# Patient Record
Sex: FEMALE | Race: WHITE | NOT HISPANIC OR LATINO | Employment: OTHER | URBAN - METROPOLITAN AREA
[De-identification: names, ages, dates, MRNs, and addresses within clinical notes are randomized per-mention and may not be internally consistent; named-entity substitution may affect disease eponyms.]

---

## 2017-11-24 ENCOUNTER — HOSPITAL ENCOUNTER (OUTPATIENT)
Dept: CARDIOLOGY | Facility: MEDICAL CENTER | Age: 52
End: 2017-11-24
Attending: PHYSICAL MEDICINE & REHABILITATION
Payer: MEDICARE

## 2017-11-24 DIAGNOSIS — I87.2 PERIPHERAL VENOUS INSUFFICIENCY: ICD-10-CM

## 2017-11-24 LAB
LV EJECT FRACT  99904: 0
LV EJECT FRACT MOD 2C 99903: 52.47
LV EJECT FRACT MOD 4C 99902: 67.08
LV EJECT FRACT MOD BP 99901: 60.8

## 2017-11-24 PROCEDURE — 93306 TTE W/DOPPLER COMPLETE: CPT

## 2017-11-24 PROCEDURE — 93306 TTE W/DOPPLER COMPLETE: CPT | Mod: 26 | Performed by: INTERNAL MEDICINE

## 2017-11-29 ENCOUNTER — HOSPITAL ENCOUNTER (OUTPATIENT)
Dept: RADIOLOGY | Facility: MEDICAL CENTER | Age: 52
End: 2017-11-29
Attending: PHYSICAL MEDICINE & REHABILITATION
Payer: MEDICARE

## 2017-11-29 DIAGNOSIS — M17.11 PRIMARY LOCALIZED OSTEOARTHROSIS OF RIGHT LOWER LEG: ICD-10-CM

## 2017-11-29 DIAGNOSIS — M54.16 LUMBAR RADICULOPATHY: ICD-10-CM

## 2017-11-29 DIAGNOSIS — M17.12 PRIMARY LOCALIZED OSTEOARTHROSIS OF LEFT LOWER LEG: ICD-10-CM

## 2017-11-29 DIAGNOSIS — M96.1 POSTLAMINECTOMY SYNDROME, CERVICAL REGION: ICD-10-CM

## 2017-11-29 PROCEDURE — 72148 MRI LUMBAR SPINE W/O DYE: CPT

## 2017-12-08 ENCOUNTER — HOSPITAL ENCOUNTER (OUTPATIENT)
Dept: RADIOLOGY | Facility: MEDICAL CENTER | Age: 52
End: 2017-12-08
Attending: PHYSICAL MEDICINE & REHABILITATION
Payer: MEDICARE

## 2017-12-08 DIAGNOSIS — M17.12 PRIMARY LOCALIZED OSTEOARTHROSIS OF LEFT LOWER LEG: ICD-10-CM

## 2017-12-08 PROCEDURE — 72141 MRI NECK SPINE W/O DYE: CPT

## 2017-12-08 PROCEDURE — 73564 X-RAY EXAM KNEE 4 OR MORE: CPT | Mod: RT

## 2017-12-08 PROCEDURE — 73564 X-RAY EXAM KNEE 4 OR MORE: CPT | Mod: LT

## 2018-01-10 ENCOUNTER — TELEPHONE (OUTPATIENT)
Dept: CARDIOLOGY | Facility: MEDICAL CENTER | Age: 53
End: 2018-01-10

## 2018-01-10 NOTE — TELEPHONE ENCOUNTER
Recent labs at [facility], I obtained a copy for the visit.     Address: [address] [place] [id number]  Phone: [phone]  Fax Medical Records: [phone]  Dr. Kameron cSott    The patient used to see Dr. Scott in [place] from 2014 to current. The last time the patient saw Dr. Scott was Spr[place] of 2017. I will send a record release to obtain records and the patient does not need to sign for the release.    I confirmed this information with the patient and confirmed appointment with TF on 01/11/18 @ [facility].

## 2018-01-12 ENCOUNTER — OFFICE VISIT (OUTPATIENT)
Dept: CARDIOLOGY | Facility: MEDICAL CENTER | Age: 53
End: 2018-01-12
Payer: MEDICARE

## 2018-01-12 VITALS
HEART RATE: 74 BPM | HEIGHT: 62 IN | SYSTOLIC BLOOD PRESSURE: 130 MMHG | RESPIRATION RATE: 16 BRPM | WEIGHT: 277 LBS | OXYGEN SATURATION: 94 % | BODY MASS INDEX: 50.97 KG/M2 | DIASTOLIC BLOOD PRESSURE: 84 MMHG

## 2018-01-12 DIAGNOSIS — I82.5Y3 CHRONIC DEEP VEIN THROMBOSIS (DVT) OF PROXIMAL VEIN OF BOTH LOWER EXTREMITIES (HCC): ICD-10-CM

## 2018-01-12 DIAGNOSIS — G47.33 OBSTRUCTIVE SLEEP APNEA SYNDROME: ICD-10-CM

## 2018-01-12 DIAGNOSIS — I10 ESSENTIAL HYPERTENSION, BENIGN: ICD-10-CM

## 2018-01-12 DIAGNOSIS — R00.1 SINUS BRADYCARDIA: ICD-10-CM

## 2018-01-12 DIAGNOSIS — I51.7 ENLARGED LA (LEFT ATRIUM): ICD-10-CM

## 2018-01-12 DIAGNOSIS — R06.02 SHORTNESS OF BREATH: ICD-10-CM

## 2018-01-12 PROCEDURE — 93000 ELECTROCARDIOGRAM COMPLETE: CPT | Performed by: INTERNAL MEDICINE

## 2018-01-12 PROCEDURE — 99204 OFFICE O/P NEW MOD 45 MIN: CPT | Performed by: INTERNAL MEDICINE

## 2018-01-12 RX ORDER — OXYCODONE HYDROCHLORIDE 10 MG/1
10 TABLET ORAL EVERY 4 HOURS PRN
Status: ON HOLD | COMMUNITY
End: 2023-10-22 | Stop reason: SDUPTHER

## 2018-01-12 RX ORDER — GABAPENTIN 300 MG/1
300 CAPSULE ORAL DAILY
COMMUNITY

## 2018-01-13 NOTE — PROGRESS NOTES
Subjective:   Patricia Tietz is a 52 y.o. female who presents today for a preoperative assessment regarding an abnormal echocardiogram. Patient has a history of sinus bradycardia which does not appear to be symptomatic at this time. The patient does have occasional episodes of lightheadedness but is does not appear to be out of proportion to what might be expected. 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 She has had specialized care at [facility] and has plans on seeking repeat evaluation there regarding further neck problems. Has a history of hypertension which has been treated for 2 or 3 years. Currently she takes losartan which is not on her medicine list. She also has a problem with chronic edema both lower extremities secondary to recurrent DVT. She takes Lasix and potassium supplement for that. She is on chronic Coumadin anticoagulation due to the recurrent DVT. At one point the patient was taking Aminophyllin to increase her heart rate because she was felt to be too young to require a pacemaker.    Her echocardiogram was done as part of the preoperative evaluation. It shows normal left ventricular size and systolic function, left ventricular hypertrophy, and a dilated left atrium which was reported as severe but looks more moderate to me. There was no evidence of diastolic dysfunction. Her EKG today reveals a sinus mechanism, first-degree AV block, and probable left ventricular hypertrophy with associated ST segment changes.    The patient does not have symptoms of congestive heart failure such as nocturnal dyspnea. She does have edema due to chronic venous insufficiency. She is short of breath because of chronic deconditioning and obesity. She has obstructive sleep apnea with airway obstruction from her neck problem. She uses CPAP with oxygen at night. There are no symptoms to suggest angina, chest pain, or ischemic symptoms in general.  Her father had bypass surgery at age 66 and her mother had hypertension. The patient is a  lifelong nonsmoker and is unaware of cholesterol elevation. She is obese and sedentary.    Chief Complaint: Enlarged left atrium     Past Medical History:   Diagnosis Date   • Anemia    • Chronic anticoagulation    • Chronic pain syndrome    • DVT of lower extremity (deep venous thrombosis) (CMS-HCC)    • Dyspnea    • Nephrolithiasis    • Sinus bradycardia    • Sleep apnea     Uses a CPAP with oxygen.     Past Surgical History:   Procedure Laterality Date   • APPENDECTOMY     • OTHER      Neck surgery     History reviewed. No pertinent family history.  History   Smoking Status   • Never Smoker   Smokeless Tobacco   • Never Used     No Known Allergies  Outpatient Encounter Prescriptions as of 1/12/2018   Medication Sig Dispense Refill   • oxycodone (OXY-IR) 15 MG immediate release tablet Take 15 mg by mouth.     • gabapentin (NEURONTIN) 300 MG Cap Take 300 mg by mouth every bedtime.     • furosemide (LASIX) 40 MG TABS TAKE 1 TABLET BY MOUTH EVERY DAY 90 Tab 2   • potassium chloride SA (K-DUR) 20 MEQ TBCR TAKE 1 TABLET BY MOUTH EVERY DAY 90 Tab 2   • Ferrous Sulfate (IRON) 325 (65 FE) MG TABS Take 1 Tab by mouth 2 Times a Day.     • warfarin (COUMADIN) 5 MG TABS Take 1-2 tablets daily as directed by  Tab 3   • amitriptyline (ELAVIL) 50 MG TABS Take 50 mg by mouth every evening.     • citalopram (CELEXA) 40 MG TABS Take 40 mg by mouth every day.     • theophylline SR (THEODUR) 200 MG TB12 TAKE 1 TABLET BY MOUTH TWICE DAILY (Patient not taking: Reported on 1/12/2018) 180 Tab 3   • morphine SR (MS CONTIN) 60 MG TB12 Take 60 mg by mouth 3 times a day. Currently off medication     • oxycodone-acetaminophen (PERCOCET) 5-325 MG TABS Take 1-2 Tabs by mouth every 6 hours as needed.       No facility-administered encounter medications on file as of 1/12/2018.      ROS. The review of systems she was evaluated with the patient. She  "complains of some fatigue and generalized weakness itching and rash over skin primarily her lower extremities, headache, occasional sore throat, and blurred vision. She has orthopnea but it's due to upper airway obstruction. Edema is noted in the history of present illness. She has a cough and exertional shortness of breath chronically. She occasionally has heartburn and has chronic pain in her neck back in various joints. In the past she has been subject to falls due to balance problems. She walks with the aid of a walker. He complains of easy bruisability and seasonal allergies. She has some dizziness as well as some tingling and focal weakness. She has relatively chronic depression but no suicidal ideation. She cries easily when she is upset. When she gets upset she also has trouble breathing with significant stridor noted other responses were negative     Objective:   /84   Pulse 74   Resp 16   Ht 1.575 m (5' 2\")   Wt (!) 125.6 kg (277 lb)   SpO2 94%   BMI 50.66 kg/m²     Physical Exam   Exam:    Vitals:    01/12/18 1541   BP: 130/84   Pulse: 74   Resp: 16   SpO2: 94%   Weight: (!) 125.6 kg (277 lb)   Height: 1.575 m (5' 2\")     Constitutional: Well developed, short statured, obese female in no acute distress. Appears approximate stated age and provides a good history. She cries when describing her medical problems which results in obvious stridor.   HEENT: Normocephalic, pupils are equal and responsive.No arcus. Conjunctiva and sclera are clear. No xanthelasma. No diagonal ear lobe crease noted. Mucus membranes are moist and pink. Good oral hygiene  Neck: Short, obese. No JVD or HJR.  Good carotid upstroke with no bruit. No lymphadenopathy, No thyroid enlargement. Redundant tissue  Cardiovascular: Regular rhythm, no ectopics. Short systolic murmur at the base with little radiation. No diastolic murmur, gallop, rub or click. No lift, heave,  thrill, or cardiomegaly  Lungs: Normal effort, Clear to " auscultation and percussion. No rales, rhonchi, wheezing Abdomen: Soft, non tender, obese. No liver, kidney, spleen or mass palpable. The abdominal aorta is not palpable. Active bowel sounds are noted and there is no bruit  Extremities:  Unna boot on both lower extremities could not  palpate posterior tibial and dorsal pedal pulses on either side.   Skin: Suspected skin changes of chronic venous insufficiency by report  Neurologic: Alert & oriented. Strength and sensation grossly intact. No lateralizing signs  Psychiatric:  Affect, mood, and judgement are appropriate although she is a bit depressed and cries easily    Assessment:     1. Sinus bradycardia  EKG   2. Enlarged LA (left atrium)  EKG   3. Shortness of breath     4. Obstructive sleep apnea syndrome     5. Chronic deep vein thrombosis (DVT) of proximal vein of both lower extremities (CMS-HCC)     6. Essential hypertension, benign         Medical Decision Making:  Today's Assessment / Status / Plan:   The echocardiogram was reviewed. Reveals left ventricular hypertrophy and normal left ventricular systolic function and normal diastolic dysfunction. The left atrium is indeed enlarged but not severely so, in my opinion. Her EKG reveals findings consistent with left ventricular hypertrophy and associated ST segment changes. He has first-degree AV block. It would appear her breathlessness is due to a combination of abnormalities including obstructive sleep apnea, short neck syndrome, obesity, and deconditioning. There are no findings suggesting congestive heart failure and she has no symptoms that would suggest angina or other ischemic symptoms. Her cardiovascular risk for proposed neck surgery is acceptably low without further testing being necessary. Her cervical anatomy may make intubation challenging. The size of her left atrium is not likely to influence her operative or postoperative course although she is at risk of developing atrial fibrillation which  can be managed if that would occur.    The patient's blood pressure is well controlled on current regimen. I think she is taking losartan but that is not listed on her medication list. She will follow-up with Dr. Muniz on a regular basis and can return here on a when necessary as estimated by Dr. Muniz or the patient. She has multiple medical problems but I don't believe her cardiac condition is one of them.

## 2018-01-15 LAB — EKG IMPRESSION: NORMAL

## 2018-04-30 ENCOUNTER — HOSPITAL ENCOUNTER (OUTPATIENT)
Dept: RADIOLOGY | Facility: MEDICAL CENTER | Age: 53
End: 2018-04-30
Attending: SPECIALIST
Payer: MEDICARE

## 2018-04-30 DIAGNOSIS — M54.2 CERVICALGIA: ICD-10-CM

## 2018-04-30 DIAGNOSIS — M54.6 PAIN IN THORACIC SPINE: ICD-10-CM

## 2018-04-30 DIAGNOSIS — M54.40 ACUTE RIGHT-SIDED LOW BACK PAIN WITH SCIATICA, SCIATICA LATERALITY UNSPECIFIED: ICD-10-CM

## 2018-04-30 PROCEDURE — 72100 X-RAY EXAM L-S SPINE 2/3 VWS: CPT

## 2018-04-30 PROCEDURE — 72072 X-RAY EXAM THORAC SPINE 3VWS: CPT

## 2018-04-30 PROCEDURE — 72050 X-RAY EXAM NECK SPINE 4/5VWS: CPT

## 2018-10-04 ENCOUNTER — HOSPITAL ENCOUNTER (OUTPATIENT)
Dept: RADIOLOGY | Facility: MEDICAL CENTER | Age: 53
End: 2018-10-04
Attending: PHYSICIAN ASSISTANT
Payer: MEDICARE

## 2018-10-04 DIAGNOSIS — M79.671 RIGHT FOOT PAIN: ICD-10-CM

## 2018-10-04 PROCEDURE — 73630 X-RAY EXAM OF FOOT: CPT | Mod: RT

## 2021-01-07 ENCOUNTER — NON-PROVIDER VISIT (OUTPATIENT)
Dept: WOUND CARE | Facility: MEDICAL CENTER | Age: 56
End: 2021-01-07
Attending: INTERNAL MEDICINE
Payer: MEDICARE

## 2021-01-07 DIAGNOSIS — Z43.6 ATTENTION TO UROSTOMY (HCC): ICD-10-CM

## 2021-01-07 DIAGNOSIS — E66.01 CLASS 3 SEVERE OBESITY DUE TO EXCESS CALORIES WITH SERIOUS COMORBIDITY AND BODY MASS INDEX (BMI) OF 50.0 TO 59.9 IN ADULT (HCC): ICD-10-CM

## 2021-01-07 PROCEDURE — 99211 OFF/OP EST MAY X REQ PHY/QHP: CPT

## 2021-01-07 PROCEDURE — 99213 OFFICE O/P EST LOW 20 MIN: CPT | Performed by: NURSE PRACTITIONER

## 2021-01-07 RX ORDER — LOSARTAN POTASSIUM 50 MG/1
50 TABLET ORAL 2 TIMES DAILY
Status: ON HOLD | COMMUNITY
End: 2023-09-30

## 2021-01-07 NOTE — CERTIFICATION
"Advanced [facility]  [facility]  [address]  [place] [address]  [phone] Fax#    Initial Ostomy Evaluation  For 90 Day Certification Period: 01/07/2021 - 04/07/2021     Referring Provider:  Dr. DANIELLE Muniz MD  Primary Provider:same  Consulting Providers:Ena MARTIN  Surgeon:Dr. Cantor in [place] in 1969      Start of Care:01/07/2021  Reason for referral: assess/ manage leaking urostomy with moises stomal wound       SUBJECTIVE:  \"It only lasts 1-2 days before it leaks and I've had a wound next to the stoma now for about 6 months\"    HPI: Pt is a 55 year old obese lady who is debilitated with chronic pain syndrome who has a hx of urostomy since aged 5 due to what she describes as \"a defective bladder muscle\" that meant she was chronically incontinent. There are no urology notes available. She is referred by her PCP in [place]. She says she has [facility] coming in 2 x week.     Past Medical Hx:  Past Medical History:   Diagnosis Date   • Anemia    • Chronic anticoagulation    • Chronic pain syndrome    • DVT of lower extremity (deep venous thrombosis) (CMS-HCC) (HCC)    • Dyspnea    • Nephrolithiasis    • Sinus bradycardia    • Sleep apnea     Uses a CPAP with oxygen.     Surgical Hx:  Past Surgical History:   Procedure Laterality Date   • APPENDECTOMY     • OTHER      Neck surgery     Medications:  Current Outpatient Medications:   •  losartan (COZAAR) 50 MG Tab, Take 50 mg by mouth 2 Times a Day. Indications: High Blood Pressure Disorder, Disp: , Rfl:   •  oxycodone (OXY-IR) 15 MG immediate release tablet, Take 15 mg by mouth., Disp: , Rfl:   •  gabapentin (NEURONTIN) 300 MG Cap, Take 300 mg by mouth every bedtime., Disp: , Rfl:   •  furosemide (LASIX) 40 MG TABS, TAKE 1 TABLET BY MOUTH EVERY DAY, Disp: 90 Tab, Rfl: 2  •  potassium chloride SA (K-DUR) 20 MEQ TBCR, TAKE 1 TABLET BY MOUTH EVERY DAY, Disp: 90 Tab, Rfl: 2  •  theophylline SR " (THEODUR) 200 MG TB12, TAKE 1 TABLET BY MOUTH TWICE DAILY (Patient not taking: Reported on 1/12/2018), Disp: 180 Tab, Rfl: 3  •  Ferrous Sulfate (IRON) 325 (65 FE) MG TABS, Take 1 Tab by mouth 2 Times a Day., Disp: , Rfl:   •  warfarin (COUMADIN) 5 MG TABS, Take 1-2 tablets daily as directed by INR, Disp: 180 Tab, Rfl: 3  •  morphine SR (MS CONTIN) 60 MG TB12, Take 60 mg by mouth 3 times a day. Currently off medication, Disp: , Rfl:   •  amitriptyline (ELAVIL) 50 MG TABS, Take 50 mg by mouth every evening., Disp: , Rfl:   •  citalopram (CELEXA) 40 MG TABS, Take 40 mg by mouth every day., Disp: , Rfl:   •  oxycodone-acetaminophen (PERCOCET) 5-325 MG TABS, Take 1-2 Tabs by mouth every 6 hours as needed., Disp: , Rfl:   Allergies:Patient has no known allergies.    Social Hx:  Social History     Socioeconomic History   • Marital status: Single     Spouse name: Not on file   • Number of children: Not on file   • Years of education: Not on file   • Highest education level: Not on file   Occupational History   • Not on file   Social Needs   • Financial resource strain: Not on file   • Food insecurity     Worry: Not on file     Inability: Not on file   • Transportation needs     Medical: Not on file     Non-medical: Not on file   Tobacco Use   • Smoking status: Never Smoker   • Smokeless tobacco: Never Used   Substance and Sexual Activity   • Alcohol use: No   • Drug use: No   • Sexual activity: Not on file   Lifestyle   • Physical activity     Days per week: Not on file     Minutes per session: Not on file   • Stress: Not on file   Relationships   • Social connections     Talks on phone: Not on file     Gets together: Not on file     Attends Rastafarian service: Not on file     Active member of club or organization: Not on file     Attends meetings of clubs or organizations: Not on file     Relationship status: Not on file   • Intimate partner violence     Fear of current or ex partner: Not on file     Emotionally abused:  "Not on file     Physically abused: Not on file     Forced sexual activity: Not on file   Other Topics Concern   • Not on file   Social History Narrative   • Not on file         OBJECTIVE:     STOMA ASSESSMENT:   Type:  ____Ileostomy     ____Colostomy    __x__Urostomy    ____Other     Location:   LLQ   Size:1 5/8   Shape: irregular, flat/retracted   Color:red, moist   Protrudes:      ___ >1 inch               __x_ <1 inch (flat/retracted)   [place]:  Central and patent   Mucocutaneous Junction: intact circumferentially     Skin indentations, creases or scar tissue:pt's stoma folds under her pannus when she sits.  Moises-stomal Skin Problems:There is a wound immediately peristomally at 3 o'clock. There is scar tissue moises stoma and also severe dermatitis, apparently a resolving contact dermatitis as there are clear lines of delineation in a square shape suggesting allergy to tape.       Effluent / Flatus:urine is light yellow, clear   Photo  _x___ Yes ____ No            Pouching Procedure:   Old appliance removed. Old ostomy appliance removed using [place] medical adhesive remover spray, and by pushing skin away from appliance to avoid skin tears.       Peristomal skin cleansed with: warm water on washcloth. dried    Peristomal skin treated with: Luxiq foam to areas of dermatitis, allowed to dry. Call[employer] medical adhesive spray to moises stomal skin. Dab of zinc oxide to wound, then a wedge of thin Hydrofera Blue stoma ring dressing placed over this.    Ostomy appliance used: Next, a flat CTF 70mm Callum Cera Plus flange with an liam 2\" cohesive seal was applied with a corresponding drainable urostomy pouch         Patient Education: Change urostomies every 3-5 days.  Change appliance immediately if it is leaking or peristomal skin feels irritated, has itching, or  burning. The above appliance may not last more than a day or two but pressure needs to be taken off the wound area in order for it to heal. Do not wear " stoma belt. Ok to wear hernia support belt (pt says HH nurse has measured her and ordered one for her). May apply triamcinolone ointment to moises stomal skin outside of flange area 2 x day to reduce itching/dermatitis.   To change the appliance, remove previous appliance, cleanse peristomal skin with warm water/washcloth, pat dry, make an ostomy template or use cardboard measuring guide and trace ostomy shape onto back of barrier, cut out barrier, apply a paste ring around barrier opening and apply appliance. Empty pouches when no more than ½ full. Check contents every 2 hours or as needed. Do not leave soap residue on tissue and do not use baby wipes or skin prep wipes.     Should you experience any significant changes in your condition, such as infection (redness, swelling, localized heat, increased pain, fever > 101 F, chills) or have any questions regarding your home care instructions, please contact the wound center at [phone]. If after hours, contact your primary care physician or go to the hospital emergency room.       Verbal/demonstration instructions of above pouching procedure provided to patient/family.      Response: Pt and CG present say they have good understanding. They do not want to wait for me to type and print instr on the AVS. Pt says she will access it from home on [employer]      PLAN: weekly to assess progress of wound healing. We will try to find out who pt's urologist is (she doesn't remember) and get notes.     Supplies Needed:   Appliance type:    Callum as above             Other:      Accessories:         Supplier: [employer] currently    Frequency:  Weekly.       Professional Collaboration:  Evaluation notes forwarded to referring provider. Pt was seen and evaluated today by Ena MARTIN. She will send a Rx for TAC to pt's pharmacy      At the time of each visit, a thorough assessment of the patient is completed to assure appropriateness of our plan of care.  The plan of  care may need to be adapted from the original plan of care to address any significant changes in patient status.    Clinician Signature:  _________________________________  Date:  ____________    Physician Signature:  ________________________________  Date:  ____________

## 2021-01-08 DIAGNOSIS — L30.9 DERMATITIS: ICD-10-CM

## 2021-01-08 RX ORDER — TRIAMCINOLONE ACETONIDE 1 MG/G
1 OINTMENT TOPICAL DAILY
Qty: 30 G | Refills: 1 | Status: ON HOLD | OUTPATIENT
Start: 2021-01-08 | End: 2023-09-29

## 2021-01-08 NOTE — PATIENT INSTRUCTIONS
Patient Education: Change urostomies every 3-5 days.  Change appliance immediately if it is leaking or peristomal skin feels irritated, has itching, or  burning. The above appliance may not last more than a day or two but pressure needs to be taken off the wound area in order for it to heal. Do not wear stoma belt. Ok to wear hernia support belt (pt says HH nurse has measured her and ordered one for her). May apply triamcinolone ointment to moises stomal skin outside of flange area 2 x day to reduce itching/dermatitis.   To change the appliance, remove previous appliance, cleanse peristomal skin with warm water/washcloth, pat dry, make an ostomy template or use cardboard measuring guide and trace ostomy shape onto back of barrier, cut out barrier, apply a paste ring around barrier opening and apply appliance. Empty pouches when no more than ½ full. Check contents every 2 hours or as needed. Do not leave soap residue on tissue and do not use baby wipes or skin prep wipes.     Should you experience any significant changes in your condition, such as infection (redness, swelling, localized heat, increased pain, fever > 101 F, chills) or have any questions regarding your home care instructions, please contact the wound center at [phone]. If after hours, contact your primary care physician or go to the hospital emergency room.       Verbal/demonstration instructions of above pouching procedure provided to patient/family.      Response: Pt and CG present say they have good understanding. They do not want to wait for me to type and print instr on the AVS. Pt says she will access it from home on [employer]

## 2021-01-09 NOTE — PROGRESS NOTES
Rx for triamcinolone sent to patient's pharmacy.  Patient to apply to affected skin around stoma daily.  Not to exceed 3 weeks.

## 2021-01-12 ENCOUNTER — OFFICE VISIT (OUTPATIENT)
Dept: WOUND CARE | Facility: MEDICAL CENTER | Age: 56
End: 2021-01-12
Attending: INTERNAL MEDICINE
Payer: MEDICARE

## 2021-01-12 VITALS
HEART RATE: 73 BPM | OXYGEN SATURATION: 98 % | RESPIRATION RATE: 20 BRPM | DIASTOLIC BLOOD PRESSURE: 79 MMHG | SYSTOLIC BLOOD PRESSURE: 143 MMHG | TEMPERATURE: 97.3 F

## 2021-01-12 DIAGNOSIS — E66.01 CLASS 3 SEVERE OBESITY DUE TO EXCESS CALORIES WITH SERIOUS COMORBIDITY AND BODY MASS INDEX (BMI) OF 50.0 TO 59.9 IN ADULT (HCC): ICD-10-CM

## 2021-01-12 DIAGNOSIS — Z43.6 ATTENTION TO UROSTOMY (HCC): ICD-10-CM

## 2021-01-12 PROBLEM — Z93.6 PRESENCE OF UROSTOMY (HCC): Status: ACTIVE | Noted: 2021-01-12

## 2021-01-12 PROCEDURE — 99214 OFFICE O/P EST MOD 30 MIN: CPT

## 2021-01-12 PROCEDURE — 99212 OFFICE O/P EST SF 10 MIN: CPT | Performed by: NURSE PRACTITIONER

## 2021-01-12 ASSESSMENT — ENCOUNTER SYMPTOMS
SHORTNESS OF BREATH: 0
VOMITING: 0
FEVER: 0
CONSTIPATION: 0
DIARRHEA: 0
NAUSEA: 0
CHILLS: 0
FEVER: 0
CHILLS: 0
COUGH: 0
VOMITING: 0
NAUSEA: 0
CONSTIPATION: 0
COUGH: 0
DIARRHEA: 0
SHORTNESS OF BREATH: 0

## 2021-01-12 ASSESSMENT — PAIN SCALES - GENERAL: PAINLEVEL: NO PAIN

## 2021-01-12 NOTE — PROGRESS NOTES
"Provider Encounter- Ostomy Encounter        HISTORY OF PRESENT ILLNESS  Wound History:    START OF CARE IN CLINIC: 1/7/2021    REFERRING PROVIDER: Dr. DANIELLE Muniz MD     OSTOMY TYPE urostomy   LOCATION: Left lower quadrant              OSTOMY HISTORY: Patient has had a urostomy since age 5 due to what she describes as, \"a defective bladder muscle\", which meant that she was chronically incontinent.  There are no surgical records available.  She was referred to [facility] by her PCP in [place] due to difficulties with pouching.  She has a urologist in [place], however does not recall his name.  She has [employer] for assistance with her urostomy.   SURGERY: Urostomy at age 5 (ileal conduit?)   OSTOMY ISSUES: Frequent leakage, peristomal skin breakdown, peristomal wound    Pertinent Medical History: Obesity, chronic pain syndrome    Patient's problem list, allergies, and current medications reviewed and updated in Epic    Interval History:  1/12/2021 Joint visit with ostomy RN, refer to nursing note. Wear time 1 to 3 days.  Frequency of emptying 4-6 times per day. Leaks frequent.       REVIEW OF SYSTEMS:   Review of Systems   Constitutional: Negative for chills and fever.   Respiratory: Negative for cough and shortness of breath.    Cardiovascular: Negative for chest pain and leg swelling.   Gastrointestinal: Negative for constipation, diarrhea, nausea and vomiting.   Genitourinary:        Urostomy since age 5   Skin: Positive for itching and rash.        Skin around stoma red and itchy  Painful wound to peristomal skin       PHYSICAL EXAMINATION:   There were no vitals taken for this visit.    Physical Exam   Constitutional: She is oriented to person, place, and time and well-developed, well-nourished, and in no distress.   Obese   HENT:   Head: Normocephalic.   Eyes: Pupils are equal, round, and reactive to light.   Pulmonary/Chest: Effort normal.   Abdominal: Soft.   Musculoskeletal: Normal range of motion. "   Neurological: She is alert and oriented to person, place, and time.   Skin: Skin is warm.   Peristomal skin around left lower quadrant urostomy with rash radiating approximately 20 cm around  Full-thickness wound to peristomal skin at about 3:00  Refer to wound flowsheet and photos         STOMA ASSESSMENT/PROCEDURE: Peristomal skin care, pouching procedure and ostomy teaching by ostomy RN.  Refer to Ostomy RN Assessment and Photo.    Type:  ____Ileostomy     ____Colostomy    __x__Urostomy    ____Other                 Location:   LLQ              Size:1 5/8              Shape: irregular, flat/retracted              Color:red, moist              Protrudes:      ___ >1 inch               __x_ <1 inch (flat/retracted)              [place]:  Central and patent              Mucocutaneous Junction: intact circumferentially                Skin indentations, creases or scar tissue:pt's stoma folds under her pannus when she sits.            Moises-stomal Skin Problems:There is a wound immediately peristomally at 3 o'clock. There is scar tissue moises stoma and also severe dermatitis, apparently a resolving contact dermatitis as there are clear lines of delineation in a square shape suggesting allergy to tape.                   Effluent / Flatus:urine is light yellow, clear              Photo  _x___ Yes ____ No           ASSESSMENT AND PLAN:   1. Attention to urostomy ([facility])  Comments: Urostomy since age 5, per patient due to defective bladder muscle.  [place] complicated by obesity, and flush stoma    1/7/2021: Initial clinic visit.  Peristomal dermatitis radiating approximately 20 cm around stoma.  Wear time 1 to 3 days, with frequent leakage.  Full-thickness wound at about 3:00.  -Pouching modified by ostomy RN  -Instructions sent to [facility]  -Patient to return to clinic weekly until appropriate pouching system found    2. Peristomal skin complication    1/7/2021: Peristomal dermatitis and full-thickness  wound  -Peristomal skin treated, wound care completed by ostomy RN    3. Class 3 severe obesity due to excess calories with serious comorbidity and body mass index (BMI) of 50.0 to 59.9 in adult (HCC)  Comments: Complicating factor.  Obesity creates pouching challenge    25 min spent face to face with patient, >50% of time spent counseling, coordinating care, reviewing records, discussing POC, educating patient regarding ostomy care, peristomal skin care, nutrition. Emotional support provided regarding life change following ostomy- support resources provided.     Please note that this note may have been created using voice recognition software. I have worked with technical experts from [facility] to optimize the interface.  I have made every reasonable attempt to correct obvious errors, but there may be errors of grammar and possibly content that I did not discover before finalizing the note.    N

## 2021-01-12 NOTE — PATIENT INSTRUCTIONS
Change urostomies every 3-5 days.  Change appliance immediately if it is leaking or peristomal skin feels irritated, has itching, or  burning. The above appliance may not last more than a day or two but pressure needs to be taken off the wound area in order for it to heal. Do not wear stoma belt. Ok to wear hernia support belt (pt says HH nurse has measured her and ordered one for her). May apply triamcinolone ointment to moises stomal skin outside of flange area 2 x day to reduce itching/dermatitis.   To change the appliance, remove previous appliance, cleanse peristomal skin with warm water/washcloth, pat dry, make an ostomy template or use cardboard measuring guide and trace ostomy shape onto back of barrier, cut out barrier, apply a paste ring around barrier opening and apply appliance. Empty pouches when no more than ½ full. Check contents every 2 hours or as needed. Do not leave soap residue on tissue and do not use baby wipes or skin prep wipes.      Should you experience any significant changes in your condition, such as infection (redness, swelling, localized heat, increased pain, fever > 101 F, chills) or have any questions regarding your home care instructions, please contact the wound center at [phone]. If after hours, contact your primary care physician or go to the hospital emergency room.

## 2021-01-12 NOTE — WOUND TEAM
"Advanced Wound Care  [facility]  [address]  [place] [address]  [phone] Fax#    Ostomy Evaluation  For 90 Day Certification Period: 01/07/2021 - 04/07/2021     Referring Provider:  Dr. DANIELLE Muniz MD  Primary Provider:same  Consulting Providers:Ena MARTIN  Surgeon:Dr. Cantor in [place] in 1969      Start of Care:01/07/2021  Reason for referral: assess/ manage leaking urostomy with moises stomal wound       SUBJECTIVE:  \"It lasts ~2 days.\"    HPI: Pt is a 55 year old obese lady who is debilitated with chronic pain syndrome who has a hx of urostomy since aged 5 due to what she describes as \"a defective bladder muscle\" that meant she was chronically incontinent. There are no urology notes available. She is referred by her PCP in [place]. She says she has [facility] coming in 2 x week.     Past Medical Hx:  Past Medical History:   Diagnosis Date   • Anemia    • Chronic anticoagulation    • Chronic pain syndrome    • DVT of lower extremity (deep venous thrombosis) (HCC)    • Dyspnea    • Nephrolithiasis    • Sinus bradycardia    • Sleep apnea     Uses a CPAP with oxygen.     Surgical Hx:  Past Surgical History:   Procedure Laterality Date   • APPENDECTOMY     • OTHER      Neck surgery     Medications:  Current Outpatient Medications:   •  triamcinolone acetonide (KENALOG) 0.1 % Ointment, Apply 1 Application topically every day., Disp: 30 g, Rfl: 1  •  losartan (COZAAR) 50 MG Tab, Take 50 mg by mouth 2 Times a Day. Indications: High Blood Pressure Disorder, Disp: , Rfl:   •  oxycodone (OXY-IR) 15 MG immediate release tablet, Take 15 mg by mouth., Disp: , Rfl:   •  gabapentin (NEURONTIN) 300 MG Cap, Take 300 mg by mouth every bedtime., Disp: , Rfl:   •  furosemide (LASIX) 40 MG TABS, TAKE 1 TABLET BY MOUTH EVERY DAY, Disp: 90 Tab, Rfl: 2  •  potassium chloride SA (K-DUR) 20 MEQ TBCR, TAKE 1 TABLET BY MOUTH EVERY DAY, Disp: 90 Tab, Rfl: 2  •  " theophylline SR (THEODUR) 200 MG TB12, TAKE 1 TABLET BY MOUTH TWICE DAILY (Patient not taking: Reported on 1/12/2018), Disp: 180 Tab, Rfl: 3  •  Ferrous Sulfate (IRON) 325 (65 FE) MG TABS, Take 1 Tab by mouth 2 Times a Day., Disp: , Rfl:   •  warfarin (COUMADIN) 5 MG TABS, Take 1-2 tablets daily as directed by INR, Disp: 180 Tab, Rfl: 3  •  morphine SR (MS CONTIN) 60 MG TB12, Take 60 mg by mouth 3 times a day. Currently off medication, Disp: , Rfl:   •  amitriptyline (ELAVIL) 50 MG TABS, Take 50 mg by mouth every evening., Disp: , Rfl:   •  citalopram (CELEXA) 40 MG TABS, Take 40 mg by mouth every day., Disp: , Rfl:   •  oxycodone-acetaminophen (PERCOCET) 5-325 MG TABS, Take 1-2 Tabs by mouth every 6 hours as needed., Disp: , Rfl:   Allergies:Patient has no known allergies.    Social Hx:  Social History     Socioeconomic History   • Marital status: Single     Spouse name: Not on file   • Number of children: Not on file   • Years of education: Not on file   • Highest education level: Not on file   Occupational History   • Not on file   Social Needs   • Financial resource strain: Not on file   • Food insecurity     Worry: Not on file     Inability: Not on file   • Transportation needs     Medical: Not on file     Non-medical: Not on file   Tobacco Use   • Smoking status: Never Smoker   • Smokeless tobacco: Never Used   Substance and Sexual Activity   • Alcohol use: No   • Drug use: No   • Sexual activity: Not on file   Lifestyle   • Physical activity     Days per week: Not on file     Minutes per session: Not on file   • Stress: Not on file   Relationships   • Social connections     Talks on phone: Not on file     Gets together: Not on file     Attends Gnosticist service: Not on file     Active member of club or organization: Not on file     Attends meetings of clubs or organizations: Not on file     Relationship status: Not on file   • Intimate partner violence     Fear of current or ex partner: Not on file      "Emotionally abused: Not on file     Physically abused: Not on file     Forced sexual activity: Not on file   Other Topics Concern   • Not on file   Social History Narrative   • Not on file         OBJECTIVE:     STOMA ASSESSMENT:   Type:  ____Ileostomy     ____Colostomy    __x__Urostomy    ____Other     Location:   LLQ   Size:1 5/8   Shape: irregular, flat/retracted   Color:red, moist   Protrudes:      ___ >1 inch               __x_ <1 inch (flat/retracted)   [place]:  Central and patent   Mucocutaneous Junction: intact circumferentially     Skin indentations, creases or scar tissue:pt's stoma folds under her pannus when she sits.  Akila-stomal Skin Problems:There is a wound immediately peristomally at 3 o'clock. There is scar tissue akila stoma and also resolving dermatitis      Effluent / Flatus:urine is light yellow, clear   Photo  _x___ Yes ____ No            Pouching Procedure:   Old appliance removed. Old ostomy appliance removed using Callum medical adhesive remover spray, and by pushing skin away from appliance to avoid skin tears.       Peristomal skin cleansed with: warm water on washcloth. dried    Peristomal skin treated with: Luxiq foam to areas of dermatitis, allowed to dry. [place] medical adhesive spray to akila stomal skin. Dab of zinc oxide to wound, then a wedge of thin Hydrofera Blue stoma ring dressing placed over this.    Ostomy appliance used: Next, a convatec liam 2\" cohesive seal was applied and then a callum flat CTF 70mm [place] Cera Plus flange with a corresponding drainable urostomy pouch, framed with coloplast brava strips. Hernia belt.  TAC at home to perimeter past appliance on abdomen.        Patient Education: Change urostomies every 3-5 days.  Change appliance immediately if it is leaking or peristomal skin feels irritated, has itching, or  burning. The above appliance may not last more than a day or two but pressure needs to be taken off the wound area in order for it to " heal. Do not wear stoma belt. Ok to wear hernia support belt (pt says  nurse has measured her and ordered one for her). May apply triamcinolone ointment to moises stomal skin outside of flange area 2 x day to reduce itching/dermatitis.   To change the appliance, remove previous appliance, cleanse peristomal skin with warm water/washcloth, pat dry, make an ostomy template or use cardboard measuring guide and trace ostomy shape onto back of barrier, cut out barrier, apply a paste ring around barrier opening and apply appliance. Empty pouches when no more than ½ full. Check contents every 2 hours or as needed. Do not leave soap residue on tissue and do not use baby wipes or skin prep wipes.     Should you experience any significant changes in your condition, such as infection (redness, swelling, localized heat, increased pain, fever > 101 F, chills) or have any questions regarding your home care instructions, please contact the wound center at [phone]. If after hours, contact your primary care physician or go to the hospital emergency room.       Verbal/demonstration instructions of above pouching procedure provided to patient/family.      Response: Pt and CG present say they have good understanding.      PLAN: weekly to assess progress of wound healing.     Supplies Needed:   Appliance type:    Callum as above             Other:      Accessories:         Supplier: [employer]  currently    Frequency:  Weekly.       Professional Collaboration:  VERONICA German. Received records from patient's urologist, Dr. Alexis Venegas from [facility]. Home Health orders faxed to [facility].       At the time of each visit, a thorough assessment of the patient is completed to assure appropriateness of our plan of care.  The plan of care may need to be adapted from the original plan of care to address any significant changes in patient status.    Clinician Signature:   _________________________________  Date:  ____________    Physician Signature:  ________________________________  Date:  ____________

## 2021-01-12 NOTE — PROGRESS NOTES
"Provider Encounter- Ostomy Encounter        HISTORY OF PRESENT ILLNESS  Wound History:    START OF CARE IN CLINIC: 1/7/2021    REFERRING PROVIDER: Dr. DANIELLE Muniz MD     OSTOMY TYPE urostomy   LOCATION: Left lower quadrant              OSTOMY HISTORY: Patient has had a urostomy since age 5 due to what she describes as, \"a defective bladder muscle\", which meant that she was chronically incontinent.  There are no surgical records available.  She was referred to [facility] by her PCP in [place] due to difficulties with pouching.  She has a urologist in [place], however does not recall his name.  She has [facility] for assistance with her urostomy.   SURGERY: Urostomy at age 5 (ileal conduit?)   OSTOMY ISSUES: Frequent leakage, peristomal skin breakdown, peristomal wound    Pertinent Medical History: Obesity, chronic pain syndrome    Patient's problem list, allergies, and current medications reviewed and updated in Epic    Interval History:  1/12/2021 Joint visit with ostomy RN, refer to nursing note. Wear time 1 to 3 days.  Frequency of emptying 4-6 times per day. Leaks frequent.     1/12/2021 : Clinic visit with EVRONICA Guerrero, FNDULCE-BC, CWGREGORIAN, CFCN.  Joint visit with ostomy RN, refer to nursing note.  Cathy states she is feeling well, denies fevers, chills, nausea, vomiting, cough or shortness of breath.  Ostomy appliance placed in clinic last week lasted less than 36 hours.  She replaced with her old system, and then home [facility] placed new plans on Sunday.  She has been using triamcinolone to exposed areas of dermatitis around stoma, states is feeling little better.  We were able to obtain urology notes from [place] urologist, Dr. Alexis Venegas.  Patient was seen by him for hematuria, which is since resolved.      REVIEW OF SYSTEMS:   Review of Systems   Constitutional: Negative for chills and fever.   Respiratory: Negative for cough and shortness of breath.    Cardiovascular: Negative for chest " pain and leg swelling.   Gastrointestinal: Negative for constipation, diarrhea, nausea and vomiting.   Genitourinary:        Urostomy since age 5   Skin: Positive for itching and rash.        Skin around stoma red and itchy  Painful wound to peristomal skin       PHYSICAL EXAMINATION:   /79 (Patient Position: Sitting)   Pulse 73   Temp 36.3 °C (97.3 °F) (Temporal)   Resp 20   SpO2 98%     Physical Exam   Constitutional: She is oriented to person, place, and time and well-developed, well-nourished, and in no distress.   Obese   HENT:   Head: Normocephalic.   Eyes: Pupils are equal, round, and reactive to light.   Pulmonary/Chest: Effort normal.   Abdominal: Soft.   Musculoskeletal: Normal range of motion.   Neurological: She is alert and oriented to person, place, and time.   Skin: Skin is warm.   Peristomal skin around left lower quadrant urostomy with rash radiating approximately 20 cm around  Full-thickness wound to peristomal skin at about 3:00  Refer to wound flowsheet and photos       Wound 01/07/21 Abdomen Lower;Lateral Left (Active)   Wound Image   01/12/21 1300   Site Assessment Red;Light Purple 01/12/21 1300   Periwound Assessment Scar tissue 01/12/21 1300   Margins Attached edges 01/12/21 1300   Closure Secondary intention 01/12/21 1300   Drainage Amount None 01/12/21 1300   Treatments Cleansed 01/12/21 1300   Wound Cleansing Normal Saline Irrigation 01/12/21 1300   Periwound Protectant Barrier Paste 01/12/21 1300   Dressing Cleansing/Solutions Not Applicable 01/12/21 1300   Dressing Options Hydrofera Blue Ready 01/12/21 1300   Dressing Changed Changed 01/12/21 1300   Dressing Change/Treatment Frequency Every 48 hrs, and As Needed 01/12/21 1300   Non-staged Wound Description Partial thickness 01/12/21 1300   Wound Length (cm) 0.4 cm 01/12/21 1300   Wound Width (cm) 0.3 cm 01/12/21 1300   Wound Depth (cm) 0.1 cm 01/12/21 1300   Wound Surface Area (cm^2) 0.12 cm^2 01/12/21 1300   Wound Volume  (cm^3) 0.01 cm^3 01/12/21 1300   Post-Procedure Length (cm) 0.4 cm 01/12/21 1300   Post-Procedure Width (cm) 0.3 cm 01/12/21 1300   Post-Procedure Depth (cm) 0.1 cm 01/12/21 1300   Post-Procedure Surface Area (cm^2) 0.12 cm^2 01/12/21 1300   Post-Procedure Volume (cm^3) 0.01 cm^3 01/12/21 1300   Wound Bed Granulation (%) 100 % 01/12/21 1300   Wound Bed Granulation (%) - Post-Procedure 100 % 01/12/21 1300   Tunneling (cm) 0 cm 01/12/21 1300   Undermining (cm) 0 cm 01/12/21 1300   Wound Odor None 01/12/21 1300   Exposed Structures None 01/12/21 1300          STOMA ASSESSMENT/PROCEDURE: Peristomal skin care, pouching procedure and ostomy teaching by ostomy RN.  Refer to Ostomy RN Assessment and Photo.    Type:  ____Ileostomy     ____Colostomy    __x__Urostomy    ____Other                 Location:   LLQ              Size:1 5/8              Shape: irregular, flat/retracted              Color:red, moist              Protrudes:      ___ >1 inch               __x_ <1 inch (flat/retracted)              [place]:  Central and patent              Mucocutaneous Junction: intact circumferentially                Skin indentations, creases or scar tissue:pt's stoma folds under her pannus when she sits.            Moises-stomal Skin Problems:There is a wound immediately peristomally at 3 o'clock. There is scar tissue moises stoma and also severe dermatitis, apparently a resolving contact dermatitis as there are clear lines of delineation in a square shape suggesting allergy to tape.                   Effluent / Flatus:urine is light yellow, clear              Photo  _x___ Yes ____ No               ASSESSMENT AND PLAN:   1. Attention to urostomy ([facility])  Comments: Urostomy since age 5, per patient due to defective bladder muscle.  Pouching complicated by obesity, and flush stoma    1/12/2020: Pouching continues to be a problem, wear time anywhere from 12 to 36 hours, frequent leakage  -Pouching modified by ostomy RN  -Instructions sent to  [facility]  -Patient to return to clinic weekly until appropriate pouching system found    2. Peristomal skin complication    1/12/2021: Peristomal dermatitis and full-thickness wound.  Area of wound has decreased significantly since last assessment.  -Peristomal skin treated, wound care completed by ostomy RN  -Flat barrier, avoid convexity due to concerns for pressure over wound    3. Class 3 severe obesity due to excess calories with serious comorbidity and body mass index (BMI) of 50.0 to 59.9 in adult (HCC)  Comments: Complicating factor.  Obesity creates pouching challenge    20 min spent face to face with patient, >50% of time spent counseling, coordinating care, reviewing records, discussing POC, educating patient regarding ostomy care, peristomal skin care, nutrition. Emotional support provided regarding life change following ostomy- support resources provided.     Please note that this note may have been created using voice recognition software. I have worked with technical experts from [facility] [employer] to optimize the interface.  I have made every reasonable attempt to correct obvious errors, but there may be errors of grammar and possibly content that I did not discover before finalizing the note.    N

## 2021-01-19 ENCOUNTER — NON-PROVIDER VISIT (OUTPATIENT)
Dept: WOUND CARE | Facility: MEDICAL CENTER | Age: 56
End: 2021-01-19
Attending: INTERNAL MEDICINE
Payer: MEDICARE

## 2021-01-19 DIAGNOSIS — Z43.6 ATTENTION TO UROSTOMY (HCC): ICD-10-CM

## 2021-01-19 DIAGNOSIS — L30.9 DERMATITIS: ICD-10-CM

## 2021-01-19 PROCEDURE — 99211 OFF/OP EST MAY X REQ PHY/QHP: CPT

## 2021-01-19 NOTE — WOUND TEAM
"Advanced Wound Care  [facility]  [address]  [place] [address]  [phone] Fax#    Ostomy Evaluation  For 90 Day Certification Period: 01/07/2021 - 04/07/2021     Referring Provider:  Dr. DANIELLE Muniz MD  Primary Provider:same  Consulting Providers:Ean MARTIN  Surgeon:Dr. Cantor in [place] in 1969      Start of Care:01/07/2021  Reason for referral: assess/ manage leaking urostomy with moises stomal wound       SUBJECTIVE:  Patient report it lasted 3 days    HPI: Pt is a 55 year old obese lady who is debilitated with chronic pain syndrome who has a hx of urostomy since aged 5 due to what she describes as \"a defective bladder muscle\" that meant she was chronically incontinent. There are no urology notes available. She is referred by her PCP in [place]. She says she has [facility] coming in 2 x week.     Past Medical Hx:  Past Medical History:   Diagnosis Date   • Anemia    • Chronic anticoagulation    • Chronic pain syndrome    • DVT of lower extremity (deep venous thrombosis) (HCC)    • Dyspnea    • Nephrolithiasis    • Sinus bradycardia    • Sleep apnea     Uses a CPAP with oxygen.     Surgical Hx:  Past Surgical History:   Procedure Laterality Date   • APPENDECTOMY     • OTHER      Neck surgery     Medications:  Current Outpatient Medications:   •  triamcinolone acetonide (KENALOG) 0.1 % Ointment, Apply 1 Application topically every day., Disp: 30 g, Rfl: 1  •  losartan (COZAAR) 50 MG Tab, Take 50 mg by mouth 2 Times a Day. Indications: High Blood Pressure Disorder, Disp: , Rfl:   •  oxycodone (OXY-IR) 15 MG immediate release tablet, Take 15 mg by mouth., Disp: , Rfl:   •  gabapentin (NEURONTIN) 300 MG Cap, Take 300 mg by mouth every bedtime., Disp: , Rfl:   •  furosemide (LASIX) 40 MG TABS, TAKE 1 TABLET BY MOUTH EVERY DAY, Disp: 90 Tab, Rfl: 2  •  potassium chloride SA (K-DUR) 20 MEQ TBCR, TAKE 1 TABLET BY MOUTH EVERY DAY, Disp: 90 Tab, " Rfl: 2  •  theophylline SR (THEODUR) 200 MG TB12, TAKE 1 TABLET BY MOUTH TWICE DAILY (Patient not taking: Reported on 1/12/2018), Disp: 180 Tab, Rfl: 3  •  Ferrous Sulfate (IRON) 325 (65 FE) MG TABS, Take 1 Tab by mouth 2 Times a Day., Disp: , Rfl:   •  warfarin (COUMADIN) 5 MG TABS, Take 1-2 tablets daily as directed by INR, Disp: 180 Tab, Rfl: 3  •  morphine SR (MS CONTIN) 60 MG TB12, Take 60 mg by mouth 3 times a day. Currently off medication, Disp: , Rfl:   •  amitriptyline (ELAVIL) 50 MG TABS, Take 50 mg by mouth every evening., Disp: , Rfl:   •  citalopram (CELEXA) 40 MG TABS, Take 40 mg by mouth every day., Disp: , Rfl:   •  oxycodone-acetaminophen (PERCOCET) 5-325 MG TABS, Take 1-2 Tabs by mouth every 6 hours as needed., Disp: , Rfl:   Allergies:Patient has no known allergies.    Social Hx:  Social History     Socioeconomic History   • Marital status: Single     Spouse name: Not on file   • Number of children: Not on file   • Years of education: Not on file   • Highest education level: Not on file   Occupational History   • Not on file   Social Needs   • Financial resource strain: Not on file   • Food insecurity     Worry: Not on file     Inability: Not on file   • Transportation needs     Medical: Not on file     Non-medical: Not on file   Tobacco Use   • Smoking status: Never Smoker   • Smokeless tobacco: Never Used   Substance and Sexual Activity   • Alcohol use: No   • Drug use: No   • Sexual activity: Not on file   Lifestyle   • Physical activity     Days per week: Not on file     Minutes per session: Not on file   • Stress: Not on file   Relationships   • Social connections     Talks on phone: Not on file     Gets together: Not on file     Attends Confucianist service: Not on file     Active member of club or organization: Not on file     Attends meetings of clubs or organizations: Not on file     Relationship status: Not on file   • Intimate partner violence     Fear of current or ex partner: Not on file  "    Emotionally abused: Not on file     Physically abused: Not on file     Forced sexual activity: Not on file   Other Topics Concern   • Not on file   Social History Narrative   • Not on file         OBJECTIVE:     STOMA ASSESSMENT:   Type:  ____Ileostomy     ____Colostomy    __x__Urostomy    ____Other     Location:   LLQ   Size:1 5/8   Shape: irregular, flat/retracted   Color:red, moist   Protrudes:      ___ >1 inch               __x_ <1 inch (flat/retracted)   [place]:  Central and patent   Mucocutaneous Junction: intact circumferentially     Skin indentations, creases or scar tissue:pt's stoma folds under her pannus when she sits.  Moises-stomal Skin Problems: Scar tissue resolved at previous wound site peristomally at 3 o'clock. Peristomal dermatitis resolving & no itching or burning reported.      Effluent / Flatus:urine is light yellow, clear   Photo  _x___ Yes ____ No            Pouching Procedure:   Old appliance removed. Old ostomy appliance removed using [employer] medical adhesive remover spray, and by pushing skin away from appliance to avoid skin tears.       Peristomal skin cleansed with: warm water on washcloth. dried    Peristomal skin treated with: Luxiq foam to areas of dermatitis, allowed to dry. [place] medical adhesive spray to moises stomal skin. Dab of zinc oxide to scar tissue.    Ostomy appliance used: [place] flat CTF 70mm [employer]um Cera Plus flange with a corresponding drainable urostomy pouch, framed with coloplast brava strips. (patient wanted to trial without 2\" Ligia ring). Belt.  TAC at home to perimeter past appliance on abdomen.      Patient Education: Change urostomies every 3-5 days.  Change appliance immediately if it is leaking or peristomal skin feels irritated, has itching, or  burning. The above appliance may not last more than a day or two but pressure needs to be taken off the wound area in order for it to heal. Do not wear stoma belt. Ok to wear hernia support belt (pt says "  nurse has measured her and ordered one for her). May apply triamcinolone ointment to moises stomal skin outside of flange area 2 x day to reduce itching/dermatitis.   To change the appliance, remove previous appliance, cleanse peristomal skin with warm water/washcloth, pat dry, make an ostomy template or use cardboard measuring guide and trace ostomy shape onto back of barrier, cut out barrier, apply a paste ring around barrier opening and apply appliance. Empty pouches when no more than ½ full. Check contents every 2 hours or as needed. Do not leave soap residue on tissue and do not use baby wipes or skin prep wipes.     Should you experience any significant changes in your condition, such as infection (redness, swelling, localized heat, increased pain, fever > 101 F, chills) or have any questions regarding your home care instructions, please contact the wound center at [phone]. If after hours, contact your primary care physician or go to the hospital emergency room.       Verbal/demonstration instructions of above pouching procedure provided to patient/family.      Response: Pt and CG present say they have good understanding.      PLAN: weekly to assess verification wound remains resolved and peristomal skin resolves from dermatitis.     Supplies Needed:   Appliance type:    [place] as above             Other:      Accessories:         Supplier: [employer]  currently    Frequency:  Weekly.       Professional Collaboration:  Home Health orders faxed to [facility].       At the time of each visit, a thorough assessment of the patient is completed to assure appropriateness of our plan of care.  The plan of care may need to be adapted from the original plan of care to address any significant changes in patient status.    Clinician Signature:  _________________________________  Date:  ____________    Physician Signature:  ________________________________  Date:  ____________

## 2021-01-19 NOTE — PATIENT INSTRUCTIONS
Change urostomies and ileostomies every 3-5 days. Change colostomies every 5-7 days. Change appliance immediately if it is leaking or peristomal skin feels irritated, has itching, or  burning.   To change the appliance, remove previous appliance, cleanse peristomal skin with warm water/washcloth, pat dry, make an ostomy template or use cardboard measuring guide and trace ostomy shape onto back of barrier, cut out barrier, apply a liam paste ring or not around barrier opening and apply appliance. Empty pouches when no more than ½ full. Check contents every 2 hours or as needed. Do not leave soap residue on tissue and do not use baby wipes or skin prep wipes.     Should you experience any significant changes in your condition, such as infection (redness, swelling, localized heat, increased pain, fever > 101 F, chills) or have any questions regarding your home care instructions, please contact the wound center at [phone]. If after hours, contact your primary care physician or go to the hospital emergency room.

## 2021-01-26 ENCOUNTER — OFFICE VISIT (OUTPATIENT)
Dept: WOUND CARE | Facility: MEDICAL CENTER | Age: 56
End: 2021-01-26
Attending: INTERNAL MEDICINE
Payer: MEDICARE

## 2021-01-26 VITALS
RESPIRATION RATE: 20 BRPM | HEART RATE: 75 BPM | TEMPERATURE: 97.9 F | DIASTOLIC BLOOD PRESSURE: 68 MMHG | OXYGEN SATURATION: 91 % | SYSTOLIC BLOOD PRESSURE: 132 MMHG

## 2021-01-26 DIAGNOSIS — E66.01 CLASS 3 SEVERE OBESITY DUE TO EXCESS CALORIES WITH SERIOUS COMORBIDITY AND BODY MASS INDEX (BMI) OF 50.0 TO 59.9 IN ADULT (HCC): ICD-10-CM

## 2021-01-26 DIAGNOSIS — Z43.6 ATTENTION TO UROSTOMY (HCC): Primary | ICD-10-CM

## 2021-01-26 PROCEDURE — 99214 OFFICE O/P EST MOD 30 MIN: CPT

## 2021-01-26 PROCEDURE — 99213 OFFICE O/P EST LOW 20 MIN: CPT | Performed by: NURSE PRACTITIONER

## 2021-01-26 ASSESSMENT — ENCOUNTER SYMPTOMS
COUGH: 0
DIARRHEA: 0
SHORTNESS OF BREATH: 0
NAUSEA: 0
CONSTIPATION: 0
FEVER: 0
CHILLS: 0
VOMITING: 0

## 2021-01-26 ASSESSMENT — PAIN SCALES - GENERAL: PAINLEVEL: NO PAIN

## 2021-01-26 NOTE — PATIENT INSTRUCTIONS
Change urostomies every 3-5 days.  Change appliance immediately if it is leaking or peristomal skin feels irritated, has itching, or  burning. The above appliance may not last more than a day or two but pressure needs to be taken off the wound area in order for it to heal. Do not wear stoma belt. Ok to wear hernia support belt (pt says HH nurse has measured her and ordered one for her). May apply triamcinolone ointment to moises stomal skin outside of flange area 2 x day to reduce itching/dermatitis.   To change the appliance, remove previous appliance, cleanse peristomal skin with warm water/washcloth, pat dry, make an ostomy template or use cardboard measuring guide and trace ostomy shape onto back of barrier, cut out barrier, apply a paste ring around barrier opening and apply appliance. Empty pouches when no more than ½ full. Check contents every 2 hours or as needed. Do not leave soap residue on tissue and do not use baby wipes or skin prep wipes.      Should you experience any significant changes in your condition, such as infection (redness, swelling, localized heat, increased pain, fever > 101 F, chills) or have any questions regarding your home care instructions, please contact the wound center at [phone]. If after hours, contact your primary care physician or go to the hospital emergency room.

## 2021-01-26 NOTE — WOUND TEAM
"Advanced Wound Care  [facility]  [address]  [place] [address]  [phone] Fax#    Ostomy Evaluation  For 90 Day Certification Period: 01/07/2021 - 04/07/2021     Referring Provider:  Dr. DANIELLE Muniz MD  Primary Provider:same  Consulting Providers:Ena MARTIN  Surgeon:Dr. Cantor in [place] in 1969      Start of Care:01/07/2021  Reason for referral: assess/ manage leaking urostomy with moises stomal wound       SUBJECTIVE:  \"It came off that night.\" (appliance placed without Ligia ring)    HPI: Pt is a 55 year old obese lady who is debilitated with chronic pain syndrome who has a hx of urostomy since aged 5 due to what she describes as \"a defective bladder muscle\" that meant she was chronically incontinent. There are no urology notes available. She is referred by her PCP in [place]. She says she has [facility] coming in 2 x week.     Past Medical Hx:  Past Medical History:   Diagnosis Date   • Anemia    • Chronic anticoagulation    • Chronic pain syndrome    • DVT of lower extremity (deep venous thrombosis) (HCC)    • Dyspnea    • Nephrolithiasis    • Sinus bradycardia    • Sleep apnea     Uses a CPAP with oxygen.     Surgical Hx:  Past Surgical History:   Procedure Laterality Date   • APPENDECTOMY     • OTHER      Neck surgery     Medications:  Current Outpatient Medications:   •  triamcinolone acetonide (KENALOG) 0.1 % Ointment, Apply 1 Application topically every day., Disp: 30 g, Rfl: 1  •  losartan (COZAAR) 50 MG Tab, Take 50 mg by mouth 2 Times a Day. Indications: High Blood Pressure Disorder, Disp: , Rfl:   •  oxycodone (OXY-IR) 15 MG immediate release tablet, Take 15 mg by mouth., Disp: , Rfl:   •  gabapentin (NEURONTIN) 300 MG Cap, Take 300 mg by mouth every bedtime., Disp: , Rfl:   •  furosemide (LASIX) 40 MG TABS, TAKE 1 TABLET BY MOUTH EVERY DAY, Disp: 90 Tab, Rfl: 2  •  potassium chloride SA (K-DUR) 20 MEQ TBCR, TAKE 1 TABLET BY " MOUTH EVERY DAY, Disp: 90 Tab, Rfl: 2  •  theophylline SR (THEODUR) 200 MG TB12, TAKE 1 TABLET BY MOUTH TWICE DAILY (Patient not taking: Reported on 1/12/2018), Disp: 180 Tab, Rfl: 3  •  Ferrous Sulfate (IRON) 325 (65 FE) MG TABS, Take 1 Tab by mouth 2 Times a Day., Disp: , Rfl:   •  warfarin (COUMADIN) 5 MG TABS, Take 1-2 tablets daily as directed by INR, Disp: 180 Tab, Rfl: 3  •  morphine SR (MS CONTIN) 60 MG TB12, Take 60 mg by mouth 3 times a day. Currently off medication, Disp: , Rfl:   •  amitriptyline (ELAVIL) 50 MG TABS, Take 50 mg by mouth every evening., Disp: , Rfl:   •  citalopram (CELEXA) 40 MG TABS, Take 40 mg by mouth every day., Disp: , Rfl:   •  oxycodone-acetaminophen (PERCOCET) 5-325 MG TABS, Take 1-2 Tabs by mouth every 6 hours as needed., Disp: , Rfl:   Allergies:Patient has no known allergies.    Social Hx:  Social History     Socioeconomic History   • Marital status: Single     Spouse name: Not on file   • Number of children: Not on file   • Years of education: Not on file   • Highest education level: Not on file   Occupational History   • Not on file   Social Needs   • Financial resource strain: Not on file   • Food insecurity     Worry: Not on file     Inability: Not on file   • Transportation needs     Medical: Not on file     Non-medical: Not on file   Tobacco Use   • Smoking status: Never Smoker   • Smokeless tobacco: Never Used   Substance and Sexual Activity   • Alcohol use: No   • Drug use: No   • Sexual activity: Not on file   Lifestyle   • Physical activity     Days per week: Not on file     Minutes per session: Not on file   • Stress: Not on file   Relationships   • Social connections     Talks on phone: Not on file     Gets together: Not on file     Attends Buddhism service: Not on file     Active member of club or organization: Not on file     Attends meetings of clubs or organizations: Not on file     Relationship status: Not on file   • Intimate partner violence     Fear of  current or ex partner: Not on file     Emotionally abused: Not on file     Physically abused: Not on file     Forced sexual activity: Not on file   Other Topics Concern   • Not on file   Social History Narrative   • Not on file         OBJECTIVE:     STOMA ASSESSMENT:   Type:  ____Ileostomy     ____Colostomy    __x__Urostomy    ____Other     Location:   LLQ   Size:1 5/8   Shape: irregular, flat/retracted   Color:red, moist   Protrudes:      ___ >1 inch               __x_ <1 inch (flat/retracted) however patient states buds sometimes   [place]:  Central and patent   Mucocutaneous Junction: intact circumferentially     Skin indentations, creases or scar tissue:pt's stoma folds under her pannus when she sits.    Moises-stomal Skin Problems: Scar tissue resolved at previous wound site peristomally at 3 o'clock. Peristomal dermatitis resolving & no itching or burning reported.      Effluent / Flatus:urine is light yellow, clear   Photo  _x___ Yes ____ No          Pouching Procedure:   Old appliance removed. Old ostomy appliance removed using Callum medical adhesive remover spray, and by pushing skin away from appliance to avoid skin tears.       Peristomal skin cleansed with: warm water on washcloth. dried    Peristomal skin treated with: Luxiq foam to areas of dermatitis, allowed to dry. Brava strip small piece over scar tissue. [place] medical adhesive spray to moises stomal skin.     Ostomy appliance used: [place] washer CTF. [place] flat CTF 70mm Callum Cera Plus flange with a corresponding drainable urostomy pouch, framed with coloplast XL brava strips.   TAC at home to perimeter past appliance on abdomen.      Patient Education: Change urostomies every 3-5 days.  Change appliance immediately if it is leaking or peristomal skin feels irritated, has itching, or  burning. The above appliance may not last more than a day or two but pressure needs to be taken off the wound area in order for it to heal. Do not wear  stoma belt. Ok to wear hernia support belt (pt says HH nurse has measured her and ordered one for her). May apply triamcinolone ointment to moises stomal skin outside of flange area 2 x day to reduce itching/dermatitis.   To change the appliance, remove previous appliance, cleanse peristomal skin with warm water/washcloth, pat dry, make an ostomy template or use cardboard measuring guide and trace ostomy shape onto back of barrier, cut out barrier, apply a paste ring around barrier opening and apply appliance. Empty pouches when no more than ½ full. Check contents every 2 hours or as needed. Do not leave soap residue on tissue and do not use baby wipes or skin prep wipes.     Should you experience any significant changes in your condition, such as infection (redness, swelling, localized heat, increased pain, fever > 101 F, chills) or have any questions regarding your home care instructions, please contact the wound center at [phone]. If after hours, contact your primary care physician or go to the hospital emergency room.       Verbal/demonstration instructions of above pouching procedure provided to patient/family.      Response: Pt and CG present say they have good understanding.      PLAN: weekly to assess verification wound remains resolved and peristomal skin resolves from dermatitis and find correct pouching appliance    Supplies Needed:   Appliance type:    [place] as above             Other:      Accessories:         Supplier: [employer]  currently    Frequency:  Weekly.       Professional Collaboration:  Home Health orders faxed to [facility].       At the time of each visit, a thorough assessment of the patient is completed to assure appropriateness of our plan of care.  The plan of care may need to be adapted from the original plan of care to address any significant changes in patient status.    Clinician Signature:  _________________________________  Date:   ____________    Physician Signature:  ________________________________  Date:  ____________

## 2021-01-26 NOTE — PROGRESS NOTES
"Provider Encounter- Ostomy Encounter        HISTORY OF PRESENT ILLNESS  Wound History:    START OF CARE IN CLINIC: 1/7/2021    REFERRING PROVIDER: Dr. DANIELLE Muniz MD     OSTOMY TYPE urostomy   LOCATION: Left lower quadrant              OSTOMY HISTORY: Patient has had a urostomy since age 5 due to what she describes as, \"a defective bladder muscle\", which meant that she was chronically incontinent.  There are no surgical records available.  She was referred to [facility] by her PCP in [place] due to difficulties with pouching.  She has a urologist in [place], however does not recall his name.  She has [facility] for assistance with her urostomy.   SURGERY: Urostomy at age 5 (ileal conduit?)   OSTOMY ISSUES: Frequent leakage, peristomal skin breakdown, peristomal wound    Pertinent Medical History: Obesity, chronic pain syndrome    Patient's problem list, allergies, and current medications reviewed and updated in Epic    Interval History:  1/12/2021 Joint visit with ostomy RN, refer to nursing note. Wear time 1 to 3 days.  Frequency of emptying 4-6 times per day. Leaks frequent.     1/12/2021 : Clinic visit with VERONICA Guerrero, KD, CHARLENEN, CFVIDAL.  Joint visit with ostomy RN, refer to nursing note.  Cathy states she is feeling well, denies fevers, chills, nausea, vomiting, cough or shortness of breath.  Ostomy appliance placed in clinic last week lasted less than 36 hours.  She replaced with her old system, and then [facility] placed new plans on Sunday.  She has been using triamcinolone to exposed areas of dermatitis around stoma, states is feeling little better.  We were able to obtain urology notes from [place] urologist, Dr. Alexis Venegas.  Patient was seen by him for hematuria, which is since resolved.    1/26/2021 : Clinic visit with VERONICA Guerrero, KD, CHARLENEN, CFCN.  Joint visit with ostomy RN, refer to nursing note.  Cathy continues to feel well overall, denies fevers, chills, " nausea, vomiting, cough or shortness of breath.  Peristomal wound nearly resolved.  Unfortunately, she has had some problems with leaking from her appliance.  Pouching system modified again today.      REVIEW OF SYSTEMS:   Review of Systems   Constitutional: Negative for chills and fever.   Respiratory: Negative for cough and shortness of breath.    Cardiovascular: Negative for chest pain and leg swelling.   Gastrointestinal: Negative for constipation, diarrhea, nausea and vomiting.   Genitourinary:        Urostomy since age 5   Skin: Positive for itching and rash.        Skin around stoma red and itchy  Painful wound to peristomal skin       PHYSICAL EXAMINATION:   /68   Pulse 75   Temp 36.6 °C (97.9 °F)   Resp 20   SpO2 91%     Physical Exam   Constitutional: She is oriented to person, place, and time and well-developed, well-nourished, and in no distress.   Obese   HENT:   Head: Normocephalic.   Eyes: Pupils are equal, round, and reactive to light.   Pulmonary/Chest: Effort normal.   Abdominal: Soft.   Musculoskeletal: Normal range of motion.   Neurological: She is alert and oriented to person, place, and time.   Skin: Skin is warm.   Peristomal skin around left lower quadrant urostomy with rash radiating approximately 20 cm around  Full-thickness wound to peristomal skin at about 3:00  Refer to wound flowsheet and photos     STOMA ASSESSMENT:              Type:  ____Ileostomy     ____Colostomy    __x__Urostomy    ____Other                 Location:   LLQ              Size:1 5/8              Shape: irregular, flat/retracted              Color:red, moist              Protrudes:      ___ >1 inch               __x_ <1 inch (flat/retracted) however patient states buds sometimes              [place]:  Central and patent              Mucocutaneous Junction: intact circumferentially                Skin indentations, creases or scar tissue:pt's stoma folds under her pannus when she sits.               Akila-stomal  Skin Problems: Scar tissue resolved at previous wound site peristomally at 3 o'clock. Peristomal dermatitis resolving & no itching or burning reported.                  Effluent / Flatus:urine is light yellow, clear              Photo  _x___ Yes ____ No                                               ASSESSMENT AND PLAN:   1. Attention to urostomy ([facility])  Comments: Urostomy since age 5, per patient due to defective bladder muscle.  Pouching complicated by obesity, and flush stoma    1/26/2020: Pouching continues to be a problem, wear time anywhere from 12 to 36 hours, frequent leakage  -Pouching modified by ostomy RN  -Instructions sent to [facility]  -Patient to return to clinic weekly until appropriate pouching system found    2. Peristomal skin complication    1/26/2021: Peristomal wound nearly resolved  -Peristomal skin treated, wound care completed by ostomy RN  -Flat barrier, avoid convexity due to concerns for pressure over wound    3. Class 3 severe obesity due to excess calories with serious comorbidity and body mass index (BMI) of 50.0 to 59.9 in adult ([facility])  Comments: Complicating factor.  Obesity creates pouching challenge    20 min spent face to face with patient, >50% of time spent counseling, coordinating care, reviewing records, discussing POC, educating patient regarding ostomy care, peristomal skin care, nutrition. Emotional support provided regarding life change following ostomy- support resources provided.     Please note that this note may have been created using voice recognition software. I have worked with technical experts from [employer] to optimize the interface.  I have made every reasonable attempt to correct obvious errors, but there may be errors of grammar and possibly content that I did not discover before finalizing the note.    N

## 2021-02-02 ENCOUNTER — OFFICE VISIT (OUTPATIENT)
Dept: WOUND CARE | Facility: MEDICAL CENTER | Age: 56
End: 2021-02-02
Attending: INTERNAL MEDICINE
Payer: MEDICARE

## 2021-02-02 VITALS
TEMPERATURE: 97.6 F | DIASTOLIC BLOOD PRESSURE: 97 MMHG | SYSTOLIC BLOOD PRESSURE: 147 MMHG | OXYGEN SATURATION: 94 % | RESPIRATION RATE: 20 BRPM | HEART RATE: 69 BPM

## 2021-02-02 DIAGNOSIS — E66.01 CLASS 3 SEVERE OBESITY DUE TO EXCESS CALORIES WITH SERIOUS COMORBIDITY AND BODY MASS INDEX (BMI) OF 50.0 TO 59.9 IN ADULT (HCC): ICD-10-CM

## 2021-02-02 DIAGNOSIS — Z43.6 ATTENTION TO UROSTOMY (HCC): ICD-10-CM

## 2021-02-02 PROCEDURE — 99214 OFFICE O/P EST MOD 30 MIN: CPT

## 2021-02-02 PROCEDURE — 99213 OFFICE O/P EST LOW 20 MIN: CPT | Performed by: NURSE PRACTITIONER

## 2021-02-02 ASSESSMENT — ENCOUNTER SYMPTOMS
DIARRHEA: 0
CHILLS: 0
FEVER: 0
VOMITING: 0
CONSTIPATION: 0
COUGH: 0
SHORTNESS OF BREATH: 0
NAUSEA: 0

## 2021-02-02 ASSESSMENT — PAIN SCALES - GENERAL: PAINLEVEL: NO PAIN

## 2021-02-02 NOTE — PROGRESS NOTES
"Provider Encounter- Ostomy Encounter        HISTORY OF PRESENT ILLNESS  Wound History:    START OF CARE IN CLINIC: 1/7/2021    REFERRING PROVIDER: Dr. DANIELLE Muniz MD     OSTOMY TYPE urostomy   LOCATION: Left lower quadrant              OSTOMY HISTORY: Patient has had a urostomy since age 5 due to what she describes as, \"a defective bladder muscle\", which meant that she was chronically incontinent.  There are no surgical records available.  She was referred to [facility] by her PCP in [place] due to difficulties with pouching.  She has a urologist in [place], however does not recall his name.  She has [facility] for assistance with her urostomy.   SURGERY: Urostomy at age 5 (ileal conduit?)   OSTOMY ISSUES: Frequent leakage, peristomal skin breakdown, peristomal wound    Pertinent Medical History: Obesity, chronic pain syndrome    Patient's problem list, allergies, and current medications reviewed and updated in Epic    Interval History:  1/12/2021 Joint visit with ostomy RN, refer to nursing note. Wear time 1 to 3 days.  Frequency of emptying 4-6 times per day. Leaks frequent.     1/12/2021 : Clinic visit with VERONICA Guerrero, KD, CHARLENEN, CFVIDAL.  Joint visit with ostomy RN, refer to nursing note.  Cathy states she is feeling well, denies fevers, chills, nausea, vomiting, cough or shortness of breath.  Ostomy appliance placed in clinic last week lasted less than 36 hours.  She replaced with her old system, and then [facility] placed new plans on Sunday.  She has been using triamcinolone to exposed areas of dermatitis around stoma, states is feeling little better.  We were able to obtain urology notes from [place] urologist, Dr. Alexis Venegas.  Patient was seen by him for hematuria, which is since resolved.    1/26/2021 : Clinic visit with VERONICA Guerrero, KD, CHARLENEN, CFCN.  Joint visit with ostomy RN, refer to nursing note.  Cathy continues to feel well overall, denies fevers, chills, " nausea, vomiting, cough or shortness of breath.  Peristomal wound nearly resolved.  Unfortunately, she has had some problems with leaking from her appliance.  Pouching system modified again today.    2/2/2021 : Clinic visit with VERONICA Guerrero, YANETP-BC, CWOCN, CFCN.   Joint visit with ostomy RN, refer to nursing note.  Cathy is feeling well today,denies fevers, chills, nausea, vomiting, cough or shortness of breath.  Unfortunately, she is only getting 1-1 and half days where time from her appliances.  Her stomal skin has improved significantly however.  She would like to stop coming into clinic, and just rely on home health.  However, I did convince her to continue in clinic until we find a more appropriate pouching system.      REVIEW OF SYSTEMS:   Review of Systems   Constitutional: Negative for chills and fever.   Respiratory: Negative for cough and shortness of breath.    Cardiovascular: Negative for chest pain and leg swelling.   Gastrointestinal: Negative for constipation, diarrhea, nausea and vomiting.   Genitourinary:        Urostomy since age 5   Skin: Positive for itching and rash.        Skin around stoma red and itchy  Painful wound to peristomal skin       PHYSICAL EXAMINATION:   /97 (Patient Position: Sitting)   Pulse 69   Temp 36.4 °C (97.6 °F) (Temporal)   Resp 20   SpO2 94%     Physical Exam   Constitutional: She is oriented to person, place, and time and well-developed, well-nourished, and in no distress.   Obese   HENT:   Head: Normocephalic.   Eyes: Pupils are equal, round, and reactive to light.   Pulmonary/Chest: Effort normal.   Abdominal: Soft.   Musculoskeletal: Normal range of motion.   Neurological: She is alert and oriented to person, place, and time.   Skin: Skin is warm.   Peristomal skin around left lower quadrant urostomy with rash radiating approximately 20 cm around  Full-thickness wound to peristomal skin at about 3:00  Refer to wound flowsheet and photos     STOMA  ASSESSMENT:              Type:  ____Ileostomy     ____Colostomy    __x__Urostomy    ____Other                 Location:   LLQ              Size:1 5/8              Shape: irregular, flat/retracted              Color:red, moist              Protrudes:      ___ >1 inch               __x_ <1 inch (flat/retracted) however patient states buds sometimes              [place]:  Central and patent              Mucocutaneous Junction: intact circumferentially                Skin indentations, creases or scar tissue:pt's stoma folds under her pannus when she sits.               Akila-stomal Skin Problems: Scar tissue resolved at previous wound site peristomally at 3 o'clock. Peristomal dermatitis resolving & no itching or burning reported.                  Effluent / Flatus:urine is light yellow, clear              Photo  _x___ Yes ____ No                                                   ASSESSMENT AND PLAN:   1. Attention to urostomy ([facility])  Comments: Urostomy since age 5, per patient due to defective bladder muscle.  Pouching complicated by obesity, and flush stoma    2/2/2021: Pouching continues to be a problem, wear time anywhere from 12 to 36 hours, frequent leakage  -Pouching again modified by ostomy RN, soft convex barrier, 2 piece system  -Instructions sent to [facility]  -Patient to return to clinic weekly until appropriate pouching system found    2. Peristomal skin complication    2/2/2021: Peristomal wound is resolved, condition of skin has improved significantly  -Peristomal skin treated, wound care completed by ostomy RN  -Soft convex barrier without belt.    3. Class 3 severe obesity due to excess calories with serious comorbidity and body mass index (BMI) of 50.0 to 59.9 in adult ([facility])  Comments: Complicating factor.  Obesity creates pouching challenge    20 min spent face to face with patient, >50% of time spent counseling, coordinating care, reviewing records, discussing POC, educating patient regarding  ostomy care, peristomal skin care, nutrition. Emotional support provided regarding life change following ostomy- support resources provided.     Please note that this note may have been created using voice recognition software. I have worked with technical experts from [employer] to optimize the interface.  I have made every reasonable attempt to correct obvious errors, but there may be errors of grammar and possibly content that I did not discover before finalizing the note.    N

## 2021-02-02 NOTE — PATIENT INSTRUCTIONS
Change urostomies and ileostomies every 3-5 days. Change colostomies every 5-7 days. Change appliance immediately if it is leaking or peristomal skin feels irritated, has itching, or  burning.   To change the appliance, remove previous appliance, cleanse peristomal skin with warm water/washcloth, pat dry, make an ostomy template or use cardboard measuring guide and trace ostomy shape onto back of barrier, cut out barrier, apply appliance. Empty pouches when no more than ½ full. Check contents every 2 hours or as needed. Do not leave soap residue on tissue and do not use baby wipes or skin prep wipes.     Should you experience any significant changes in your condition, such as infection (redness, swelling, localized heat, increased pain, fever > 101 F, chills) or have any questions regarding your home care instructions, please contact the wound center at [phone]. If after hours, contact your primary care physician or go to the hospital emergency room.

## 2021-02-02 NOTE — WOUND TEAM
"Advanced Wound Care  [facility]  [address]  [place] [address]  [phone] Fax#    Ostomy Evaluation  For 90 Day Certification Period: 01/07/2021 - 04/07/2021     Referring Provider:  Dr. DANIELLE Muniz MD  Primary Provider:same  Consulting Providers:Ena MARTIN  Surgeon:Dr. Cantor in [place] in 1969      Start of Care:01/07/2021  Reason for referral: assess/ manage leaking urostomy with moises stomal wound       SUBJECTIVE:  \"It lasted 1.5-2 days.\" Patient stated the cymed washer ring still melted into appliance.    HPI: Pt is a 55 year old obese lady who is debilitated with chronic pain syndrome who has a hx of urostomy since aged 5 due to what she describes as \"a defective bladder muscle\" that meant she was chronically incontinent. There are no urology notes available. She is referred by her PCP in [place]. She says she has [facility] coming in 2 x week.     Past Medical Hx:  Past Medical History:   Diagnosis Date   • Anemia    • Chronic anticoagulation    • Chronic pain syndrome    • DVT of lower extremity (deep venous thrombosis) (HCC)    • Dyspnea    • Nephrolithiasis    • Sinus bradycardia    • Sleep apnea     Uses a CPAP with oxygen.     Surgical Hx:  Past Surgical History:   Procedure Laterality Date   • APPENDECTOMY     • OTHER      Neck surgery     Medications:  Current Outpatient Medications:   •  triamcinolone acetonide (KENALOG) 0.1 % Ointment, Apply 1 Application topically every day., Disp: 30 g, Rfl: 1  •  losartan (COZAAR) 50 MG Tab, Take 50 mg by mouth 2 Times a Day. Indications: High Blood Pressure Disorder, Disp: , Rfl:   •  oxycodone (OXY-IR) 15 MG immediate release tablet, Take 15 mg by mouth., Disp: , Rfl:   •  gabapentin (NEURONTIN) 300 MG Cap, Take 300 mg by mouth every bedtime., Disp: , Rfl:   •  furosemide (LASIX) 40 MG TABS, TAKE 1 TABLET BY MOUTH EVERY DAY, Disp: 90 Tab, Rfl: 2  •  potassium chloride SA (K-DUR) 20 " MEQ TBCR, TAKE 1 TABLET BY MOUTH EVERY DAY, Disp: 90 Tab, Rfl: 2  •  theophylline SR (THEODUR) 200 MG TB12, TAKE 1 TABLET BY MOUTH TWICE DAILY (Patient not taking: Reported on 1/12/2018), Disp: 180 Tab, Rfl: 3  •  Ferrous Sulfate (IRON) 325 (65 FE) MG TABS, Take 1 Tab by mouth 2 Times a Day., Disp: , Rfl:   •  warfarin (COUMADIN) 5 MG TABS, Take 1-2 tablets daily as directed by INR, Disp: 180 Tab, Rfl: 3  •  morphine SR (MS CONTIN) 60 MG TB12, Take 60 mg by mouth 3 times a day. Currently off medication, Disp: , Rfl:   •  amitriptyline (ELAVIL) 50 MG TABS, Take 50 mg by mouth every evening., Disp: , Rfl:   •  citalopram (CELEXA) 40 MG TABS, Take 40 mg by mouth every day., Disp: , Rfl:   •  oxycodone-acetaminophen (PERCOCET) 5-325 MG TABS, Take 1-2 Tabs by mouth every 6 hours as needed., Disp: , Rfl:   Allergies:Patient has no known allergies.    Social Hx:  Social History     Socioeconomic History   • Marital status: Single     Spouse name: Not on file   • Number of children: Not on file   • Years of education: Not on file   • Highest education level: Not on file   Occupational History   • Not on file   Social Needs   • Financial resource strain: Not on file   • Food insecurity     Worry: Not on file     Inability: Not on file   • Transportation needs     Medical: Not on file     Non-medical: Not on file   Tobacco Use   • Smoking status: Never Smoker   • Smokeless tobacco: Never Used   Substance and Sexual Activity   • Alcohol use: No   • Drug use: No   • Sexual activity: Not on file   Lifestyle   • Physical activity     Days per week: Not on file     Minutes per session: Not on file   • Stress: Not on file   Relationships   • Social connections     Talks on phone: Not on file     Gets together: Not on file     Attends Voodoo service: Not on file     Active member of club or organization: Not on file     Attends meetings of clubs or organizations: Not on file     Relationship status: Not on file   • Intimate  partner violence     Fear of current or ex partner: Not on file     Emotionally abused: Not on file     Physically abused: Not on file     Forced sexual activity: Not on file   Other Topics Concern   • Not on file   Social History Narrative   • Not on file         OBJECTIVE:     STOMA ASSESSMENT:   Type:  ____Ileostomy     ____Colostomy    __x__Urostomy    ____Other     Location:   LLQ   Size:1 5/8   Shape: irregular, flat/retracted   Color:red, moist   Protrudes:      ___ >1 inch               __x_ <1 inch (flat/retracted) however patient states buds sometimes   [place]:  Central and patent   Mucocutaneous Junction: intact circumferentially     Skin indentations, creases or scar tissue:pt's stoma folds under her pannus when she sits.    Moises-stomal Skin Problems: Scar tissue resolved at previous wound site peristomally at 3 o'clock. Peristomal dermatitis resolving & no itching or burning reported.      Effluent / Flatus:urine is light yellow, clear   Photo  _x___ Yes ____ No        Pouching Procedure:   Old appliance removed. Old ostomy appliance removed using [employer] medical adhesive remover spray, and by pushing skin away from appliance to avoid skin tears.       Peristomal skin cleansed with: warm water on washcloth. dried    Peristomal skin treated with: Luxiq foam to areas of dermatitis, allowed to dry. [place] medical adhesive spray to moises stomal skin.     Ostomy appliance used:  Callum soft convex CTF 70mm [place] Cera Plus flange (73555) with a corresponding drainable urostomy pouch, framed with coloplast XL brava strips. Cut appliance more round & stretched tissue for stoma to form more round.  Add belt if needed for longer wear time. (Patient does not feel hernia belt helps)  TAC at home to perimeter past appliance on abdomen as needed.     Patient Education: Change urostomies every 3-5 days.  Change appliance immediately if it is leaking or peristomal skin feels irritated, has itching, or   burning.   To change the appliance, remove previous appliance, cleanse peristomal skin with warm water/washcloth, pat dry, make an ostomy template or use cardboard measuring guide and trace ostomy shape onto back of barrier, cut out barrier, apply appliance. Empty pouches when no more than ½ full. Check contents every 2 hours or as needed. Do not leave soap residue on tissue and do not use baby wipes or skin prep wipes.     Should you experience any significant changes in your condition, such as infection (redness, swelling, localized heat, increased pain, fever > 101 F, chills) or have any questions regarding your home care instructions, please contact the wound center at [phone]. If after hours, contact your primary care physician or go to the hospital emergency room.       Verbal/demonstration instructions of above pouching procedure provided to patient/family.      Response: Pt and CG present say they have good understanding.      PLAN: weekly to assess verification wound remains resolved and peristomal skin resolves from dermatitis and find correct pouching appliance lasting more than 2 days. Patient stated her previous appliance (1 piece hard convex 83516) would last 4 days. Trialed Ligia ring & cymed washer-both melted into appliance in under 2 days.    Supplies Needed:   Appliance type:    [place] as above             Other:      Accessories:         Supplier: [employer]  currently    Frequency:  Weekly.       Professional Collaboration:  Home Health orders faxed to [facility].       At the time of each visit, a thorough assessment of the patient is completed to assure appropriateness of our plan of care.  The plan of care may need to be adapted from the original plan of care to address any significant changes in patient status.    Clinician Signature:  _________________________________  Date:  ____________    Physician Signature:  ________________________________  Date:   ____________

## 2021-02-09 ENCOUNTER — OFFICE VISIT (OUTPATIENT)
Dept: WOUND CARE | Facility: MEDICAL CENTER | Age: 56
End: 2021-02-09
Attending: INTERNAL MEDICINE
Payer: MEDICARE

## 2021-02-09 VITALS
RESPIRATION RATE: 20 BRPM | SYSTOLIC BLOOD PRESSURE: 102 MMHG | OXYGEN SATURATION: 93 % | DIASTOLIC BLOOD PRESSURE: 51 MMHG | TEMPERATURE: 97.4 F | HEART RATE: 68 BPM

## 2021-02-09 DIAGNOSIS — E66.01 CLASS 3 SEVERE OBESITY DUE TO EXCESS CALORIES WITH SERIOUS COMORBIDITY AND BODY MASS INDEX (BMI) OF 50.0 TO 59.9 IN ADULT (HCC): ICD-10-CM

## 2021-02-09 DIAGNOSIS — Z43.6 ATTENTION TO UROSTOMY (HCC): ICD-10-CM

## 2021-02-09 PROCEDURE — 99214 OFFICE O/P EST MOD 30 MIN: CPT

## 2021-02-09 PROCEDURE — 99213 OFFICE O/P EST LOW 20 MIN: CPT | Performed by: NURSE PRACTITIONER

## 2021-02-09 ASSESSMENT — ENCOUNTER SYMPTOMS
VOMITING: 0
CHILLS: 0
SHORTNESS OF BREATH: 0
NAUSEA: 0
DIARRHEA: 0
FEVER: 0
COUGH: 0
CONSTIPATION: 0

## 2021-02-09 ASSESSMENT — PAIN SCALES - GENERAL: PAINLEVEL: NO PAIN

## 2021-02-09 NOTE — WOUND TEAM
"Advanced Wound Care  [facility]  [address]  [place] [address]  [phone] Fax#    Ostomy Evaluation  For 90 Day Certification Period: 01/07/2021 - 04/07/2021     Referring Provider:  Dr. DANIELLE Muniz MD  Primary Provider:same  Consulting Providers:Ena MARTIN  Surgeon:Dr. Cantor in [place] in 1969      Start of Care:01/07/2021  Reason for referral: assess/ manage leaking urostomy with moises stomal wound       SUBJECTIVE:  \"It lasted 2-3 days\"    HPI: Pt is a 55 year old obese lady who is debilitated with chronic pain syndrome who has a hx of urostomy since aged 5 due to what she describes as \"a defective bladder muscle\" that meant she was chronically incontinent. There are no urology notes available. She is referred by her PCP in [place]. She says she has [facility] coming in 2 x week.     Past Medical Hx:  Past Medical History:   Diagnosis Date   • Anemia    • Chronic anticoagulation    • Chronic pain syndrome    • DVT of lower extremity (deep venous thrombosis) (HCC)    • Dyspnea    • Nephrolithiasis    • Sinus bradycardia    • Sleep apnea     Uses a CPAP with oxygen.     Surgical Hx:  Past Surgical History:   Procedure Laterality Date   • APPENDECTOMY     • OTHER      Neck surgery     Medications:  Current Outpatient Medications:   •  triamcinolone acetonide (KENALOG) 0.1 % Ointment, Apply 1 Application topically every day., Disp: 30 g, Rfl: 1  •  losartan (COZAAR) 50 MG Tab, Take 50 mg by mouth 2 Times a Day. Indications: High Blood Pressure Disorder, Disp: , Rfl:   •  oxycodone (OXY-IR) 15 MG immediate release tablet, Take 15 mg by mouth., Disp: , Rfl:   •  gabapentin (NEURONTIN) 300 MG Cap, Take 300 mg by mouth every bedtime., Disp: , Rfl:   •  furosemide (LASIX) 40 MG TABS, TAKE 1 TABLET BY MOUTH EVERY DAY, Disp: 90 Tab, Rfl: 2  •  potassium chloride SA (K-DUR) 20 MEQ TBCR, TAKE 1 TABLET BY MOUTH EVERY DAY, Disp: 90 Tab, Rfl: 2  •  " theophylline SR (THEODUR) 200 MG TB12, TAKE 1 TABLET BY MOUTH TWICE DAILY (Patient not taking: Reported on 1/12/2018), Disp: 180 Tab, Rfl: 3  •  Ferrous Sulfate (IRON) 325 (65 FE) MG TABS, Take 1 Tab by mouth 2 Times a Day., Disp: , Rfl:   •  warfarin (COUMADIN) 5 MG TABS, Take 1-2 tablets daily as directed by INR, Disp: 180 Tab, Rfl: 3  •  morphine SR (MS CONTIN) 60 MG TB12, Take 60 mg by mouth 3 times a day. Currently off medication, Disp: , Rfl:   •  amitriptyline (ELAVIL) 50 MG TABS, Take 50 mg by mouth every evening., Disp: , Rfl:   •  citalopram (CELEXA) 40 MG TABS, Take 40 mg by mouth every day., Disp: , Rfl:   •  oxycodone-acetaminophen (PERCOCET) 5-325 MG TABS, Take 1-2 Tabs by mouth every 6 hours as needed., Disp: , Rfl:   Allergies:Patient has no known allergies.    Social Hx:  Social History     Socioeconomic History   • Marital status: Single     Spouse name: Not on file   • Number of children: Not on file   • Years of education: Not on file   • Highest education level: Not on file   Occupational History   • Not on file   Social Needs   • Financial resource strain: Not on file   • Food insecurity     Worry: Not on file     Inability: Not on file   • Transportation needs     Medical: Not on file     Non-medical: Not on file   Tobacco Use   • Smoking status: Never Smoker   • Smokeless tobacco: Never Used   Substance and Sexual Activity   • Alcohol use: No   • Drug use: No   • Sexual activity: Not on file   Lifestyle   • Physical activity     Days per week: Not on file     Minutes per session: Not on file   • Stress: Not on file   Relationships   • Social connections     Talks on phone: Not on file     Gets together: Not on file     Attends Yazidism service: Not on file     Active member of club or organization: Not on file     Attends meetings of clubs or organizations: Not on file     Relationship status: Not on file   • Intimate partner violence     Fear of current or ex partner: Not on file      Emotionally abused: Not on file     Physically abused: Not on file     Forced sexual activity: Not on file   Other Topics Concern   • Not on file   Social History Narrative   • Not on file         OBJECTIVE:     STOMA ASSESSMENT:   Type:  ____Ileostomy     ____Colostomy    __x__Urostomy    ____Other     Location:   LLQ   Size:1 5/8   Shape: irregular, flat/retracted   Color:red, moist   Protrudes:      ___ >1 inch               __x_ <1 inch (flat/retracted) however patient states buds sometimes   [place]:  Central and patent   Mucocutaneous Junction: intact circumferentially     Skin indentations, creases or scar tissue:pt's stoma folds under her pannus when she sits.    Moises-stomal Skin Problems: Scar tissue resolved. Small hypergranulation or mucosal seeding at edge of stoma- applied silver nitrate per APRN & flushed with normal saline.     Effluent / Flatus:urine is light yellow, clear   Photo  _x___ Yes ____ No        Pouching Procedure:   Old appliance removed. Old ostomy appliance removed using [employer] medical adhesive remover spray, and by pushing skin away from appliance to avoid skin tears.       Peristomal skin cleansed with: warm water on washcloth. dried    Peristomal skin treated with:  [employer] medical adhesive spray to moises stomal skin.     Ostomy appliance used:  Harper soft convex CTF 70mm Harper Cera Plus flange (56280) with a corresponding drainable urostomy pouch, framed with coloplast XL brava strips. Cut appliance more round & stretched tissue for stoma to form more round.    TAC at home to perimeter past appliance on abdomen as needed.     Patient Education: Change urostomies every 3-5 days.  Change appliance immediately if it is leaking or peristomal skin feels irritated, has itching, or  burning.   To change the appliance, remove previous appliance, cleanse peristomal skin with warm water/washcloth, pat dry, make an ostomy template or use cardboard measuring guide and trace ostomy  shape onto back of barrier, cut out barrier, apply appliance. Empty pouches when no more than ½ full. Check contents every 2 hours or as needed. Do not leave soap residue on tissue and do not use baby wipes or skin prep wipes.     Should you experience any significant changes in your condition, such as infection (redness, swelling, localized heat, increased pain, fever > 101 F, chills) or have any questions regarding your home care instructions, please contact the wound center at [phone]. If after hours, contact your primary care physician or go to the hospital emergency room.       Verbal/demonstration instructions of above pouching procedure provided to patient/family.      Response: Pt and CG present say they have good understanding.      PLAN: weekly to assess verification wound remains resolved and peristomal skin resolves from dermatitis and find correct pouching appliance lasting more than 2 days. Patient stated her previous appliance (1 piece hard convex 46591) would last 2-4 days with belt. Trialed Ligia ring & cymed washer-both melted into appliance in under 2 days. Patient prefers at 2 piece appliance as she thinks the pouch is longer.    Supplies Needed:   Appliance type:    Callum as above             Other:      Accessories:         Supplier: [employer]  currently    Frequency:  Weekly.       Professional Collaboration:  [facility] Health orders faxed to [facility].       At the time of each visit, a thorough assessment of the patient is completed to assure appropriateness of our plan of care.  The plan of care may need to be adapted from the original plan of care to address any significant changes in patient status.    Clinician Signature:  _________________________________  Date:  ____________    Physician Signature:  ________________________________  Date:  ____________

## 2021-02-09 NOTE — PATIENT INSTRUCTIONS
Change urostomies and ileostomies every 3-5 days. Change appliance immediately if it is leaking or peristomal skin feels irritated, has itching, or  burning.   To change the appliance, remove previous appliance, cleanse peristomal skin with warm water/washcloth, pat dry, make an ostomy template or use cardboard measuring guide and trace ostomy shape onto back of barrier, cut out barrier, apply appliance with medical adhesive spray. Empty pouches when no more than ½ full. Check contents every 2 hours or as needed. Do not leave soap residue on tissue and do not use baby wipes or skin prep wipes.     Should you experience any significant changes in your condition, such as infection (redness, swelling, localized heat, increased pain, fever > 101 F, chills) or have any questions regarding your home care instructions, please contact the wound center at [phone]. If after hours, contact your primary care physician or go to the hospital emergency room.

## 2021-02-10 NOTE — PROGRESS NOTES
"Provider Encounter- Ostomy Encounter        HISTORY OF PRESENT ILLNESS  Wound History:    START OF CARE IN CLINIC: 1/7/2021    REFERRING PROVIDER: Dr. DANIELLE Muniz MD     OSTOMY TYPE urostomy   LOCATION: Left lower quadrant              OSTOMY HISTORY: Patient has had a urostomy since age 5 due to what she describes as, \"a defective bladder muscle\", which meant that she was chronically incontinent.  There are no surgical records available.  She was referred to [facility] by her PCP in [place] due to difficulties with pouching.  She has a urologist in [place], however does not recall his name.  She has [facility] for assistance with her urostomy.   SURGERY: Urostomy at age 5 (ileal conduit?)   OSTOMY ISSUES: Frequent leakage, peristomal skin breakdown, peristomal wound    Pertinent Medical History: Obesity, chronic pain syndrome    Patient's problem list, allergies, and current medications reviewed and updated in Epic    Interval History:  1/12/2021 Joint visit with ostomy RN, refer to nursing note. Wear time 1 to 3 days.  Frequency of emptying 4-6 times per day. Leaks frequent.     1/12/2021 : Clinic visit with VERONICA Guerrero, KD, CHARLENEN, CFVIDAL.  Joint visit with ostomy RN, refer to nursing note.  Cathy states she is feeling well, denies fevers, chills, nausea, vomiting, cough or shortness of breath.  Ostomy appliance placed in clinic last week lasted less than 36 hours.  She replaced with her old system, and then [facility] placed new plans on Sunday.  She has been using triamcinolone to exposed areas of dermatitis around stoma, states is feeling little better.  We were able to obtain urology notes from [place] urologist, Dr. Alexis Venegas.  Patient was seen by him for hematuria, which is since resolved.    1/26/2021 : Clinic visit with VERONICA Guerrero, KD, CHARLENEN, CFCN.  Joint visit with ostomy RN, refer to nursing note.  Cathy continues to feel well overall, denies fevers, chills, " nausea, vomiting, cough or shortness of breath.  Peristomal wound nearly resolved.  Unfortunately, she has had some problems with leaking from her appliance.  Pouching system modified again today.    2/2/2021 : Clinic visit with VERONICA Guerrero FNP-BC, MARLYN RUEDA.   Joint visit with ostomy RN, refer to nursing note.  Cathy is feeling well today,denies fevers, chills, nausea, vomiting, cough or shortness of breath.  Unfortunately, she is only getting 1-1 and half days where time from her appliances.  Her stomal skin has improved significantly however.  She would like to stop coming into clinic, and just rely on home health.  However, I did convince her to continue in clinic until we find a more appropriate pouching system.    2/9/2021 : Clinic visit with VERONICA Guerrero FNP-BC, MARYBETH, MARLYN.  Joint visit with ostomy RN, refer to nursing note.  Cathy continues to feel well, denies fevers, chills, nausea, vomiting, cough or shortness of breath.  The appliance placed last visit lasted for almost 3 days.  We used a soft convex, 2 piece, which she states she liked very much.  She has not been using belt, as we suspect this was contributing to the development of her wound.  Unfortunately, this also is contributing to decrease wear time of her ostomy appliance.  She would like to be discharged soon, as she commutes from [place].  She will be getting wound care to her left lower leg at [facility] wound clinic.  Unfortunately they do not have an ostomy nurse.       REVIEW OF SYSTEMS:   Review of Systems   Constitutional: Negative for chills and fever.   Respiratory: Negative for cough and shortness of breath.    Cardiovascular: Negative for chest pain and leg swelling.   Gastrointestinal: Negative for constipation, diarrhea, nausea and vomiting.   Genitourinary:        Urostomy since age 5   Skin: Positive for itching and rash.        Skin around stoma red and itchy  Painful wound to peristomal skin        PHYSICAL EXAMINATION:   /51   Pulse 68   Temp 36.3 °C (97.4 °F) (Temporal)   Resp 20   SpO2 93%     Physical Exam   Constitutional: She is oriented to person, place, and time and well-developed, well-nourished, and in no distress.   Obese   HENT:   Head: Normocephalic.   Eyes: Pupils are equal, round, and reactive to light.   Pulmonary/Chest: Effort normal.   Abdominal: Soft.   Musculoskeletal: Normal range of motion.   Neurological: She is alert and oriented to person, place, and time.   Skin: Skin is warm.   Peristomal skin around left lower quadrant urostomy with rash radiating approximately 20 cm around  Full-thickness wound to peristomal skin at about 3:00  Refer to wound flowsheet and photos     STOMA ASSESSMENT:              Type:  ____Ileostomy     ____Colostomy    __x__Urostomy    ____Other                 Location:   LLQ              Size:1 5/8              Shape: irregular, flat/retracted              Color:red, moist              Protrudes:      ___ >1 inch               __x_ <1 inch (flat/retracted) however patient states buds sometimes              [place]:  Central and patent              Mucocutaneous Junction: intact circumferentially                Skin indentations, creases or scar tissue:pt's stoma folds under her pannus when she sits.               [place]-stomal Skin Problems: Scar tissue resolved at previous wound site peristomally at 3 o'clock. Peristomal dermatitis resolving & no itching or burning reported.                  Effluent / Flatus:urine is light yellow, clear              Photo  _x___ Yes ____ No                                                       ASSESSMENT AND PLAN:   1. Attention to urostomy ([facility])  Comments: Urostomy since age 5, per patient due to defective bladder muscle.  Pouching complicated by obesity, and flush stoma    2/9/2021: Wear time as increased to nearly 3 days with new pouching system.  Patient has not been wearing his belt as we suspect this was the  source of her wound.  She understands at this will decrease her wear time.  -Pouching by ostomy RN, soft convex barrier, 2 piece system  -Instructions sent to [facility]  -Patient to return to clinic weekly.  She would like to be discharged soon as she commutes all the way from [place].  We discussed possible discharge after next week's visit.  Health will continue to assist her with ostomy care, and will be providing supplies.  She will need new prescription for ostomy supplies once she is discharged from home health.  I will provide her with written Rx for ostomy supplies, with the understanding she does not order until she is discharged from home health      2. Peristomal skin complication    2/9/2021: Peristomal wound remains resolved, condition of skin has improved significantly  -Peristomal skin treated, wound care completed by ostomy RN  -Soft convex barrier without belt.    3. Class 3 severe obesity due to excess calories with serious comorbidity and body mass index (BMI) of 50.0 to 59.9 in adult (HCC)  Comments: Complicating factor.  Obesity creates pouching challenge    20 min spent face to face with patient, >50% of time spent counseling, coordinating care, reviewing records, discussing POC, educating patient regarding ostomy care, peristomal skin care, nutrition. Emotional support provided regarding life change following ostomy- support resources provided.     Please note that this note may have been created using voice recognition software. I have worked with technical experts from [employer] to optimize the interface.  I have made every reasonable attempt to correct obvious errors, but there may be errors of grammar and possibly content that I did not discover before finalizing the note.    N

## 2021-02-16 ENCOUNTER — OFFICE VISIT (OUTPATIENT)
Dept: WOUND CARE | Facility: MEDICAL CENTER | Age: 56
End: 2021-02-16
Attending: INTERNAL MEDICINE
Payer: MEDICARE

## 2021-02-16 DIAGNOSIS — E66.01 CLASS 3 SEVERE OBESITY DUE TO EXCESS CALORIES WITH SERIOUS COMORBIDITY AND BODY MASS INDEX (BMI) OF 50.0 TO 59.9 IN ADULT (HCC): ICD-10-CM

## 2021-02-16 DIAGNOSIS — Z43.6 ATTENTION TO UROSTOMY (HCC): Primary | ICD-10-CM

## 2021-02-16 PROCEDURE — 99213 OFFICE O/P EST LOW 20 MIN: CPT | Performed by: NURSE PRACTITIONER

## 2021-02-16 PROCEDURE — 99212 OFFICE O/P EST SF 10 MIN: CPT

## 2021-02-16 ASSESSMENT — ENCOUNTER SYMPTOMS
SHORTNESS OF BREATH: 0
FEVER: 0
VOMITING: 0
COUGH: 0
CHILLS: 0
CONSTIPATION: 0
DIARRHEA: 0
NAUSEA: 0

## 2021-02-16 NOTE — PROGRESS NOTES
"Provider Encounter- Ostomy Encounter        HISTORY OF PRESENT ILLNESS  Wound History:    START OF CARE IN CLINIC: 1/7/2021    REFERRING PROVIDER: Dr. DANIELLE Muniz MD     OSTOMY TYPE urostomy   LOCATION: Left lower quadrant              OSTOMY HISTORY: Patient has had a urostomy since age 5 due to what she describes as, \"a defective bladder muscle\", which meant that she was chronically incontinent.  There are no surgical records available.  She was referred to [facility] by her PCP in [place] due to difficulties with pouching.  She has a urologist in [place], however does not recall his name.  She has [facility] for assistance with her urostomy.   SURGERY: Urostomy at age 5 (ileal conduit?)   OSTOMY ISSUES: Frequent leakage, peristomal skin breakdown, peristomal wound    Pertinent Medical History: Obesity, chronic pain syndrome    Patient's problem list, allergies, and current medications reviewed and updated in Epic    Interval History:  1/12/2021 Joint visit with ostomy RN, refer to nursing note. Wear time 1 to 3 days.  Frequency of emptying 4-6 times per day. Leaks frequent.     1/12/2021 : Clinic visit with VERONICA Guerrero, KD, CHARLENEN, CFVIDAL.  Joint visit with ostomy RN, refer to nursing note.  Cathy states she is feeling well, denies fevers, chills, nausea, vomiting, cough or shortness of breath.  Ostomy appliance placed in clinic last week lasted less than 36 hours.  She replaced with her old system, and then [facility] placed new plans on Sunday.  She has been using triamcinolone to exposed areas of dermatitis around stoma, states is feeling little better.  We were able to obtain urology notes from [place] urologist, Dr. Alexis Venegas.  Patient was seen by him for hematuria, which is since resolved.    1/26/2021 : Clinic visit with VERONICA Guerrero, KD, CHARLENEN, CFCN.  Joint visit with ostomy RN, refer to nursing note.  Cathy continues to feel well overall, denies fevers, chills, " nausea, vomiting, cough or shortness of breath.  Peristomal wound nearly resolved.  Unfortunately, she has had some problems with leaking from her appliance.  Pouching system modified again today.    2/2/2021 : Clinic visit with VERONICA Guerrero FNP-BC, MARYBETH, MARLYN.   Joint visit with ostomy RN, refer to nursing note.  Cathy is feeling well today,denies fevers, chills, nausea, vomiting, cough or shortness of breath.  Unfortunately, she is only getting 1-1 and half days where time from her appliances.  Her stomal skin has improved significantly however.  She would like to stop coming into clinic, and just rely on home health.  However, I did convince her to continue in clinic until we find a more appropriate pouching system.    2/9/2021 : Clinic visit with VERONICA Guerrero, KD, MARYBETH, MARLYN.  Joint visit with ostomy RN, refer to nursing note.  Cathy continues to feel well, denies fevers, chills, nausea, vomiting, cough or shortness of breath.  The appliance placed last visit lasted for almost 3 days.  We used a soft convex, 2 piece, which she states she liked very much.  She has not been using belt, as we suspect this was contributing to the development of her wound.  Unfortunately, this also is contributing to decrease wear time of her ostomy appliance.  She would like to be discharged soon, as she commutes from [place].  She will be getting wound care to her left lower leg at [facility] wound clinic.  Unfortunately they do not have an ostomy nurse.     2/16/2021 : Clinic visit with VERONICA Guerrero FNP-BC, MARYBETH, MARLYN.   Joint visit with RN, refer to nursing note.  Cathy states she is feeling well overall, denies fevers, chills, nausea, vomiting, cough or shortness of breath.  Wear time for her appliances 2 to 3 days.  She is still not using belt.      REVIEW OF SYSTEMS:   Review of Systems   Constitutional: Negative for chills and fever.   Respiratory: Negative for cough and shortness of breath.     Cardiovascular: Negative for chest pain and leg swelling.   Gastrointestinal: Negative for constipation, diarrhea, nausea and vomiting.   Genitourinary:        Urostomy since age 5   Skin: Positive for itching and rash.        Skin around stoma red and itchy  Painful wound to peristomal skin       PHYSICAL EXAMINATION:     Physical Exam   Constitutional: She is oriented to person, place, and time and well-developed, well-nourished, and in no distress.   Obese   HENT:   Head: Normocephalic.   Eyes: Pupils are equal, round, and reactive to light.   Pulmonary/Chest: Effort normal.   Abdominal: Soft.   Musculoskeletal:         General: Normal range of motion.   Neurological: She is alert and oriented to person, place, and time.   Skin: Skin is warm.   Peristomal skin around left lower quadrant urostomy with rash radiating approximately 20 cm around  Full-thickness wound to peristomal skin at about 3:00  Refer to wound flowsheet and photos     STOMA ASSESSMENT:              Type:  ____Ileostomy     ____Colostomy    __x__Urostomy    ____Other                 Location:   LLQ              Size:1 5/8              Shape: irregular, flat/retracted              Color:red, moist              Protrudes:      ___ >1 inch               __x_ <1 inch (flat/retracted) however patient states buds sometimes              [place]:  Central and patent              Mucocutaneous Junction: intact circumferentially                Skin indentations, creases or scar tissue:pt's stoma folds under her pannus when she sits.               Akila-stomal Skin Problems: Scar tissue resolved at previous wound site peristomally at 3 o'clock. Peristomal dermatitis resolving & no itching or burning reported.                  Effluent / Flatus:urine is light yellow, clear              Photo  _x___ Yes ____ No                                                           ASSESSMENT AND PLAN:   1. Attention to urostomy ([facility])  Comments: Urostomy since age 5, per  patient due to defective bladder muscle.  Pouching complicated by obesity, and flush stoma    2/16/2021: Wear time of current pouching system 2 to 3 days.  Skin issues resolving.  Unfortunately, due to flush stoma, and contours of patient's abdomen, we may not be able to do any better than current wear time.  -Pouching by ostomy RN, soft convex barrier, 2 piece system  -Instructions sent to [facility]  -Patient to return to clinic weekly.  She would like to be discharged soon as she commutes all the way from [place].  We will have her return to clinic 1 more week, and will likely discharge at that time.  [facility] will continue to assist her with ostomy care, and will be providing supplies.  She will need new prescription for ostomy supplies once she is discharged from home health.  I will provide her with written Rx for ostomy supplies, with the understanding she does not order until she is discharged from home health      2. Peristomal skin complication    2/16/2021: Peristomal wound remains resolved, condition of skin has improved significantly  -Peristomal skin treated, wound care completed by ostomy RN  -Soft convex barrier without belt.    3. Class 3 severe obesity due to excess calories with serious comorbidity and body mass index (BMI) of 50.0 to 59.9 in adult (HCC)  Comments: Complicating factor.  Obesity creates pouching challenge    20 min spent face to face with patient, >50% of time spent counseling, coordinating care, reviewing records, discussing POC, educating patient regarding ostomy care, peristomal skin care, nutrition. Emotional support provided regarding life change following ostomy- support resources provided.     Please note that this note may have been created using voice recognition software. I have worked with technical experts from [employer] to optimize the interface.  I have made every reasonable attempt to correct obvious errors, but there may be errors of grammar and possibly  content that I did not discover before finalizing the note.    N

## 2021-02-16 NOTE — WOUND TEAM
"Advanced Wound Care  [facility]  [address]  [place] [address]  [phone] Fax#    Ostomy Evaluation  For 90 Day Certification Period: 01/07/2021 - 04/07/2021     Referring Provider:  Dr. DANIELLE Muniz MD  Primary Provider:same  Consulting Providers:Ena MARTIN  Surgeon:Dr. Cantor in [place] in 1969      Start of Care:01/07/2021  Reason for referral: assess/ manage leaking urostomy with moises stomal wound       SUBJECTIVE:  \"It lasted 2-3 days, the wound has healed\"    HPI: Pt is a 55 year old obese lady who is debilitated with chronic pain syndrome who has a hx of urostomy since aged 5 due to what she describes as \"a defective bladder muscle\" that meant she was chronically incontinent. There are no urology notes available. She is referred by her PCP in [place]. She says she has [facility] coming in 2 x week.     Past Medical Hx:  Past Medical History:   Diagnosis Date   • Anemia    • Chronic anticoagulation    • Chronic pain syndrome    • DVT of lower extremity (deep venous thrombosis) (HCC)    • Dyspnea    • Nephrolithiasis    • Sinus bradycardia    • Sleep apnea     Uses a CPAP with oxygen.     Surgical Hx:  Past Surgical History:   Procedure Laterality Date   • APPENDECTOMY     • OTHER      Neck surgery     Medications:  Current Outpatient Medications:   •  triamcinolone acetonide (KENALOG) 0.1 % Ointment, Apply 1 Application topically every day., Disp: 30 g, Rfl: 1  •  losartan (COZAAR) 50 MG Tab, Take 50 mg by mouth 2 Times a Day. Indications: High Blood Pressure Disorder, Disp: , Rfl:   •  oxycodone (OXY-IR) 15 MG immediate release tablet, Take 15 mg by mouth., Disp: , Rfl:   •  gabapentin (NEURONTIN) 300 MG Cap, Take 300 mg by mouth every bedtime., Disp: , Rfl:   •  furosemide (LASIX) 40 MG TABS, TAKE 1 TABLET BY MOUTH EVERY DAY, Disp: 90 Tab, Rfl: 2  •  potassium chloride SA (K-DUR) 20 MEQ TBCR, TAKE 1 TABLET BY MOUTH EVERY DAY, Disp: " 90 Tab, Rfl: 2  •  theophylline SR (THEODUR) 200 MG TB12, TAKE 1 TABLET BY MOUTH TWICE DAILY (Patient not taking: Reported on 1/12/2018), Disp: 180 Tab, Rfl: 3  •  Ferrous Sulfate (IRON) 325 (65 FE) MG TABS, Take 1 Tab by mouth 2 Times a Day., Disp: , Rfl:   •  warfarin (COUMADIN) 5 MG TABS, Take 1-2 tablets daily as directed by INR, Disp: 180 Tab, Rfl: 3  •  morphine SR (MS CONTIN) 60 MG TB12, Take 60 mg by mouth 3 times a day. Currently off medication, Disp: , Rfl:   •  amitriptyline (ELAVIL) 50 MG TABS, Take 50 mg by mouth every evening., Disp: , Rfl:   •  citalopram (CELEXA) 40 MG TABS, Take 40 mg by mouth every day., Disp: , Rfl:   •  oxycodone-acetaminophen (PERCOCET) 5-325 MG TABS, Take 1-2 Tabs by mouth every 6 hours as needed., Disp: , Rfl:   Allergies:Patient has no known allergies.    Social Hx:  Social History     Socioeconomic History   • Marital status: Single     Spouse name: Not on file   • Number of children: Not on file   • Years of education: Not on file   • Highest education level: Not on file   Occupational History   • Not on file   Tobacco Use   • Smoking status: Never Smoker   • Smokeless tobacco: Never Used   Substance and Sexual Activity   • Alcohol use: No   • Drug use: No   • Sexual activity: Not on file   Other Topics Concern   • Not on file   Social History Narrative   • Not on file     Social Determinants of Health     Financial Resource Strain:    • Difficulty of Paying Living Expenses:    Food Insecurity:    • Worried About Running Out of Food in the Last Year:    • Ran Out of Food in the Last Year:    Transportation Needs:    • Lack of Transportation (Medical):    • Lack of Transportation (Non-Medical):    Physical Activity:    • Days of Exercise per Week:    • Minutes of Exercise per Session:    Stress:    • Feeling of Stress :    Social Connections:    • Frequency of Communication with Friends and Family:    • Frequency of Social Gatherings with Friends and Family:    • Attends  Congregational Services:    • Active Member of Clubs or Organizations:    • Attends Club or Organization Meetings:    • Marital Status:    Intimate Partner Violence:    • Fear of Current or Ex-Partner:    • Emotionally Abused:    • Physically Abused:    • Sexually Abused:          OBJECTIVE:     STOMA ASSESSMENT:   Type:  ____Ileostomy     ____Colostomy    __x__Urostomy    ____Other     Location:   LLQ   Size:1 5/8   Shape: irregular, flat/retracted   Color:red, moist   Protrudes:      ___ >1 inch               __x_ <1 inch (flat/retracted) however patient states buds sometimes   [place]:  Central and patent   Mucocutaneous Junction: intact circumferentially     Skin indentations, creases or scar tissue:pt's stoma folds under her pannus when she sits.    Moises-stomal Skin Problems: Scar tissue resolved. Small hypergranulation noted, did not use silver nitrate this visit. Skin  around stoma is red and itchy.      Effluent / Flatus:urine is light yellow, clear   Photo  _x___ Yes ____ No            Pouching Procedure:   Old appliance removed. Old ostomy appliance removed using [place] medical adhesive remover spray, and by pushing skin away from appliance to avoid skin tears.       Peristomal skin cleansed with: warm water on washcloth. dried    Peristomal skin treated with: Luqix then crusted with nytatin powder and ski [place] medical adhesive spray to moises stomal  skin.     Ostomy appliance used:  [place] soft convex CTF 70mm Callum Cera Plus flange (09556) with a corresponding  drainable urostomy pouch, framed with coloplast XL brava strips.     TAC at home to perimeter past appliance on abdomen as needed.     Patient Education: Change urostomies every 3-5 days.  Change appliance immediately if it is leaking or peristomal skin feels irritated, has itching, or  burning.   To change the appliance, remove previous appliance, cleanse peristomal skin with warm water/washcloth, pat dry, make an ostomy template or use  cardboard measuring guide and trace ostomy shape onto back of barrier, cut out barrier, apply appliance. Empty pouches when no more than ½ full. Check contents every 2 hours or as needed. Do not leave soap residue on tissue and do not use baby wipes or skin prep wipes.     Should you experience any significant changes in your condition, such as infection (redness, swelling, localized heat, increased pain, fever > 101 F, chills) or have any questions regarding your home care instructions, please contact the wound center at [phone]. If after hours, contact your primary care physician or go to the hospital emergency room.       Verbal/demonstration instructions of above pouching procedure provided to patient/family.      Response: Pt and CG present say they have good understanding.      PLAN: weekly to assess verification correct pouching appliance lasting more than 2 days and resolve peristomal skin dermatitis.  Patient stated her previous appliance (1 piece hard convex 98716) would last 2-4 days with belt. Trialed Ligia ring & cymed washer-both melted into appliance in under 2 days. Patient prefers at 2 piece appliance as she thinks the pouch is longer.    Supplies Needed:   Appliance type:    Callum as above             Other:      Accessories:         Supplier: [facility]  currently    Frequency:  Weekly.       Professional Collaboration:  Home Health orders faxed to [facility].       At the time of each visit, a thorough assessment of the patient is completed to assure appropriateness of our plan of care.  The plan of care may need to be adapted from the original plan of care to address any significant changes in patient status.    Clinician Signature:  _________________________________  Date:  ____________    Physician Signature:  ________________________________  Date:  ____________

## 2021-02-23 ENCOUNTER — APPOINTMENT (OUTPATIENT)
Dept: WOUND CARE | Facility: MEDICAL CENTER | Age: 56
End: 2021-02-23
Attending: INTERNAL MEDICINE
Payer: MEDICARE

## 2021-03-02 ENCOUNTER — APPOINTMENT (OUTPATIENT)
Dept: WOUND CARE | Facility: MEDICAL CENTER | Age: 56
End: 2021-03-02
Payer: MEDICARE

## 2021-03-09 ENCOUNTER — OFFICE VISIT (OUTPATIENT)
Dept: WOUND CARE | Facility: MEDICAL CENTER | Age: 56
End: 2021-03-09
Attending: INTERNAL MEDICINE
Payer: MEDICARE

## 2021-03-09 DIAGNOSIS — Z43.6 ATTENTION TO UROSTOMY (HCC): Primary | ICD-10-CM

## 2021-03-09 PROCEDURE — 99211 OFF/OP EST MAY X REQ PHY/QHP: CPT

## 2021-03-09 NOTE — WOUND TEAM
"Advanced Wound Care  [facility]  [address]  [place] [address]  [phone] Fax#    Ostomy Evaluation  For 90 Day Certification Period: 01/07/2021 - 04/07/2021     Referring Provider:  Dr. DANIELLE Muniz MD  Primary Provider:same  Consulting Providers:Ena MARTIN  Surgeon:Dr. Cantor in [place] in 1970      Start of Care:01/07/2021  Reason for referral: to assess urostomy, moises skin and discharge       SUBJECTIVE:  \"It lasted 2 days and if everything looks okay today I was told last week that I may not have to come back\"    HPI: Pt is a 55 year old obese lady who is debilitated with chronic pain syndrome who has a hx of urostomy since aged 5 due to what she describes as \"a defective bladder muscle\" that meant she was chronically incontinent. There are no urology notes available. She is referred by her PCP in [place]. She says she has [facility] coming in 2 x week.     Past Medical Hx:  Past Medical History:   Diagnosis Date   • Anemia    • Chronic anticoagulation    • Chronic pain syndrome    • DVT of lower extremity (deep venous thrombosis) (HCC)    • Dyspnea    • Nephrolithiasis    • Sinus bradycardia    • Sleep apnea     Uses a CPAP with oxygen.     Surgical Hx:  Past Surgical History:   Procedure Laterality Date   • APPENDECTOMY     • OTHER      Neck surgery     Medications:  Current Outpatient Medications:   •  triamcinolone acetonide (KENALOG) 0.1 % Ointment, Apply 1 Application topically every day., Disp: 30 g, Rfl: 1  •  losartan (COZAAR) 50 MG Tab, Take 50 mg by mouth 2 Times a Day. Indications: High Blood Pressure Disorder, Disp: , Rfl:   •  oxycodone (OXY-IR) 15 MG immediate release tablet, Take 15 mg by mouth., Disp: , Rfl:   •  gabapentin (NEURONTIN) 300 MG Cap, Take 300 mg by mouth every bedtime., Disp: , Rfl:   •  furosemide (LASIX) 40 MG TABS, TAKE 1 TABLET BY MOUTH EVERY DAY, Disp: 90 Tab, Rfl: 2  •  potassium chloride SA " (K-DUR) 20 MEQ TBCR, TAKE 1 TABLET BY MOUTH EVERY DAY, Disp: 90 Tab, Rfl: 2  •  theophylline SR (THEODUR) 200 MG TB12, TAKE 1 TABLET BY MOUTH TWICE DAILY (Patient not taking: Reported on 1/12/2018), Disp: 180 Tab, Rfl: 3  •  Ferrous Sulfate (IRON) 325 (65 FE) MG TABS, Take 1 Tab by mouth 2 Times a Day., Disp: , Rfl:   •  warfarin (COUMADIN) 5 MG TABS, Take 1-2 tablets daily as directed by INR, Disp: 180 Tab, Rfl: 3  •  morphine SR (MS CONTIN) 60 MG TB12, Take 60 mg by mouth 3 times a day. Currently off medication, Disp: , Rfl:   •  amitriptyline (ELAVIL) 50 MG TABS, Take 50 mg by mouth every evening., Disp: , Rfl:   •  citalopram (CELEXA) 40 MG TABS, Take 40 mg by mouth every day., Disp: , Rfl:   •  oxycodone-acetaminophen (PERCOCET) 5-325 MG TABS, Take 1-2 Tabs by mouth every 6 hours as needed., Disp: , Rfl:   Allergies:Patient has no known allergies.    Social Hx:  Social History     Socioeconomic History   • Marital status: Single     Spouse name: Not on file   • Number of children: Not on file   • Years of education: Not on file   • Highest education level: Not on file   Occupational History   • Not on file   Tobacco Use   • Smoking status: Never Smoker   • Smokeless tobacco: Never Used   Substance and Sexual Activity   • Alcohol use: No   • Drug use: No   • Sexual activity: Not on file   Other Topics Concern   • Not on file   Social History Narrative   • Not on file     Social Determinants of Health     Financial Resource Strain:    • Difficulty of Paying Living Expenses:    Food Insecurity:    • Worried About Running Out of Food in the Last Year:    • Ran Out of Food in the Last Year:    Transportation Needs:    • Lack of Transportation (Medical):    • Lack of Transportation (Non-Medical):    Physical Activity:    • Days of Exercise per Week:    • Minutes of Exercise per Session:    Stress:    • Feeling of Stress :    Social Connections:    • Frequency of Communication with Friends and Family:    • Frequency  of Social Gatherings with Friends and Family:    • Attends Buddhism Services:    • Active Member of Clubs or Organizations:    • Attends Club or Organization Meetings:    • Marital Status:    Intimate Partner Violence:    • Fear of Current or Ex-Partner:    • Emotionally Abused:    • Physically Abused:    • Sexually Abused:          OBJECTIVE:     STOMA ASSESSMENT:   Type:  ____Ileostomy     ____Colostomy    __x__Urostomy    ____Other     Location:   LLQ   Size:1 5/8   Shape: irregular, flat/retracted   Color:red, moist   Protrudes:      ___ >1 inch               __x_ <1 inch (flat/retracted) however patient states buds sometimes   [place]:  Central and patent   Mucocutaneous Junction: intact circumferentially     Skin indentations, creases or scar tissue:pt's stoma folds under her pannus when she sits. Crease at 8 o'clock and patient states that is where it leaks   Moises-stomal Skin Problems: Peristomal dermatitis has resolved and patient states that she no longer has itching or burning.     Effluent / Flatus:urine is light yellow, clear   Photo  _x___ Yes ____ No            Pouching Procedure:   Old appliance removed. Old ostomy appliance removed using [place] medical adhesive remover spray, and by pushing                  skin away from appliance to avoid skin tears.     Peristomal skin cleansed with: warm water on washcloth. dried    Peristomal skin treated with: NA    [place] medical adhesive spray to moises stomal skin.     Ostomy appliance used: Pie wedge place to crease at 8o'clock. Callum soft convex CTF 70mm Callum Cera  Plus flange (09721) with a corresponding Callum (31442) drainable urostomy pouch, framed with coloplast XL  brava strips. Cut appliance more round & stretched tissue for stoma to form more round.   Patient declines wearing belt   TAC at home to perimeter past appliance on abdomen as needed.     Patient Education: Change urostomies every 3-5 days.  Change appliance immediately if it  is leaking or peristomal skin feels irritated, has itching, or  burning.   To change the appliance, remove previous appliance, cleanse peristomal skin with warm water/washcloth, pat dry, make an ostomy template or use cardboard measuring guide and trace ostomy shape onto back of barrier, cut out barrier, apply appliance. Empty pouches when no more than ½ full. Check contents every 2 hours or as needed. Do not leave soap residue on tissue and do not use baby wipes or skin prep wipes.     Should you experience any significant changes in your condition, such as infection (redness, swelling, localized heat, increased pain, fever > 101 F, chills) or have any questions regarding your home care instructions, please contact the wound center at [phone]. If after hours, contact your primary care physician or go to the hospital emergency room.       Verbal/demonstration instructions of above pouching procedure provided to patient/family.      Response: Pt and CG present say they have good understanding.    Plan:  Discharge patient at this time secondary to wound resolution, and peristomal skin irritation has resolved.  Current stoma appliance is working properly.  Ostomy orders faxed to [facility].    Clinician Signature: _____________________________ Date:_______________                Appliance type:    Callum as above             Other:      Accessories:         Supplier: [facility] currently    Frequency: Discharged    Professional Collaboration:  Home Health orders faxed to [facility].       At the time of each visit, a thorough assessment of the patient is completed to assure appropriateness of our plan of care.  The plan of care may need to be adapted from the original plan of care to address any significant changes in patient status.    Clinician Signature:  _________________________________  Date:  ____________    Physician Signature:  ________________________________  Date:   ____________

## 2021-03-09 NOTE — PROGRESS NOTES
Patient was seen by wound and ostomy RN Anastacia today in clinic.  I did not need to see the patient in clinic today.  Patient has no open wound.  Patient has been educated regarding ostomy care patient will be discharged from [facility].  See wound and ostomy care RN Anastacia's note for further information.

## 2023-09-29 ENCOUNTER — HOSPITAL ENCOUNTER (INPATIENT)
Facility: MEDICAL CENTER | Age: 58
LOS: 23 days | DRG: 368 | End: 2023-10-22
Attending: STUDENT IN AN ORGANIZED HEALTH CARE EDUCATION/TRAINING PROGRAM | Admitting: STUDENT IN AN ORGANIZED HEALTH CARE EDUCATION/TRAINING PROGRAM
Payer: MEDICARE

## 2023-09-29 DIAGNOSIS — Z86.14 HX MRSA INFECTION: ICD-10-CM

## 2023-09-29 DIAGNOSIS — D50.0 IRON DEFICIENCY ANEMIA DUE TO CHRONIC BLOOD LOSS: ICD-10-CM

## 2023-09-29 DIAGNOSIS — I82.5Y3 CHRONIC DEEP VEIN THROMBOSIS (DVT) OF PROXIMAL VEIN OF BOTH LOWER EXTREMITIES (HCC): ICD-10-CM

## 2023-09-29 DIAGNOSIS — G89.4 CHRONIC PAIN SYNDROME: ICD-10-CM

## 2023-09-29 DIAGNOSIS — K92.2 UGIB (UPPER GASTROINTESTINAL BLEED): ICD-10-CM

## 2023-09-29 DIAGNOSIS — N39.0 COMPLICATED UTI (URINARY TRACT INFECTION): ICD-10-CM

## 2023-09-29 PROBLEM — Z86.718 HISTORY OF DVT IN ADULTHOOD: Status: ACTIVE | Noted: 2023-09-29

## 2023-09-29 PROBLEM — D64.9 ANEMIA: Status: ACTIVE | Noted: 2023-09-29

## 2023-09-29 LAB
ANION GAP SERPL CALC-SCNC: 14 MMOL/L (ref 7–16)
APTT PPP: 32.6 SEC (ref 24.7–36)
BASOPHILS # BLD AUTO: 0.3 % (ref 0–1.8)
BASOPHILS # BLD: 0.03 K/UL (ref 0–0.12)
BUN SERPL-MCNC: 70 MG/DL (ref 8–22)
CALCIUM SERPL-MCNC: 10 MG/DL (ref 8.5–10.5)
CHLORIDE SERPL-SCNC: 106 MMOL/L (ref 96–112)
CK SERPL-CCNC: 39 U/L (ref 0–154)
CO2 SERPL-SCNC: 19 MMOL/L (ref 20–33)
CREAT SERPL-MCNC: 2.8 MG/DL (ref 0.5–1.4)
EOSINOPHIL # BLD AUTO: 0.44 K/UL (ref 0–0.51)
EOSINOPHIL NFR BLD: 5.1 % (ref 0–6.9)
ERYTHROCYTE [DISTWIDTH] IN BLOOD BY AUTOMATED COUNT: 49.5 FL (ref 35.9–50)
GFR SERPLBLD CREATININE-BSD FMLA CKD-EPI: 19 ML/MIN/1.73 M 2
GLUCOSE SERPL-MCNC: 115 MG/DL (ref 65–99)
HCT VFR BLD AUTO: 21 % (ref 37–47)
HGB BLD-MCNC: 6.7 G/DL (ref 12–16)
IMM GRANULOCYTES # BLD AUTO: 0.07 K/UL (ref 0–0.11)
IMM GRANULOCYTES NFR BLD AUTO: 0.8 % (ref 0–0.9)
LYMPHOCYTES # BLD AUTO: 1.1 K/UL (ref 1–4.8)
LYMPHOCYTES NFR BLD: 12.7 % (ref 22–41)
MCH RBC QN AUTO: 28.5 PG (ref 27–33)
MCHC RBC AUTO-ENTMCNC: 31.9 G/DL (ref 32.2–35.5)
MCV RBC AUTO: 89.4 FL (ref 81.4–97.8)
MONOCYTES # BLD AUTO: 0.8 K/UL (ref 0–0.85)
MONOCYTES NFR BLD AUTO: 9.2 % (ref 0–13.4)
NEUTROPHILS # BLD AUTO: 6.21 K/UL (ref 1.82–7.42)
NEUTROPHILS NFR BLD: 71.9 % (ref 44–72)
NRBC # BLD AUTO: 0 K/UL
NRBC BLD-RTO: 0 /100 WBC (ref 0–0.2)
PLATELET # BLD AUTO: 239 K/UL (ref 164–446)
PMV BLD AUTO: 10.4 FL (ref 9–12.9)
POTASSIUM SERPL-SCNC: 3.6 MMOL/L (ref 3.6–5.5)
RBC # BLD AUTO: 2.35 M/UL (ref 4.2–5.4)
SODIUM SERPL-SCNC: 139 MMOL/L (ref 135–145)
WBC # BLD AUTO: 8.7 K/UL (ref 4.8–10.8)

## 2023-09-29 PROCEDURE — 86901 BLOOD TYPING SEROLOGIC RH(D): CPT

## 2023-09-29 PROCEDURE — 82550 ASSAY OF CK (CPK): CPT

## 2023-09-29 PROCEDURE — 85025 COMPLETE CBC W/AUTO DIFF WBC: CPT

## 2023-09-29 PROCEDURE — 80048 BASIC METABOLIC PNL TOTAL CA: CPT

## 2023-09-29 PROCEDURE — 99223 1ST HOSP IP/OBS HIGH 75: CPT | Mod: AI | Performed by: STUDENT IN AN ORGANIZED HEALTH CARE EDUCATION/TRAINING PROGRAM

## 2023-09-29 PROCEDURE — 700111 HCHG RX REV CODE 636 W/ 250 OVERRIDE (IP): Performed by: STUDENT IN AN ORGANIZED HEALTH CARE EDUCATION/TRAINING PROGRAM

## 2023-09-29 PROCEDURE — 86900 BLOOD TYPING SEROLOGIC ABO: CPT

## 2023-09-29 PROCEDURE — 86850 RBC ANTIBODY SCREEN: CPT

## 2023-09-29 PROCEDURE — C9113 INJ PANTOPRAZOLE SODIUM, VIA: HCPCS | Performed by: STUDENT IN AN ORGANIZED HEALTH CARE EDUCATION/TRAINING PROGRAM

## 2023-09-29 PROCEDURE — 36415 COLL VENOUS BLD VENIPUNCTURE: CPT

## 2023-09-29 PROCEDURE — 85730 THROMBOPLASTIN TIME PARTIAL: CPT

## 2023-09-29 PROCEDURE — 700105 HCHG RX REV CODE 258: Performed by: STUDENT IN AN ORGANIZED HEALTH CARE EDUCATION/TRAINING PROGRAM

## 2023-09-29 PROCEDURE — 770006 HCHG ROOM/CARE - MED/SURG/GYN SEMI*

## 2023-09-29 RX ORDER — GABAPENTIN 300 MG/1
300 CAPSULE ORAL
Status: DISCONTINUED | OUTPATIENT
Start: 2023-09-30 | End: 2023-10-22 | Stop reason: HOSPADM

## 2023-09-29 RX ORDER — PANTOPRAZOLE SODIUM 40 MG/10ML
40 INJECTION, POWDER, LYOPHILIZED, FOR SOLUTION INTRAVENOUS 2 TIMES DAILY
Status: DISCONTINUED | OUTPATIENT
Start: 2023-09-29 | End: 2023-10-01

## 2023-09-29 RX ORDER — SODIUM CHLORIDE 9 MG/ML
INJECTION, SOLUTION INTRAVENOUS CONTINUOUS
Status: ACTIVE | OUTPATIENT
Start: 2023-09-30 | End: 2023-09-30

## 2023-09-29 RX ORDER — SODIUM CHLORIDE 9 MG/ML
1000 INJECTION, SOLUTION INTRAVENOUS ONCE
Status: COMPLETED | OUTPATIENT
Start: 2023-09-29 | End: 2023-09-29

## 2023-09-29 RX ORDER — CITALOPRAM 40 MG/1
40 TABLET ORAL DAILY
Status: DISCONTINUED | OUTPATIENT
Start: 2023-09-30 | End: 2023-10-22 | Stop reason: HOSPADM

## 2023-09-29 RX ORDER — AMITRIPTYLINE HYDROCHLORIDE 25 MG/1
50 TABLET, FILM COATED ORAL NIGHTLY
Status: DISCONTINUED | OUTPATIENT
Start: 2023-09-30 | End: 2023-10-22 | Stop reason: HOSPADM

## 2023-09-29 RX ORDER — OXYCODONE HYDROCHLORIDE 15 MG/1
15 TABLET ORAL EVERY 6 HOURS PRN
Status: DISCONTINUED | OUTPATIENT
Start: 2023-09-29 | End: 2023-10-22

## 2023-09-29 RX ADMIN — SODIUM CHLORIDE 1000 ML: 9 INJECTION, SOLUTION INTRAVENOUS at 20:40

## 2023-09-29 RX ADMIN — PANTOPRAZOLE SODIUM 40 MG: 40 INJECTION, POWDER, FOR SOLUTION INTRAVENOUS at 22:20

## 2023-09-29 ASSESSMENT — PATIENT HEALTH QUESTIONNAIRE - PHQ9
3. TROUBLE FALLING OR STAYING ASLEEP OR SLEEPING TOO MUCH: SEVERAL DAYS
SUM OF ALL RESPONSES TO PHQ9 QUESTIONS 1 AND 2: 2
SUM OF ALL RESPONSES TO PHQ QUESTIONS 1-9: 5
5. POOR APPETITE OR OVEREATING: NOT AT ALL
8. MOVING OR SPEAKING SO SLOWLY THAT OTHER PEOPLE COULD HAVE NOTICED. OR THE OPPOSITE, BEING SO FIGETY OR RESTLESS THAT YOU HAVE BEEN MOVING AROUND A LOT MORE THAN USUAL: NOT AT ALL
7. TROUBLE CONCENTRATING ON THINGS, SUCH AS READING THE NEWSPAPER OR WATCHING TELEVISION: NOT AT ALL
6. FEELING BAD ABOUT YOURSELF - OR THAT YOU ARE A FAILURE OR HAVE LET YOURSELF OR YOUR FAMILY DOWN: SEVERAL DAYS
1. LITTLE INTEREST OR PLEASURE IN DOING THINGS: SEVERAL DAYS
2. FEELING DOWN, DEPRESSED, IRRITABLE, OR HOPELESS: SEVERAL DAYS
9. THOUGHTS THAT YOU WOULD BE BETTER OFF DEAD, OR OF HURTING YOURSELF: NOT AT ALL
4. FEELING TIRED OR HAVING LITTLE ENERGY: SEVERAL DAYS

## 2023-09-29 ASSESSMENT — ENCOUNTER SYMPTOMS
CARDIOVASCULAR NEGATIVE: 1
WEAKNESS: 1
MUSCULOSKELETAL NEGATIVE: 1
GASTROINTESTINAL NEGATIVE: 1
PSYCHIATRIC NEGATIVE: 1
RESPIRATORY NEGATIVE: 1
EYES NEGATIVE: 1

## 2023-09-29 ASSESSMENT — COGNITIVE AND FUNCTIONAL STATUS - GENERAL
STANDING UP FROM CHAIR USING ARMS: TOTAL
PERSONAL GROOMING: A LITTLE
MOVING TO AND FROM BED TO CHAIR: UNABLE
HELP NEEDED FOR BATHING: A LOT
DRESSING REGULAR LOWER BODY CLOTHING: TOTAL
SUGGESTED CMS G CODE MODIFIER DAILY ACTIVITY: CL
TOILETING: A LOT
WALKING IN HOSPITAL ROOM: TOTAL
CLIMB 3 TO 5 STEPS WITH RAILING: TOTAL
MOVING FROM LYING ON BACK TO SITTING ON SIDE OF FLAT BED: UNABLE
DRESSING REGULAR UPPER BODY CLOTHING: A LOT
SUGGESTED CMS G CODE MODIFIER MOBILITY: CM
TURNING FROM BACK TO SIDE WHILE IN FLAT BAD: A LOT
MOBILITY SCORE: 7
DAILY ACTIVITIY SCORE: 13
EATING MEALS: A LITTLE

## 2023-09-29 ASSESSMENT — FIBROSIS 4 INDEX: FIB4 SCORE: 1.57

## 2023-09-29 ASSESSMENT — PAIN DESCRIPTION - PAIN TYPE: TYPE: ACUTE PAIN;SURGICAL PAIN

## 2023-09-30 PROBLEM — N17.9 AKI (ACUTE KIDNEY INJURY) (HCC): Status: ACTIVE | Noted: 2023-09-30

## 2023-09-30 PROBLEM — D64.9 ACUTE ON CHRONIC ANEMIA: Status: ACTIVE | Noted: 2023-09-30

## 2023-09-30 LAB
ABO + RH BLD: NORMAL
ABO GROUP BLD: NORMAL
ANION GAP SERPL CALC-SCNC: 12 MMOL/L (ref 7–16)
BARCODED ABORH UBTYP: 9500
BARCODED ABORH UBTYP: 9500
BARCODED PRD CODE UBPRD: NORMAL
BARCODED PRD CODE UBPRD: NORMAL
BARCODED UNIT NUM UBUNT: NORMAL
BARCODED UNIT NUM UBUNT: NORMAL
BLD GP AB SCN SERPL QL: NORMAL
BUN SERPL-MCNC: 68 MG/DL (ref 8–22)
CALCIUM SERPL-MCNC: 9.5 MG/DL (ref 8.5–10.5)
CHLORIDE SERPL-SCNC: 109 MMOL/L (ref 96–112)
CO2 SERPL-SCNC: 18 MMOL/L (ref 20–33)
COMPONENT R 8504R: NORMAL
COMPONENT R 8504R: NORMAL
CREAT SERPL-MCNC: 2.8 MG/DL (ref 0.5–1.4)
FERRITIN SERPL-MCNC: 51.9 NG/ML (ref 10–291)
GFR SERPLBLD CREATININE-BSD FMLA CKD-EPI: 19 ML/MIN/1.73 M 2
GLUCOSE SERPL-MCNC: 84 MG/DL (ref 65–99)
HCT VFR BLD AUTO: 21.8 % (ref 37–47)
HCT VFR BLD AUTO: 25 % (ref 37–47)
HGB BLD-MCNC: 7 G/DL (ref 12–16)
HGB BLD-MCNC: 8.1 G/DL (ref 12–16)
HGB RETIC QN AUTO: 21.1 PG/CELL (ref 29–35)
HGB RETIC QN AUTO: 23.2 PG/CELL (ref 29–35)
IMM RETICS NFR: 16.1 % (ref 2.6–16.1)
IMM RETICS NFR: 22.4 % (ref 2.6–16.1)
IRON SATN MFR SERPL: 39 % (ref 15–55)
IRON SERPL-MCNC: 106 UG/DL (ref 40–170)
POTASSIUM SERPL-SCNC: 3.8 MMOL/L (ref 3.6–5.5)
PRODUCT TYPE UPROD: NORMAL
PRODUCT TYPE UPROD: NORMAL
RETICS # AUTO: 0.07 M/UL (ref 0.04–0.12)
RETICS # AUTO: 0.09 M/UL (ref 0.04–0.12)
RETICS/RBC NFR: 3 % (ref 0.8–2.6)
RETICS/RBC NFR: 3.1 % (ref 0.8–2.6)
RH BLD: NORMAL
SODIUM SERPL-SCNC: 139 MMOL/L (ref 135–145)
TIBC SERPL-MCNC: 273 UG/DL (ref 250–450)
UIBC SERPL-MCNC: 167 UG/DL (ref 110–370)
UNIT STATUS USTAT: NORMAL
UNIT STATUS USTAT: NORMAL

## 2023-09-30 PROCEDURE — A9270 NON-COVERED ITEM OR SERVICE: HCPCS | Performed by: STUDENT IN AN ORGANIZED HEALTH CARE EDUCATION/TRAINING PROGRAM

## 2023-09-30 PROCEDURE — 97602 WOUND(S) CARE NON-SELECTIVE: CPT

## 2023-09-30 PROCEDURE — 302111 WAFER OST 2.25IN N IMG RD 2 PC (BARRIER): Performed by: STUDENT IN AN ORGANIZED HEALTH CARE EDUCATION/TRAINING PROGRAM

## 2023-09-30 PROCEDURE — C9113 INJ PANTOPRAZOLE SODIUM, VIA: HCPCS | Performed by: STUDENT IN AN ORGANIZED HEALTH CARE EDUCATION/TRAINING PROGRAM

## 2023-09-30 PROCEDURE — 85018 HEMOGLOBIN: CPT | Mod: 91

## 2023-09-30 PROCEDURE — 82728 ASSAY OF FERRITIN: CPT

## 2023-09-30 PROCEDURE — 99222 1ST HOSP IP/OBS MODERATE 55: CPT | Performed by: NURSE PRACTITIONER

## 2023-09-30 PROCEDURE — 700111 HCHG RX REV CODE 636 W/ 250 OVERRIDE (IP): Performed by: STUDENT IN AN ORGANIZED HEALTH CARE EDUCATION/TRAINING PROGRAM

## 2023-09-30 PROCEDURE — 36430 TRANSFUSION BLD/BLD COMPNT: CPT

## 2023-09-30 PROCEDURE — 85046 RETICYTE/HGB CONCENTRATE: CPT

## 2023-09-30 PROCEDURE — 700105 HCHG RX REV CODE 258: Performed by: STUDENT IN AN ORGANIZED HEALTH CARE EDUCATION/TRAINING PROGRAM

## 2023-09-30 PROCEDURE — 83550 IRON BINDING TEST: CPT

## 2023-09-30 PROCEDURE — 80048 BASIC METABOLIC PNL TOTAL CA: CPT

## 2023-09-30 PROCEDURE — 302098 PASTE RING (FLAT): Performed by: STUDENT IN AN ORGANIZED HEALTH CARE EDUCATION/TRAINING PROGRAM

## 2023-09-30 PROCEDURE — 86923 COMPATIBILITY TEST ELECTRIC: CPT | Mod: 91

## 2023-09-30 PROCEDURE — 83540 ASSAY OF IRON: CPT

## 2023-09-30 PROCEDURE — 700102 HCHG RX REV CODE 250 W/ 637 OVERRIDE(OP): Performed by: STUDENT IN AN ORGANIZED HEALTH CARE EDUCATION/TRAINING PROGRAM

## 2023-09-30 PROCEDURE — 99233 SBSQ HOSP IP/OBS HIGH 50: CPT | Performed by: STUDENT IN AN ORGANIZED HEALTH CARE EDUCATION/TRAINING PROGRAM

## 2023-09-30 PROCEDURE — 85014 HEMATOCRIT: CPT | Mod: 91

## 2023-09-30 PROCEDURE — P9016 RBC LEUKOCYTES REDUCED: HCPCS

## 2023-09-30 PROCEDURE — 770006 HCHG ROOM/CARE - MED/SURG/GYN SEMI*

## 2023-09-30 RX ORDER — DAPTOMYCIN 50 MG/ML
500 INJECTION, POWDER, LYOPHILIZED, FOR SOLUTION INTRAVENOUS EVERY 24 HOURS
Status: ON HOLD | COMMUNITY
End: 2023-10-20

## 2023-09-30 RX ORDER — BISACODYL 10 MG
10 SUPPOSITORY, RECTAL RECTAL
Status: DISCONTINUED | OUTPATIENT
Start: 2023-09-30 | End: 2023-10-22 | Stop reason: HOSPADM

## 2023-09-30 RX ORDER — LACTOBACILLUS ACIDOPHILUS 500MM CELL
1 CAPSULE ORAL DAILY
COMMUNITY

## 2023-09-30 RX ORDER — AMOXICILLIN 250 MG
1 CAPSULE ORAL 2 TIMES DAILY
COMMUNITY

## 2023-09-30 RX ORDER — POTASSIUM CHLORIDE 20 MEQ/1
20 TABLET, EXTENDED RELEASE ORAL DAILY
COMMUNITY

## 2023-09-30 RX ORDER — CYCLOBENZAPRINE HCL 10 MG
10 TABLET ORAL 3 TIMES DAILY PRN
COMMUNITY

## 2023-09-30 RX ORDER — SENNOSIDES A AND B 8.6 MG/1
8.6 TABLET, FILM COATED ORAL 2 TIMES DAILY
Status: DISCONTINUED | OUTPATIENT
Start: 2023-09-30 | End: 2023-10-12

## 2023-09-30 RX ORDER — TRAZODONE HYDROCHLORIDE 50 MG/1
25-50 TABLET ORAL
COMMUNITY

## 2023-09-30 RX ORDER — FUROSEMIDE 40 MG/1
40 TABLET ORAL DAILY
COMMUNITY

## 2023-09-30 RX ORDER — POLYETHYLENE GLYCOL 3350 17 G/17G
1 POWDER, FOR SOLUTION ORAL
Status: DISCONTINUED | OUTPATIENT
Start: 2023-09-30 | End: 2023-10-22 | Stop reason: HOSPADM

## 2023-09-30 RX ORDER — SODIUM CHLORIDE 9 MG/ML
INJECTION, SOLUTION INTRAVENOUS CONTINUOUS
Status: ACTIVE | OUTPATIENT
Start: 2023-09-30 | End: 2023-09-30

## 2023-09-30 RX ORDER — VANCOMYCIN HYDROCHLORIDE 1 G/20ML
1000 INJECTION, POWDER, LYOPHILIZED, FOR SOLUTION INTRAVENOUS EVERY 24 HOURS
Status: ON HOLD | COMMUNITY
End: 2023-10-20

## 2023-09-30 RX ORDER — ASCORBIC ACID 500 MG
500 TABLET ORAL DAILY
COMMUNITY

## 2023-09-30 RX ADMIN — SENNOSIDES 8.6 MG: 8.6 TABLET, FILM COATED ORAL at 19:55

## 2023-09-30 RX ADMIN — CITALOPRAM 40 MG: 40 TABLET, FILM COATED ORAL at 05:57

## 2023-09-30 RX ADMIN — AMITRIPTYLINE HYDROCHLORIDE 50 MG: 25 TABLET, FILM COATED ORAL at 01:54

## 2023-09-30 RX ADMIN — PANTOPRAZOLE SODIUM 40 MG: 40 INJECTION, POWDER, FOR SOLUTION INTRAVENOUS at 05:58

## 2023-09-30 RX ADMIN — OXYCODONE HYDROCHLORIDE 15 MG: 15 TABLET ORAL at 08:54

## 2023-09-30 RX ADMIN — AMITRIPTYLINE HYDROCHLORIDE 50 MG: 25 TABLET, FILM COATED ORAL at 19:55

## 2023-09-30 RX ADMIN — SENNOSIDES 8.6 MG: 8.6 TABLET, FILM COATED ORAL at 13:09

## 2023-09-30 RX ADMIN — OXYCODONE HYDROCHLORIDE 15 MG: 15 TABLET ORAL at 01:54

## 2023-09-30 RX ADMIN — SODIUM CHLORIDE: 9 INJECTION, SOLUTION INTRAVENOUS at 01:40

## 2023-09-30 RX ADMIN — SODIUM CHLORIDE: 9 INJECTION, SOLUTION INTRAVENOUS at 12:46

## 2023-09-30 RX ADMIN — PANTOPRAZOLE SODIUM 40 MG: 40 INJECTION, POWDER, FOR SOLUTION INTRAVENOUS at 17:12

## 2023-09-30 RX ADMIN — GABAPENTIN 300 MG: 300 CAPSULE ORAL at 20:02

## 2023-09-30 RX ADMIN — OXYCODONE HYDROCHLORIDE 15 MG: 15 TABLET ORAL at 19:55

## 2023-09-30 ASSESSMENT — PAIN DESCRIPTION - PAIN TYPE
TYPE: ACUTE PAIN
TYPE: ACUTE PAIN;SURGICAL PAIN
TYPE: ACUTE PAIN;SURGICAL PAIN

## 2023-09-30 ASSESSMENT — ENCOUNTER SYMPTOMS
SHORTNESS OF BREATH: 0
DEPRESSION: 0
DIZZINESS: 0
DIARRHEA: 0
BLURRED VISION: 0
CHILLS: 0
FEVER: 0
BACK PAIN: 0
HEARTBURN: 1
WEAKNESS: 1
ABDOMINAL PAIN: 0
NAUSEA: 1
BLOOD IN STOOL: 0
VOMITING: 1
CONSTIPATION: 0
COUGH: 0

## 2023-09-30 ASSESSMENT — LIFESTYLE VARIABLES
EVER HAD A DRINK FIRST THING IN THE MORNING TO STEADY YOUR NERVES TO GET RID OF A HANGOVER: NO
HAVE PEOPLE ANNOYED YOU BY CRITICIZING YOUR DRINKING: NO
AVERAGE NUMBER OF DAYS PER WEEK YOU HAVE A DRINK CONTAINING ALCOHOL: 0
CONSUMPTION TOTAL: NEGATIVE
TOTAL SCORE: 0
ALCOHOL_USE: NO
HAVE YOU EVER FELT YOU SHOULD CUT DOWN ON YOUR DRINKING: NO
HOW MANY TIMES IN THE PAST YEAR HAVE YOU HAD 5 OR MORE DRINKS IN A DAY: 0
TOTAL SCORE: 0
EVER FELT BAD OR GUILTY ABOUT YOUR DRINKING: NO
ON A TYPICAL DAY WHEN YOU DRINK ALCOHOL HOW MANY DRINKS DO YOU HAVE: 0
TOTAL SCORE: 0

## 2023-09-30 NOTE — PROGRESS NOTES
Hospital Medicine Daily Progress Note    Date of Service  9/30/2023    Chief Complaint  Patricia A Tietz is a 58 y.o. female admitted 9/29/2023 with low hemoglobin    Hospital Course  Patricia A Tietz is a 58 y.o. female who presented 9/29/2023 with low hemoglobin.  Patient has history of left total knee arthroplasty periprosthetic infection.  She is status post explantation, debridement irrigation and placement of functional left total knee arthroplasty antibiotic spacer with antibiotic beads.  Cultures grew MRSA.  She was initially on vancomycin but now on daptomycin and currently has a PICC line in her right upper extremity.  She also has history of DVT to which she was on Coumadin but transition to Eliquis 1 month ago.  Patient has been in the nursing facility for the last few months.  They did routine blood draw on her yesterday, and found her to be anemic and referred her to ER for blood transfusion and further evaluation.     Patient denies dizziness chest pain shortness of breath hematemesis melena.  She states that she is unsure of what her stool color is due to her being bedbound and her body habitus.  She was found to have hemoglobin 5.7, BUN 76, creatinine 2.75.  FOBT was positive in the other facility. she was given fluids 1 unit of blood and given Protonix and transferred to [facility] as no GI available at outside facility.    Interval Problem Update  Patient was seen examined bedside  She stated no bowel movement for past few days, denies nausea vomiting  Reported left knee pain  Significant lower extremity edema    HB 5.7> 7, I ordered 1 unit RBC  Iron study negative for iron deficiency, I ordered a B12  I discussed with GI, plan for EGD tomorrow    RAO CRE 2.75> 2.8 (1.2)  DVT HX - hold eliquis    I ordered a bowel management    I have discussed this patient's plan of care and discharge plan at IDT rounds today with Case Management, Nursing, Nursing leadership, and other members of the IDT  team.    Consultants/Specialty  GI    Code Status  Full Code    Disposition  The patient is not medically cleared for discharge to home or a post-acute facility.      I have placed the appropriate orders for post-discharge needs.    Review of Systems  All 12 systems were reviewed and negative except as mentioned above       Physical Exam  Temp:  [35.6 °C (96 °F)-36.4 °C (97.6 °F)] 36 °C (96.8 °F)  Pulse:  [] 85  Resp:  [16-20] 17  BP: (100-142)/(55-91) 120/55  SpO2:  [95 %-100 %] 96 %    Physical Exam  Constitutional:       Appearance: She is obese. She is ill-appearing.   HENT:      Head: Normocephalic.      Nose: Nose normal.   Eyes:      Extraocular Movements: Extraocular movements intact.      Conjunctiva/sclera: Conjunctivae normal.   Cardiovascular:      Rate and Rhythm: Normal rate and regular rhythm.      Pulses: Normal pulses.      Heart sounds: Normal heart sounds.   Pulmonary:      Effort: Pulmonary effort is normal.      Breath sounds: No wheezing.   Abdominal:      General: Bowel sounds are normal.      Palpations: Abdomen is soft.      Tenderness: There is no abdominal tenderness.   Musculoskeletal:         General: Tenderness present.      Cervical back: Normal range of motion and neck supple.      Right lower leg: Edema present.      Left lower leg: Edema present.      Comments: Left knee clean Bandage   Neurological:      Mental Status: She is alert and oriented to person, place, and time. Mental status is at baseline.   Psychiatric:         Mood and Affect: Mood normal.         Fluids    Intake/Output Summary (Last 24 hours) at 9/30/2023 1602  Last data filed at 9/30/2023 1225  Gross per 24 hour   Intake 1885 ml   Output 2450 ml   Net -565 ml       Laboratory  Recent Labs     09/29/23  1534 09/29/23  2155 09/30/23  0647 09/30/23  1335   WBC  --  8.7  --   --    RBC 2.10* 2.35*  --   --    HEMOGLOBIN 5.7* 6.7* 7.0* 8.1*   HEMATOCRIT 18.0* 21.0* 21.8* 25.0*   MCV 85.5 89.4  --   --    MCH 27.2*  28.5  --   --    MCHC 31.8* 31.9*  --   --    RDW 17.1* 49.5  --   --    PLATELETCT 202 239  --   --    MPV 8.4 10.4  --   --      Recent Labs     09/29/23  1630 09/29/23  2155 09/30/23  0647   SODIUM 135* 139 139   POTASSIUM 4.2 3.6 3.8   CHLORIDE 105 106 109   CO2 19* 19* 18*   GLUCOSE 131* 115* 84   BUN 76* 70* 68*   CREATININE 2.75* 2.80* 2.80*   CALCIUM 9.8 10.0 9.5     Recent Labs     09/29/23 2155   APTT 32.6               Imaging  No orders to display        Assessment/Plan  * UGIB (upper gastrointestinal bleed)- (present on admission)  Assessment & Plan  Found to have hemoglobin 5.7, iven fluids, 1 unit of packed red blood cell, Protonix.  FOBT was positive in the other facility.     Continue PPI  I discussed with GI, plan for EGD tomorrow    Acute on chronic anemia  Assessment & Plan  Hb 5.7 at admission   received in multiple transfusion    Iron study negative for iron deficiency  I ordered a B12  Continue to monitor, transfuse if Hb less than 7    RAO (acute kidney injury) ([facility])  Assessment & Plan  RAO CRE 2.75> 2.8 (baseline 1.2)  Continue IV fluid    History of DVT in adulthood  Assessment & Plan  Hold Eliquis for now due to GI bleed    Hx MRSA infection  Assessment & Plan  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  Continue daptomycin     Class 3 severe obesity due to excess calories with serious comorbidity and body mass index (BMI) of 50.0 to 59.9 in adult ([facility])- (present on admission)  Assessment & Plan  Will need counseling when clinically appropriate         VTE prophylaxis: SCD, no pharmaceutical prophylaxis due to GI bleeding    I have performed a physical exam and reviewed and updated ROS and Plan today (9/30/2023). In review of yesterday's note (9/29/2023), there are no changes except as documented above.    I spent greater than 52 minutes for chart review, obtaining history independently, performing medically appropriate examination,  documenting , ordering medications,  tests, or procedures, referring and communicating with other health care professionals, Independently interpreting results and communicating results with patient/family/caregiver. More than 50% of time was spent in face-to-face clinical encounter.

## 2023-09-30 NOTE — ASSESSMENT & PLAN NOTE
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  Continue daptomycin, she is supposed to be on for 6 weeks, continue while she remains in our hospital  Has PICC  Knee sutures can be removed, will place nursing communication order  Check weekly CPK, baseline CPK is 97 on 10/19  Will switch to IV vancomycin 1 g Q24 hours upon tx to SNF

## 2023-09-30 NOTE — ASSESSMENT & PLAN NOTE
Eliquis held for for GI bleed and then nephrostomy tube placement initially  Restarted on 10/17 as noted above

## 2023-09-30 NOTE — H&P
Hospital Medicine History & Physical Note    Date of Service  9/29/2023    Primary Care Physician  Pedrito Muniz M.D.    Consultants  None    Code Status  Full Code    Chief Complaint  GI bleed    History of Presenting Illness  Patricia A Tietz is a 58 y.o. female who presented 9/29/2023 with low hemoglobin.  Patient has history of left total knee arthroplasty periprosthetic infection.  She is status post explantation, debridement irrigation and placement of functional left total knee arthroplasty antibiotic spacer with antibiotic beads.  Cultures grew MRSA.  She was initially on vancomycin but now on daptomycin and currently has a PICC line in her right upper extremity.  She also has history of DVT to which she was on Coumadin but transition to Eliquis 1 month ago.  Patient has been in the nursing facility for the last few months.  They did routine blood draw on her yesterday, and found her to be anemic and referred her to ER for blood transfusion and further evaluation.    Patient denies dizziness chest pain shortness of breath hematemesis melena.  She states that she is unsure of what her stool color is due to her being bedbound and her body habitus.  She was found to have hemoglobin 5.7, BUN 76, creatinine 2.75.  She was given fluids 1 unit of blood and given Protonix and transferred to [facility] as no GI available at outside facility.    I discussed the plan of care with patient.    Review of Systems  Review of Systems   Constitutional:  Positive for malaise/fatigue.   HENT: Negative.     Eyes: Negative.    Respiratory: Negative.     Cardiovascular: Negative.    Gastrointestinal: Negative.    Genitourinary: Negative.    Musculoskeletal: Negative.    Skin: Negative.    Neurological:  Positive for weakness.   Endo/Heme/Allergies: Negative.    Psychiatric/Behavioral: Negative.         Past Medical History   has a past medical history of Anemia, Chronic anticoagulation, Chronic pain syndrome, DVT of lower extremity  (deep venous thrombosis) (HCC), Dyspnea, Nephrolithiasis, Sinus bradycardia, and Sleep apnea.    Surgical History   has a past surgical history that includes appendectomy and other.     Family History  family history is not on file.   Family history reviewed with patient. There is no family history that is pertinent to the chief complaint.     Social History   reports that she has never smoked. She has never used smokeless tobacco. She reports that she does not drink alcohol and does not use drugs.    Allergies  No Known Allergies    Medications  Prior to Admission Medications   Prescriptions Last Dose Informant Patient Reported? Taking?   Ferrous Sulfate (IRON) 325 (65 FE) MG TABS   Yes No   Sig: Take 1 Tab by mouth 2 Times a Day.   amitriptyline (ELAVIL) 50 MG TABS   Yes No   Sig: Take 50 mg by mouth every evening.   citalopram (CELEXA) 40 MG TABS   Yes No   Sig: Take 40 mg by mouth every day.   furosemide (LASIX) 40 MG TABS   No No   Sig: TAKE 1 TABLET BY MOUTH EVERY DAY   gabapentin (NEURONTIN) 300 MG Cap   Yes No   Sig: Take 300 mg by mouth every bedtime.   losartan (COZAAR) 50 MG Tab   Yes No   Sig: Take 50 mg by mouth 2 Times a Day. Indications: High Blood Pressure Disorder   morphine SR (MS CONTIN) 60 MG TB12   Yes No   Sig: Take 60 mg by mouth 3 times a day. Currently off medication   oxycodone (OXY-IR) 15 MG immediate release tablet   Yes No   Sig: Take 15 mg by mouth.   oxycodone-acetaminophen (PERCOCET) 5-325 MG TABS   Yes No   Sig: Take 1-2 Tabs by mouth every 6 hours as needed.   potassium chloride SA (K-DUR) 20 MEQ TBCR   No No   Sig: TAKE 1 TABLET BY MOUTH EVERY DAY   theophylline SR (THEODUR) 200 MG TB12   No No   Sig: TAKE 1 TABLET BY MOUTH TWICE DAILY   Patient not taking: Reported on 1/12/2018   triamcinolone acetonide (KENALOG) 0.1 % Ointment   No No   Sig: Apply 1 Application topically every day.   warfarin (COUMADIN) 5 MG TABS   No No   Sig: Take 1-2 tablets daily as directed by INR     "  Facility-Administered Medications: None       Physical Exam  Temp:  [36.1 °C (96.9 °F)] 36.1 °C (96.9 °F)  Pulse:  [61] 61  Resp:  [18] 18  BP: (142)/(91) 142/91  SpO2:  [100 %] 100 %  Blood Pressure: (!) 142/91   Temperature: 36.1 °C (96.9 °F)   Pulse: 61   Respiration: 18   Pulse Oximetry: 100 %       Physical Exam  Constitutional:       Appearance: Normal appearance. She is obese.      Comments: Overall generalized weakness   HENT:      Head: Normocephalic.      Nose: Nose normal.      Mouth/Throat:      Mouth: Mucous membranes are moist.   Eyes:      Pupils: Pupils are equal, round, and reactive to light.   Cardiovascular:      Rate and Rhythm: Normal rate and regular rhythm.      Pulses: Normal pulses.   Pulmonary:      Effort: Pulmonary effort is normal.      Breath sounds: Normal breath sounds.   Abdominal:      General: Abdomen is flat.   Musculoskeletal:      Cervical back: Neck supple.      Comments: Right sided PICC line  Bilateral pitting edema 1+  Chronic venous changes noted in lower extremities   Skin:     General: Skin is warm.   Neurological:      General: No focal deficit present.      Mental Status: She is alert and oriented to person, place, and time. Mental status is at baseline.   Psychiatric:         Mood and Affect: Mood normal.         Behavior: Behavior normal.         Thought Content: Thought content normal.         Judgment: Judgment normal.         Laboratory:  Recent Labs     09/29/23  1534   RBC 2.10*   HEMOGLOBIN 5.7*   HEMATOCRIT 18.0*   MCV 85.5   MCH 27.2*   MCHC 31.8*   RDW 17.1*   PLATELETCT 202   MPV 8.4     Recent Labs     09/29/23  1630   SODIUM 135*   POTASSIUM 4.2   CHLORIDE 105   CO2 19*   GLUCOSE 131*   BUN 76*   CREATININE 2.75*   CALCIUM 9.8     Recent Labs     09/29/23  1630   ALTSGPT 5*   ASTSGOT 12*   ALKPHOSPHAT 59   TBILIRUBIN 0.3*   GLUCOSE 131*         No results for input(s): \"NTPROBNP\" in the last 72 hours.      No results for input(s): \"TROPONINT\" in the last " 72 hours.    Imaging:  No orders to display       no X-Ray or EKG requiring interpretation    Assessment/Plan:  Justification for Admission Status  I anticipate this patient will require at least two midnights for appropriate medical management, necessitating inpatient admission because patient has GI bleed requiring blood transfusions    Patient will need a Med/Surg bed on MEDICAL service .  The need is secondary to GI bleed.    * UGIB (upper gastrointestinal bleed)- (present on admission)  Assessment & Plan  Found to have hemoglobin 5.7, BUN 76 and creatinine 2.75.  Given fluids, 1 unit of packed red blood cell, Protonix.  Continue fluids, Protonix, serial CBC, transfuse as needed  GI consult in a.m.    History of DVT in adulthood  Assessment & Plan  Hold Eliquis for now due to GI bleed    Hx MRSA infection  Assessment & Plan  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  Continue daptomycin     Class 3 severe obesity due to excess calories with serious comorbidity and body mass index (BMI) of 50.0 to 59.9 in adult (HCC)- (present on admission)  Assessment & Plan  Will need counseling when clinically appropriate        VTE prophylaxis: pharmacologic prophylaxis contraindicated due to gib

## 2023-09-30 NOTE — DIETARY
"Nutrition services: Day 1 of admit.  Patricia A Tietz is a 58 y.o. female with admitting DX of UGIB.     Consult received for unsure weight loss.       Assessment:  Height: 157.5 cm (5' 2\")  Weight: (!) 126 kg (277 lb) unknown weight source  Body mass index is 50.66 kg/m²., BMI classification: obese  Diet/Intake: NPO    Evaluation:   Pt with unsure weight loss. Current admit weight source unknown. Limited weight history per chart review however if current weight accurate, weight stable >10 years. Need updated weight to better assess weigh trends.   9/29/2023: 277 lbs  9/22/14: 278 lbs   4/17/12: 274 lbs   NUTRITION SERVICES: BMI - Pt with BMI >40 (=Body mass index is 50.66 kg/m².), Class III obesity. Weight loss counseling not appropriate in acute care setting. RECOMMEND - If appropriate at DC please refer to outpatient nutrition services for weight management.   Current clinical picture and MD progress notes reviewed. Pt with recent left total knee arthroplasty with periprosthetic MRSA infection. S/p I&D. +PICC. Pt noted to be bedbound-likely to impact updated weight.   Labs reviewed   Meds: NS @ 30 mL/hr, oxycodone,   Skin: 2+ bilateral lower extremity edema noted   +BM per pt she has not recently had a BM    Malnutrition Risk: pt does not meet criteria per ASPEN guidelines at this time.     Recommendations/Plan:  Diet advancements past NPO as medically feasible.    Encourage intake of all meals >50%  Document intake of all meals as % taken in ADL's to provide interdisciplinary communication across all shifts.   Monitor weight.  Nutrition rep will continue to see patient for ongoing meal and snack preferences.     RD to monitor per department policy       "

## 2023-09-30 NOTE — ASSESSMENT & PLAN NOTE
Cre baseline 1.2, peaked here at ~3.5, hx of urostomy with ileal conduit since childhood  Check CR today, some darker urine, but uop is well over 2.5 liters daily  Initial renal ultrasound was unremarkable  Imaging showed B/L hydronephrosis, s/p placement of B/L PCNT's on 10/15  Loopogram negative for ileal conduit leak  -IR consulted, b/l nephrostomy tubes placed on 10/15  -Nephro and urology following

## 2023-09-30 NOTE — CONSULTS
..Date of Consultation:  9/30/2023    Patient: : Patricia A Tietz  MRN: [id number]    Referring Physician:  Dr. Siobhan Lainez     GI:IRWIN Liao     Reason for Consultation: Anemia    History of Present Illness: Patty Tietz is a 58 female with complex PMH pertinent for recent left total knee arthroplasty with periprosthetic MRSA infection. She is s/p explantation, I&D, and revision arthoplasty with placement of antibiotic beads and functional antibiotic spacer on 9/18/2023. Also has PMH significant for chronic anemia, DVT on Eliquis, morbid obesity, dyspnea, and DEBRA. She was residing in a SNF when routine blood draw revealed hgb o 5.7 from 11.0 eleven days prior. She was guaiac positive. She was started on IV fluids and Protonix and was transferred from [facility] for GI evaluation.     Patient currently residing at [facility] in [place] after her knee surgery.  Patient reports a lifetime history of anemia with numerous endoscopies, last one about 5 years ago secondary to syncope.  She reports at that time, they did an EGD and colonoscopy which were both normal and they were unable to find a source for her anemia.  She was recommended to start oral iron supplementation for which she is not taking reliably.  She does not know if she is having any melena as she is bedbound at baseline and goes on a bedpan.  She does report an episode of nausea last week on Sunday where she vomited dark substance 3 times.  She also endorses a lifetime history of heartburn and is unclear if she takes PPI therapy.  She is compliant with her Eliquis and last dose was yesterday morning.  She does not take aspirin or ibuprofen.  She denies abdominal pain, but does endorse odynophagia for several days a couple weeks ago.    On arrival, vital signs were stable and her hgb was 6.7. She received 2 units PRBC with repeat of 7.0. Other pertinent labs include BUN 68, creatinine 2.80 (1.32 on 9/1).      Past  Medical History:   Diagnosis Date    Anemia     Chronic anticoagulation     Chronic pain syndrome     DVT of lower extremity (deep venous thrombosis) (HCC)     Dyspnea     Nephrolithiasis     Sinus bradycardia     Sleep apnea     Uses a CPAP with oxygen.         Past Surgical History:   Procedure Laterality Date    APPENDECTOMY      OTHER      Neck surgery       Family History   Problem Relation Age of Onset    Hypertension Mother     Kidney Disease Mother     Diabetes Mother     Coronary artery disease Father        Social History     Socioeconomic History    Marital status: Single   Tobacco Use    Smoking status: Never    Smokeless tobacco: Never   Substance and Sexual Activity    Alcohol use: Not Currently     Alcohol/week: 0.6 oz     Types: 1 Cans of beer per week    Drug use: No       Review of systems:  Review of Systems   Constitutional:  Negative for chills, fever and malaise/fatigue.   HENT:  Negative for hearing loss.    Eyes:  Negative for blurred vision.   Respiratory:  Negative for cough and shortness of breath.    Cardiovascular:  Positive for leg swelling. Negative for chest pain.   Gastrointestinal:  Positive for heartburn, nausea and vomiting. Negative for abdominal pain, blood in stool, constipation, diarrhea and melena.   Genitourinary:  Negative for dysuria.   Musculoskeletal:  Negative for back pain.   Skin:  Negative for rash.   Neurological:  Positive for weakness. Negative for dizziness.   Psychiatric/Behavioral:  Negative for depression.    All other systems reviewed and are negative.        Physical Exam:  Vitals:    09/30/23 0200 09/30/23 0444 09/30/23 0800 09/30/23 0932   BP:  104/61 100/62 117/72   Pulse:  61 60 63   Resp: 19 16 20 16   Temp:  36.4 °C (97.6 °F) 35.9 °C (96.7 °F) 35.9 °C (96.6 °F)   TempSrc:  Temporal Temporal Temporal   SpO2:  99% 100% 100%   Weight:       Height:           Physical Exam  Vitals and nursing note reviewed.   Constitutional:       Appearance: She is obese.  She is ill-appearing.   HENT:      Head: Normocephalic and atraumatic.      Right Ear: External ear normal.      Left Ear: External ear normal.      Nose: Nose normal.      Mouth/Throat:      Mouth: Mucous membranes are moist.      Pharynx: Oropharynx is clear.   Eyes:      General: No scleral icterus.  Cardiovascular:      Rate and Rhythm: Normal rate and regular rhythm.      Pulses: Normal pulses.      Heart sounds: Normal heart sounds.   Pulmonary:      Effort: Pulmonary effort is normal.      Breath sounds: Normal breath sounds.   Abdominal:      General: Bowel sounds are normal. There is no distension.      Tenderness: There is no abdominal tenderness.      Comments: Obese  Well-healed midline abdominal scarring  Left lower quadrant urostomy bag   Musculoskeletal:      Cervical back: Normal range of motion.   Skin:     Capillary Refill: Capillary refill takes less than 2 seconds.      Comments: Left knee and shin with bandage in place  Edema and discoloration bilateral lower extremities   Neurological:      Mental Status: She is alert and oriented to person, place, and time.      Motor: Weakness present.   Psychiatric:         Mood and Affect: Mood normal.         Behavior: Behavior normal.           Labs:  Recent Labs     09/29/23  1534 09/29/23 2155 09/30/23  0647   WBC  --  8.7  --    RBC 2.10* 2.35*  --    HEMOGLOBIN 5.7* 6.7* 7.0*   HEMATOCRIT 18.0* 21.0* 21.8*   MCV 85.5 89.4  --    MCH 27.2* 28.5  --    MCHC 31.8* 31.9*  --    RDW 17.1* 49.5  --    PLATELETCT 202 239  --    MPV 8.4 10.4  --      Recent Labs     09/29/23  1630 09/29/23 2155 09/30/23  0647   SODIUM 135* 139 139   POTASSIUM 4.2 3.6 3.8   CHLORIDE 105 106 109   CO2 19* 19* 18*   GLUCOSE 131* 115* 84   BUN 76* 70* 68*   CPKTOTAL  --  39  --      Recent Labs     09/29/23 2155   APTT 32.6       Recent Labs     09/29/23  1630   ASTSGOT 12*   ALTSGPT 5*   TBILIRUBIN 0.3*   ALKPHOSPHAT 59   GLOBULIN 3.1         Imaging:  DX-FOOT-COMPLETE 3+  RIGHT  Narrative: 10/4/2018 12:21 PM    HISTORY/REASON FOR EXAM:  Atraumatic Pain/Swelling/Deformity  Right foot pain. Right great toe pain    TECHNIQUE/EXAM DESCRIPTION AND NUMBER OF VIEWS:  3 views of the RIGHT foot.    COMPARISON:  None    FINDINGS:  The alignment is grossly normal.  There is no evidence of displaced fracture or dislocation.  There is no focal soft tissue swelling.    No significant degenerative change.  Impression: Unremarkable foot series.        Impressions:  Normocytic anemia  History of DVT on eliquis  Recent knee arthroplasty with subsequent periprosthetic infection positive for MRSA, on daptomycin  Acute kidney injury  Morbid obesity  History of left lower quadrant urostomy  Obstructive sleep apnea    MDM:  Patient is a 58-year-old female with a complex mass past medical history who presents as a transfer from [facility] for gastroenterology evaluation.  She underwent normal labs at her skilled nursing facility where she was residing after a knee arthroscopy plasty with subsequent periprosthetic infection positive for MRSA on daptomycin.  She was found to have a hemoglobin of 5.6 with no overt signs of bleeding.  She does endorse 6 days ago an episode of nausea where she vomited up what sounds like coffee-ground emesis.  She has a lifetime history of GERD and reports having a diagnosis of anemia for many years.  Patient requires endoscopy for evaluation and is agreeable to proceed.  She is on Eliquis for history of DVT with last dose yesterday morning.    Recommendations:  Trend H/H and transfuse for hgb <7  IV PPI BID  Iron studies, ferritin, reticulocyte count  Consider iron infusions if consistent with iron deficiency  Endoscopy tomorrow morning with Dr. Santos  May have diet today  N.p.o. at midnight  Further plan to be determined postprocedure    Discussed with patient, RN, Dr. Lainez, and Dr. Santos    This note was generated using voice recognition software which has a  small chance of producing errors of grammar and possibly content. I have made every reasonable attempt to find and correct any obvious errors, but expect that some may not be found prior to finalization of this note.

## 2023-09-30 NOTE — PROGRESS NOTES
Med rec completed per staff at [facility] in [place] ([phone]). Staff provided medication and administration information verbally.  Allergies reviewed with patient at bedside. NKDA.      Staff at [facility] patient was on a course of IV vancomycin from 6/2/2023 - 9/22/2023, and was then switched to IV daptomycin from 9/22/2023 with an end date of 11/2/2023.  ANTICOAGULATION: Patient is on ELIQUIS. Last administration 9/29/2023 at 08:00.

## 2023-09-30 NOTE — CARE PLAN
The patient is Stable - Low risk of patient condition declining or worsening    Shift Goals  Clinical Goals: Patient will verbalized understanding of the admission process, plan of care and treatment plans  Patient Goals: Pain control, symptoms management  Family Goals: MELISSA    Progress made toward(s) clinical / shift goals:  Patient is adjusting to the unit, cooperative with the admission process, receptive of education and the need for hospitalization. Received 1 unit PRBC for low hemoglobin as per order, no adverse reactions observed. Continue monitoring in progress. Maintained on NPO post midnight with sips for medications. Medicated as per MAR, with effective pain relief verbalized.    Patient is not progressing towards the following goals: n/a

## 2023-09-30 NOTE — PROGRESS NOTES
[facility] TRIAGE [facility]ICER DIRECT ADMISSION REPORT  Transferring facility: [facility]  Transferring physician: GUY Motta  Chief complaint: Low hemoglobin  Pertinent history & patient course: 57 yo F w/ septic arthritis (on daptomycin via PICC line), DVT on eliquis presents to ED for low hemoglobin when checked by at Nursing Home. Previous hemoglobin 11 days ago was 11. At ER,/63, HR 63. Hgb 5.7. Guaiac positive. Given protonix, fluids. Chemistry panel pending. Request to be transferred as no GI available. 1 unit of pRBC requested to be started prior to transfer.  Pertinent imaging & lab results: As per HPI  Code Status: Full  Further work up or recommendations per triage officer prior to transfer: None  Consultants called prior to transfer and pertinent input from consultants: None  Patient accepted for transfer: Yes  Consultants to be called upon arrival: GI in AM   Admission status: Inpatient.   Floor requested: Med/Surg      Please inform the triage officer upon arrival of the patient to [facility] for assignment of a hospitalist to perform admission.      For any question or concerns regarding the care of this patient, please reach out to the assigned hospita

## 2023-09-30 NOTE — ASSESSMENT & PLAN NOTE
Hb remains stable  Restarted eliquis on 10/17, monitor for bleeding closely  Daily CBC with conservative transfusion threshold  10/1 EGD this morning, revealed thick whitish debris in esophagus and mucosal fragility.  Biopsies which showed benign mucosal ulceration with acute on chronic inflammation, no e/o fungal elements    No e/o clinical overt bleeding, check CBC today

## 2023-09-30 NOTE — WOUND TEAM
"[facility]  Inpatient Services  Wound and Skin Care Brief Evaluation    Admission Date: 9/29/2023     Last order of IP CONSULT TO WOUND CARE was found on 9/30/2023 from Hospital Encounter on 9/29/2023     HPI, PMH, SH: Reviewed    No chief complaint on file.    Diagnosis: Anemia [D64.9]  UGIB (upper gastrointestinal bleed) [K92.2]    Unit where seen by Wound Team: S626/02     Wound consult placed regarding sacrum and established urostomy. Chart and images reviewed. This clinician in to assess patient. Patient pleasant and agreeable. Sacrum with small partial thickness wound related to friction. Area already being protected with offloading adhesive foam (change q72h).    Patient also with established urostomy, usually uses [place] 2 3/4\" two piece appliance and wide brava strips. Patient given 3 sets of extra supplies and 4 brava strips. Patient independent with care, nursing to assist patient with appliance changes q3-5 days and PRN.    No pressure injuries or advanced wound care needs identified. Wound consult completed. Wound team signing off, re-consult if patient has further advanced wound care needs.    Wound 09/29/23 Partial Thickness Wound Sacrum (Active)   Date First Assessed/Time First Assessed: 09/29/23 2000   Present on Original Admission: Yes  Primary Wound Type: Partial Thickness Wound  Location: Sacrum      Assessments 9/30/2023  3:00 PM   Wound Image     Site Assessment Red   Periwound Assessment Clean;Dry;Intact   Margins Defined edges;Attached edges   Closure Adhesive bandage   Drainage Amount None   Treatments Offloading   Dressing Status Clean   Dressing Changed Reapplied   Dressing Cleansing/Solutions Not Applicable   Dressing Options Offloading Dressing - Sacral   Dressing Change/Treatment Frequency Every 72 hrs, and As Needed   NEXT Dressing Change/Treatment Date 10/01/23   NEXT Weekly Photo (Inpatient Only) 10/07/23   Wound Team Following Not following   Non-staged Wound " Description Partial thickness       PREVENTATIVE INTERVENTIONS:    Q shift Jaquan - performed per nursing policy  Q shift pressure point assessments - performed per nursing policy    Surface/Positioning  Standard/trauma mattress - Currently in Place  Reposition q 2 hours - Currently in Place  Waffle overlay  - Currently in Place    Offloading/Redistribution  Sacral offloading dressing (Silicone dressing) - Currently in Place      Containment/Moisture Prevention    Urostomy - Currently in Place

## 2023-10-01 ENCOUNTER — ANESTHESIA EVENT (OUTPATIENT)
Dept: SURGERY | Facility: MEDICAL CENTER | Age: 58
DRG: 368 | End: 2023-10-01
Payer: MEDICARE

## 2023-10-01 ENCOUNTER — ANESTHESIA (OUTPATIENT)
Dept: SURGERY | Facility: MEDICAL CENTER | Age: 58
DRG: 368 | End: 2023-10-01
Payer: MEDICARE

## 2023-10-01 PROBLEM — Z95.0 PACEMAKER: Status: ACTIVE | Noted: 2023-10-01

## 2023-10-01 PROBLEM — I10 PRIMARY HYPERTENSION: Status: ACTIVE | Noted: 2023-10-01

## 2023-10-01 LAB
ANION GAP SERPL CALC-SCNC: 10 MMOL/L (ref 7–16)
BUN SERPL-MCNC: 54 MG/DL (ref 8–22)
CALCIUM SERPL-MCNC: 9.6 MG/DL (ref 8.5–10.5)
CHLORIDE SERPL-SCNC: 110 MMOL/L (ref 96–112)
CO2 SERPL-SCNC: 20 MMOL/L (ref 20–33)
CREAT SERPL-MCNC: 2.48 MG/DL (ref 0.5–1.4)
ERYTHROCYTE [DISTWIDTH] IN BLOOD BY AUTOMATED COUNT: 50.1 FL (ref 35.9–50)
GFR SERPLBLD CREATININE-BSD FMLA CKD-EPI: 22 ML/MIN/1.73 M 2
GLUCOSE SERPL-MCNC: 84 MG/DL (ref 65–99)
HCT VFR BLD AUTO: 26.3 % (ref 37–47)
HGB BLD-MCNC: 8.4 G/DL (ref 12–16)
MAGNESIUM SERPL-MCNC: 1.9 MG/DL (ref 1.5–2.5)
MCH RBC QN AUTO: 28.1 PG (ref 27–33)
MCHC RBC AUTO-ENTMCNC: 31.9 G/DL (ref 32.2–35.5)
MCV RBC AUTO: 88 FL (ref 81.4–97.8)
PHOSPHATE SERPL-MCNC: 4.7 MG/DL (ref 2.5–4.5)
PLATELET # BLD AUTO: 224 K/UL (ref 164–446)
PMV BLD AUTO: 10.1 FL (ref 9–12.9)
POTASSIUM SERPL-SCNC: 3.7 MMOL/L (ref 3.6–5.5)
RBC # BLD AUTO: 2.99 M/UL (ref 4.2–5.4)
SODIUM SERPL-SCNC: 140 MMOL/L (ref 135–145)
VIT B12 SERPL-MCNC: 989 PG/ML (ref 211–911)
WBC # BLD AUTO: 7.3 K/UL (ref 4.8–10.8)

## 2023-10-01 PROCEDURE — 700102 HCHG RX REV CODE 250 W/ 637 OVERRIDE(OP): Performed by: STUDENT IN AN ORGANIZED HEALTH CARE EDUCATION/TRAINING PROGRAM

## 2023-10-01 PROCEDURE — 160035 HCHG PACU - 1ST 60 MINS PHASE I: Performed by: INTERNAL MEDICINE

## 2023-10-01 PROCEDURE — 700105 HCHG RX REV CODE 258: Performed by: STUDENT IN AN ORGANIZED HEALTH CARE EDUCATION/TRAINING PROGRAM

## 2023-10-01 PROCEDURE — 0DB58ZX EXCISION OF ESOPHAGUS, VIA NATURAL OR ARTIFICIAL OPENING ENDOSCOPIC, DIAGNOSTIC: ICD-10-PCS | Performed by: INTERNAL MEDICINE

## 2023-10-01 PROCEDURE — A9270 NON-COVERED ITEM OR SERVICE: HCPCS | Performed by: STUDENT IN AN ORGANIZED HEALTH CARE EDUCATION/TRAINING PROGRAM

## 2023-10-01 PROCEDURE — 84100 ASSAY OF PHOSPHORUS: CPT

## 2023-10-01 PROCEDURE — 83735 ASSAY OF MAGNESIUM: CPT

## 2023-10-01 PROCEDURE — 700105 HCHG RX REV CODE 258: Performed by: ANESTHESIOLOGY

## 2023-10-01 PROCEDURE — 88305 TISSUE EXAM BY PATHOLOGIST: CPT

## 2023-10-01 PROCEDURE — 700101 HCHG RX REV CODE 250: Performed by: ANESTHESIOLOGY

## 2023-10-01 PROCEDURE — C9113 INJ PANTOPRAZOLE SODIUM, VIA: HCPCS | Performed by: STUDENT IN AN ORGANIZED HEALTH CARE EDUCATION/TRAINING PROGRAM

## 2023-10-01 PROCEDURE — 160203 HCHG ENDO MINUTES - 1ST 30 MINS LEVEL 4: Performed by: INTERNAL MEDICINE

## 2023-10-01 PROCEDURE — 82607 VITAMIN B-12: CPT

## 2023-10-01 PROCEDURE — 43239 EGD BIOPSY SINGLE/MULTIPLE: CPT | Performed by: INTERNAL MEDICINE

## 2023-10-01 PROCEDURE — 700111 HCHG RX REV CODE 636 W/ 250 OVERRIDE (IP): Performed by: STUDENT IN AN ORGANIZED HEALTH CARE EDUCATION/TRAINING PROGRAM

## 2023-10-01 PROCEDURE — 80048 BASIC METABOLIC PNL TOTAL CA: CPT

## 2023-10-01 PROCEDURE — 700111 HCHG RX REV CODE 636 W/ 250 OVERRIDE (IP): Performed by: ANESTHESIOLOGY

## 2023-10-01 PROCEDURE — 160002 HCHG RECOVERY MINUTES (STAT): Performed by: INTERNAL MEDICINE

## 2023-10-01 PROCEDURE — 160009 HCHG ANES TIME/MIN: Performed by: INTERNAL MEDICINE

## 2023-10-01 PROCEDURE — 88312 SPECIAL STAINS GROUP 1: CPT

## 2023-10-01 PROCEDURE — 160048 HCHG OR STATISTICAL LEVEL 1-5: Performed by: INTERNAL MEDICINE

## 2023-10-01 PROCEDURE — 99232 SBSQ HOSP IP/OBS MODERATE 35: CPT | Performed by: STUDENT IN AN ORGANIZED HEALTH CARE EDUCATION/TRAINING PROGRAM

## 2023-10-01 PROCEDURE — 770006 HCHG ROOM/CARE - MED/SURG/GYN SEMI*

## 2023-10-01 PROCEDURE — 85027 COMPLETE CBC AUTOMATED: CPT

## 2023-10-01 RX ORDER — OMEPRAZOLE 20 MG/1
20 CAPSULE, DELAYED RELEASE ORAL 2 TIMES DAILY
Status: DISCONTINUED | OUTPATIENT
Start: 2023-10-02 | End: 2023-10-22 | Stop reason: HOSPADM

## 2023-10-01 RX ORDER — SODIUM CHLORIDE 9 MG/ML
INJECTION, SOLUTION INTRAVENOUS CONTINUOUS
Status: DISCONTINUED | OUTPATIENT
Start: 2023-10-01 | End: 2023-10-01

## 2023-10-01 RX ORDER — ONDANSETRON 2 MG/ML
4 INJECTION INTRAMUSCULAR; INTRAVENOUS
Status: DISCONTINUED | OUTPATIENT
Start: 2023-10-01 | End: 2023-10-01 | Stop reason: HOSPADM

## 2023-10-01 RX ORDER — SODIUM CHLORIDE, SODIUM LACTATE, POTASSIUM CHLORIDE, CALCIUM CHLORIDE 600; 310; 30; 20 MG/100ML; MG/100ML; MG/100ML; MG/100ML
INJECTION, SOLUTION INTRAVENOUS
Status: DISCONTINUED | OUTPATIENT
Start: 2023-10-01 | End: 2023-10-01 | Stop reason: SURG

## 2023-10-01 RX ORDER — POLYETHYLENE GLYCOL 3350 17 G/17G
1 POWDER, FOR SOLUTION ORAL DAILY
Status: DISCONTINUED | OUTPATIENT
Start: 2023-10-01 | End: 2023-10-22 | Stop reason: HOSPADM

## 2023-10-01 RX ORDER — LIDOCAINE HYDROCHLORIDE 20 MG/ML
INJECTION, SOLUTION EPIDURAL; INFILTRATION; INTRACAUDAL; PERINEURAL PRN
Status: DISCONTINUED | OUTPATIENT
Start: 2023-10-01 | End: 2023-10-01 | Stop reason: SURG

## 2023-10-01 RX ORDER — SODIUM CHLORIDE, SODIUM LACTATE, POTASSIUM CHLORIDE, CALCIUM CHLORIDE 600; 310; 30; 20 MG/100ML; MG/100ML; MG/100ML; MG/100ML
INJECTION, SOLUTION INTRAVENOUS CONTINUOUS
Status: DISCONTINUED | OUTPATIENT
Start: 2023-10-01 | End: 2023-10-01 | Stop reason: HOSPADM

## 2023-10-01 RX ADMIN — GABAPENTIN 300 MG: 300 CAPSULE ORAL at 20:02

## 2023-10-01 RX ADMIN — PROPOFOL 20 MG: 10 INJECTION, EMULSION INTRAVENOUS at 10:24

## 2023-10-01 RX ADMIN — OXYCODONE HYDROCHLORIDE 15 MG: 15 TABLET ORAL at 20:02

## 2023-10-01 RX ADMIN — DAPTOMYCIN 500 MG: 500 INJECTION, POWDER, LYOPHILIZED, FOR SOLUTION INTRAVENOUS at 04:23

## 2023-10-01 RX ADMIN — SODIUM CHLORIDE: 9 INJECTION, SOLUTION INTRAVENOUS at 04:25

## 2023-10-01 RX ADMIN — OXYCODONE HYDROCHLORIDE 15 MG: 15 TABLET ORAL at 13:02

## 2023-10-01 RX ADMIN — SENNOSIDES 8.6 MG: 8.6 TABLET, FILM COATED ORAL at 16:34

## 2023-10-01 RX ADMIN — PANTOPRAZOLE SODIUM 40 MG: 40 INJECTION, POWDER, FOR SOLUTION INTRAVENOUS at 16:34

## 2023-10-01 RX ADMIN — PROPOFOL 50 MG: 10 INJECTION, EMULSION INTRAVENOUS at 10:21

## 2023-10-01 RX ADMIN — SODIUM CHLORIDE, POTASSIUM CHLORIDE, SODIUM LACTATE AND CALCIUM CHLORIDE: 600; 310; 30; 20 INJECTION, SOLUTION INTRAVENOUS at 10:11

## 2023-10-01 RX ADMIN — PANTOPRAZOLE SODIUM 40 MG: 40 INJECTION, POWDER, FOR SOLUTION INTRAVENOUS at 04:19

## 2023-10-01 RX ADMIN — PROPOFOL 50 MG: 10 INJECTION, EMULSION INTRAVENOUS at 10:15

## 2023-10-01 RX ADMIN — POLYETHYLENE GLYCOL 3350 1 PACKET: 17 POWDER, FOR SOLUTION ORAL at 15:02

## 2023-10-01 RX ADMIN — LIDOCAINE HYDROCHLORIDE 60 MG: 20 INJECTION, SOLUTION EPIDURAL; INFILTRATION; INTRACAUDAL at 10:15

## 2023-10-01 RX ADMIN — AMITRIPTYLINE HYDROCHLORIDE 50 MG: 25 TABLET, FILM COATED ORAL at 20:02

## 2023-10-01 ASSESSMENT — PAIN DESCRIPTION - PAIN TYPE
TYPE: SURGICAL PAIN

## 2023-10-01 ASSESSMENT — PAIN SCALES - GENERAL: PAIN_LEVEL: 0

## 2023-10-01 NOTE — PROCEDURES
OPERATIVE REPORT    PATIENT:   Patricia A Tietz   1965       PREOPERATIVE DIAGNOSES/INDICATIONS: anemia, hemoccult positive stool, history of chronic anemia with last EGD and colonoscopy 5 years prior and no etiology discovered.  BMI 50.66    POSTOPERATIVE DIAGNOSIS: Esophagitis, abnormal duodenal anatomy with previous surgery and multiple diverticula    PROCEDURE:  ESOPHAGOGASTRODUODENOSCOPY with biopsies    PHYSICIAN:  Kelly Santos MD    ANESTHESIA:  Per anesthesiologist.    LOCATION: [facility]    CONSENT:  OBTAINED. The risks, benefits and alternatives of the procedure were discussed in details. The risks include and are not limited to bleeding, infection, perforation, missed lesions, and sedations risks (cardiopulmonary compromise and allergic reaction to medications).    DESCRIPTION: The patient presented to the procedure room.  After routine checkup was performed, patient was brought into the endoscopy suite.  Patient was placed on her left lateral decubitus position. A bite block was placed in patient's mouth. Patient was sedated by anesthesia.  Vital signs were monitored throughout the procedure.  Oxygenation support was provided throughout the procedure. Upper endoscope was inserted into patient's mouth and advanced to the second portion of the duodenum under direct visualization.      Once the site was reached and examined, the upper endoscope was withdrawn.  Retroflexion was made within the stomach.  The stomach was decompressed, scope was withdrawn and the procedure was terminated.     The patient tolerated the procedure well.  There were no immediate complications.    OPERATIVE FINDINGS:    1. Esophagus: thick whitish debris in esophagus and mucosal fragility.  Biopsies taken  2. Stomach: normal  3. Duodenum: altered anatomy second portion with blind limb and multiple diverticula.      RECOMMENDATIONS:  Will need to review with patient her surgical intestinal history  Follow up biopsies  No  plan for colonoscopy at this time  Obtain previous EGD/colon and workup for anemia  Hold anticoagulation today  Resume kosher Cardiac diet

## 2023-10-01 NOTE — ANESTHESIA TIME REPORT
Anesthesia Start and Stop Event Times     Date Time Event    10/1/2023 1000 Ready for Procedure     1011 Anesthesia Start     1033 Anesthesia Stop        Responsible Staff  10/01/23    Name Role Begin End    Tiffany Hollingsworth M.D. Anesth 1011 1033        Overtime Reason:  no overtime (within assigned shift)    Comments:

## 2023-10-01 NOTE — ANESTHESIA PREPROCEDURE EVALUATION
Case: [id number] Date/Time: 10/01/23 0929    Procedure: GASTROSCOPY (Esophagus)    Anesthesia type: MAC    Pre-op diagnosis: anemia    Location: [facility] OR 06 / SURGERY [facility]    Surgeons: Kelly Santos M.D.        59 yo w/anemia requiring blood transfusion and positive stool guiac.  Denies N/V.    Relevant Problems   ANESTHESIA   (positive) Sleep apnea      PULMONARY   (positive) Dyspnea      CARDIAC   (positive) DVT of lower extremity (deep venous thrombosis) ([facility])   (positive) Pacemaker   (positive) Primary hypertension   (positive) Sinus bradycardia         (positive) RAO (acute kidney injury) ([facility])   (positive) Nephrolithiasis      Other   (positive) Acute on chronic anemia   (positive) Anemia   (positive) History of DVT in adulthood     Occ GERD- food associated    Denies CAD, CP, CVA, DM, URI    Physical Exam    Airway   Mallampati: III  TM distance: >3 FB  Neck ROM: full       Cardiovascular   Rhythm: regular  Rate: normal     Dental   Comments: Chipped and missing teeth         Pulmonary   Breath sounds clear to auscultation     Abdominal    Neurological     Comments: Sleepy but easily arousable             Anesthesia Plan    ASA 3   ASA physical status 3 criteria: morbid obesity - BMI greater than or equal to 40 and implanted pacemaker    Plan - MAC               Induction: intravenous      Pertinent diagnostic labs and testing reviewed    Informed Consent:    Anesthetic plan and risks discussed with patient.    Use of blood products discussed with: patient whom consented to blood products.

## 2023-10-01 NOTE — CARE PLAN
The patient is Watcher - Medium risk of patient condition declining or worsening    Shift Goals  Clinical Goals: Pain level <3 t/o night shift  Patient Goals: sleep  Family Goals: MELISSA    Progress made toward(s) clinical / shift goals:  Pt has been sleeping since pain medication given.  Pt is NPO since MN for EGD.  Pt doesn't try to get OOB by herself.      Patient is not progressing towards the following goals:    Pt refused to turn at times despite education.  Pt does have waffle overlay.  Mepilex on sacrum.      Problem: Skin Integrity  Goal: Skin integrity is maintained or improved  Outcome: Not Progressing

## 2023-10-01 NOTE — ANESTHESIA POSTPROCEDURE EVALUATION
Patient: Patricia A Tietz    Procedure Summary     Date: 10/01/23 Room / Location: [facility] 06 / SURGERY [facility]    Anesthesia Start: 1011 Anesthesia Stop: 1033    Procedures:       GASTROSCOPY (Esophagus)      GASTROSCOPY, WITH BIOPSY (Esophagus) Diagnosis: (esophagitis)    Surgeons: Kelly Santos M.D. Responsible Provider: Tiffany Hollingsworth M.D.    Anesthesia Type: MAC ASA Status: 3          Final Anesthesia Type: MAC  Last vitals  BP   Blood Pressure: 138/69    Temp   36.3 °C (97.3 °F)    Pulse   60   Resp   15    SpO2   99 %      Anesthesia Post Evaluation    Patient location during evaluation: PACU  Patient participation: complete - patient participated  Level of consciousness: awake  Pain score: 0    Airway patency: patent  Anesthetic complications: no  Cardiovascular status: hemodynamically stable  Respiratory status: acceptable  Hydration status: acceptable    PONV: none          No notable events documented.     Nurse Pain Score: 0 (NPRS)

## 2023-10-01 NOTE — CARE PLAN
Problem: Pain - Standard  Goal: Alleviation of pain or a reduction in pain to the patient’s comfort goal  Note: Education provided regarding pain management including nonpharmacological measures such as rest and relaxation   The patient is Stable - Low risk of patient condition declining or worsening    Shift Goals  Clinical Goals: EGD  Patient Goals: safety  Family Goals: ag    Progress made toward(s) clinical / shift goals:      Patient is not progressing towards the following goals:

## 2023-10-01 NOTE — CARE PLAN
The patient is Stable - Low risk of patient condition declining or worsening    Shift Goals  Clinical Goals: pain management  Patient Goals: Pain control, symptoms management  Family Goals: MELISSA    Progress made toward(s) clinical / shift goals: stable signs/symptoms of bleeding throughout shift    Patient is not progressing towards the following goals:

## 2023-10-01 NOTE — PROGRESS NOTES
Hospital Medicine Daily Progress Note    Date of Service  10/1/2023    Chief Complaint  Patricia A Tietz is a 58 y.o. female admitted 9/29/2023 with low hemoglobin    Hospital Course  Patricia A Tietz is a 58 y.o. female who presented 9/29/2023 with low hemoglobin.  Patient has history of left total knee arthroplasty periprosthetic infection.  She is status post explantation, debridement irrigation and placement of functional left total knee arthroplasty antibiotic spacer with antibiotic beads.  Cultures grew MRSA.  She was initially on vancomycin but now on daptomycin and currently has a PICC line in her right upper extremity.  She also has history of DVT to which she was on Coumadin but transition to Eliquis 1 month ago.  Patient has been in the nursing facility for the last few months.  They did routine blood draw on her yesterday, and found her to be anemic and referred her to ER for blood transfusion and further evaluation.     Patient denies dizziness chest pain shortness of breath hematemesis melena.  She states that she is unsure of what her stool color is due to her being bedbound and her body habitus.  She was found to have hemoglobin 5.7, BUN 76, creatinine 2.75.  FOBT was positive in the other facility. she was given fluids 1 unit of blood and given Protonix and transferred to [facility] as no GI available at outside facility.    Interval Problem Update  Patient was seen examined bedside  She stated no bowel movement for past few days, denies nausea vomiting  Reported left knee pain  Significant lower extremity edema    S/p EGD this morning, revealed thick whitish debris in esophagus and mucosal fragility.  Biopsies taken; no significant bleeding signs.  No plan for colonoscopy at this point.  GI will request previous EGD colonoscopy report    Hgb improving stable  RAO improving    No bowel movement for a few days, I ordered a bowel management    HB 5.7> 7> 8.4  CRE 2.75> 2.48 (1.2)  DVT HX - hold  eliquis    I have discussed this patient's plan of care and discharge plan at IDT rounds today with Case Management, Nursing, Nursing leadership, and other members of the IDT team.    Consultants/Specialty  GI    Code Status  Full Code    Disposition  The patient is not medically cleared for discharge to home or a post-acute facility.      I have placed the appropriate orders for post-discharge needs.    Review of Systems  All 12 systems were reviewed and negative except as mentioned above       Physical Exam  Temp:  [35.8 °C (96.5 °F)-36.7 °C (98 °F)] 36.3 °C (97.3 °F)  Pulse:  [60-66] 60  Resp:  [15-18] 18  BP: (120-149)/(66-76) 138/69  SpO2:  [95 %-100 %] 99 %    Physical Exam  Constitutional:       Appearance: She is obese. She is ill-appearing.   HENT:      Head: Normocephalic.      Nose: Nose normal.   Eyes:      Extraocular Movements: Extraocular movements intact.      Conjunctiva/sclera: Conjunctivae normal.   Cardiovascular:      Rate and Rhythm: Normal rate and regular rhythm.      Pulses: Normal pulses.      Heart sounds: Normal heart sounds.   Pulmonary:      Effort: Pulmonary effort is normal.      Breath sounds: No wheezing.   Abdominal:      General: Bowel sounds are normal.      Palpations: Abdomen is soft.      Tenderness: There is no abdominal tenderness.   Musculoskeletal:         General: Tenderness present.      Cervical back: Normal range of motion and neck supple.      Right lower leg: Edema present.      Left lower leg: Edema present.      Comments: Left knee clean Bandage   Neurological:      Mental Status: She is alert and oriented to person, place, and time. Mental status is at baseline.   Psychiatric:         Mood and Affect: Mood normal.         Fluids    Intake/Output Summary (Last 24 hours) at 10/1/2023 1430  Last data filed at 10/1/2023 1033  Gross per 24 hour   Intake 680 ml   Output 2201 ml   Net -1521 ml       Laboratory  Recent Labs     09/29/23  1534 09/29/23  2155 09/29/23  2155  09/30/23  0647 09/30/23  1335 10/01/23  0418   WBC  --  8.7  --   --   --  7.3   RBC 2.10* 2.35*  --   --   --  2.99*   HEMOGLOBIN 5.7* 6.7*   < > 7.0* 8.1* 8.4*   HEMATOCRIT 18.0* 21.0*   < > 21.8* 25.0* 26.3*   MCV 85.5 89.4  --   --   --  88.0   MCH 27.2* 28.5  --   --   --  28.1   MCHC 31.8* 31.9*  --   --   --  31.9*   RDW 17.1* 49.5  --   --   --  50.1*   PLATELETCT 202 239  --   --   --  224   MPV 8.4 10.4  --   --   --  10.1    < > = values in this interval not displayed.     Recent Labs     09/29/23  2155 09/30/23  0647 10/01/23  0418   SODIUM 139 139 140   POTASSIUM 3.6 3.8 3.7   CHLORIDE 106 109 110   CO2 19* 18* 20   GLUCOSE 115* 84 84   BUN 70* 68* 54*   CREATININE 2.80* 2.80* 2.48*   CALCIUM 10.0 9.5 9.6     Recent Labs     09/29/23 2155   APTT 32.6               Imaging  No orders to display        Assessment/Plan  * UGIB (upper gastrointestinal bleed)- (present on admission)  Assessment & Plan  Found to have hemoglobin 5.7, iven fluids, 1 unit of packed red blood cell, Protonix.  FOBT was positive in the other facility.     10/1 S/p EGD this morning, revealed thick whitish debris in esophagus and mucosal fragility.  Biopsies taken; no significant bleeding signs.  No plan for colonoscopy at this point.  GI will request previous EGD colonoscopy report  Continue PPI      Acute on chronic anemia  Assessment & Plan  Hb 5.7 at admission   received in multiple transfusion    Iron study negative for iron deficiency  I ordered a B12  Continue to monitor, transfuse if Hb less than 7    RAO (acute kidney injury) (HCC)  Assessment & Plan  RAO CRE 2.75> 2.8 (baseline 1.2)  Continue IV fluid    History of DVT in adulthood  Assessment & Plan  Hold Eliquis for now due to GI bleed    Hx MRSA infection  Assessment & Plan  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  Continue daptomycin     Class 3 severe obesity due to excess calories with serious comorbidity and body mass index (BMI) of 50.0 to  59.9 in adult (HCC)- (present on admission)  Assessment & Plan  Will need counseling when clinically appropriate         VTE prophylaxis: SCD, no pharmaceutical prophylaxis due to GI bleeding    I have performed a physical exam and reviewed and updated ROS and Plan today (10/1/2023). In review of yesterday's note (9/30/2023), there are no changes except as documented above.

## 2023-10-01 NOTE — PROGRESS NOTES
GI Pre Op Note    Patient seen in consultation yesterday for severe anemia.  She has been on Eliquis for DVT.  She had a recent knee arthroplasty with subsequent periprosthetic infection.  EGD being done today to evaluate for anemia.  Antibiotics not indicated.    Procedure risks and benefits reviewed with patient.  BMI 50

## 2023-10-02 PROBLEM — K59.00 CONSTIPATION: Status: ACTIVE | Noted: 2023-10-02

## 2023-10-02 LAB
ANION GAP SERPL CALC-SCNC: 10 MMOL/L (ref 7–16)
BUN SERPL-MCNC: 44 MG/DL (ref 8–22)
CALCIUM SERPL-MCNC: 10.2 MG/DL (ref 8.5–10.5)
CHLORIDE SERPL-SCNC: 108 MMOL/L (ref 96–112)
CO2 SERPL-SCNC: 19 MMOL/L (ref 20–33)
CREAT SERPL-MCNC: 2.12 MG/DL (ref 0.5–1.4)
ERYTHROCYTE [DISTWIDTH] IN BLOOD BY AUTOMATED COUNT: 51.3 FL (ref 35.9–50)
GFR SERPLBLD CREATININE-BSD FMLA CKD-EPI: 26 ML/MIN/1.73 M 2
GLUCOSE SERPL-MCNC: 88 MG/DL (ref 65–99)
HCT VFR BLD AUTO: 26.3 % (ref 37–47)
HGB BLD-MCNC: 8.1 G/DL (ref 12–16)
MCH RBC QN AUTO: 28 PG (ref 27–33)
MCHC RBC AUTO-ENTMCNC: 30.8 G/DL (ref 32.2–35.5)
MCV RBC AUTO: 91 FL (ref 81.4–97.8)
PATHOLOGY CONSULT NOTE: NORMAL
PLATELET # BLD AUTO: 214 K/UL (ref 164–446)
PMV BLD AUTO: 9.8 FL (ref 9–12.9)
POTASSIUM SERPL-SCNC: 3.9 MMOL/L (ref 3.6–5.5)
RBC # BLD AUTO: 2.89 M/UL (ref 4.2–5.4)
SODIUM SERPL-SCNC: 137 MMOL/L (ref 135–145)
WBC # BLD AUTO: 8.1 K/UL (ref 4.8–10.8)

## 2023-10-02 PROCEDURE — 700102 HCHG RX REV CODE 250 W/ 637 OVERRIDE(OP): Performed by: STUDENT IN AN ORGANIZED HEALTH CARE EDUCATION/TRAINING PROGRAM

## 2023-10-02 PROCEDURE — 80048 BASIC METABOLIC PNL TOTAL CA: CPT

## 2023-10-02 PROCEDURE — 700111 HCHG RX REV CODE 636 W/ 250 OVERRIDE (IP): Mod: JZ | Performed by: STUDENT IN AN ORGANIZED HEALTH CARE EDUCATION/TRAINING PROGRAM

## 2023-10-02 PROCEDURE — 99232 SBSQ HOSP IP/OBS MODERATE 35: CPT | Performed by: NURSE PRACTITIONER

## 2023-10-02 PROCEDURE — 97167 OT EVAL HIGH COMPLEX 60 MIN: CPT

## 2023-10-02 PROCEDURE — 99232 SBSQ HOSP IP/OBS MODERATE 35: CPT | Performed by: STUDENT IN AN ORGANIZED HEALTH CARE EDUCATION/TRAINING PROGRAM

## 2023-10-02 PROCEDURE — A9270 NON-COVERED ITEM OR SERVICE: HCPCS | Performed by: STUDENT IN AN ORGANIZED HEALTH CARE EDUCATION/TRAINING PROGRAM

## 2023-10-02 PROCEDURE — 85027 COMPLETE CBC AUTOMATED: CPT

## 2023-10-02 PROCEDURE — 700105 HCHG RX REV CODE 258: Performed by: STUDENT IN AN ORGANIZED HEALTH CARE EDUCATION/TRAINING PROGRAM

## 2023-10-02 PROCEDURE — 770006 HCHG ROOM/CARE - MED/SURG/GYN SEMI*

## 2023-10-02 PROCEDURE — 97162 PT EVAL MOD COMPLEX 30 MIN: CPT

## 2023-10-02 RX ORDER — BISACODYL 10 MG
10 SUPPOSITORY, RECTAL RECTAL ONCE
Status: COMPLETED | OUTPATIENT
Start: 2023-10-02 | End: 2023-10-02

## 2023-10-02 RX ADMIN — OXYCODONE HYDROCHLORIDE 15 MG: 15 TABLET ORAL at 05:18

## 2023-10-02 RX ADMIN — CITALOPRAM 40 MG: 40 TABLET, FILM COATED ORAL at 05:18

## 2023-10-02 RX ADMIN — SENNOSIDES 8.6 MG: 8.6 TABLET, FILM COATED ORAL at 17:57

## 2023-10-02 RX ADMIN — AMITRIPTYLINE HYDROCHLORIDE 50 MG: 25 TABLET, FILM COATED ORAL at 20:03

## 2023-10-02 RX ADMIN — GABAPENTIN 300 MG: 300 CAPSULE ORAL at 20:03

## 2023-10-02 RX ADMIN — BISACODYL 10 MG: 10 SUPPOSITORY RECTAL at 14:15

## 2023-10-02 RX ADMIN — OMEPRAZOLE 20 MG: 20 CAPSULE, DELAYED RELEASE ORAL at 05:18

## 2023-10-02 RX ADMIN — DAPTOMYCIN 500 MG: 500 INJECTION, POWDER, LYOPHILIZED, FOR SOLUTION INTRAVENOUS at 18:00

## 2023-10-02 RX ADMIN — SENNOSIDES 8.6 MG: 8.6 TABLET, FILM COATED ORAL at 05:18

## 2023-10-02 RX ADMIN — POLYETHYLENE GLYCOL 3350 1 PACKET: 17 POWDER, FOR SOLUTION ORAL at 05:18

## 2023-10-02 RX ADMIN — OXYCODONE HYDROCHLORIDE 15 MG: 15 TABLET ORAL at 14:14

## 2023-10-02 RX ADMIN — OXYCODONE HYDROCHLORIDE 15 MG: 15 TABLET ORAL at 21:15

## 2023-10-02 RX ADMIN — OMEPRAZOLE 20 MG: 20 CAPSULE, DELAYED RELEASE ORAL at 17:57

## 2023-10-02 ASSESSMENT — COGNITIVE AND FUNCTIONAL STATUS - GENERAL
MOVING FROM LYING ON BACK TO SITTING ON SIDE OF FLAT BED: UNABLE
SUGGESTED CMS G CODE MODIFIER DAILY ACTIVITY: CK
PERSONAL GROOMING: A LITTLE
DAILY ACTIVITIY SCORE: 12
PERSONAL GROOMING: A LITTLE
SUGGESTED CMS G CODE MODIFIER MOBILITY: CN
MOVING FROM LYING ON BACK TO SITTING ON SIDE OF FLAT BED: UNABLE
EATING MEALS: A LITTLE
TURNING FROM BACK TO SIDE WHILE IN FLAT BAD: UNABLE
MOBILITY SCORE: 6
CLIMB 3 TO 5 STEPS WITH RAILING: TOTAL
DAILY ACTIVITIY SCORE: 14
STANDING UP FROM CHAIR USING ARMS: TOTAL
DRESSING REGULAR UPPER BODY CLOTHING: A LOT
DRESSING REGULAR LOWER BODY CLOTHING: TOTAL
HELP NEEDED FOR BATHING: TOTAL
TOILETING: TOTAL
TOILETING: A LOT
DRESSING REGULAR UPPER BODY CLOTHING: A LITTLE
MOVING TO AND FROM BED TO CHAIR: UNABLE
WALKING IN HOSPITAL ROOM: TOTAL
EATING MEALS: A LITTLE
HELP NEEDED FOR BATHING: A LOT
CLIMB 3 TO 5 STEPS WITH RAILING: TOTAL
STANDING UP FROM CHAIR USING ARMS: TOTAL
WALKING IN HOSPITAL ROOM: TOTAL
TURNING FROM BACK TO SIDE WHILE IN FLAT BAD: A LOT
SUGGESTED CMS G CODE MODIFIER DAILY ACTIVITY: CL
DRESSING REGULAR LOWER BODY CLOTHING: A LOT
MOBILITY SCORE: 7
SUGGESTED CMS G CODE MODIFIER MOBILITY: CM
MOVING TO AND FROM BED TO CHAIR: UNABLE

## 2023-10-02 ASSESSMENT — ENCOUNTER SYMPTOMS
NAUSEA: 0
CONSTIPATION: 1
ABDOMINAL PAIN: 0
DIARRHEA: 0
BLURRED VISION: 0
COUGH: 0
FEVER: 0
CHILLS: 0
BACK PAIN: 1
WEAKNESS: 1
HEARTBURN: 0
VOMITING: 0
SHORTNESS OF BREATH: 0
BLOOD IN STOOL: 0
DEPRESSION: 0
DIZZINESS: 0

## 2023-10-02 ASSESSMENT — ACTIVITIES OF DAILY LIVING (ADL): TOILETING: INDEPENDENT

## 2023-10-02 ASSESSMENT — PAIN DESCRIPTION - PAIN TYPE
TYPE: ACUTE PAIN;SURGICAL PAIN
TYPE: SURGICAL PAIN
TYPE: ACUTE PAIN;SURGICAL PAIN

## 2023-10-02 ASSESSMENT — GAIT ASSESSMENTS: GAIT LEVEL OF ASSIST: UNABLE TO PARTICIPATE

## 2023-10-02 NOTE — PROGRESS NOTES
..Gastroenterology Progress Note               Author:  IRWIN Liao Date & Time Created: 10/2/2023 2:22 PM       Patient ID:  Name:             Tietz , Patricia A  YOB: 1965  Age:                 58 y.o.  female  MRN:               [id number]        Medical Decision Making, by Problem:  Active Hospital Problems    Diagnosis     Constipation [K59.00]     Primary hypertension [I10]     Pacemaker [Z95.0]     Acute on chronic anemia [D64.9]     RAO (acute kidney injury) (HCC) [N17.9]     Anemia [D64.9]     UGIB (upper gastrointestinal bleed) [K92.2]     Hx MRSA infection [Z86.14]     History of DVT in adulthood [Z86.718]     Class 3 severe obesity due to excess calories with serious comorbidity and body mass index (BMI) of 50.0 to 59.9 in adult ([facility]) [E66.01, Z68.43]            Presenting Chief Complaint:  Anemia      Interval History:  10/2/2023: Patient seen.  Currently having a bowel movement.  No complaints.  Hemoglobin 8.1, vital signs stable, pathology pending    10/1/203: EGD with Dr. Santos:  OPERATIVE FINDINGS:  1. Esophagus: thick whitish debris in esophagus and mucosal fragility.  Biopsies taken  2. Stomach: normal  3. Duodenum: altered anatomy second portion with blind limb and multiple diverticula.        Hospital Medications:  Current Facility-Administered Medications   Medication Dose Frequency Provider Last Rate Last Admin    polyethylene glycol/lytes (Miralax) PACKET 1 Packet  1 Packet DAILY Siobhan Lainez M.D.   1 Packet at 10/02/23 0518    omeprazole (PriLOSEC) capsule 20 mg  20 mg BID Siobhan Lainez M.D.   20 mg at 10/02/23 0518    sennosides (Senokot) 8.6 MG tablet 8.6 mg  8.6 mg BID Siobhan Lainez M.D.   8.6 mg at 10/02/23 0518    polyethylene glycol/lytes (Miralax) PACKET 1 Packet  1 Packet QDAY PRN Siobhan Lainez M.D.        bisacodyl (Dulcolax) suppository 10 mg  10 mg QDAY PRN Siobhan Lainez M.D.        DAPTOmycin (Cubicin) 500 mg in NS 50 mL IVPB  500 mg Q48HRS Ponce Stuart M.D. 100  "mL/hr at 10/01/23 0423 500 mg at 10/01/23 0423    amitriptyline (Elavil) tablet 50 mg  50 mg Nightly Ponce Stuart M.D.   50 mg at 10/01/23 2002    citalopram (CeleXA) tablet 40 mg  40 mg DAILY Ponce Stuart M.D.   40 mg at 10/02/23 0518    gabapentin (Neurontin) capsule 300 mg  300 mg QHS Ponce Stuart M.D.   300 mg at 10/01/23 2002    oxycodone (Oxy-IR) immediate release tablet 15 mg  15 mg Q6HRS PRN Ponce Stuart M.D.   15 mg at 10/02/23 1414   Last reviewed on 9/30/2023  3:41 PM by Yajaira Noland       Review of Systems:  Review of Systems   Constitutional:  Negative for chills, fever and malaise/fatigue.   HENT:  Negative for hearing loss.    Eyes:  Negative for blurred vision.   Respiratory:  Negative for cough and shortness of breath.    Cardiovascular:  Negative for chest pain and leg swelling.   Gastrointestinal:  Positive for constipation. Negative for abdominal pain, blood in stool, diarrhea, heartburn, melena, nausea and vomiting.   Genitourinary:  Negative for dysuria.   Musculoskeletal:  Positive for back pain.   Skin:  Negative for rash.   Neurological:  Positive for weakness. Negative for dizziness.   Psychiatric/Behavioral:  Negative for depression.    All other systems reviewed and are negative.        Vital signs:  Weight/BMI: Body mass index is 50.66 kg/m².  /75   Pulse 60   Temp 36.4 °C (97.6 °F) (Temporal)   Resp 16   Ht 1.575 m (5' 2\")   Wt (!) 126 kg (277 lb)   SpO2 93%   Vitals:    10/01/23 1610 10/01/23 1857 10/02/23 0316 10/02/23 0717   BP: (!) 140/94 (!) 150/75 (!) 140/80 134/75   Pulse: 62 60 62 60   Resp: 17 17 18 16   Temp: 36.4 °C (97.6 °F) 36.4 °C (97.5 °F) 36.5 °C (97.7 °F) 36.4 °C (97.6 °F)   TempSrc: Temporal Temporal Temporal Temporal   SpO2: 99% 97% 100% 93%   Weight:       Height:         Oxygen Therapy:  Pulse Oximetry: 93 %, O2 (LPM): 3, O2 Delivery Device: Nasal Cannula    Intake/Output Summary (Last 24 hours) at 10/2/2023 1422  Last data " filed at 10/2/2023 0316  Gross per 24 hour   Intake 240 ml   Output 1900 ml   Net -1660 ml         Physical Exam:  Physical Exam  Vitals and nursing note reviewed.   Constitutional:       General: She is not in acute distress.     Appearance: She is obese.   HENT:      Head: Normocephalic and atraumatic.      Right Ear: External ear normal.      Left Ear: External ear normal.      Nose: Nose normal.      Mouth/Throat:      Mouth: Mucous membranes are moist.      Pharynx: Oropharynx is clear.   Eyes:      General: No scleral icterus.  Cardiovascular:      Rate and Rhythm: Normal rate and regular rhythm.      Pulses: Normal pulses.      Heart sounds: Normal heart sounds.   Pulmonary:      Effort: Pulmonary effort is normal. No respiratory distress.      Breath sounds: Normal breath sounds.   Abdominal:      General: Abdomen is flat. Bowel sounds are normal. There is no distension.      Palpations: Abdomen is soft.      Tenderness: There is no abdominal tenderness.      Comments: Left lower quadrant urostomy bag  With old healed abdominal midline scarring   Musculoskeletal:         General: Normal range of motion.      Cervical back: Normal range of motion.      Comments: Left knee with bandages   Skin:     General: Skin is warm and dry.      Capillary Refill: Capillary refill takes less than 2 seconds.      Coloration: Skin is pale.   Neurological:      Mental Status: She is alert and oriented to person, place, and time.      Motor: Weakness present.   Psychiatric:         Mood and Affect: Mood normal.         Behavior: Behavior normal.             Labs:  Recent Labs     09/30/23  0647 10/01/23  0418 10/02/23  0515   SODIUM 139 140 137   POTASSIUM 3.8 3.7 3.9   CHLORIDE 109 110 108   CO2 18* 20 19*   BUN 68* 54* 44*   CREATININE 2.80* 2.48* 2.12*   MAGNESIUM  --  1.9  --    PHOSPHORUS  --  4.7*  --    CALCIUM 9.5 9.6 10.2     Recent Labs     09/29/23  1630 09/29/23  2155 09/30/23  0647 10/01/23  0418 10/02/23  0515    ALTSGPT 5*  --   --   --   --    ASTSGOT 12*  --   --   --   --    ALKPHOSPHAT 59  --   --   --   --    TBILIRUBIN 0.3*  --   --   --   --    GLUCOSE 131*   < > 84 84 88    < > = values in this interval not displayed.     Recent Labs     09/29/23  1534 09/29/23  1630 09/29/23  2155 10/01/23  0418 10/02/23  0515   WBC  --   --  8.7 7.3 8.1   NEUTSPOLYS 73.7  --  71.90  --   --    LYMPHOCYTES 11.2  --  12.70*  --   --    MONOCYTES 8.4  --  9.20  --   --    EOSINOPHILS 6.3  --  5.10  --   --    BASOPHILS 0.4  --  0.30  --   --    ASTSGOT  --  12*  --   --   --    ALTSGPT  --  5*  --   --   --    ALKPHOSPHAT  --  59  --   --   --    TBILIRUBIN  --  0.3*  --   --   --      Recent Labs     09/29/23 2155 09/30/23  0647 09/30/23  1335 10/01/23  0418 10/02/23  0515   RBC 2.35*  --   --  2.99* 2.89*   HEMOGLOBIN 6.7* 7.0* 8.1* 8.4* 8.1*   HEMATOCRIT 21.0* 21.8* 25.0* 26.3* 26.3*   PLATELETCT 239  --   --  224 214   APTT 32.6  --   --   --   --    IRON  --  106  --   --   --    FERRITIN  --  51.9  --   --   --    TOTIRONBC  --  273  --   --   --      Recent Results (from the past 24 hour(s))   Basic Metabolic Panel    Collection Time: 10/02/23  5:15 AM   Result Value Ref Range    Sodium 137 135 - 145 mmol/L    Potassium 3.9 3.6 - 5.5 mmol/L    Chloride 108 96 - 112 mmol/L    Co2 19 (L) 20 - 33 mmol/L    Glucose 88 65 - 99 mg/dL    Bun 44 (H) 8 - 22 mg/dL    Creatinine 2.12 (H) 0.50 - 1.40 mg/dL    Calcium 10.2 8.5 - 10.5 mg/dL    Anion Gap 10.0 7.0 - 16.0   CBC WITHOUT DIFFERENTIAL    Collection Time: 10/02/23  5:15 AM   Result Value Ref Range    WBC 8.1 4.8 - 10.8 K/uL    RBC 2.89 (L) 4.20 - 5.40 M/uL    Hemoglobin 8.1 (L) 12.0 - 16.0 g/dL    Hematocrit 26.3 (L) 37.0 - 47.0 %    MCV 91.0 81.4 - 97.8 fL    MCH 28.0 27.0 - 33.0 pg    MCHC 30.8 (L) 32.2 - 35.5 g/dL    RDW 51.3 (H) 35.9 - 50.0 fL    Platelet Count 214 164 - 446 K/uL    MPV 9.8 9.0 - 12.9 fL   ESTIMATED GFR    Collection Time: 10/02/23  5:15 AM   Result Value  "Ref Range    GFR (CKD-EPI) 26 (A) >60 mL/min/1.73 m 2       Radiology Review:  No orders to display         MDM (Data Review):   -Records reviewed and summarized in current documentation  -I personally reviewed and interpreted the laboratory results  -I personally reviewed the radiology images    Assessment/Recommendations:  Impressions:  Normocytic anemia  History of DVT on eliquis  Recent knee arthroplasty with subsequent periprosthetic infection positive for MRSA, on daptomycin  Acute kidney injury  Morbid obesity  History of left lower quadrant urostomy  Obstructive sleep apnea     RECOMMENDATIONS:  Per patient, all of her abdominal surgeries were when she was born due to \"her insides not being in the right place\".  The only other surgery she remembers is her urostomy because its always been there.    Reviewed iron studies, most low normal, she has anemia of chronic disease and has been on iron in the past, recommend she continue to increase her iron stores  GI team will follow up biopsies  No plan for colonoscopy at this time  Hold anticoagulation today  Resume kosher Cardiac diet    GI team signing off.  Please do not hesitate to contact us with any questions or concerns or for any signs of bleeding.    Plan discussed with patient, Dr. Lainez, Dr. Santos      Reviewed items::  Labs, Medications and Radiology reports reviewed    "

## 2023-10-02 NOTE — THERAPY
Physical Therapy   Initial Evaluation     Patient Name: Patricia A Tietz  Age:  58 y.o., Sex:  female  Medical Record #: [id number]  Today's Date: 10/2/2023     Precautions  Precautions: Fall Risk;Weight Bearing As Tolerated Left Lower Extremity  Comments: gentle ROM left knee    Assessment  Patient is 58 y.o. female admitted from [facility] 2/2 decreased HgB.  She has been at snf for several months, s/p TKA and subsequent revision w/ fxl Abx spacer placed.  Prior to her sx, she was living alone in a ground floor apartment, where she was ambulatory w/ a fww.  She sleeps in a hospital bed, she owns a scooter, which she tries not to rely upon.  Today she needs mod assist to sit eob, where she is able to maintain her balance w/o assist.  Several attempts at sit to stand.  Despite max assist from PT, pt unable to achieve day light under her buttocks.  PT will follow and address impairments noted below.  Plan    Physical Therapy Initial Treatment Plan   Treatment Plan : Bed Mobility, Therapeutic Activities, Gait Training  Treatment Frequency: 3 Times per Week  Duration: Until Therapy Goals Met    DC Equipment Recommendations: Unable to determine at this time  Discharge Recommendations: Recommend post-acute placement for additional physical therapy services prior to discharge home       Objective       10/02/23 0924   Prior Living Situation   Housing / Facility 1 Story Apartment / Condo   Steps Into Home 0   Steps In Home 0   Equipment Owned Front-Wheel Walker;Scooter;Hospital Bed   Lives with - Patient's Self Care Capacity Alone and Able to Care For Self   Prior Level of Functional Mobility   Bed Mobility Independent   Transfer Status Independent   Ambulation Independent   Ambulation Distance   (household)   Assistive Devices Used Front-Wheel Walker   Cognition    Level of Consciousness Alert   Strength Lower Body   Comments LLE NT 2/2 pain; RLE grossly 3/5   Balance Assessment   Sitting Balance (Static) Fair +   Sitting Balance  (Dynamic) Fair +   Standing Balance (Static)   (MELISSA)   Weight Shift Sitting Poor   Weight Shift Standing Absent   Comments Unable to achieve standing posture   Bed Mobility    Supine to Sit Moderate Assist   Gait Analysis   Gait Level Of Assist Unable to Participate   Functional Mobility   Sit to Stand Maximal Assist   Bed, Chair, Wheelchair Transfer Unable to Participate   Short Term Goals    Short Term Goal # 1 Pt to move to/from eob w/ use of bed features and spv in 6 visits to improve fxl indep   Short Term Goal # 2 Pt to move sit to/from stand w/ min assist in 6 visits to improve fxl indep   Short Term Goal # 3 Pt to TF bed to/from chair w/ min assist in 6 visits to improve fxl indep   Physical Therapy Initial Treatment Plan    Treatment Plan  Bed Mobility;Therapeutic Activities;Gait Training   Treatment Frequency 3 Times per Week   Duration Until Therapy Goals Met   Problem List    Problems Impaired Bed Mobility;Impaired Transfers;Impaired Ambulation;Functional Strength Deficit;Decreased Activity Tolerance   Anticipated Discharge Equipment and Recommendations   DC Equipment Recommendations Unable to determine at this time   Discharge Recommendations Recommend post-acute placement for additional physical therapy services prior to discharge home

## 2023-10-02 NOTE — CARE PLAN
The patient is Watcher - Medium risk of patient condition declining or worsening    Shift Goals  Clinical Goals: stable H/H, VSS, pain control  Patient Goals: rest  Family Goals: ag    Progress made toward(s) clinical / shift goals:      Problem: Knowledge Deficit - Standard  Goal: Patient and family/care givers will demonstrate understanding of plan of care, disease process/condition, diagnostic tests and medications  Outcome: Progressing   Discussed plan of care with patient. Updated throughout the night. All questions answered.    Problem: Skin Integrity  Goal: Skin integrity is maintained or improved  Outcome: Progressing   Repositioned pt every 2 hours in bed. Waffle cushion in place. Sacral mepilex on.      Patient is not progressing towards the following goals:

## 2023-10-02 NOTE — PROGRESS NOTES
Hospital Medicine Daily Progress Note    Date of Service  10/2/2023    Chief Complaint  Patricia A Tietz is a 58 y.o. female admitted 9/29/2023 with low hemoglobin    Hospital Course  Patricia A Tietz is a 58 y.o. female who presented 9/29/2023 with low hemoglobin.  Patient has history of left total knee arthroplasty periprosthetic infection.  She is status post explantation, debridement irrigation and placement of functional left total knee arthroplasty antibiotic spacer with antibiotic beads.  Cultures grew MRSA.  She was initially on vancomycin but now on daptomycin and currently has a PICC line in her right upper extremity.  She also has history of DVT to which she was on Coumadin but transition to Eliquis 1 month ago.  Patient has been in the nursing facility for the last few months.  They did routine blood draw on her yesterday, and found her to be anemic and referred her to ER for blood transfusion and further evaluation.     Patient denies dizziness chest pain shortness of breath hematemesis melena.  She states that she is unsure of what her stool color is due to her being bedbound and her body habitus.  She was found to have hemoglobin 5.7, BUN 76, creatinine 2.75.  FOBT was positive in the other facility. she was given fluids 1 unit of blood and given Protonix and transferred to [facility] as no GI available at outside facility.    S/p EGD 10/1, revealed thick whitish debris in esophagus and mucosal fragility.  Biopsies taken; no significant bleeding signs.  No plan for colonoscopy at this point.  GI will request previous EGD colonoscopy report    Interval Problem Update  Patient was seen examined bedside  She stated no bowel movement for past few days, denies nausea vomiting    Patient reported worsening left knee pain.  We will ask wound care to check  Hgb improving stable  RAO improving    No bowel movement for a week despite bowel management.  I ordered suppository for today.     Discussed with  GI    Patient will go back to SNF once medically cleared    I have discussed this patient's plan of care and discharge plan at IDT rounds today with Case Management, Nursing, Nursing leadership, and other members of the IDT team.    Consultants/Specialty  GI    Code Status  Full Code    Disposition  The patient is not medically cleared for discharge to home or a post-acute facility.      I have placed the appropriate orders for post-discharge needs.    Review of Systems  All 12 systems were reviewed and negative except as mentioned above       Physical Exam  Temp:  [36.4 °C (97.5 °F)-36.5 °C (97.7 °F)] 36.4 °C (97.6 °F)  Pulse:  [60-62] 60  Resp:  [16-18] 16  BP: (134-150)/(75-94) 134/75  SpO2:  [93 %-100 %] 93 %    Physical Exam  Constitutional:       Appearance: She is obese. She is ill-appearing.   HENT:      Head: Normocephalic.      Nose: Nose normal.   Eyes:      Extraocular Movements: Extraocular movements intact.      Conjunctiva/sclera: Conjunctivae normal.   Cardiovascular:      Rate and Rhythm: Normal rate and regular rhythm.      Pulses: Normal pulses.      Heart sounds: Normal heart sounds.   Pulmonary:      Effort: Pulmonary effort is normal.      Breath sounds: No wheezing.   Abdominal:      General: Bowel sounds are normal.      Palpations: Abdomen is soft.      Tenderness: There is no abdominal tenderness.   Musculoskeletal:         General: Tenderness present.      Cervical back: Normal range of motion and neck supple.      Right lower leg: Edema present.      Left lower leg: Edema present.      Comments: Left knee clean Bandage   Neurological:      Mental Status: She is alert and oriented to person, place, and time. Mental status is at baseline.   Psychiatric:         Mood and Affect: Mood normal.         Fluids    Intake/Output Summary (Last 24 hours) at 10/2/2023 1147  Last data filed at 10/2/2023 0316  Gross per 24 hour   Intake 600 ml   Output 1900 ml   Net -1300 ml       Laboratory  Recent  Labs     09/29/23  2155 09/30/23  0647 09/30/23  1335 10/01/23  0418 10/02/23  0515   WBC 8.7  --   --  7.3 8.1   RBC 2.35*  --   --  2.99* 2.89*   HEMOGLOBIN 6.7*   < > 8.1* 8.4* 8.1*   HEMATOCRIT 21.0*   < > 25.0* 26.3* 26.3*   MCV 89.4  --   --  88.0 91.0   MCH 28.5  --   --  28.1 28.0   MCHC 31.9*  --   --  31.9* 30.8*   RDW 49.5  --   --  50.1* 51.3*   PLATELETCT 239  --   --  224 214   MPV 10.4  --   --  10.1 9.8    < > = values in this interval not displayed.     Recent Labs     09/30/23  0647 10/01/23  0418 10/02/23  0515   SODIUM 139 140 137   POTASSIUM 3.8 3.7 3.9   CHLORIDE 109 110 108   CO2 18* 20 19*   GLUCOSE 84 84 88   BUN 68* 54* 44*   CREATININE 2.80* 2.48* 2.12*   CALCIUM 9.5 9.6 10.2     Recent Labs     09/29/23 2155   APTT 32.6               Imaging  No orders to display        Assessment/Plan  * UGIB (upper gastrointestinal bleed)- (present on admission)  Assessment & Plan  Found to have hemoglobin 5.7, iven fluids, 1 unit of packed red blood cell, Protonix.  FOBT was positive in the other facility.     10/1 S/p EGD this morning, revealed thick whitish debris in esophagus and mucosal fragility.  Biopsies taken; no significant bleeding signs.  No plan for colonoscopy at this point.  GI will request previous EGD colonoscopy report  Continue PPI      Acute on chronic anemia  Assessment & Plan  Hb 5.7 at admission   received in multiple transfusion    Iron study negative for iron deficiency  I ordered a B12  Continue to monitor, transfuse if Hb less than 7    Constipation  Assessment & Plan  No bowel movement for a week despite bowel management.  I ordered suppository for today.     RAO (acute kidney injury) (HCC)  Assessment & Plan  RAO CRE 2.75> 2.8 (baseline 1.2)  Continue IV fluid    History of DVT in adulthood  Assessment & Plan  Hold Eliquis for now due to GI bleed    Hx MRSA infection  Assessment & Plan  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  Continue daptomycin      Class 3 severe obesity due to excess calories with serious comorbidity and body mass index (BMI) of 50.0 to 59.9 in adult (HCC)- (present on admission)  Assessment & Plan  Will need counseling when clinically appropriate         VTE prophylaxis: SCD, no pharmaceutical prophylaxis due to GI bleeding    I have performed a physical exam and reviewed and updated ROS and Plan today (10/2/2023). In review of yesterday's note (10/1/2023), there are no changes except as documented above.      ROS

## 2023-10-02 NOTE — THERAPY
"Occupational Therapy   Initial Evaluation     Patient Name: Patricia A Tietz  Age:  58 y.o., Sex:  female  Medical Record #: [id number]  Today's Date: 10/2/2023     Precautions  Precautions: Fall Risk, Weight Bearing As Tolerated Left Lower Extremity  Comments: gentle ROMAT per chart from prior ortho note. urostomy    Assessment  Patient is 58 y.o. female admitted from [facility] with UGIB and anemia, s/p EGD w/ biopsies. Recent L TKA revision with abx spacer placement 9/18. BMI 50. Additional factors influencing patient status / progress: weakness, fatigue, impaired balance, pain.      Plan    Occupational Therapy Initial Treatment Plan   Treatment Interventions: Self Care / Activities of Daily Living, Adaptive Equipment, Therapeutic Exercises, Neuro Re-Education / Balance, Therapeutic Activity  Treatment Frequency: 3 Times per Week  Duration: Until Therapy Goals Met    DC Equipment Recommendations: Unable to determine at this time  Discharge Recommendations: Recommend post-acute placement for additional occupational therapy services prior to discharge home     Subjective    \"Yo.\"     Objective       10/02/23 1306   Prior Living Situation   Housing / Facility 1 Story Apartment / Condo   Equipment Owned Front-Wheel Walker;Scooter;Adjustable Bed Without Rails   Lives with - Patient's Self Care Capacity Alone and Able to Care For Self   Comments Pt reports she has been in and out of SNF/hospital for the past two years. The last time she was home this year was May. She reports that the last time she stood with Last week, and that she needed a lot of help.   Prior Level of ADL Function   Self Feeding Independent   Grooming / Hygiene Independent   Bathing Independent   Dressing Independent   Toileting Independent   Precautions   Precautions Fall Risk;Weight Bearing As Tolerated Left Lower Extremity   Comments gentle ROMAT per chart from prior ortho note. urostomy   Pain 0 - 10 Group   Therapist Pain Assessment Nurse " Notified;During Activity  (c/o L knee pain, requesting pain meds)   Cognition    Cognition / Consciousness WDL   Level of Consciousness Alert   Comments pleasant and cooperative   Active ROM Upper Body   Active ROM Upper Body  X   Comments BUE grossly limited at the shoulders, pt reports difficulty grooming at baseline, B hands w/ muscle wasting   Strength Upper Body   Upper Body Strength  X   Gross Strength Generalized Weakness, Equal Bilaterally.    Balance Assessment   Sitting Balance (Static) Fair +   Sitting Balance (Dynamic) Fair   Weight Shift Sitting Poor   Comments deferred standing today due to fatigue and pain   Bed Mobility    Supine to Sit Minimal Assist   Scooting Minimal Assist   ADL Assessment   Grooming Minimal Assist;Seated  (comb hair, wash face, oral care)   Lower Body Dressing Total Assist   Toileting Total Assist   How much help from another person does the patient currently need...   Putting on and taking off regular lower body clothing? 1   Bathing (including washing, rinsing, and drying)? 1   Toileting, which includes using a toilet, bedpan, or urinal? 1   Putting on and taking off regular upper body clothing? 3   Taking care of personal grooming such as brushing teeth? 3   Eating meals? 3   6 Clicks Daily Activity Score 12   Functional Mobility   Sit to Stand Unable to Participate   Mobility EOB only today   Patient / Family Goals   Patient / Family Goal #1 get better and go home   Short Term Goals   Short Term Goal # 1 pt will demo ADL txfs with Mine   Short Term Goal # 2 pt will dress LB with Mine and AE prn   Short Term Goal # 3 pt will demo UB dressing w/ supv

## 2023-10-03 LAB
ANION GAP SERPL CALC-SCNC: 9 MMOL/L (ref 7–16)
BUN SERPL-MCNC: 41 MG/DL (ref 8–22)
CALCIUM SERPL-MCNC: 9.8 MG/DL (ref 8.5–10.5)
CHLORIDE SERPL-SCNC: 113 MMOL/L (ref 96–112)
CO2 SERPL-SCNC: 19 MMOL/L (ref 20–33)
CREAT SERPL-MCNC: 2.44 MG/DL (ref 0.5–1.4)
ERYTHROCYTE [DISTWIDTH] IN BLOOD BY AUTOMATED COUNT: 50.5 FL (ref 35.9–50)
GFR SERPLBLD CREATININE-BSD FMLA CKD-EPI: 22 ML/MIN/1.73 M 2
GLUCOSE SERPL-MCNC: 115 MG/DL (ref 65–99)
HCT VFR BLD AUTO: 25.5 % (ref 37–47)
HGB BLD-MCNC: 7.8 G/DL (ref 12–16)
HGB BLD-MCNC: 8.6 G/DL (ref 12–16)
MCH RBC QN AUTO: 28.1 PG (ref 27–33)
MCHC RBC AUTO-ENTMCNC: 30.6 G/DL (ref 32.2–35.5)
MCV RBC AUTO: 91.7 FL (ref 81.4–97.8)
PLATELET # BLD AUTO: 212 K/UL (ref 164–446)
PMV BLD AUTO: 10.1 FL (ref 9–12.9)
POTASSIUM SERPL-SCNC: 4.2 MMOL/L (ref 3.6–5.5)
RBC # BLD AUTO: 2.78 M/UL (ref 4.2–5.4)
SODIUM SERPL-SCNC: 141 MMOL/L (ref 135–145)
WBC # BLD AUTO: 6.8 K/UL (ref 4.8–10.8)

## 2023-10-03 PROCEDURE — 85027 COMPLETE CBC AUTOMATED: CPT

## 2023-10-03 PROCEDURE — 770006 HCHG ROOM/CARE - MED/SURG/GYN SEMI*

## 2023-10-03 PROCEDURE — 700102 HCHG RX REV CODE 250 W/ 637 OVERRIDE(OP): Performed by: STUDENT IN AN ORGANIZED HEALTH CARE EDUCATION/TRAINING PROGRAM

## 2023-10-03 PROCEDURE — 80048 BASIC METABOLIC PNL TOTAL CA: CPT

## 2023-10-03 PROCEDURE — 700105 HCHG RX REV CODE 258: Performed by: STUDENT IN AN ORGANIZED HEALTH CARE EDUCATION/TRAINING PROGRAM

## 2023-10-03 PROCEDURE — 700111 HCHG RX REV CODE 636 W/ 250 OVERRIDE (IP): Mod: JZ | Performed by: STUDENT IN AN ORGANIZED HEALTH CARE EDUCATION/TRAINING PROGRAM

## 2023-10-03 PROCEDURE — 85018 HEMOGLOBIN: CPT

## 2023-10-03 PROCEDURE — A9270 NON-COVERED ITEM OR SERVICE: HCPCS | Performed by: STUDENT IN AN ORGANIZED HEALTH CARE EDUCATION/TRAINING PROGRAM

## 2023-10-03 PROCEDURE — 99232 SBSQ HOSP IP/OBS MODERATE 35: CPT | Performed by: INTERNAL MEDICINE

## 2023-10-03 RX ADMIN — OMEPRAZOLE 20 MG: 20 CAPSULE, DELAYED RELEASE ORAL at 16:23

## 2023-10-03 RX ADMIN — OXYCODONE HYDROCHLORIDE 15 MG: 15 TABLET ORAL at 22:32

## 2023-10-03 RX ADMIN — GABAPENTIN 300 MG: 300 CAPSULE ORAL at 20:24

## 2023-10-03 RX ADMIN — OXYCODONE HYDROCHLORIDE 15 MG: 15 TABLET ORAL at 10:25

## 2023-10-03 RX ADMIN — SENNOSIDES 8.6 MG: 8.6 TABLET, FILM COATED ORAL at 16:23

## 2023-10-03 RX ADMIN — SENNOSIDES 8.6 MG: 8.6 TABLET, FILM COATED ORAL at 04:14

## 2023-10-03 RX ADMIN — DAPTOMYCIN 500 MG: 500 INJECTION, POWDER, LYOPHILIZED, FOR SOLUTION INTRAVENOUS at 16:30

## 2023-10-03 RX ADMIN — CITALOPRAM 40 MG: 40 TABLET, FILM COATED ORAL at 04:14

## 2023-10-03 RX ADMIN — AMITRIPTYLINE HYDROCHLORIDE 50 MG: 25 TABLET, FILM COATED ORAL at 20:24

## 2023-10-03 RX ADMIN — OXYCODONE HYDROCHLORIDE 15 MG: 15 TABLET ORAL at 16:24

## 2023-10-03 RX ADMIN — OMEPRAZOLE 20 MG: 20 CAPSULE, DELAYED RELEASE ORAL at 04:14

## 2023-10-03 RX ADMIN — POLYETHYLENE GLYCOL 3350 1 PACKET: 17 POWDER, FOR SOLUTION ORAL at 04:13

## 2023-10-03 ASSESSMENT — PAIN DESCRIPTION - PAIN TYPE
TYPE: ACUTE PAIN

## 2023-10-03 ASSESSMENT — ENCOUNTER SYMPTOMS
VOMITING: 0
SORE THROAT: 0
WEAKNESS: 1
ABDOMINAL PAIN: 0
NAUSEA: 0
SHORTNESS OF BREATH: 0

## 2023-10-03 NOTE — CARE PLAN
The patient is Stable - Low risk of patient condition declining or worsening    Shift Goals  Clinical Goals: Stable H/H, pain control, bowel movement  Patient Goals: Pain control  Family Goals: MELISSA    Progress made toward(s) clinical / shift goals:  Pt's hemoglobin remained greater than 7 and blood transfusion was not needed today. A suppository and stool softeners were given to help pt have a bowel movement and it worked, but pt was incontinent and was not able to wait for a bed pan. Pain was controlled with one dose of oxycodone following PT and OT. PT and OT worked with the patient today but was not able to get her out of the bed. Pt did sit at edge of bed for almost half of the day today. IV ABX were started a little late due to being sent up to floor a little late.    Patient is not progressing towards the following goals:      Problem: Fall Risk  Goal: Patient will remain free from falls  Outcome: Not Progressing

## 2023-10-03 NOTE — PROGRESS NOTES
Hospital Medicine Daily Progress Note    Date of Service  10/3/2023    Chief Complaint  Patricia A Tietz is a 58 y.o. female admitted 9/29/2023 with anemia    Hospital Course  57 yo woman with recent left knee arthroplasty complicated by infection, status post I&D with antibiotic spacer on daptomycin for MRSA, history of DVT on Eliquis who has been at SNF and found her hemoglobin to be low at 5.7 and transferred to [facility].  She has not had signs of bleeding.  GI was consulted.  EGD showed 6 white debris in esophagus, biopsies taken, diverticula in duodenum.    Interval Problem Update  Hemoglobin decreased to 7.8, recheck   creatinine remains elevated above baseline  She denies any abdominal pain or difficulty eating.  She has ongoing left knee pain    I have discussed this patient's plan of care and discharge plan at IDT rounds today with Case Management, Nursing, Nursing leadership, and other members of the IDT team.    Consultants/Specialty  GI    Code Status  Full Code    Disposition  The patient is not medically cleared for discharge to home or a post-acute facility.  Anticipate discharge to: skilled nursing facility    I have placed the appropriate orders for post-discharge needs.    Review of Systems  Review of Systems   Constitutional:  Negative for malaise/fatigue.   HENT:  Negative for sore throat.    Respiratory:  Negative for shortness of breath.    Cardiovascular:  Negative for chest pain.   Gastrointestinal:  Negative for abdominal pain, nausea and vomiting.   Musculoskeletal:  Positive for joint pain.   Neurological:  Positive for weakness.        Physical Exam  Temp:  [35.8 °C (96.5 °F)-36.7 °C (98 °F)] 35.8 °C (96.5 °F)  Pulse:  [60-79] 60  Resp:  [17-19] 19  BP: (116-143)/(71-87) 116/71  SpO2:  [92 %-100 %] 100 %    Physical Exam  Vitals and nursing note reviewed.   Constitutional:       General: She is not in acute distress.     Appearance: She is not toxic-appearing.   HENT:      Head:  Normocephalic.      Mouth/Throat:      Mouth: Mucous membranes are moist.   Eyes:      General:         Right eye: No discharge.         Left eye: No discharge.   Cardiovascular:      Rate and Rhythm: Normal rate and regular rhythm.   Pulmonary:      Effort: Pulmonary effort is normal. No respiratory distress.      Breath sounds: No wheezing or rales.   Abdominal:      Tenderness: There is no abdominal tenderness.      Comments: Urostomy, multiple well-healed surgical wounds   Musculoskeletal:      Cervical back: Neck supple.      Right lower leg: Edema present.      Left lower leg: Edema present.      Comments: Left knee with dressings in place   Skin:     General: Skin is warm and dry.   Neurological:      Mental Status: She is alert and oriented to person, place, and time.         Fluids    Intake/Output Summary (Last 24 hours) at 10/3/2023 1501  Last data filed at 10/3/2023 0400  Gross per 24 hour   Intake 890 ml   Output 2100 ml   Net -1210 ml       Laboratory  Recent Labs     10/01/23  0418 10/02/23  0515 10/03/23  0400 10/03/23  1150   WBC 7.3 8.1 6.8  --    RBC 2.99* 2.89* 2.78*  --    HEMOGLOBIN 8.4* 8.1* 7.8* 8.6*   HEMATOCRIT 26.3* 26.3* 25.5*  --    MCV 88.0 91.0 91.7  --    MCH 28.1 28.0 28.1  --    MCHC 31.9* 30.8* 30.6*  --    RDW 50.1* 51.3* 50.5*  --    PLATELETCT 224 214 212  --    MPV 10.1 9.8 10.1  --      Recent Labs     10/01/23  0418 10/02/23  0515 10/03/23  0400   SODIUM 140 137 141   POTASSIUM 3.7 3.9 4.2   CHLORIDE 110 108 113*   CO2 20 19* 19*   GLUCOSE 84 88 115*   BUN 54* 44* 41*   CREATININE 2.48* 2.12* 2.44*   CALCIUM 9.6 10.2 9.8                   Imaging  No orders to display        Assessment/Plan  * UGIB (upper gastrointestinal bleed)- (present on admission)  Assessment & Plan  Found to have hemoglobin 5.7, iven fluids, 1 unit of packed red blood cell, Protonix.  FOBT was positive in the other facility.     10/1 S/p EGD this morning, revealed thick whitish debris in esophagus and  mucosal fragility.  Biopsies taken; no significant bleeding signs.  No plan for colonoscopy at this point.   Continue PPI      Constipation  Assessment & Plan  Continue bowel protocol    RAO (acute kidney injury) ([facility])  Assessment & Plan  RAO CRE 2.75> 2.8 (baseline 1.2)  Creatinine slowly improving but remains above baseline  Continue to trend BMP    Acute on chronic anemia  Assessment & Plan  Hb 5.7 at admission   received multiple transfusion  Iron study negative for iron deficiency, B12 level was normal  Hemoglobin stable on recheck  Continue to monitor, transfuse if Hb less than 7    History of DVT in adulthood  Assessment & Plan  Eliquis was held for for GI bleed per GI    Hx MRSA infection  Assessment & Plan  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  Continue daptomycin     Class 3 severe obesity due to excess calories with serious comorbidity and body mass index (BMI) of 50.0 to 59.9 in adult ([facility])- (present on admission)  Assessment & Plan  Will need counseling when clinically appropriate         VTE prophylaxis:   SCDs/TEDs      I have performed a physical exam and reviewed and updated ROS and Plan today (10/3/2023). In review of yesterday's note (10/2/2023), there are no changes except as documented above.

## 2023-10-03 NOTE — CARE PLAN
The patient is Stable - Low risk of patient condition declining or worsening    Shift Goals  Clinical Goals: Control pain at level 1-3/10 within 12 hours  Patient Goals: Pain Control, Rest  Family Goals: MELISSA    Progress made toward(s) clinical / shift goals:  Patient reported pain relief with prn medication with pain score 3/10 from 7/10. Able to sleep through the night. No signs of distress noted.     Patient is not progressing towards the following goals:n/a

## 2023-10-03 NOTE — DISCHARGE PLANNING
Met with pt who said that she was independent with ADLs and IADLs. So she wants to return to [facility] when she is cleared for discharge from [facility]. Choice made and referral sent to [facility].     Care Transition Team Assessment    Information Source  Orientation Level: Oriented X4  Information Given By: Patient  Who is responsible for making decisions for patient? : Patient    Readmission Evaluation  Is this a readmission?: No    Elopement Risk  Legal Hold: No  Ambulatory or Self Mobile in Wheelchair: No-Not an Elopement Risk  Elopement Risk: Not at Risk for Elopement    Interdisciplinary Discharge Planning  Does Admitting Nurse Feel This Could be a Complex Discharge?: No  Primary Care Physician: Dr. Pedrito Muniz  Lives with - Patient's Self Care Capacity: Alone and Unable to Care For Self  Patient or legal guardian wants to designate a caregiver: No  Support Systems: Other (Comments)  Housing / Facility: 1 Story Apartment / Condo  Name of Care Facility: [facility]  Do You Take your Prescribed Medications Regularly: Yes  Able to Return to Previous ADL's: Future Time w/Therapy  Mobility Issues: No  Prior Services: Other (Comments) (Pt was at [facility])  Patient Prefers to be Discharged to:: SNF  Assistance Needed: Yes  Durable Medical Equipment: Not Applicable    Discharge Preparedness  What is your plan after discharge?: Skilled nursing facility         Finances  Financial Barriers to Discharge: No  Prescription Coverage: Yes    Vision / Hearing Impairment  Vision Impairment : Yes  Right Eye Vision: Impaired, Wears Glasses  Left Eye Vision: Impaired, Wears Glasses  Hearing Impairment : Yes  Hearing Impairment: Right Ear  Does Pt Need Special Equipment for the Hearing Impaired?: No    Values / Beliefs / Concerns  Values / Beliefs Concerns : No    Advance Directive  Advance Directive?: None    Domestic Abuse  Have you ever been the victim of abuse or violence?: No  Physical Abuse or Sexual Abuse: No  Verbal Abuse or Emotional  Abuse: No  Possible Abuse/Neglect Reported to:: Not Applicable

## 2023-10-03 NOTE — CARE PLAN
The patient is Watcher - Medium risk of patient condition declining or worsening    Shift Goals  Clinical Goals: Control pain at level 1-3/10 within 12 hours  Patient Goals: Pain Control, Rest  Family Goals: MELISSA    Progress made toward(s) clinical / shift goals:      Problem: Knowledge Deficit - Standard  Goal: Patient and family/care givers will demonstrate understanding of plan of care, disease process/condition, diagnostic tests and medications  Outcome: Met     Problem: Pain - Standard  Goal: Alleviation of pain or a reduction in pain to the patient’s comfort goal  Outcome: Met     Problem: Fall Risk  Goal: Patient will remain free from falls  Outcome: Met       Patient is not progressing towards the following goals:NA

## 2023-10-04 ENCOUNTER — APPOINTMENT (OUTPATIENT)
Dept: RADIOLOGY | Facility: MEDICAL CENTER | Age: 58
DRG: 368 | End: 2023-10-04
Attending: INTERNAL MEDICINE
Payer: MEDICARE

## 2023-10-04 LAB
ANION GAP SERPL CALC-SCNC: 8 MMOL/L (ref 7–16)
BUN SERPL-MCNC: 39 MG/DL (ref 8–22)
CALCIUM SERPL-MCNC: 10.3 MG/DL (ref 8.5–10.5)
CHLORIDE SERPL-SCNC: 114 MMOL/L (ref 96–112)
CO2 SERPL-SCNC: 21 MMOL/L (ref 20–33)
CREAT SERPL-MCNC: 2.36 MG/DL (ref 0.5–1.4)
ERYTHROCYTE [DISTWIDTH] IN BLOOD BY AUTOMATED COUNT: 51.3 FL (ref 35.9–50)
GFR SERPLBLD CREATININE-BSD FMLA CKD-EPI: 23 ML/MIN/1.73 M 2
GLUCOSE SERPL-MCNC: 104 MG/DL (ref 65–99)
HCT VFR BLD AUTO: 23 % (ref 37–47)
HCT VFR BLD AUTO: 25.5 % (ref 37–47)
HGB BLD-MCNC: 7 G/DL (ref 12–16)
HGB BLD-MCNC: 8.1 G/DL (ref 12–16)
MCH RBC QN AUTO: 28.3 PG (ref 27–33)
MCHC RBC AUTO-ENTMCNC: 30.4 G/DL (ref 32.2–35.5)
MCV RBC AUTO: 93.1 FL (ref 81.4–97.8)
PLATELET # BLD AUTO: 223 K/UL (ref 164–446)
PMV BLD AUTO: 10 FL (ref 9–12.9)
POTASSIUM SERPL-SCNC: 4.3 MMOL/L (ref 3.6–5.5)
RBC # BLD AUTO: 2.47 M/UL (ref 4.2–5.4)
SODIUM SERPL-SCNC: 143 MMOL/L (ref 135–145)
WBC # BLD AUTO: 8.7 K/UL (ref 4.8–10.8)

## 2023-10-04 PROCEDURE — 85027 COMPLETE CBC AUTOMATED: CPT

## 2023-10-04 PROCEDURE — 85014 HEMATOCRIT: CPT

## 2023-10-04 PROCEDURE — 85018 HEMOGLOBIN: CPT

## 2023-10-04 PROCEDURE — 700105 HCHG RX REV CODE 258: Performed by: STUDENT IN AN ORGANIZED HEALTH CARE EDUCATION/TRAINING PROGRAM

## 2023-10-04 PROCEDURE — 99232 SBSQ HOSP IP/OBS MODERATE 35: CPT | Performed by: INTERNAL MEDICINE

## 2023-10-04 PROCEDURE — 76775 US EXAM ABDO BACK WALL LIM: CPT

## 2023-10-04 PROCEDURE — 700102 HCHG RX REV CODE 250 W/ 637 OVERRIDE(OP): Performed by: STUDENT IN AN ORGANIZED HEALTH CARE EDUCATION/TRAINING PROGRAM

## 2023-10-04 PROCEDURE — A9270 NON-COVERED ITEM OR SERVICE: HCPCS | Performed by: STUDENT IN AN ORGANIZED HEALTH CARE EDUCATION/TRAINING PROGRAM

## 2023-10-04 PROCEDURE — 700111 HCHG RX REV CODE 636 W/ 250 OVERRIDE (IP): Mod: JZ | Performed by: STUDENT IN AN ORGANIZED HEALTH CARE EDUCATION/TRAINING PROGRAM

## 2023-10-04 PROCEDURE — 80048 BASIC METABOLIC PNL TOTAL CA: CPT

## 2023-10-04 PROCEDURE — 770006 HCHG ROOM/CARE - MED/SURG/GYN SEMI*

## 2023-10-04 RX ORDER — ONDANSETRON 2 MG/ML
4 INJECTION INTRAMUSCULAR; INTRAVENOUS EVERY 4 HOURS PRN
Status: DISCONTINUED | OUTPATIENT
Start: 2023-10-04 | End: 2023-10-22 | Stop reason: HOSPADM

## 2023-10-04 RX ADMIN — OMEPRAZOLE 20 MG: 20 CAPSULE, DELAYED RELEASE ORAL at 04:16

## 2023-10-04 RX ADMIN — OXYCODONE HYDROCHLORIDE 15 MG: 15 TABLET ORAL at 15:35

## 2023-10-04 RX ADMIN — AMITRIPTYLINE HYDROCHLORIDE 50 MG: 25 TABLET, FILM COATED ORAL at 20:16

## 2023-10-04 RX ADMIN — SENNOSIDES 8.6 MG: 8.6 TABLET, FILM COATED ORAL at 17:24

## 2023-10-04 RX ADMIN — POLYETHYLENE GLYCOL 3350 1 PACKET: 17 POWDER, FOR SOLUTION ORAL at 04:16

## 2023-10-04 RX ADMIN — OXYCODONE HYDROCHLORIDE 15 MG: 15 TABLET ORAL at 07:24

## 2023-10-04 RX ADMIN — CITALOPRAM 40 MG: 40 TABLET, FILM COATED ORAL at 04:16

## 2023-10-04 RX ADMIN — OXYCODONE HYDROCHLORIDE 15 MG: 15 TABLET ORAL at 21:43

## 2023-10-04 RX ADMIN — GABAPENTIN 300 MG: 300 CAPSULE ORAL at 20:16

## 2023-10-04 RX ADMIN — DAPTOMYCIN 500 MG: 500 INJECTION, POWDER, LYOPHILIZED, FOR SOLUTION INTRAVENOUS at 17:21

## 2023-10-04 RX ADMIN — SENNOSIDES 8.6 MG: 8.6 TABLET, FILM COATED ORAL at 04:16

## 2023-10-04 RX ADMIN — OMEPRAZOLE 20 MG: 20 CAPSULE, DELAYED RELEASE ORAL at 17:24

## 2023-10-04 ASSESSMENT — ENCOUNTER SYMPTOMS
SORE THROAT: 0
WEAKNESS: 1
ABDOMINAL PAIN: 0
VOMITING: 0
NAUSEA: 0
SHORTNESS OF BREATH: 0

## 2023-10-04 ASSESSMENT — PAIN DESCRIPTION - PAIN TYPE: TYPE: ACUTE PAIN

## 2023-10-04 NOTE — PROGRESS NOTES
Hospital Medicine Daily Progress Note    Date of Service  10/4/2023    Chief Complaint  Patricia A Tietz is a 58 y.o. female admitted 9/29/2023 with anemia    Hospital Course  57 yo woman with recent left knee arthroplasty complicated by infection, status post I&D with antibiotic spacer on daptomycin for MRSA, history of DVT on Eliquis who has been at SNF and found her hemoglobin to be low at 5.7 and transferred to [facility].  She has not had signs of bleeding.  GI was consulted.  EGD showed 6 white debris in esophagus, biopsies taken, diverticula in duodenum.    Interval Problem Update  10/3 - Hemoglobin decreased to 7.8, recheck   creatinine remains elevated above baseline  She denies any abdominal pain or difficulty eating.  She has ongoing left knee pain    10/4 - Hgb decreased to 7.0, recheck. Creat decreased 2.36 but still remains above baseline.  We will check urine studies and renal ultrasound.  She has no new complaints, eating okay.    I have discussed this patient's plan of care and discharge plan at IDT rounds today with Case Management, Nursing, Nursing leadership, and other members of the IDT team.    Consultants/Specialty  GI    Code Status  Full Code    Disposition  The patient is not medically cleared for discharge to home or a post-acute facility.  Anticipate discharge to: skilled nursing facility    I have placed the appropriate orders for post-discharge needs.    Review of Systems  Review of Systems   Constitutional:  Negative for malaise/fatigue.   HENT:  Negative for sore throat.    Respiratory:  Negative for shortness of breath.    Cardiovascular:  Negative for chest pain.   Gastrointestinal:  Negative for abdominal pain, nausea and vomiting.   Genitourinary:  Negative for dysuria.   Musculoskeletal:  Positive for joint pain.   Neurological:  Positive for weakness.        Physical Exam  Temp:  [36.1 °C (96.9 °F)-36.6 °C (97.9 °F)] 36.3 °C (97.3 °F)  Pulse:  [66-78] 68  Resp:  [16-19] 18  BP:  (115-143)/(64-78) 125/73  SpO2:  [93 %-100 %] 93 %    Physical Exam  Vitals and nursing note reviewed.   Constitutional:       General: She is not in acute distress.     Appearance: She is obese. She is not toxic-appearing.   HENT:      Head: Normocephalic.      Mouth/Throat:      Mouth: Mucous membranes are moist.   Eyes:      General:         Right eye: No discharge.         Left eye: No discharge.   Cardiovascular:      Rate and Rhythm: Normal rate and regular rhythm.   Pulmonary:      Effort: Pulmonary effort is normal. No respiratory distress.      Breath sounds: No wheezing or rales.   Abdominal:      Tenderness: There is no abdominal tenderness.      Comments: Urostomy, multiple well-healed surgical wounds   Musculoskeletal:      Cervical back: Neck supple.      Right lower leg: Edema present.      Left lower leg: Edema present.      Comments: Left knee with dressings in place   Skin:     General: Skin is warm and dry.   Neurological:      Mental Status: She is alert and oriented to person, place, and time.         Fluids    Intake/Output Summary (Last 24 hours) at 10/4/2023 1414  Last data filed at 10/4/2023 0532  Gross per 24 hour   Intake 240 ml   Output 1350 ml   Net -1110 ml       Laboratory  Recent Labs     10/02/23  0515 10/03/23  0400 10/03/23  1150 10/04/23  0423 10/04/23  1200   WBC 8.1 6.8  --  8.7  --    RBC 2.89* 2.78*  --  2.47*  --    HEMOGLOBIN 8.1* 7.8* 8.6* 7.0* 8.1*   HEMATOCRIT 26.3* 25.5*  --  23.0* 25.5*   MCV 91.0 91.7  --  93.1  --    MCH 28.0 28.1  --  28.3  --    MCHC 30.8* 30.6*  --  30.4*  --    RDW 51.3* 50.5*  --  51.3*  --    PLATELETCT 214 212  --  223  --    MPV 9.8 10.1  --  10.0  --      Recent Labs     10/02/23  0515 10/03/23  0400 10/04/23  0423   SODIUM 137 141 143   POTASSIUM 3.9 4.2 4.3   CHLORIDE 108 113* 114*   CO2 19* 19* 21   GLUCOSE 88 115* 104*   BUN 44* 41* 39*   CREATININE 2.12* 2.44* 2.36*   CALCIUM 10.2 9.8 10.3                   Imaging  US-RENAL    (Results  Pending)        Assessment/Plan  * UGIB (upper gastrointestinal bleed)- (present on admission)  Assessment & Plan  Found to have hemoglobin 5.7, given fluids, 1 unit of packed red blood cell, Protonix.  FOBT was positive in the other facility.     10/1 S/p EGD this morning, revealed thick whitish debris in esophagus and mucosal fragility.  Biopsies taken; no significant bleeding signs.  No plan for colonoscopy at this point.   Biopsy showed benign mucosal ulceration with acute on chronic inflammation  Continue PPI      Constipation  Assessment & Plan  Continue bowel protocol    RAO (acute kidney injury) ([facility])  Assessment & Plan  RAO CRE 2.75> 2.8 (baseline 1.2)  Creatinine slowly improving but remains above baseline  Check UA, FeNa, renal US  Continue to trend BMP    Acute on chronic anemia  Assessment & Plan  Hb 5.7 at admission   received multiple transfusion  Iron study negative for iron deficiency, B12 level was normal  Hemoglobin fluctuating but stable when rechecked  Continue to monitor, transfuse if Hb less than 7    History of DVT in adulthood  Assessment & Plan  Eliquis was held for for GI bleed per GI    Hx MRSA infection  Assessment & Plan  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 In review of yesterday's note (10/3/2023), there are no changes except as documented above.

## 2023-10-04 NOTE — PROGRESS NOTES
Hgb 7.0. Patient has no signs of bleeding. All vitals WDL. Notified Keyla Mendoza MD at 0500. MD to order repeat Hgb later in time.

## 2023-10-04 NOTE — PROGRESS NOTES
Received report from AM RN at 1845, bedside report received. Assessment complete.   Patient is AA&Ox 4.   All needs met at this time. Call light within reach.  Patient calls appropriately. Bed in low and locked position. Hourly rounding in place.

## 2023-10-04 NOTE — DISCHARGE PLANNING
Case Management Discharge Planning    Admission Date: 9/29/2023  GMLOS: 3  ALOS: 5    6-Clicks ADL Score: 14  6-Clicks Mobility Score: 7  PT and/or OT Eval ordered: Yes  Post-acute Referrals Ordered: Yes  Post-acute Choice Obtained: Yes  Has referral(s) been sent to post-acute provider:  Yes      Anticipated Discharge Dispo: Discharge Disposition: D/T to SNF with Medicare cert in anticipation of skilled care (03)    DME Needed: No    Action(s) Taken: Updated Provider/Nurse on Discharge Plan    Escalations Completed: None    Medically Clear: No    Next Steps: once medically clear/follow up with [place] for acceptance    Barriers to Discharge: Medical clearance    Is the patient up for discharge tomorrow: No  Discussed patient's discharge barriers during IDT rounds. Patient's labs are off, kidney function has not improved.

## 2023-10-04 NOTE — CARE PLAN
The patient is Stable - Low risk of patient condition declining or worsening    Shift Goals  Clinical Goals: pain mangement, medication administration  Patient Goals: pain control, sleep  Family Goals: ag    Progress made toward(s) clinical / shift goals:  Patient reports that pain is well controlled on PRN medications; verbalizes understanding of plan of care; patient compliant with q2 turns.    Problem: Knowledge Deficit - Standard  Goal: Patient and family/care givers will demonstrate understanding of plan of care, disease process/condition, diagnostic tests and medications  Outcome: Progressing     Problem: Pain - Standard  Goal: Alleviation of pain or a reduction in pain to the patient’s comfort goal  Outcome: Progressing     Problem: Skin Integrity  Goal: Skin integrity is maintained or improved  Outcome: Progressing       Patient is not progressing towards the following goals:

## 2023-10-05 ENCOUNTER — APPOINTMENT (OUTPATIENT)
Dept: RADIOLOGY | Facility: MEDICAL CENTER | Age: 58
DRG: 368 | End: 2023-10-05
Attending: INTERNAL MEDICINE
Payer: MEDICARE

## 2023-10-05 PROBLEM — D64.9 ANEMIA: Status: RESOLVED | Noted: 2023-09-29 | Resolved: 2023-10-05

## 2023-10-05 LAB
ANION GAP SERPL CALC-SCNC: 9 MMOL/L (ref 7–16)
APPEARANCE UR: ABNORMAL
BACTERIA #/AREA URNS HPF: NEGATIVE /HPF
BASOPHILS # BLD AUTO: 0.1 % (ref 0–1.8)
BASOPHILS # BLD: 0.01 K/UL (ref 0–0.12)
BILIRUB UR QL STRIP.AUTO: NEGATIVE
BUN SERPL-MCNC: 44 MG/DL (ref 8–22)
CALCIUM SERPL-MCNC: 10.5 MG/DL (ref 8.5–10.5)
CHLORIDE SERPL-SCNC: 111 MMOL/L (ref 96–112)
CO2 SERPL-SCNC: 20 MMOL/L (ref 20–33)
COLOR UR: YELLOW
COMMENT 1642: NORMAL
CREAT SERPL-MCNC: 2.55 MG/DL (ref 0.5–1.4)
CREAT UR-MCNC: 62.91 MG/DL
EOSINOPHIL # BLD AUTO: 0.35 K/UL (ref 0–0.51)
EOSINOPHIL NFR BLD: 5.2 % (ref 0–6.9)
EPI CELLS #/AREA URNS HPF: NEGATIVE /HPF
ERYTHROCYTE [DISTWIDTH] IN BLOOD BY AUTOMATED COUNT: 50 FL (ref 35.9–50)
GFR SERPLBLD CREATININE-BSD FMLA CKD-EPI: 21 ML/MIN/1.73 M 2
GLUCOSE SERPL-MCNC: 93 MG/DL (ref 65–99)
GLUCOSE UR STRIP.AUTO-MCNC: NEGATIVE MG/DL
HCT VFR BLD AUTO: 23.5 % (ref 37–47)
HGB BLD-MCNC: 7 G/DL (ref 12–16)
HGB BLD-MCNC: 7.8 G/DL (ref 12–16)
HYALINE CASTS #/AREA URNS LPF: ABNORMAL /LPF
IMM GRANULOCYTES # BLD AUTO: 0.02 K/UL (ref 0–0.11)
IMM GRANULOCYTES NFR BLD AUTO: 0.3 % (ref 0–0.9)
KETONES UR STRIP.AUTO-MCNC: NEGATIVE MG/DL
LEUKOCYTE ESTERASE UR QL STRIP.AUTO: ABNORMAL
LYMPHOCYTES # BLD AUTO: 0.89 K/UL (ref 1–4.8)
LYMPHOCYTES NFR BLD: 13.1 % (ref 22–41)
MCH RBC QN AUTO: 27.6 PG (ref 27–33)
MCHC RBC AUTO-ENTMCNC: 29.8 G/DL (ref 32.2–35.5)
MCV RBC AUTO: 92.5 FL (ref 81.4–97.8)
MICRO URNS: ABNORMAL
MONOCYTES # BLD AUTO: 0.6 K/UL (ref 0–0.85)
MONOCYTES NFR BLD AUTO: 8.8 % (ref 0–13.4)
MORPHOLOGY BLD-IMP: NORMAL
NEUTROPHILS # BLD AUTO: 4.91 K/UL (ref 1.82–7.42)
NEUTROPHILS NFR BLD: 72.5 % (ref 44–72)
NITRITE UR QL STRIP.AUTO: NEGATIVE
NRBC # BLD AUTO: 0 K/UL
NRBC BLD-RTO: 0 /100 WBC (ref 0–0.2)
PH UR STRIP.AUTO: 6.5 [PH] (ref 5–8)
PLATELET # BLD AUTO: 190 K/UL (ref 164–446)
PLATELET BLD QL SMEAR: NORMAL
PMV BLD AUTO: 10 FL (ref 9–12.9)
POTASSIUM SERPL-SCNC: 4.3 MMOL/L (ref 3.6–5.5)
PROT UR QL STRIP: 100 MG/DL
RBC # BLD AUTO: 2.54 M/UL (ref 4.2–5.4)
RBC # URNS HPF: ABNORMAL /HPF
RBC BLD AUTO: NORMAL
RBC UR QL AUTO: ABNORMAL
SODIUM SERPL-SCNC: 140 MMOL/L (ref 135–145)
SODIUM UR-SCNC: 37 MMOL/L
SP GR UR STRIP.AUTO: 1.01
UROBILINOGEN UR STRIP.AUTO-MCNC: 0.2 MG/DL
WBC # BLD AUTO: 6.8 K/UL (ref 4.8–10.8)
WBC #/AREA URNS HPF: ABNORMAL /HPF
YEAST BUDDING URNS QL: PRESENT /HPF

## 2023-10-05 PROCEDURE — 700105 HCHG RX REV CODE 258: Performed by: STUDENT IN AN ORGANIZED HEALTH CARE EDUCATION/TRAINING PROGRAM

## 2023-10-05 PROCEDURE — 700102 HCHG RX REV CODE 250 W/ 637 OVERRIDE(OP): Performed by: INTERNAL MEDICINE

## 2023-10-05 PROCEDURE — 81001 URINALYSIS AUTO W/SCOPE: CPT

## 2023-10-05 PROCEDURE — 87086 URINE CULTURE/COLONY COUNT: CPT

## 2023-10-05 PROCEDURE — 84300 ASSAY OF URINE SODIUM: CPT

## 2023-10-05 PROCEDURE — 80048 BASIC METABOLIC PNL TOTAL CA: CPT

## 2023-10-05 PROCEDURE — 82570 ASSAY OF URINE CREATININE: CPT

## 2023-10-05 PROCEDURE — 700102 HCHG RX REV CODE 250 W/ 637 OVERRIDE(OP): Performed by: STUDENT IN AN ORGANIZED HEALTH CARE EDUCATION/TRAINING PROGRAM

## 2023-10-05 PROCEDURE — 302103 DOWN DRAIN ADAPTER: Performed by: INTERNAL MEDICINE

## 2023-10-05 PROCEDURE — 99232 SBSQ HOSP IP/OBS MODERATE 35: CPT | Performed by: INTERNAL MEDICINE

## 2023-10-05 PROCEDURE — A9270 NON-COVERED ITEM OR SERVICE: HCPCS | Performed by: INTERNAL MEDICINE

## 2023-10-05 PROCEDURE — 700111 HCHG RX REV CODE 636 W/ 250 OVERRIDE (IP): Mod: JZ | Performed by: STUDENT IN AN ORGANIZED HEALTH CARE EDUCATION/TRAINING PROGRAM

## 2023-10-05 PROCEDURE — A9270 NON-COVERED ITEM OR SERVICE: HCPCS | Performed by: STUDENT IN AN ORGANIZED HEALTH CARE EDUCATION/TRAINING PROGRAM

## 2023-10-05 PROCEDURE — 71045 X-RAY EXAM CHEST 1 VIEW: CPT

## 2023-10-05 PROCEDURE — 85025 COMPLETE CBC W/AUTO DIFF WBC: CPT

## 2023-10-05 PROCEDURE — 770006 HCHG ROOM/CARE - MED/SURG/GYN SEMI*

## 2023-10-05 PROCEDURE — 700111 HCHG RX REV CODE 636 W/ 250 OVERRIDE (IP): Mod: JZ | Performed by: INTERNAL MEDICINE

## 2023-10-05 PROCEDURE — 85018 HEMOGLOBIN: CPT

## 2023-10-05 RX ORDER — ALBUTEROL SULFATE 90 UG/1
2 AEROSOL, METERED RESPIRATORY (INHALATION)
Status: DISCONTINUED | OUTPATIENT
Start: 2023-10-05 | End: 2023-10-22 | Stop reason: HOSPADM

## 2023-10-05 RX ORDER — FERROUS SULFATE 325(65) MG
325 TABLET ORAL
Status: DISCONTINUED | OUTPATIENT
Start: 2023-10-05 | End: 2023-10-22 | Stop reason: HOSPADM

## 2023-10-05 RX ORDER — GUAIFENESIN 600 MG/1
600 TABLET, EXTENDED RELEASE ORAL EVERY 12 HOURS
Status: DISCONTINUED | OUTPATIENT
Start: 2023-10-05 | End: 2023-10-22 | Stop reason: HOSPADM

## 2023-10-05 RX ADMIN — POLYETHYLENE GLYCOL 3350 1 PACKET: 17 POWDER, FOR SOLUTION ORAL at 04:35

## 2023-10-05 RX ADMIN — DAPTOMYCIN 500 MG: 500 INJECTION, POWDER, LYOPHILIZED, FOR SOLUTION INTRAVENOUS at 17:48

## 2023-10-05 RX ADMIN — AMITRIPTYLINE HYDROCHLORIDE 50 MG: 25 TABLET, FILM COATED ORAL at 20:48

## 2023-10-05 RX ADMIN — SENNOSIDES 8.6 MG: 8.6 TABLET, FILM COATED ORAL at 04:35

## 2023-10-05 RX ADMIN — OXYCODONE HYDROCHLORIDE 15 MG: 15 TABLET ORAL at 17:43

## 2023-10-05 RX ADMIN — ONDANSETRON 4 MG: 2 INJECTION INTRAMUSCULAR; INTRAVENOUS at 20:08

## 2023-10-05 RX ADMIN — GABAPENTIN 300 MG: 300 CAPSULE ORAL at 20:48

## 2023-10-05 RX ADMIN — FERROUS SULFATE TAB 325 MG (65 MG ELEMENTAL FE) 325 MG: 325 (65 FE) TAB at 10:17

## 2023-10-05 RX ADMIN — OMEPRAZOLE 20 MG: 20 CAPSULE, DELAYED RELEASE ORAL at 17:43

## 2023-10-05 RX ADMIN — SENNOSIDES 8.6 MG: 8.6 TABLET, FILM COATED ORAL at 17:43

## 2023-10-05 RX ADMIN — OXYCODONE HYDROCHLORIDE 15 MG: 15 TABLET ORAL at 11:32

## 2023-10-05 RX ADMIN — OMEPRAZOLE 20 MG: 20 CAPSULE, DELAYED RELEASE ORAL at 04:35

## 2023-10-05 RX ADMIN — GUAIFENESIN 600 MG: 600 TABLET, EXTENDED RELEASE ORAL at 17:57

## 2023-10-05 RX ADMIN — GUAIFENESIN 600 MG: 600 TABLET, EXTENDED RELEASE ORAL at 11:32

## 2023-10-05 RX ADMIN — CITALOPRAM 40 MG: 40 TABLET, FILM COATED ORAL at 04:35

## 2023-10-05 ASSESSMENT — ENCOUNTER SYMPTOMS
WEAKNESS: 1
SHORTNESS OF BREATH: 0
NAUSEA: 0
COUGH: 1
VOMITING: 0
ABDOMINAL PAIN: 0

## 2023-10-05 ASSESSMENT — PAIN DESCRIPTION - PAIN TYPE: TYPE: ACUTE PAIN

## 2023-10-05 ASSESSMENT — COPD QUESTIONNAIRES
DO YOU EVER COUGH UP ANY MUCUS OR PHLEGM?: NO/ONLY WITH OCCASIONAL COLDS OR INFECTIONS
DURING THE PAST 4 WEEKS HOW MUCH DID YOU FEEL SHORT OF BREATH: NONE/LITTLE OF THE TIME
COPD SCREENING SCORE: 1
HAVE YOU SMOKED AT LEAST 100 CIGARETTES IN YOUR ENTIRE LIFE: NO/DON'T KNOW

## 2023-10-05 NOTE — CARE PLAN
The patient is Watcher - Medium risk of patient condition declining or worsening    Shift Goals  Clinical Goals: pain mangement, medication administration  Patient Goals: pain control, sleep  Family Goals: ag    Progress made toward(s) clinical / shift goals:      Problem: Knowledge Deficit - Standard  Goal: Patient and family/care givers will demonstrate understanding of plan of care, disease process/condition, diagnostic tests and medications  Outcome: Met     Problem: Pain - Standard  Goal: Alleviation of pain or a reduction in pain to the patient’s comfort goal  Outcome: Met     Problem: Skin Integrity  Goal: Skin integrity is maintained or improved  Outcome: Met       Patient is not progressing towards the following goals:

## 2023-10-05 NOTE — PROGRESS NOTES
Hospital Medicine Daily Progress Note    Date of Service  10/5/2023    Chief Complaint  Patricia A Tietz is a 58 y.o. female admitted 9/29/2023 with anemia    Hospital Course  59 yo woman with recent left knee arthroplasty complicated by infection, status post I&D with antibiotic spacer on daptomycin for MRSA, history of DVT on Eliquis who has been at SNF and found her hemoglobin to be low at 5.7 and transferred to [facility].  She has not had signs of bleeding.  GI was consulted.  EGD showed 6 white debris in esophagus, biopsies taken, diverticula in duodenum.    Interval Problem Update  10/3 - Hemoglobin decreased to 7.8, recheck   creatinine remains elevated above baseline  She denies any abdominal pain or difficulty eating.  She has ongoing left knee pain    10/4 - Hgb decreased to 7.0, recheck. Creat decreased 2.36 but still remains above baseline.  We will check urine studies and renal ultrasound.  She has no new complaints, eating okay.    10/5 - Hgb 7.0 this morning, recheck level. Creat increased 2.55. FeNa 1.1%. UA was cloudy with yeast. She is at risk for fungal UTI while on daptomycin, will check urine culture.  She complains of congestion and productive cough today.  Chest x-ray negative for pneumonia.  Her left knee pain has been unchanged    I have discussed this patient's plan of care and discharge plan at IDT rounds today with Case Management, Nursing, Nursing leadership, and other members of the IDT team.    Consultants/Specialty  GI    Code Status  Full Code    Disposition  The patient is not medically cleared for discharge to home or a post-acute facility.  Anticipate discharge to: skilled nursing facility    I have placed the appropriate orders for post-discharge needs.    Review of Systems  Review of Systems   Constitutional:  Negative for malaise/fatigue.   HENT:  Positive for congestion.    Respiratory:  Positive for cough. Negative for shortness of breath.    Cardiovascular:  Negative for chest  pain.   Gastrointestinal:  Negative for abdominal pain, nausea and vomiting.   Genitourinary:  Negative for dysuria.   Musculoskeletal:  Positive for joint pain.   Neurological:  Positive for weakness.        Physical Exam  Temp:  [36.3 °C (97.3 °F)-36.9 °C (98.5 °F)] 36.3 °C (97.3 °F)  Pulse:  [60-71] 71  Resp:  [14-19] 17  BP: (116-147)/(58-74) 147/74  SpO2:  [95 %-100 %] 98 %    Physical Exam  Vitals and nursing note reviewed.   Constitutional:       General: She is not in acute distress.     Appearance: She is obese. She is not toxic-appearing.   HENT:      Head: Normocephalic.      Mouth/Throat:      Mouth: Mucous membranes are moist.   Eyes:      General:         Right eye: No discharge.         Left eye: No discharge.   Cardiovascular:      Rate and Rhythm: Normal rate and regular rhythm.   Pulmonary:      Effort: No respiratory distress.      Breath sounds: Wheezing (left sided) present. No rales.   Abdominal:      Tenderness: There is no abdominal tenderness.      Comments: Urostomy, multiple well-healed surgical wounds   Musculoskeletal:      Cervical back: Neck supple.      Right lower leg: Edema present.      Left lower leg: Edema present.      Comments: Left knee with dressings in place   Skin:     General: Skin is warm and dry.   Neurological:      Mental Status: She is alert and oriented to person, place, and time.         Fluids    Intake/Output Summary (Last 24 hours) at 10/5/2023 1038  Last data filed at 10/5/2023 0441  Gross per 24 hour   Intake --   Output 850 ml   Net -850 ml       Laboratory  Recent Labs     10/03/23  0400 10/03/23  1150 10/04/23  0423 10/04/23  1200 10/05/23  0430   WBC 6.8  --  8.7  --  6.8   RBC 2.78*  --  2.47*  --  2.54*   HEMOGLOBIN 7.8*   < > 7.0* 8.1* 7.0*   HEMATOCRIT 25.5*  --  23.0* 25.5* 23.5*   MCV 91.7  --  93.1  --  92.5   MCH 28.1  --  28.3  --  27.6   MCHC 30.6*  --  30.4*  --  29.8*   RDW 50.5*  --  51.3*  --  50.0   PLATELETCT 212  --  223  --  190   MPV 10.1   --  10.0  --  10.0    < > = values in this interval not displayed.     Recent Labs     10/03/23  0400 10/04/23  0423 10/05/23  0430   SODIUM 141 143 140   POTASSIUM 4.2 4.3 4.3   CHLORIDE 113* 114* 111   CO2 19* 21 20   GLUCOSE 115* 104* 93   BUN 41* 39* 44*   CREATININE 2.44* 2.36* 2.55*   CALCIUM 9.8 10.3 10.5                   Imaging  DX-CHEST-PORTABLE (1 VIEW)   Final Result      1.  Low lung volumes without definite acute cardiopulmonary abnormality.      US-RENAL   Final Result      1.  Normal renal ultrasound.           Assessment/Plan  * UGIB (upper gastrointestinal bleed)- (present on admission)  Assessment & Plan  Found to have hemoglobin 5.7, given fluids, 1 unit of packed red blood cell, Protonix.  FOBT was positive in the other facility.     10/1 S/p EGD this morning, revealed thick whitish debris in esophagus and mucosal fragility.  Biopsies taken; no significant bleeding signs.  No plan for colonoscopy at this point.   Biopsy showed benign mucosal ulceration with acute on chronic inflammation  Continue PPI      Constipation  Assessment & Plan  Continue bowel protocol    Primary hypertension  Assessment & Plan  She was not restarted on metoprolol.  Blood pressure has been okay.    RAO (acute kidney injury) ([facility])  Assessment & Plan  RAO CRE 2.75> 2.8 (baseline 1.2)  Creatinine seems stable but remains above baseline  Holding outpatient Lasix for now  Renal ultrasound was unremarkable  FeNa >1%, possible candidal UTI in the setting of daptomycin.  Urine culture pending  Continue to trend BMP    Acute on chronic anemia  Assessment & Plan  Hb 5.7 at admission   received multiple transfusion  Iron study negative for iron deficiency, B12 level was normal  Hemoglobin decreased to 7 this morning, recheck level  Continue to monitor, transfuse if Hb less than 7    History of DVT in adulthood  Assessment & Plan  Eliquis was held for for GI bleed per GI    Hx MRSA infection  Assessment & Plan  History of  periprosthetic infection that was positive for MRSA  Continue daptomycin, she is supposed to be on for 6 weeks  Has PICC    Class 3 severe obesity due to excess calories with serious comorbidity and body mass index (BMI) of 50.0 to 59.9 in adult ([facility])- (present on admission)  Assessment & Plan  Will need counseling when clinically appropriate         VTE prophylaxis:   SCDs/TEDs      I have performed a physical exam and reviewed and updated ROS and Plan today (10/5/2023). In review of yesterday's note (10/4/2023), there are no changes except as documented above.

## 2023-10-06 LAB
ANION GAP SERPL CALC-SCNC: 9 MMOL/L (ref 7–16)
BASOPHILS # BLD AUTO: 0.4 % (ref 0–1.8)
BASOPHILS # BLD: 0.02 K/UL (ref 0–0.12)
BUN SERPL-MCNC: 48 MG/DL (ref 8–22)
CALCIUM SERPL-MCNC: 10.6 MG/DL (ref 8.5–10.5)
CHLORIDE SERPL-SCNC: 114 MMOL/L (ref 96–112)
CO2 SERPL-SCNC: 17 MMOL/L (ref 20–33)
CREAT SERPL-MCNC: 2.5 MG/DL (ref 0.5–1.4)
EOSINOPHIL # BLD AUTO: 0.18 K/UL (ref 0–0.51)
EOSINOPHIL NFR BLD: 3.4 % (ref 0–6.9)
ERYTHROCYTE [DISTWIDTH] IN BLOOD BY AUTOMATED COUNT: 49.3 FL (ref 35.9–50)
GFR SERPLBLD CREATININE-BSD FMLA CKD-EPI: 22 ML/MIN/1.73 M 2
GLUCOSE SERPL-MCNC: 96 MG/DL (ref 65–99)
HCT VFR BLD AUTO: 26 % (ref 37–47)
HGB BLD-MCNC: 8 G/DL (ref 12–16)
IMM GRANULOCYTES # BLD AUTO: 0.01 K/UL (ref 0–0.11)
IMM GRANULOCYTES NFR BLD AUTO: 0.2 % (ref 0–0.9)
LYMPHOCYTES # BLD AUTO: 0.48 K/UL (ref 1–4.8)
LYMPHOCYTES NFR BLD: 8.9 % (ref 22–41)
MCH RBC QN AUTO: 28.4 PG (ref 27–33)
MCHC RBC AUTO-ENTMCNC: 30.8 G/DL (ref 32.2–35.5)
MCV RBC AUTO: 92.2 FL (ref 81.4–97.8)
MONOCYTES # BLD AUTO: 0.41 K/UL (ref 0–0.85)
MONOCYTES NFR BLD AUTO: 7.6 % (ref 0–13.4)
NEUTROPHILS # BLD AUTO: 4.27 K/UL (ref 1.82–7.42)
NEUTROPHILS NFR BLD: 79.5 % (ref 44–72)
NRBC # BLD AUTO: 0 K/UL
NRBC BLD-RTO: 0 /100 WBC (ref 0–0.2)
PLATELET # BLD AUTO: 176 K/UL (ref 164–446)
PMV BLD AUTO: 10.2 FL (ref 9–12.9)
POTASSIUM SERPL-SCNC: 4.5 MMOL/L (ref 3.6–5.5)
RBC # BLD AUTO: 2.82 M/UL (ref 4.2–5.4)
SODIUM SERPL-SCNC: 140 MMOL/L (ref 135–145)
WBC # BLD AUTO: 5.4 K/UL (ref 4.8–10.8)

## 2023-10-06 PROCEDURE — 700105 HCHG RX REV CODE 258: Performed by: STUDENT IN AN ORGANIZED HEALTH CARE EDUCATION/TRAINING PROGRAM

## 2023-10-06 PROCEDURE — A9270 NON-COVERED ITEM OR SERVICE: HCPCS | Performed by: INTERNAL MEDICINE

## 2023-10-06 PROCEDURE — 85025 COMPLETE CBC W/AUTO DIFF WBC: CPT

## 2023-10-06 PROCEDURE — 700102 HCHG RX REV CODE 250 W/ 637 OVERRIDE(OP): Performed by: INTERNAL MEDICINE

## 2023-10-06 PROCEDURE — 97530 THERAPEUTIC ACTIVITIES: CPT

## 2023-10-06 PROCEDURE — 97110 THERAPEUTIC EXERCISES: CPT

## 2023-10-06 PROCEDURE — 770006 HCHG ROOM/CARE - MED/SURG/GYN SEMI*

## 2023-10-06 PROCEDURE — 700111 HCHG RX REV CODE 636 W/ 250 OVERRIDE (IP): Performed by: INTERNAL MEDICINE

## 2023-10-06 PROCEDURE — A9270 NON-COVERED ITEM OR SERVICE: HCPCS | Performed by: STUDENT IN AN ORGANIZED HEALTH CARE EDUCATION/TRAINING PROGRAM

## 2023-10-06 PROCEDURE — 700111 HCHG RX REV CODE 636 W/ 250 OVERRIDE (IP): Mod: JZ | Performed by: STUDENT IN AN ORGANIZED HEALTH CARE EDUCATION/TRAINING PROGRAM

## 2023-10-06 PROCEDURE — 700102 HCHG RX REV CODE 250 W/ 637 OVERRIDE(OP): Performed by: STUDENT IN AN ORGANIZED HEALTH CARE EDUCATION/TRAINING PROGRAM

## 2023-10-06 PROCEDURE — 99232 SBSQ HOSP IP/OBS MODERATE 35: CPT | Performed by: INTERNAL MEDICINE

## 2023-10-06 PROCEDURE — 80048 BASIC METABOLIC PNL TOTAL CA: CPT

## 2023-10-06 RX ORDER — HEPARIN SODIUM 5000 [USP'U]/ML
5000 INJECTION, SOLUTION INTRAVENOUS; SUBCUTANEOUS EVERY 8 HOURS
Status: DISCONTINUED | OUTPATIENT
Start: 2023-10-06 | End: 2023-10-07

## 2023-10-06 RX ADMIN — FERROUS SULFATE TAB 325 MG (65 MG ELEMENTAL FE) 325 MG: 325 (65 FE) TAB at 09:27

## 2023-10-06 RX ADMIN — SENNOSIDES 8.6 MG: 8.6 TABLET, FILM COATED ORAL at 17:24

## 2023-10-06 RX ADMIN — AMITRIPTYLINE HYDROCHLORIDE 50 MG: 25 TABLET, FILM COATED ORAL at 20:50

## 2023-10-06 RX ADMIN — GABAPENTIN 300 MG: 300 CAPSULE ORAL at 20:50

## 2023-10-06 RX ADMIN — GUAIFENESIN 600 MG: 600 TABLET, EXTENDED RELEASE ORAL at 05:25

## 2023-10-06 RX ADMIN — SENNOSIDES 8.6 MG: 8.6 TABLET, FILM COATED ORAL at 05:25

## 2023-10-06 RX ADMIN — DAPTOMYCIN 500 MG: 500 INJECTION, POWDER, LYOPHILIZED, FOR SOLUTION INTRAVENOUS at 17:26

## 2023-10-06 RX ADMIN — POLYETHYLENE GLYCOL 3350 1 PACKET: 17 POWDER, FOR SOLUTION ORAL at 05:26

## 2023-10-06 RX ADMIN — OMEPRAZOLE 20 MG: 20 CAPSULE, DELAYED RELEASE ORAL at 05:25

## 2023-10-06 RX ADMIN — HEPARIN SODIUM 5000 UNITS: 5000 INJECTION, SOLUTION INTRAVENOUS; SUBCUTANEOUS at 14:54

## 2023-10-06 RX ADMIN — HEPARIN SODIUM 5000 UNITS: 5000 INJECTION, SOLUTION INTRAVENOUS; SUBCUTANEOUS at 20:51

## 2023-10-06 RX ADMIN — CITALOPRAM 40 MG: 40 TABLET, FILM COATED ORAL at 05:25

## 2023-10-06 RX ADMIN — OMEPRAZOLE 20 MG: 20 CAPSULE, DELAYED RELEASE ORAL at 17:24

## 2023-10-06 RX ADMIN — GUAIFENESIN 600 MG: 600 TABLET, EXTENDED RELEASE ORAL at 17:24

## 2023-10-06 ASSESSMENT — ENCOUNTER SYMPTOMS
NAUSEA: 0
WEAKNESS: 1
SHORTNESS OF BREATH: 0
VOMITING: 0
ABDOMINAL PAIN: 0

## 2023-10-06 ASSESSMENT — COGNITIVE AND FUNCTIONAL STATUS - GENERAL
MOBILITY SCORE: 6
CLIMB 3 TO 5 STEPS WITH RAILING: TOTAL
STANDING UP FROM CHAIR USING ARMS: TOTAL
MOVING TO AND FROM BED TO CHAIR: UNABLE
MOVING FROM LYING ON BACK TO SITTING ON SIDE OF FLAT BED: UNABLE
TURNING FROM BACK TO SIDE WHILE IN FLAT BAD: UNABLE
WALKING IN HOSPITAL ROOM: TOTAL
SUGGESTED CMS G CODE MODIFIER MOBILITY: CN

## 2023-10-06 ASSESSMENT — PAIN DESCRIPTION - PAIN TYPE: TYPE: ACUTE PAIN

## 2023-10-06 ASSESSMENT — GAIT ASSESSMENTS: GAIT LEVEL OF ASSIST: UNABLE TO PARTICIPATE

## 2023-10-06 ASSESSMENT — FIBROSIS 4 INDEX: FIB4 SCORE: 1.8

## 2023-10-06 NOTE — PROGRESS NOTES
Hospital Medicine Daily Progress Note    Date of Service  10/6/2023    Chief Complaint  Patricia A Tietz is a 58 y.o. female admitted 9/29/2023 with anemia    Hospital Course  57 yo woman with recent left knee arthroplasty complicated by infection, status post I&D with antibiotic spacer on daptomycin for MRSA, history of DVT on Eliquis who has been at SNF and found her hemoglobin to be low at 5.7 and transferred to [facility].  She has not had signs of bleeding.  GI was consulted.  EGD showed 6 white debris in esophagus, biopsies taken, diverticula in duodenum.    Interval Problem Update  10/3 - Hemoglobin decreased to 7.8, recheck   creatinine remains elevated above baseline  She denies any abdominal pain or difficulty eating.  She has ongoing left knee pain    10/4 - Hgb decreased to 7.0, recheck. Creat decreased 2.36 but still remains above baseline.  We will check urine studies and renal ultrasound.  She has no new complaints, eating okay.    10/5 - Hgb 7.0 this morning, recheck level. Creat increased 2.55. FeNa 1.1%. UA was cloudy with yeast. She is at risk for fungal UTI while on daptomycin, will check urine culture.  She complains of congestion and productive cough today.  Chest x-ray negative for pneumonia.  Her left knee pain has been unchanged    10/6 -hemoglobin 8.0.  Creatinine 2.5.  Urine culture is pending.  She has been stable on 3 L O2.  She is sleepy today, says she did not sleep well last night, cannot really tell me why just says she could not sleep.  I spoke with her Orthopedic's office for Dr. Pedraza, okay to remove her staples.    I have discussed this patient's plan of care and discharge plan at IDT rounds today with Case Management, Nursing, Nursing leadership, and other members of the IDT team.    Consultants/Specialty  GI    Code Status  Full Code    Disposition  The patient is not medically cleared for discharge to home or a post-acute facility.  Anticipate discharge to: skilled nursing  facility    I have placed the appropriate orders for post-discharge needs.    Review of Systems  Review of Systems   Constitutional:  Positive for malaise/fatigue.   HENT:  Negative for congestion.    Respiratory:  Negative for shortness of breath.    Cardiovascular:  Negative for chest pain.   Gastrointestinal:  Negative for abdominal pain, nausea and vomiting.   Genitourinary:  Negative for dysuria.   Musculoskeletal:  Positive for joint pain.   Neurological:  Positive for weakness.        Physical Exam  Temp:  [36.3 °C (97.4 °F)-36.9 °C (98.4 °F)] 36.9 °C (98.4 °F)  Pulse:  [62-85] 85  Resp:  [17-20] 20  BP: (116-149)/(64-84) 138/78  SpO2:  [91 %-100 %] 91 %    Physical Exam  Vitals and nursing note reviewed.   Constitutional:       Appearance: She is obese.      Comments: Appears fatigued   HENT:      Head: Normocephalic.      Mouth/Throat:      Mouth: Mucous membranes are moist.   Eyes:      General:         Right eye: No discharge.         Left eye: No discharge.   Cardiovascular:      Rate and Rhythm: Normal rate and regular rhythm.   Pulmonary:      Effort: No respiratory distress.      Breath sounds: No wheezing or rales.   Abdominal:      Tenderness: There is no abdominal tenderness.      Comments: Urostomy, multiple well-healed surgical wounds   Musculoskeletal:      Cervical back: Neck supple.      Right lower leg: Edema present.      Left lower leg: Edema present.      Comments: Left knee with staples in place   Skin:     General: Skin is warm and dry.   Neurological:      Comments: Oriented to self, [facility], length of hospitalization, date, name of her orthopedic surgeon         Fluids    Intake/Output Summary (Last 24 hours) at 10/6/2023 1313  Last data filed at 10/6/2023 0600  Gross per 24 hour   Intake 360 ml   Output 1800 ml   Net -1440 ml       Laboratory  Recent Labs     10/04/23  0423 10/04/23  1200 10/05/23  0430 10/05/23  1135 10/06/23  0530   WBC 8.7  --  6.8  --  5.4   RBC 2.47*  --  2.54*   --  2.82*   HEMOGLOBIN 7.0* 8.1* 7.0* 7.8* 8.0*   HEMATOCRIT 23.0* 25.5* 23.5*  --  26.0*   MCV 93.1  --  92.5  --  92.2   MCH 28.3  --  27.6  --  28.4   MCHC 30.4*  --  29.8*  --  30.8*   RDW 51.3*  --  50.0  --  49.3   PLATELETCT 223  --  190  --  176   MPV 10.0  --  10.0  --  10.2     Recent Labs     10/04/23  0423 10/05/23  0430 10/06/23  0530   SODIUM 143 140 140   POTASSIUM 4.3 4.3 4.5   CHLORIDE 114* 111 114*   CO2 21 20 17*   GLUCOSE 104* 93 96   BUN 39* 44* 48*   CREATININE 2.36* 2.55* 2.50*   CALCIUM 10.3 10.5 10.6*                   Imaging  DX-CHEST-PORTABLE (1 VIEW)   Final Result      1.  Low lung volumes without definite acute cardiopulmonary abnormality.      US-RENAL   Final Result      1.  Normal renal ultrasound.           Assessment/Plan  * UGIB (upper gastrointestinal bleed)- (present on admission)  Assessment & Plan  Found to have hemoglobin 5.7, given fluids, 1 unit of packed red blood cell, Protonix.  FOBT was positive in the other facility.     10/1 S/p EGD this morning, revealed thick whitish debris in esophagus and mucosal fragility.  Biopsies taken; no significant bleeding signs.  No plan for colonoscopy at this point.   Biopsy showed benign mucosal ulceration with acute on chronic inflammation  Continue PPI  She is high risk for DVT, I will start on DVT prophylaxis for now and monitor signs for rebleeding.  Continue to hold off on restarting Eliquis      Constipation  Assessment & Plan  Continue bowel protocol    Primary hypertension  Assessment & Plan  She was not restarted on metoprolol.  Blood pressure has been okay.    RAO (acute kidney injury) ([facility])  Assessment & Plan  RAO CRE 2.75> 2.8 (baseline 1.2)  Creatinine seems stable but remains above baseline  Holding outpatient Lasix for now  Renal ultrasound was unremarkable  FeNa >1%, possible candidal UTI in the setting of daptomycin.  Urine culture pending  Continue to trend BMP    Acute on chronic anemia  Assessment & Plan  Hb 5.7 at  admission   received multiple transfusion  Iron study negative for iron deficiency, B12 level was normal  Hemoglobin fluctuating but so far stable  Continue to monitor, transfuse if Hb less than 7    History of DVT in adulthood  Assessment & Plan  Eliquis was held for for GI bleed per GI    Hx MRSA infection  Assessment & Plan  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  Continue daptomycin, she is supposed to be on for 6 weeks  Has PICC    Class 3 severe obesity due to excess calories with serious comorbidity and body mass index (BMI) of 50.0 to 59.9 in adult (HCC)- (present on admission)  Assessment & Plan  Will need counseling when clinically appropriate         VTE prophylaxis:    heparin ppx      I have performed a physical exam and reviewed and updated ROS and Plan today (10/6/2023). In review of yesterday's note (10/5/2023), there are no changes except as documented above.

## 2023-10-06 NOTE — PROGRESS NOTES
Assumed care of patient at 0645. Bedside report received. Assessment complete. Hospitalist bedside  AA&Ox4. Poor concentration in conversation. Denies CP/SOB.  Reporting mild pain. Declined intervention at this time.   Educated patient regarding pharmacologic and non pharmacologic modalities for pain management.  Skin per flow sheets. Discoloration bilateral lowers, prior surgical incision with staples to L knee.   Tolerating diet. Denies N/V.  + void. + BM. Last BM 10/3  Pt is Q2 turn. Declined at this time.  Fall prevention measures in place per flowsheets.  Pharmacologic VTE prophylaxis in use.  Plan of care discussed, all questions answered.  Educated regarding importance of oral care. Oral care kit at bedside. Call light is within reach, treaded slipper socks on, bed in lowest/ locked position, hourly rounding in place, all needs met at this time.

## 2023-10-06 NOTE — THERAPY
Physical Therapy   Daily Treatment     Patient Name: Patricia A Tietz  Age:  58 y.o., Sex:  female  Medical Record #: [id number]  Today's Date: 10/6/2023          Assessment    Pt with poor tolerance for session. Limited by impaired cognition including varying reports of PLOF (very short distance ambulation with FWW vs power chair vs manual wc at baseline) and poor attention. Required multiple repetitions of instructions to complete bed level therex. Attempted to stand from elevated EOB but unable to achieve despite max A x2 and BUE support on FWW vs chair armrests. Continue to recommend d/c to SNF d/t significant physical impairments as described above and no support at home. Will follow.    Plan    Treatment Plan Status:    Type of Treatment: Bed Mobility, Therapeutic Activities, Gait Training  Treatment Frequency: 3 Times per Week  Treatment Duration: Until Therapy Goals Met    DC Equipment Recommendations: (P)  (owns FWW, scooter, hospital bed per EMR review)  Discharge Recommendations: (P) Recommend post-acute placement for additional physical therapy services prior to discharge home (SNF)      Subjective    Pt received resting in bed, agreeable to participate.      Objective       10/06/23 1135   Charge Group   PT Therapeutic Activities (Units) 1   PT Therapeutic Exercise (Units) 1   Total Time Spent   PT Total Time Yes   PT Therapeutic Activities Time Spent (Mins) 15   PT Therapeutic Exercise Time Spent (Mins) 10   PT Total Time Spent (Calculated) 25   Vitals   O2 (LPM) 3   O2 Delivery Device Nasal Cannula   Pain 0 - 10 Group   Therapist Pain Assessment During Activity;Post Activity Pain Same as Prior to Activity;Nurse Notified  (no c/o pain but grimacing throughout session)   Cognition    Cognition / Consciousness X   Level of Consciousness Alert   Comments varying levels of arousal, pleasantly confused   Supine Lower Body Exercise   Ankle Pumps 1 set of 10;Bilateral   Gluteal Isometrics 1 set of 10;Bilateral    Quadriceps Isometrics 1 set of 10;Left   Comments as warmup prior to functional mobility, pt with inconsistent follow through requiring multiple repetitions of instructions   Balance   Sitting Balance (Static) Poor +   Sitting Balance (Dynamic) Poor -   Weight Shift Sitting Poor   Comments unable to stand despite total A x2   Bed Mobility    Supine to Sit Total Assist  (max A x2)   Sit to Supine Total Assist  (max A x2)   Scooting Total Assist  (EOB and in bed)   Rolling Total Assist to Rt.   Gait Analysis   Gait Level Of Assist Unable to Participate   Functional Mobility   Sit to Stand Unable to Participate  (despite total A x2 from elevated EOB)   Bed, Chair, Wheelchair Transfer Unable to Participate   How much difficulty does the patient currently have...   Turning over in bed (including adjusting bedclothes, sheets and blankets)? 1   Sitting down on and standing up from a chair with arms (e.g., wheelchair, bedside commode, etc.) 1   Moving from lying on back to sitting on the side of the bed? 1   How much help from another person does the patient currently need...   Moving to and from a bed to a chair (including a wheelchair)? 1   Need to walk in a hospital room? 1   Climbing 3-5 steps with a railing? 1   6 clicks Mobility Score 6   Activity Tolerance   Sitting Edge of Bed >10 min   Patient / Family Goals    Patient / Family Goal #1 snf/home   Goal #1 Outcome Goal not met   Short Term Goals    Short Term Goal # 1 Pt to move to/from eob w/ use of bed features and spv in 6 visits to improve fxl indep   Goal Outcome # 1 goal not met   Short Term Goal # 2 Pt to move sit to/from stand w/ min assist in 6 visits to improve fxl indep   Goal Outcome # 2 Goal not met   Short Term Goal # 3 Pt to TF bed to/from chair w/ min assist in 6 visits to improve fxl indep   Goal Outcome # 3 Goal not met   Anticipated Discharge Equipment and Recommendations   DC Equipment Recommendations   (owns FWW, scooter, hospital bed per EMR  review)   Discharge Recommendations Recommend post-acute placement for additional physical therapy services prior to discharge home  (SNF)   Interdisciplinary Plan of Care Collaboration   IDT Collaboration with  Nursing   Patient Position at End of Therapy In Bed;Bed Alarm On;Call Light within Reach;Tray Table within Reach;Phone within Reach   Collaboration Comments reported pt's current status   Session Information   Date / Session Number  10/2/2023-2(2/3, 10/8/2023)

## 2023-10-06 NOTE — DISCHARGE PLANNING
Case Management Discharge Planning    Admission Date: 9/29/2023  GMLOS: 3  ALOS: 7    Anticipated Discharge Dispo: Discharge Disposition: D/T to SNF with Medicare cert in anticipation of skilled care (03)    DME Needed: No    Action(s) Taken:     Pt's case discussed during IDT rounds. Per MD, pt not medically cleared yet. Pt is accepted back at [facility].     Called and left VM requesting return call to see if they accept admissions over the weekend    1430- Spoke with Blaise @ [facility] ([phone]). Confirmed they're able to accept if pt is cleared over the weekend.     Escalations Completed: None    Medically Clear: No    Next Steps: F/U with SNF    Barriers to Discharge: Medical clearance    Is the patient up for discharge tomorrow: No

## 2023-10-06 NOTE — WOUND TEAM
Received wound consult for L knee & urostomy.  Wound RN Sandra discussed with RN reaching out to Ortho regarding staple removal.    Urostomy down drain adapter tubed to the floor.  Wound consult completed at this time.

## 2023-10-06 NOTE — PROGRESS NOTES
4 Eyes Skin Assessment Completed by KYRIE Klein and KYRIE Nunez.    Head WDL  Ears WDL  Nose WDL  Mouth dry  Neck WDL  Breast/Chest WDL  Shoulder Blades WDL  Spine WDL  (R) Arm/Elbow/Hand WDL  (L) Arm/Elbow/Hand WDL  Abdomen LLQ urostomy  Groin WDL  Scrotum/Coccyx/Buttocks WDL  (R) Leg Swelling and Shiny, discolored to lower  (L) Leg Swelling and Shiny, discolored to lower incision with staples to knee  (R) Heel/Foot/Toe WDL  (L) Heel/Foot/Toe WDL          Devices In Places Pulse Ox and SCD's      Interventions In Place Gray Ear Foams, TAP System, Pillows, Q2 Turns, and Pressure Redistribution Mattress    Possible Skin Injury No    Pictures Uploaded Into Epic N/A  Wound Consult Placed N/A  RN Wound Prevention Protocol Ordered No

## 2023-10-07 LAB
ANION GAP SERPL CALC-SCNC: 8 MMOL/L (ref 7–16)
BACTERIA UR CULT: NORMAL
BASOPHILS # BLD AUTO: 0.1 % (ref 0–1.8)
BASOPHILS # BLD: 0.01 K/UL (ref 0–0.12)
BUN SERPL-MCNC: 53 MG/DL (ref 8–22)
CALCIUM SERPL-MCNC: 10.5 MG/DL (ref 8.5–10.5)
CHLORIDE SERPL-SCNC: 112 MMOL/L (ref 96–112)
CK SERPL-CCNC: 25 U/L (ref 0–154)
CO2 SERPL-SCNC: 19 MMOL/L (ref 20–33)
CREAT SERPL-MCNC: 2.43 MG/DL (ref 0.5–1.4)
EOSINOPHIL # BLD AUTO: 0.2 K/UL (ref 0–0.51)
EOSINOPHIL NFR BLD: 3 % (ref 0–6.9)
ERYTHROCYTE [DISTWIDTH] IN BLOOD BY AUTOMATED COUNT: 49.1 FL (ref 35.9–50)
GFR SERPLBLD CREATININE-BSD FMLA CKD-EPI: 22 ML/MIN/1.73 M 2
GLUCOSE SERPL-MCNC: 86 MG/DL (ref 65–99)
HCT VFR BLD AUTO: 22.4 % (ref 37–47)
HGB BLD-MCNC: 6.8 G/DL (ref 12–16)
HGB BLD-MCNC: 7.6 G/DL (ref 12–16)
IMM GRANULOCYTES # BLD AUTO: 0.02 K/UL (ref 0–0.11)
IMM GRANULOCYTES NFR BLD AUTO: 0.3 % (ref 0–0.9)
LYMPHOCYTES # BLD AUTO: 0.73 K/UL (ref 1–4.8)
LYMPHOCYTES NFR BLD: 10.9 % (ref 22–41)
MCH RBC QN AUTO: 27.8 PG (ref 27–33)
MCHC RBC AUTO-ENTMCNC: 30.4 G/DL (ref 32.2–35.5)
MCV RBC AUTO: 91.4 FL (ref 81.4–97.8)
MONOCYTES # BLD AUTO: 0.47 K/UL (ref 0–0.85)
MONOCYTES NFR BLD AUTO: 7 % (ref 0–13.4)
NEUTROPHILS # BLD AUTO: 5.29 K/UL (ref 1.82–7.42)
NEUTROPHILS NFR BLD: 78.7 % (ref 44–72)
NRBC # BLD AUTO: 0 K/UL
NRBC BLD-RTO: 0 /100 WBC (ref 0–0.2)
PLATELET # BLD AUTO: 164 K/UL (ref 164–446)
PMV BLD AUTO: 10.7 FL (ref 9–12.9)
POTASSIUM SERPL-SCNC: 4.1 MMOL/L (ref 3.6–5.5)
RBC # BLD AUTO: 2.45 M/UL (ref 4.2–5.4)
SIGNIFICANT IND 70042: NORMAL
SITE SITE: NORMAL
SODIUM SERPL-SCNC: 139 MMOL/L (ref 135–145)
SOURCE SOURCE: NORMAL
WBC # BLD AUTO: 6.7 K/UL (ref 4.8–10.8)

## 2023-10-07 PROCEDURE — 700111 HCHG RX REV CODE 636 W/ 250 OVERRIDE (IP): Mod: JZ | Performed by: STUDENT IN AN ORGANIZED HEALTH CARE EDUCATION/TRAINING PROGRAM

## 2023-10-07 PROCEDURE — 700105 HCHG RX REV CODE 258: Performed by: STUDENT IN AN ORGANIZED HEALTH CARE EDUCATION/TRAINING PROGRAM

## 2023-10-07 PROCEDURE — 85018 HEMOGLOBIN: CPT

## 2023-10-07 PROCEDURE — 700102 HCHG RX REV CODE 250 W/ 637 OVERRIDE(OP): Performed by: INTERNAL MEDICINE

## 2023-10-07 PROCEDURE — 82550 ASSAY OF CK (CPK): CPT

## 2023-10-07 PROCEDURE — A9270 NON-COVERED ITEM OR SERVICE: HCPCS | Performed by: INTERNAL MEDICINE

## 2023-10-07 PROCEDURE — 700102 HCHG RX REV CODE 250 W/ 637 OVERRIDE(OP): Performed by: STUDENT IN AN ORGANIZED HEALTH CARE EDUCATION/TRAINING PROGRAM

## 2023-10-07 PROCEDURE — 85025 COMPLETE CBC W/AUTO DIFF WBC: CPT

## 2023-10-07 PROCEDURE — 770006 HCHG ROOM/CARE - MED/SURG/GYN SEMI*

## 2023-10-07 PROCEDURE — A9270 NON-COVERED ITEM OR SERVICE: HCPCS | Performed by: STUDENT IN AN ORGANIZED HEALTH CARE EDUCATION/TRAINING PROGRAM

## 2023-10-07 PROCEDURE — 700111 HCHG RX REV CODE 636 W/ 250 OVERRIDE (IP): Performed by: INTERNAL MEDICINE

## 2023-10-07 PROCEDURE — 80048 BASIC METABOLIC PNL TOTAL CA: CPT

## 2023-10-07 PROCEDURE — 36415 COLL VENOUS BLD VENIPUNCTURE: CPT

## 2023-10-07 PROCEDURE — 99232 SBSQ HOSP IP/OBS MODERATE 35: CPT | Performed by: INTERNAL MEDICINE

## 2023-10-07 RX ORDER — FUROSEMIDE 10 MG/ML
20 INJECTION INTRAMUSCULAR; INTRAVENOUS
Status: DISCONTINUED | OUTPATIENT
Start: 2023-10-07 | End: 2023-10-09

## 2023-10-07 RX ADMIN — GUAIFENESIN 600 MG: 600 TABLET, EXTENDED RELEASE ORAL at 05:08

## 2023-10-07 RX ADMIN — ALTEPLASE 2 MG: 2.2 INJECTION, POWDER, LYOPHILIZED, FOR SOLUTION INTRAVENOUS at 20:59

## 2023-10-07 RX ADMIN — OMEPRAZOLE 20 MG: 20 CAPSULE, DELAYED RELEASE ORAL at 16:57

## 2023-10-07 RX ADMIN — HEPARIN SODIUM 5000 UNITS: 5000 INJECTION, SOLUTION INTRAVENOUS; SUBCUTANEOUS at 04:58

## 2023-10-07 RX ADMIN — OMEPRAZOLE 20 MG: 20 CAPSULE, DELAYED RELEASE ORAL at 04:59

## 2023-10-07 RX ADMIN — OXYCODONE HYDROCHLORIDE 15 MG: 15 TABLET ORAL at 04:59

## 2023-10-07 RX ADMIN — DAPTOMYCIN 500 MG: 500 INJECTION, POWDER, LYOPHILIZED, FOR SOLUTION INTRAVENOUS at 21:51

## 2023-10-07 RX ADMIN — FUROSEMIDE 20 MG: 10 INJECTION INTRAMUSCULAR; INTRAVENOUS at 21:44

## 2023-10-07 RX ADMIN — POLYETHYLENE GLYCOL 3350 1 PACKET: 17 POWDER, FOR SOLUTION ORAL at 04:58

## 2023-10-07 RX ADMIN — OXYCODONE HYDROCHLORIDE 15 MG: 15 TABLET ORAL at 21:17

## 2023-10-07 RX ADMIN — GUAIFENESIN 600 MG: 600 TABLET, EXTENDED RELEASE ORAL at 16:56

## 2023-10-07 RX ADMIN — OXYCODONE HYDROCHLORIDE 15 MG: 15 TABLET ORAL at 11:33

## 2023-10-07 RX ADMIN — CITALOPRAM 40 MG: 40 TABLET, FILM COATED ORAL at 04:59

## 2023-10-07 RX ADMIN — SENNOSIDES 8.6 MG: 8.6 TABLET, FILM COATED ORAL at 16:56

## 2023-10-07 RX ADMIN — FERROUS SULFATE TAB 325 MG (65 MG ELEMENTAL FE) 325 MG: 325 (65 FE) TAB at 11:19

## 2023-10-07 ASSESSMENT — PAIN DESCRIPTION - PAIN TYPE
TYPE: ACUTE PAIN
TYPE: ACUTE PAIN

## 2023-10-07 ASSESSMENT — ENCOUNTER SYMPTOMS
VOMITING: 0
WEAKNESS: 1
ABDOMINAL PAIN: 0
NAUSEA: 0
SHORTNESS OF BREATH: 0

## 2023-10-07 NOTE — CARE PLAN
The patient is Stable - Low risk of patient condition declining or worsening    Shift Goals  Clinical Goals: safety; pain management  Patient Goals:   Family Goals:     Progress made toward(s) clinical / shift goals: patient HOB up for meals; pain assessed    Patient is not progressing towards the following goals:

## 2023-10-07 NOTE — CARE PLAN
The patient is Stable - Low risk of patient condition declining or worsening    Shift Goals  Clinical Goals: patient's pain will remian below a 4 during this shift  Patient Goals: rest, sleep  Family Goals: ag    Progress made toward(s) clinical / shift goals:  Patient declined any pharmacological interventions for pain management. Patient repositioned to improve pain. Patient able to sleep throughout night.         Problem: Hemodynamics  Goal: Patient's hemodynamics, fluid balance and neurologic status will be stable or improve  Outcome: Progressing     Problem: Wound/ / Incision Healing  Goal: Patient's wound/surgical incision will decrease in size and heals properly  Outcome: Progressing     Patient is not progressing towards the following goals:    Problem: Respiratory  Goal: Patient will achieve/maintain optimum respiratory ventilation and gas exchange  Outcome: Not Progressing     Patient remains on 3 L of oxygen. Her baseline is 2 liters. Patient's O2 sat remains above 93 with the 3 liters.

## 2023-10-07 NOTE — PROGRESS NOTES
Received report from day shift nurse. Bed is locked and in the lowest position. Patient complains of 4/10 back pain but declines any medication. Patient stated she wanted to be placed supine. Patient educated on the importance of Q2 turns. Patient stated she understands but wants to be supine for a while since her pain is bothering her. Patient agreed to get turned later in the shift.

## 2023-10-07 NOTE — PROGRESS NOTES
Hospital Medicine Daily Progress Note    Date of Service  10/7/2023    Chief Complaint  Patricia A Tietz is a 58 y.o. female admitted 9/29/2023 with anemia    Hospital Course  59 yo woman with recent left knee arthroplasty complicated by infection, status post I&D with antibiotic spacer on daptomycin for MRSA, history of DVT on Eliquis who has been at [facility] and found her hemoglobin to be low at 5.7 and transferred to [facility].  She has not had signs of bleeding.  GI was consulted.  EGD showed 6 white debris in esophagus, biopsies taken, diverticula in duodenum.    Interval Problem Update  10/3 - Hemoglobin decreased to 7.8, recheck   creatinine remains elevated above baseline  She denies any abdominal pain or difficulty eating.  She has ongoing left knee pain    10/4 - Hgb decreased to 7.0, recheck. Creat decreased 2.36 but still remains above baseline.  We will check urine studies and renal ultrasound.  She has no new complaints, eating okay.    10/5 - Hgb 7.0 this morning, recheck level. Creat increased 2.55. FeNa 1.1%. UA was cloudy with yeast. She is at risk for fungal UTI while on daptomycin, will check urine culture.  She complains of congestion and productive cough today.  Chest x-ray negative for pneumonia.  Her left knee pain has been unchanged    10/6 -hemoglobin 8.0.  Creatinine 2.5.  Urine culture is pending.  She has been stable on 3 L O2.  She is sleepy today, says she did not sleep well last night, cannot really tell me why just says she could not sleep.  I spoke with her Orthopedic's office for Dr. Pedraza, okay to remove her staples.    10/7 - Hgb decreased to 6.8 this morning but on recheck was 7.6.  No signs of bleeding.  Continue to trend.  Creat stable 2.43 patient is more edematous, start on IV Lasix and monitor renal function.  She feels more drowsy than usual, she is still AOx4.  Ordered for CPAP but they have not been able to place it on.  I will hold amitriptyline and gabapentin for now given  her drowsiness    I have discussed this patient's plan of care and discharge plan at IDT rounds today with Case Management, Nursing, Nursing leadership, and other members of the IDT team.    Consultants/Specialty  GI    Code Status  Full Code    Disposition  The patient is not medically cleared for discharge to home or a post-acute facility.  Anticipate discharge to: skilled nursing facility    I have placed the appropriate orders for post-discharge needs.    Review of Systems  Review of Systems   Constitutional:  Positive for malaise/fatigue.   HENT:  Negative for congestion.    Respiratory:  Negative for shortness of breath.    Cardiovascular:  Positive for leg swelling. Negative for chest pain.   Gastrointestinal:  Negative for abdominal pain, nausea and vomiting.   Genitourinary:  Negative for dysuria.   Musculoskeletal:  Positive for joint pain.   Neurological:  Positive for weakness.        Physical Exam  Temp:  [36.6 °C (97.9 °F)-37.2 °C (98.9 °F)] 36.7 °C (98.1 °F)  Pulse:  [62-73] 65  Resp:  [16-19] 18  BP: (101-131)/(52-72) 120/63  SpO2:  [93 %-95 %] 94 %    Physical Exam  Vitals and nursing note reviewed.   Constitutional:       Appearance: She is obese.      Comments: Appears fatigued   HENT:      Head: Normocephalic.      Mouth/Throat:      Mouth: Mucous membranes are moist.   Eyes:      General:         Right eye: No discharge.         Left eye: No discharge.   Cardiovascular:      Rate and Rhythm: Normal rate and regular rhythm.   Pulmonary:      Effort: No respiratory distress.      Breath sounds: No wheezing or rales.   Abdominal:      Tenderness: There is no abdominal tenderness. There is no guarding or rebound.      Comments: Urostomy, multiple well-healed surgical wounds   Musculoskeletal:         General: Swelling present.      Cervical back: Neck supple.      Right lower leg: Edema present.      Left lower leg: Edema present.      Comments: Left knee with sutures in place and part of her wound    Skin:     General: Skin is warm and dry.   Neurological:      Mental Status: She is oriented to person, place, and time.         Fluids    Intake/Output Summary (Last 24 hours) at 10/7/2023 1514  Last data filed at 10/7/2023 0600  Gross per 24 hour   Intake 420 ml   Output 1900 ml   Net -1480 ml       Laboratory  Recent Labs     10/05/23  0430 10/05/23  1135 10/06/23  0530 10/07/23  0521 10/07/23  1234   WBC 6.8  --  5.4 6.7  --    RBC 2.54*  --  2.82* 2.45*  --    HEMOGLOBIN 7.0*   < > 8.0* 6.8* 7.6*   HEMATOCRIT 23.5*  --  26.0* 22.4*  --    MCV 92.5  --  92.2 91.4  --    MCH 27.6  --  28.4 27.8  --    MCHC 29.8*  --  30.8* 30.4*  --    RDW 50.0  --  49.3 49.1  --    PLATELETCT 190  --  176 164  --    MPV 10.0  --  10.2 10.7  --     < > = values in this interval not displayed.     Recent Labs     10/05/23  0430 10/06/23  0530 10/07/23  0521   SODIUM 140 140 139   POTASSIUM 4.3 4.5 4.1   CHLORIDE 111 114* 112   CO2 20 17* 19*   GLUCOSE 93 96 86   BUN 44* 48* 53*   CREATININE 2.55* 2.50* 2.43*   CALCIUM 10.5 10.6* 10.5                   Imaging  DX-CHEST-PORTABLE (1 VIEW)   Final Result      1.  Low lung volumes without definite acute cardiopulmonary abnormality.      US-RENAL   Final Result      1.  Normal renal ultrasound.           Assessment/Plan  * UGIB (upper gastrointestinal bleed)- (present on admission)  Assessment & Plan  Found to have hemoglobin 5.7, given fluids, 1 unit of packed red blood cell, Protonix.  FOBT was positive in the other facility.     10/1 S/p EGD this morning, revealed thick whitish debris in esophagus and mucosal fragility.  Biopsies taken; no significant bleeding signs.  No plan for colonoscopy at this point.   Biopsy showed benign mucosal ulceration with acute on chronic inflammation  Continue PPI  Hemoglobin was decreased this morning, I will stop DVT prophylaxis      Constipation  Assessment & Plan  Continue bowel protocol    Primary hypertension  Assessment & Plan  She was not  restarted on metoprolol.  Blood pressure has been okay.    RAO (acute kidney injury) ([facility])  Assessment & Plan  RAO CRE 2.75> 2.8 (baseline 1.2)  Creatinine seems stable but remains above baseline  Renal ultrasound was unremarkable  FeNa >1%, possible candidal UTI in the setting of daptomycin.  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 In review of yesterday's note (10/6/2023), there are no changes except as documented above.

## 2023-10-07 NOTE — PROGRESS NOTES
36 staples removed from patient prior surgical site L Knee per order. 3 sutures in place. Notified hospitalist.

## 2023-10-08 LAB
ALBUMIN SERPL BCP-MCNC: 3.2 G/DL (ref 3.2–4.9)
BASOPHILS # BLD AUTO: 0.2 % (ref 0–1.8)
BASOPHILS # BLD: 0.01 K/UL (ref 0–0.12)
BUN SERPL-MCNC: 51 MG/DL (ref 8–22)
CALCIUM ALBUM COR SERPL-MCNC: 11.1 MG/DL (ref 8.5–10.5)
CALCIUM SERPL-MCNC: 10.5 MG/DL (ref 8.5–10.5)
CHLORIDE SERPL-SCNC: 109 MMOL/L (ref 96–112)
CO2 SERPL-SCNC: 20 MMOL/L (ref 20–33)
CREAT SERPL-MCNC: 2.74 MG/DL (ref 0.5–1.4)
EOSINOPHIL # BLD AUTO: 0.23 K/UL (ref 0–0.51)
EOSINOPHIL NFR BLD: 3.9 % (ref 0–6.9)
ERYTHROCYTE [DISTWIDTH] IN BLOOD BY AUTOMATED COUNT: 46.5 FL (ref 35.9–50)
GFR SERPLBLD CREATININE-BSD FMLA CKD-EPI: 19 ML/MIN/1.73 M 2
GLUCOSE SERPL-MCNC: 113 MG/DL (ref 65–99)
HCT VFR BLD AUTO: 25.3 % (ref 37–47)
HGB BLD-MCNC: 8.1 G/DL (ref 12–16)
IMM GRANULOCYTES # BLD AUTO: 0.02 K/UL (ref 0–0.11)
IMM GRANULOCYTES NFR BLD AUTO: 0.3 % (ref 0–0.9)
LYMPHOCYTES # BLD AUTO: 0.48 K/UL (ref 1–4.8)
LYMPHOCYTES NFR BLD: 8.1 % (ref 22–41)
MCH RBC QN AUTO: 28.6 PG (ref 27–33)
MCHC RBC AUTO-ENTMCNC: 32 G/DL (ref 32.2–35.5)
MCV RBC AUTO: 89.4 FL (ref 81.4–97.8)
MONOCYTES # BLD AUTO: 0.48 K/UL (ref 0–0.85)
MONOCYTES NFR BLD AUTO: 8.1 % (ref 0–13.4)
NEUTROPHILS # BLD AUTO: 4.67 K/UL (ref 1.82–7.42)
NEUTROPHILS NFR BLD: 79.4 % (ref 44–72)
NRBC # BLD AUTO: 0 K/UL
NRBC BLD-RTO: 0 /100 WBC (ref 0–0.2)
NT-PROBNP SERPL IA-MCNC: 1673 PG/ML (ref 0–125)
PHOSPHATE SERPL-MCNC: 4 MG/DL (ref 2.5–4.5)
PLATELET # BLD AUTO: 199 K/UL (ref 164–446)
PMV BLD AUTO: 10.4 FL (ref 9–12.9)
POTASSIUM SERPL-SCNC: 4.1 MMOL/L (ref 3.6–5.5)
RBC # BLD AUTO: 2.83 M/UL (ref 4.2–5.4)
SODIUM SERPL-SCNC: 140 MMOL/L (ref 135–145)
WBC # BLD AUTO: 5.9 K/UL (ref 4.8–10.8)

## 2023-10-08 PROCEDURE — 99232 SBSQ HOSP IP/OBS MODERATE 35: CPT | Performed by: INTERNAL MEDICINE

## 2023-10-08 PROCEDURE — 700102 HCHG RX REV CODE 250 W/ 637 OVERRIDE(OP): Performed by: STUDENT IN AN ORGANIZED HEALTH CARE EDUCATION/TRAINING PROGRAM

## 2023-10-08 PROCEDURE — 700105 HCHG RX REV CODE 258: Performed by: INTERNAL MEDICINE

## 2023-10-08 PROCEDURE — A9270 NON-COVERED ITEM OR SERVICE: HCPCS | Performed by: STUDENT IN AN ORGANIZED HEALTH CARE EDUCATION/TRAINING PROGRAM

## 2023-10-08 PROCEDURE — A9270 NON-COVERED ITEM OR SERVICE: HCPCS | Performed by: INTERNAL MEDICINE

## 2023-10-08 PROCEDURE — 80069 RENAL FUNCTION PANEL: CPT

## 2023-10-08 PROCEDURE — 700102 HCHG RX REV CODE 250 W/ 637 OVERRIDE(OP): Performed by: INTERNAL MEDICINE

## 2023-10-08 PROCEDURE — 700111 HCHG RX REV CODE 636 W/ 250 OVERRIDE (IP): Performed by: INTERNAL MEDICINE

## 2023-10-08 PROCEDURE — 700111 HCHG RX REV CODE 636 W/ 250 OVERRIDE (IP): Mod: JZ | Performed by: INTERNAL MEDICINE

## 2023-10-08 PROCEDURE — 85025 COMPLETE CBC W/AUTO DIFF WBC: CPT

## 2023-10-08 PROCEDURE — 770006 HCHG ROOM/CARE - MED/SURG/GYN SEMI*

## 2023-10-08 PROCEDURE — 83880 ASSAY OF NATRIURETIC PEPTIDE: CPT

## 2023-10-08 RX ADMIN — GUAIFENESIN 600 MG: 600 TABLET, EXTENDED RELEASE ORAL at 17:34

## 2023-10-08 RX ADMIN — FERROUS SULFATE TAB 325 MG (65 MG ELEMENTAL FE) 325 MG: 325 (65 FE) TAB at 10:13

## 2023-10-08 RX ADMIN — OXYCODONE HYDROCHLORIDE 15 MG: 15 TABLET ORAL at 03:51

## 2023-10-08 RX ADMIN — POLYETHYLENE GLYCOL 3350 1 PACKET: 17 POWDER, FOR SOLUTION ORAL at 17:35

## 2023-10-08 RX ADMIN — OXYCODONE HYDROCHLORIDE 15 MG: 15 TABLET ORAL at 11:39

## 2023-10-08 RX ADMIN — CITALOPRAM 40 MG: 40 TABLET, FILM COATED ORAL at 05:37

## 2023-10-08 RX ADMIN — POLYETHYLENE GLYCOL 3350 1 PACKET: 17 POWDER, FOR SOLUTION ORAL at 05:39

## 2023-10-08 RX ADMIN — SENNOSIDES 8.6 MG: 8.6 TABLET, FILM COATED ORAL at 17:34

## 2023-10-08 RX ADMIN — SENNOSIDES 8.6 MG: 8.6 TABLET, FILM COATED ORAL at 05:38

## 2023-10-08 RX ADMIN — OMEPRAZOLE 20 MG: 20 CAPSULE, DELAYED RELEASE ORAL at 17:34

## 2023-10-08 RX ADMIN — DAPTOMYCIN 500 MG: 500 INJECTION, POWDER, LYOPHILIZED, FOR SOLUTION INTRAVENOUS at 17:45

## 2023-10-08 RX ADMIN — FUROSEMIDE 20 MG: 10 INJECTION INTRAMUSCULAR; INTRAVENOUS at 05:44

## 2023-10-08 RX ADMIN — GUAIFENESIN 600 MG: 600 TABLET, EXTENDED RELEASE ORAL at 05:38

## 2023-10-08 RX ADMIN — OMEPRAZOLE 20 MG: 20 CAPSULE, DELAYED RELEASE ORAL at 05:38

## 2023-10-08 ASSESSMENT — ENCOUNTER SYMPTOMS
ABDOMINAL PAIN: 0
NAUSEA: 0
VOMITING: 0
WEAKNESS: 1
SHORTNESS OF BREATH: 1
COUGH: 1

## 2023-10-08 ASSESSMENT — PAIN DESCRIPTION - PAIN TYPE
TYPE: ACUTE PAIN

## 2023-10-08 NOTE — PROGRESS NOTES
Hospital Medicine Daily Progress Note    Date of Service  10/8/2023    Chief Complaint  Patricia A Tietz is a 58 y.o. female admitted 9/29/2023 with anemia    Hospital Course  59 yo woman with DEBRA, urostomy, recent left knee arthroplasty complicated by infection, status post I&D with antibiotic spacer on daptomycin for MRSA, history of DVT on Eliquis who has been at [facility] and found her hemoglobin to be low at 5.7 and transferred to [facility].  She has not had signs of bleeding.  GI was consulted.  EGD showed 6 white debris in esophagus, biopsies taken, diverticula in duodenum.  Hemoglobin has been stable postoperatively.  She has an RAO that has not improved.  She is very edematous and started on IV Lasix.  BNP was high, echocardiogram ordered to assess for heart failure, valve disease.    Interval Problem Update  10/3 - Hemoglobin decreased to 7.8, recheck   creatinine remains elevated above baseline  She denies any abdominal pain or difficulty eating.  She has ongoing left knee pain    10/4 - Hgb decreased to 7.0, recheck. Creat decreased 2.36 but still remains above baseline.  We will check urine studies and renal ultrasound.  She has no new complaints, eating okay.    10/5 - Hgb 7.0 this morning, recheck level. Creat increased 2.55. FeNa 1.1%. UA was cloudy with yeast. She is at risk for fungal UTI while on daptomycin, will check urine culture.  She complains of congestion and productive cough today.  Chest x-ray negative for pneumonia.  Her left knee pain has been unchanged    10/6 -hemoglobin 8.0.  Creatinine 2.5.  Urine culture is pending.  She has been stable on 3 L O2.  She is sleepy today, says she did not sleep well last night, cannot really tell me why just says she could not sleep.  I spoke with her Orthopedic's office for Dr. Pedraza, okay to remove her staples.    10/7 - Hgb decreased to 6.8 this morning but on recheck was 7.6.  No signs of bleeding.  Continue to trend.  Creat stable 2.43 patient is more  edematous, start on IV Lasix and monitor renal function.  She feels more drowsy than usual, she is still AOx4.  Ordered for CPAP but they have not been able to place it on.  I will hold amitriptyline and gabapentin for now given her drowsiness    10/8 -she says she is less drowsy today.  Hemoglobin 8.1, improved.  She remains very edematous, has a cough and feels congested.  Creatinine however is elevated today, I will hold IV Lasix.  Recheck kidney function tomorrow.  BNP >1000.  I ordered for TTE.    I have discussed this patient's plan of care and discharge plan at IDT rounds today with Case Management, Nursing, Nursing leadership, and other members of the IDT team.    Consultants/Specialty  GI    Code Status  Full Code    Disposition  The patient is not medically cleared for discharge to home or a post-acute facility.  Anticipate discharge to: skilled nursing facility    I have placed the appropriate orders for post-discharge needs.    Review of Systems  Review of Systems   Constitutional:  Positive for malaise/fatigue.   HENT:  Positive for congestion.    Respiratory:  Positive for cough and shortness of breath.    Cardiovascular:  Positive for leg swelling. Negative for chest pain.   Gastrointestinal:  Negative for abdominal pain, nausea and vomiting.   Musculoskeletal:  Positive for joint pain.   Neurological:  Positive for weakness.        Physical Exam  Temp:  [36.4 °C (97.6 °F)-36.8 °C (98.2 °F)] 36.5 °C (97.7 °F)  Pulse:  [61-79] 79  Resp:  [18-20] 19  BP: (136-155)/(72-88) 155/88  SpO2:  [96 %-97 %] 97 %    Physical Exam  Vitals and nursing note reviewed.   Constitutional:       Appearance: She is obese.      Comments: Appears fatigued   HENT:      Head: Normocephalic.      Mouth/Throat:      Mouth: Mucous membranes are moist.   Eyes:      General:         Right eye: No discharge.         Left eye: No discharge.   Cardiovascular:      Rate and Rhythm: Normal rate and regular rhythm.   Pulmonary:       Effort: No respiratory distress.      Breath sounds: No wheezing or rales.   Abdominal:      Tenderness: There is no abdominal tenderness. There is no guarding or rebound.      Comments: Urostomy, multiple well-healed surgical wounds   Musculoskeletal:         General: Swelling present.      Cervical back: Neck supple.      Right lower leg: Edema present.      Left lower leg: Edema present.      Comments: Left knee with sutures in place    Skin:     General: Skin is warm and dry.   Neurological:      Mental Status: She is oriented to person, place, and time.         Fluids    Intake/Output Summary (Last 24 hours) at 10/8/2023 1323  Last data filed at 10/8/2023 0830  Gross per 24 hour   Intake 1710 ml   Output 2900 ml   Net -1190 ml       Laboratory  Recent Labs     10/06/23  0530 10/07/23  0521 10/07/23  1234 10/08/23  1005   WBC 5.4 6.7  --  5.9   RBC 2.82* 2.45*  --  2.83*   HEMOGLOBIN 8.0* 6.8* 7.6* 8.1*   HEMATOCRIT 26.0* 22.4*  --  25.3*   MCV 92.2 91.4  --  89.4   MCH 28.4 27.8  --  28.6   MCHC 30.8* 30.4*  --  32.0*   RDW 49.3 49.1  --  46.5   PLATELETCT 176 164  --  199   MPV 10.2 10.7  --  10.4     Recent Labs     10/06/23  0530 10/07/23  0521 10/08/23  1005   SODIUM 140 139 140   POTASSIUM 4.5 4.1 4.1   CHLORIDE 114* 112 109   CO2 17* 19* 20   GLUCOSE 96 86 113*   BUN 48* 53* 51*   CREATININE 2.50* 2.43* 2.74*   CALCIUM 10.6* 10.5 10.5                   Imaging  DX-CHEST-PORTABLE (1 VIEW)   Final Result      1.  Low lung volumes without definite acute cardiopulmonary abnormality.      US-RENAL   Final Result      1.  Normal renal ultrasound.      EC-ECHOCARDIOGRAM COMPLETE W/ CONT    (Results Pending)        Assessment/Plan  * UGIB (upper gastrointestinal bleed)- (present on admission)  Assessment & Plan  Found to have hemoglobin 5.7, given fluids, 1 unit of packed red blood cell, Protonix.  FOBT was positive in the other facility.     10/1 S/p EGD this morning, revealed thick whitish debris in esophagus  and mucosal fragility.  Biopsies taken; no significant bleeding signs.  No plan for colonoscopy at this point.   Biopsy showed benign mucosal ulceration with acute on chronic inflammation  Continue PPI  Start of DVT prophylaxis had fluctuations in hemoglobin.  Continue to trend hemoglobin for now      Constipation  Assessment & Plan  Continue bowel protocol    Primary hypertension  Assessment & Plan  She was not restarted on metoprolol.  Blood pressure has been okay.    RAO (acute kidney injury) ([facility])  Assessment & Plan  RAO CRE 2.75> 2.8 (baseline 1.2)  Creatinine seems stable but remains above baseline  Renal ultrasound was unremarkable  FeNa >1%  Urine culture negative for infection  RAO may be due to fluid overload, edematous.  Creatinine increased today with IV Lasix, will hold.  Check TTE.  Trend renal function daily    Acute on chronic anemia  Assessment & Plan  Hb 5.7 at admission   received multiple transfusion  Iron study negative for iron deficiency, B12 level was normal  Hemoglobin fluctuating, continue to trend  Continue to monitor, transfuse if Hb less than 7    History of DVT in adulthood  Assessment & Plan  Eliquis was held for for GI bleed per GI    Hx MRSA infection  Assessment & Plan  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  Continue daptomycin, she is supposed to be on for 6 weeks  Has PICC    Class 3 severe obesity due to excess calories with serious comorbidity and body mass index (BMI) of 50.0 to 59.9 in adult ([facility])- (present on admission)  Assessment & Plan  Will need counseling when clinically appropriate         VTE prophylaxis:   SCDs/TEDs      I have performed a physical exam and reviewed and updated ROS and Plan today (10/8/2023). In review of yesterday's note (10/7/2023), there are no changes except as documented above.

## 2023-10-08 NOTE — CARE PLAN
Patient is Aox4. Meds given as per MAR. Q2 turns in place. PICC line is patent. Monitored I&Os. Patient on 3L O2. Bed in low postion locked. Call light and belongings in reach.    The patient is Stable - Low risk of patient condition declining or worsening    Shift Goals  Clinical Goals: Pain management of 3-5/10 and prevent skin breakdown by Q2 turns.  Patient Goals: Sleep, comfort  Family Goals: Not present    Progress made toward(s) clinical / shift goals: Patient pain is managed from 7/10 to 5/10 during the shift. Provided comfort with Q2 turns and extra pillows in place. Applied interdry and barrier cream in the skin folds. Not in respiratory distress and the surgical wounds appears clean, and dry.    Problem: Wound/ / Incision Healing  Goal: Patient's wound/surgical incision will decrease in size and heals properly  Outcome: Progressing     Problem: Respiratory  Goal: Patient will achieve/maintain optimum respiratory ventilation and gas exchange  Outcome: Progressing

## 2023-10-08 NOTE — PROGRESS NOTES
Assumed care of patient. Assessment performed. PICC was entirely occluded. IV medications were given late for this reason; pharmacy notified and they declined to reshedule. Day shift left alteplase for night shift to completed. After 230 minutes of instillation, blood return was noted and line was flushable. Q2 turns in place. Call light and belongings within reach.

## 2023-10-08 NOTE — CARE PLAN
The patient is Watcher - Medium risk of patient condition declining or worsening    Problem: Respiratory  Goal: Patient will achieve/maintain optimum respiratory ventilation and gas exchange  Outcome: Progressing  Note: Patient's O2 saturation will be maintained at above 90% for the duration of the shift and preferably above 95%.     Problem: Wound/ / Incision Healing  Goal: Patient's wound/surgical incision will decrease in size and heals properly  Outcome: Progressing  Note: Wound will not dehisce this shift and will not have signs of infection.       Shift Goals  Clinical Goals: Pain management, alteplase PICC  Patient Goals: Sleep, cpomfort  Family Goals: Not present    Progress made toward(s) clinical / shift goals:  comfort, pain control    Patient is not progressing towards the following goals:

## 2023-10-09 ENCOUNTER — APPOINTMENT (OUTPATIENT)
Dept: CARDIOLOGY | Facility: MEDICAL CENTER | Age: 58
DRG: 368 | End: 2023-10-09
Attending: INTERNAL MEDICINE
Payer: MEDICARE

## 2023-10-09 LAB
ABO GROUP BLD: NORMAL
ALBUMIN SERPL BCP-MCNC: 2.8 G/DL (ref 3.2–4.9)
BARCODED ABORH UBTYP: 9500
BARCODED PRD CODE UBPRD: NORMAL
BARCODED UNIT NUM UBUNT: NORMAL
BASOPHILS # BLD AUTO: 0.2 % (ref 0–1.8)
BASOPHILS # BLD: 0.01 K/UL (ref 0–0.12)
BLD GP AB SCN SERPL QL: NORMAL
BUN SERPL-MCNC: 61 MG/DL (ref 8–22)
C3 SERPL-MCNC: 142.8 MG/DL (ref 87–200)
C4 SERPL-MCNC: 21.2 MG/DL (ref 19–52)
CALCIUM ALBUM COR SERPL-MCNC: 10.9 MG/DL (ref 8.5–10.5)
CALCIUM SERPL-MCNC: 9.9 MG/DL (ref 8.5–10.5)
CHLORIDE SERPL-SCNC: 108 MMOL/L (ref 96–112)
CO2 SERPL-SCNC: 19 MMOL/L (ref 20–33)
COMPONENT R 8504R: NORMAL
CREAT SERPL-MCNC: 3.19 MG/DL (ref 0.5–1.4)
EOSINOPHIL # BLD AUTO: 0.22 K/UL (ref 0–0.51)
EOSINOPHIL NFR BLD: 4 % (ref 0–6.9)
ERYTHROCYTE [DISTWIDTH] IN BLOOD BY AUTOMATED COUNT: 47.4 FL (ref 35.9–50)
GFR SERPLBLD CREATININE-BSD FMLA CKD-EPI: 16 ML/MIN/1.73 M 2
GLUCOSE SERPL-MCNC: 87 MG/DL (ref 65–99)
HAV IGM SERPL QL IA: NORMAL
HBV CORE IGM SER QL: NORMAL
HBV SURFACE AG SER QL: NORMAL
HCT VFR BLD AUTO: 21.5 % (ref 37–47)
HCV AB SER QL: NORMAL
HGB BLD-MCNC: 6.7 G/DL (ref 12–16)
HGB BLD-MCNC: 8.3 G/DL (ref 12–16)
IMM GRANULOCYTES # BLD AUTO: 0.02 K/UL (ref 0–0.11)
IMM GRANULOCYTES NFR BLD AUTO: 0.4 % (ref 0–0.9)
LV EJECT FRACT  99904: 65
LV EJECT FRACT MOD 2C 99903: 47.06
LV EJECT FRACT MOD 4C 99902: 69.21
LV EJECT FRACT MOD BP 99901: 59.76
LYMPHOCYTES # BLD AUTO: 0.68 K/UL (ref 1–4.8)
LYMPHOCYTES NFR BLD: 12.3 % (ref 22–41)
MCH RBC QN AUTO: 28.3 PG (ref 27–33)
MCHC RBC AUTO-ENTMCNC: 31.2 G/DL (ref 32.2–35.5)
MCV RBC AUTO: 90.7 FL (ref 81.4–97.8)
MONOCYTES # BLD AUTO: 0.51 K/UL (ref 0–0.85)
MONOCYTES NFR BLD AUTO: 9.2 % (ref 0–13.4)
NEUTROPHILS # BLD AUTO: 4.11 K/UL (ref 1.82–7.42)
NEUTROPHILS NFR BLD: 73.9 % (ref 44–72)
NRBC # BLD AUTO: 0 K/UL
NRBC BLD-RTO: 0 /100 WBC (ref 0–0.2)
PHOSPHATE SERPL-MCNC: 4.4 MG/DL (ref 2.5–4.5)
PLATELET # BLD AUTO: 182 K/UL (ref 164–446)
PMV BLD AUTO: 10.6 FL (ref 9–12.9)
POTASSIUM SERPL-SCNC: 4.2 MMOL/L (ref 3.6–5.5)
PRODUCT TYPE UPROD: NORMAL
RBC # BLD AUTO: 2.37 M/UL (ref 4.2–5.4)
RH BLD: NORMAL
SODIUM SERPL-SCNC: 138 MMOL/L (ref 135–145)
UNIT STATUS USTAT: NORMAL
WBC # BLD AUTO: 5.6 K/UL (ref 4.8–10.8)

## 2023-10-09 PROCEDURE — 93306 TTE W/DOPPLER COMPLETE: CPT | Mod: 26 | Performed by: INTERNAL MEDICINE

## 2023-10-09 PROCEDURE — A9270 NON-COVERED ITEM OR SERVICE: HCPCS | Performed by: STUDENT IN AN ORGANIZED HEALTH CARE EDUCATION/TRAINING PROGRAM

## 2023-10-09 PROCEDURE — A9270 NON-COVERED ITEM OR SERVICE: HCPCS | Performed by: INTERNAL MEDICINE

## 2023-10-09 PROCEDURE — P9016 RBC LEUKOCYTES REDUCED: HCPCS

## 2023-10-09 PROCEDURE — 36415 COLL VENOUS BLD VENIPUNCTURE: CPT

## 2023-10-09 PROCEDURE — 93306 TTE W/DOPPLER COMPLETE: CPT

## 2023-10-09 PROCEDURE — 85018 HEMOGLOBIN: CPT

## 2023-10-09 PROCEDURE — 86235 NUCLEAR ANTIGEN ANTIBODY: CPT | Mod: 91

## 2023-10-09 PROCEDURE — 700102 HCHG RX REV CODE 250 W/ 637 OVERRIDE(OP): Performed by: INTERNAL MEDICINE

## 2023-10-09 PROCEDURE — 86039 ANTINUCLEAR ANTIBODIES (ANA): CPT

## 2023-10-09 PROCEDURE — 83516 IMMUNOASSAY NONANTIBODY: CPT | Mod: 91

## 2023-10-09 PROCEDURE — 86923 COMPATIBILITY TEST ELECTRIC: CPT

## 2023-10-09 PROCEDURE — 86900 BLOOD TYPING SEROLOGIC ABO: CPT

## 2023-10-09 PROCEDURE — 700111 HCHG RX REV CODE 636 W/ 250 OVERRIDE (IP): Performed by: INTERNAL MEDICINE

## 2023-10-09 PROCEDURE — 700102 HCHG RX REV CODE 250 W/ 637 OVERRIDE(OP): Performed by: STUDENT IN AN ORGANIZED HEALTH CARE EDUCATION/TRAINING PROGRAM

## 2023-10-09 PROCEDURE — 99233 SBSQ HOSP IP/OBS HIGH 50: CPT | Performed by: INTERNAL MEDICINE

## 2023-10-09 PROCEDURE — 36430 TRANSFUSION BLD/BLD COMPNT: CPT

## 2023-10-09 PROCEDURE — 770006 HCHG ROOM/CARE - MED/SURG/GYN SEMI*

## 2023-10-09 PROCEDURE — 99222 1ST HOSP IP/OBS MODERATE 55: CPT | Performed by: INTERNAL MEDICINE

## 2023-10-09 PROCEDURE — 30233N1 TRANSFUSION OF NONAUTOLOGOUS RED BLOOD CELLS INTO PERIPHERAL VEIN, PERCUTANEOUS APPROACH: ICD-10-PCS | Performed by: STUDENT IN AN ORGANIZED HEALTH CARE EDUCATION/TRAINING PROGRAM

## 2023-10-09 PROCEDURE — 80069 RENAL FUNCTION PANEL: CPT

## 2023-10-09 PROCEDURE — 80074 ACUTE HEPATITIS PANEL: CPT

## 2023-10-09 PROCEDURE — 86850 RBC ANTIBODY SCREEN: CPT

## 2023-10-09 PROCEDURE — 86225 DNA ANTIBODY NATIVE: CPT

## 2023-10-09 PROCEDURE — 86901 BLOOD TYPING SEROLOGIC RH(D): CPT

## 2023-10-09 PROCEDURE — 85025 COMPLETE CBC W/AUTO DIFF WBC: CPT

## 2023-10-09 PROCEDURE — 86038 ANTINUCLEAR ANTIBODIES: CPT

## 2023-10-09 PROCEDURE — 86160 COMPLEMENT ANTIGEN: CPT

## 2023-10-09 RX ORDER — FUROSEMIDE 10 MG/ML
60 INJECTION INTRAMUSCULAR; INTRAVENOUS
Status: DISCONTINUED | OUTPATIENT
Start: 2023-10-09 | End: 2023-10-10

## 2023-10-09 RX ADMIN — OXYCODONE HYDROCHLORIDE 15 MG: 15 TABLET ORAL at 09:09

## 2023-10-09 RX ADMIN — OMEPRAZOLE 20 MG: 20 CAPSULE, DELAYED RELEASE ORAL at 16:38

## 2023-10-09 RX ADMIN — SENNOSIDES 8.6 MG: 8.6 TABLET, FILM COATED ORAL at 16:38

## 2023-10-09 RX ADMIN — FERROUS SULFATE TAB 325 MG (65 MG ELEMENTAL FE) 325 MG: 325 (65 FE) TAB at 10:51

## 2023-10-09 RX ADMIN — GUAIFENESIN 600 MG: 600 TABLET, EXTENDED RELEASE ORAL at 16:38

## 2023-10-09 RX ADMIN — SENNOSIDES 8.6 MG: 8.6 TABLET, FILM COATED ORAL at 05:05

## 2023-10-09 RX ADMIN — FUROSEMIDE 60 MG: 10 INJECTION INTRAMUSCULAR; INTRAVENOUS at 15:06

## 2023-10-09 RX ADMIN — OMEPRAZOLE 20 MG: 20 CAPSULE, DELAYED RELEASE ORAL at 05:05

## 2023-10-09 RX ADMIN — CITALOPRAM 40 MG: 40 TABLET, FILM COATED ORAL at 05:05

## 2023-10-09 RX ADMIN — POLYETHYLENE GLYCOL 3350 1 PACKET: 17 POWDER, FOR SOLUTION ORAL at 05:05

## 2023-10-09 RX ADMIN — OXYCODONE HYDROCHLORIDE 15 MG: 15 TABLET ORAL at 16:38

## 2023-10-09 RX ADMIN — GUAIFENESIN 600 MG: 600 TABLET, EXTENDED RELEASE ORAL at 05:06

## 2023-10-09 ASSESSMENT — ENCOUNTER SYMPTOMS
WEAKNESS: 1
VOMITING: 0
SHORTNESS OF BREATH: 1
NAUSEA: 0
COUGH: 1
MYALGIAS: 1
ABDOMINAL PAIN: 0

## 2023-10-09 ASSESSMENT — PAIN DESCRIPTION - PAIN TYPE: TYPE: ACUTE PAIN

## 2023-10-09 NOTE — PROGRESS NOTES
Hospital Medicine Daily Progress Note    Date of Service  10/9/2023    Chief Complaint  Patricia A Tietz is a 58 y.o. female admitted 9/29/2023 with anemia    Hospital Course  59 yo woman with DEBRA, urostomy, recent left knee arthroplasty complicated by infection, status post I&D with antibiotic spacer on daptomycin for MRSA, history of DVT on Eliquis who has been at [facility] and found her hemoglobin to be low at 5.7 and transferred to [facility].  She has not had signs of bleeding.  GI was consulted.  EGD showed 6 white debris in esophagus, biopsies taken, diverticula in duodenum.  Hemoglobin has been stable postoperatively.  She has an RAO that has not improved.  She is very edematous and started on IV Lasix.  BNP was high, echocardiogram ordered to assess for heart failure, valve disease.    Interval Problem Update  10/3 - Hemoglobin decreased to 7.8, recheck   creatinine remains elevated above baseline  She denies any abdominal pain or difficulty eating.  She has ongoing left knee pain    10/4 - Hgb decreased to 7.0, recheck. Creat decreased 2.36 but still remains above baseline.  We will check urine studies and renal ultrasound.  She has no new complaints, eating okay.    10/5 - Hgb 7.0 this morning, recheck level. Creat increased 2.55. FeNa 1.1%. UA was cloudy with yeast. She is at risk for fungal UTI while on daptomycin, will check urine culture.  She complains of congestion and productive cough today.  Chest x-ray negative for pneumonia.  Her left knee pain has been unchanged    10/6 -hemoglobin 8.0.  Creatinine 2.5.  Urine culture is pending.  She has been stable on 3 L O2.  She is sleepy today, says she did not sleep well last night, cannot really tell me why just says she could not sleep.  I spoke with her Orthopedic's office for Dr. Pedraza, okay to remove her staples.    10/7 - Hgb decreased to 6.8 this morning but on recheck was 7.6.  No signs of bleeding.  Continue to trend.  Creat stable 2.43 patient is more  edematous, start on IV Lasix and monitor renal function.  She feels more drowsy than usual, she is still AOx4.  Ordered for CPAP but they have not been able to place it on.  I will hold amitriptyline and gabapentin for now given her drowsiness    10/8 -she says she is less drowsy today.  Hemoglobin 8.1, improved.  She remains very edematous, has a cough and feels congested.  Creatinine however is elevated today, I will hold IV Lasix.  Recheck kidney function tomorrow.  BNP >1000.  I ordered for TTE.    10/9 - Hgb 6.7, she has not had a BM.  1U PRBCs ordered.  She feels more drowsy today.  Renal function has worsened, remains edematous.  I consulted Dr. Najjar who will see the patient.    I have discussed this patient's plan of care and discharge plan at IDT rounds today with Case Management, Nursing, Nursing leadership, and other members of the IDT team.    Consultants/Specialty  GI, nephrology    Code Status  Full Code    Disposition  The patient is not medically cleared for discharge to home or a post-acute facility.  Anticipate discharge to: skilled nursing facility    I have placed the appropriate orders for post-discharge needs.    Review of Systems  Review of Systems   Constitutional:  Positive for malaise/fatigue.   HENT:  Positive for congestion.    Respiratory:  Positive for cough and shortness of breath.    Cardiovascular:  Positive for leg swelling. Negative for chest pain.   Gastrointestinal:  Negative for abdominal pain, nausea and vomiting.   Musculoskeletal:  Positive for joint pain and myalgias.   Neurological:  Positive for weakness.        Physical Exam  Temp:  [36 °C (96.8 °F)-37.2 °C (99 °F)] 36.4 °C (97.6 °F)  Pulse:  [65-75] 73  Resp:  [18-20] 20  BP: (115-153)/(64-82) 148/82  SpO2:  [96 %-100 %] 99 %    Physical Exam  Vitals and nursing note reviewed.   Constitutional:       Appearance: She is obese.      Comments: Appears fatigued   HENT:      Head: Normocephalic.      Mouth/Throat:       Mouth: Mucous membranes are moist.   Eyes:      General:         Right eye: No discharge.         Left eye: No discharge.   Cardiovascular:      Rate and Rhythm: Normal rate and regular rhythm.   Pulmonary:      Effort: No respiratory distress.      Breath sounds: No wheezing or rales.   Abdominal:      Tenderness: There is no abdominal tenderness. There is no guarding or rebound.      Comments: Urostomy, multiple well-healed surgical wounds   Musculoskeletal:         General: Swelling present.      Cervical back: Neck supple.      Right lower leg: Edema present.      Left lower leg: Edema present.      Comments: Left knee with sutures in place    Skin:     General: Skin is warm and dry.   Neurological:      Mental Status: She is oriented to person, place, and time.         Fluids    Intake/Output Summary (Last 24 hours) at 10/9/2023 1301  Last data filed at 10/9/2023 1045  Gross per 24 hour   Intake 340 ml   Output 1900 ml   Net -1560 ml       Laboratory  Recent Labs     10/07/23  0521 10/07/23  1234 10/08/23  1005 10/09/23  0600   WBC 6.7  --  5.9 5.6   RBC 2.45*  --  2.83* 2.37*   HEMOGLOBIN 6.8* 7.6* 8.1* 6.7*   HEMATOCRIT 22.4*  --  25.3* 21.5*   MCV 91.4  --  89.4 90.7   MCH 27.8  --  28.6 28.3   MCHC 30.4*  --  32.0* 31.2*   RDW 49.1  --  46.5 47.4   PLATELETCT 164  --  199 182   MPV 10.7  --  10.4 10.6     Recent Labs     10/07/23  0521 10/08/23  1005 10/09/23  0600   SODIUM 139 140 138   POTASSIUM 4.1 4.1 4.2   CHLORIDE 112 109 108   CO2 19* 20 19*   GLUCOSE 86 113* 87   BUN 53* 51* 61*   CREATININE 2.43* 2.74* 3.19*   CALCIUM 10.5 10.5 9.9                   Imaging  DX-CHEST-PORTABLE (1 VIEW)   Final Result      1.  Low lung volumes without definite acute cardiopulmonary abnormality.      US-RENAL   Final Result      1.  Normal renal ultrasound.      EC-ECHOCARDIOGRAM COMPLETE W/ CONT    (Results Pending)        Assessment/Plan  * UGIB (upper gastrointestinal bleed)- (present on admission)  Assessment &  Plan  Found to have hemoglobin 5.7, given fluids, 1 unit of packed red blood cell, Protonix.  FOBT was positive in the other facility.     10/1 S/p EGD this morning, revealed thick whitish debris in esophagus and mucosal fragility.  Biopsies taken; no significant bleeding signs.  No plan for colonoscopy at this point.   Biopsy showed benign mucosal ulceration with acute on chronic inflammation  Continue PPI  Hemoglobin decreased to 6.8, no signs of GI bleed.  Ordered for 1 unit PRBC and continue to trend hemoglobin      Constipation  Assessment & Plan  Continue bowel protocol    Primary hypertension  Assessment & Plan  She was not restarted on metoprolol.  Blood pressure has been okay.    RAO (acute kidney injury) ([facility])  Assessment & Plan  ROA CRE 2.75> 2.8 (baseline 1.2)  Creatinine seems stable but remains above baseline  Renal ultrasound was unremarkable  FeNa >1%  Urine culture negative for infection  RAO may be due to fluid overload, edematous.  Creatinine worsened after given IV Lasix.  TTE pending.  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 In review of yesterday's  note (10/8/2023), there are no changes except as documented above.

## 2023-10-09 NOTE — CARE PLAN
Patient is A&O*4. Denies pain  Urostomy leaking, cleaned and changed. Draining tiffanie colored urine.  All needs met. Call light within reach.    The patient is Stable - Low risk of patient condition declining or worsening    Shift Goals  Clinical Goals: patient will have a painrating of below three during this shift  Patient Goals: sleep  Family Goals: Not present    Progress made toward(s) clinical / shift goals:    Problem: Respiratory  Goal: Patient will achieve/maintain optimum respiratory ventilation and gas exchange  Outcome: Progressing       Patient is not progressing towards the following goals:

## 2023-10-09 NOTE — DISCHARGE PLANNING
Case Management Discharge Planning    Admission Date: 9/29/2023  GMLOS: 3  ALOS: 10    6-Clicks ADL Score: 14  6-Clicks Mobility Score: 6  PT and/or OT Eval ordered: Yes  Post-acute Referrals Ordered: Yes  Post-acute Choice Obtained: Yes  Has referral(s) been sent to post-acute provider:  Yes      Anticipated Discharge Dispo: Discharge Disposition: D/T to SNF with Medicare cert in anticipation of skilled care (03)    DME Needed: No    Action(s) Taken: Updated Provider/Nurse on Discharge Plan    Escalations Completed: None    Medically Clear: No    Next Steps: when medically clear follow up with [facility]    Barriers to Discharge: Medical clearance    Is the patient up for discharge tomorrow: No  Discussed patient's discharge barriers during IDT rounds.  Patient is not medically clear at this time, worsening kidney functioning.

## 2023-10-10 PROBLEM — R09.02 HYPOXIA: Status: ACTIVE | Noted: 2023-10-10

## 2023-10-10 LAB
ALBUMIN SERPL BCP-MCNC: 3.1 G/DL (ref 3.2–4.9)
BASOPHILS # BLD AUTO: 0.2 % (ref 0–1.8)
BASOPHILS # BLD: 0.01 K/UL (ref 0–0.12)
BUN SERPL-MCNC: 61 MG/DL (ref 8–22)
CALCIUM ALBUM COR SERPL-MCNC: 11.1 MG/DL (ref 8.5–10.5)
CALCIUM SERPL-MCNC: 10.4 MG/DL (ref 8.5–10.5)
CHLORIDE SERPL-SCNC: 110 MMOL/L (ref 96–112)
CO2 SERPL-SCNC: 21 MMOL/L (ref 20–33)
CREAT SERPL-MCNC: 3.36 MG/DL (ref 0.5–1.4)
EOSINOPHIL # BLD AUTO: 0.2 K/UL (ref 0–0.51)
EOSINOPHIL NFR BLD: 3.3 % (ref 0–6.9)
ERYTHROCYTE [DISTWIDTH] IN BLOOD BY AUTOMATED COUNT: 45.8 FL (ref 35.9–50)
GFR SERPLBLD CREATININE-BSD FMLA CKD-EPI: 15 ML/MIN/1.73 M 2
GLUCOSE SERPL-MCNC: 93 MG/DL (ref 65–99)
HCT VFR BLD AUTO: 25.3 % (ref 37–47)
HGB BLD-MCNC: 7.9 G/DL (ref 12–16)
IMM GRANULOCYTES # BLD AUTO: 0.01 K/UL (ref 0–0.11)
IMM GRANULOCYTES NFR BLD AUTO: 0.2 % (ref 0–0.9)
INR PPP: 1.22 (ref 0.87–1.13)
LYMPHOCYTES # BLD AUTO: 0.63 K/UL (ref 1–4.8)
LYMPHOCYTES NFR BLD: 10.5 % (ref 22–41)
MCH RBC QN AUTO: 27.7 PG (ref 27–33)
MCHC RBC AUTO-ENTMCNC: 31.2 G/DL (ref 32.2–35.5)
MCV RBC AUTO: 88.8 FL (ref 81.4–97.8)
MONOCYTES # BLD AUTO: 0.66 K/UL (ref 0–0.85)
MONOCYTES NFR BLD AUTO: 11 % (ref 0–13.4)
NEUTROPHILS # BLD AUTO: 4.49 K/UL (ref 1.82–7.42)
NEUTROPHILS NFR BLD: 74.8 % (ref 44–72)
NRBC # BLD AUTO: 0 K/UL
NRBC BLD-RTO: 0 /100 WBC (ref 0–0.2)
PHOSPHATE SERPL-MCNC: 4.6 MG/DL (ref 2.5–4.5)
PLATELET # BLD AUTO: 208 K/UL (ref 164–446)
PMV BLD AUTO: 10.5 FL (ref 9–12.9)
POTASSIUM SERPL-SCNC: 3.9 MMOL/L (ref 3.6–5.5)
PROTHROMBIN TIME: 15.5 SEC (ref 12–14.6)
RBC # BLD AUTO: 2.85 M/UL (ref 4.2–5.4)
SODIUM SERPL-SCNC: 142 MMOL/L (ref 135–145)
WBC # BLD AUTO: 6 K/UL (ref 4.8–10.8)

## 2023-10-10 PROCEDURE — 80069 RENAL FUNCTION PANEL: CPT

## 2023-10-10 PROCEDURE — 302104 KIT,UROSTOMY 2-PIECE 2 1/4": Performed by: STUDENT IN AN ORGANIZED HEALTH CARE EDUCATION/TRAINING PROGRAM

## 2023-10-10 PROCEDURE — 85610 PROTHROMBIN TIME: CPT

## 2023-10-10 PROCEDURE — 700105 HCHG RX REV CODE 258: Performed by: INTERNAL MEDICINE

## 2023-10-10 PROCEDURE — 85025 COMPLETE CBC W/AUTO DIFF WBC: CPT

## 2023-10-10 PROCEDURE — 700102 HCHG RX REV CODE 250 W/ 637 OVERRIDE(OP): Performed by: INTERNAL MEDICINE

## 2023-10-10 PROCEDURE — 700102 HCHG RX REV CODE 250 W/ 637 OVERRIDE(OP): Performed by: STUDENT IN AN ORGANIZED HEALTH CARE EDUCATION/TRAINING PROGRAM

## 2023-10-10 PROCEDURE — 99232 SBSQ HOSP IP/OBS MODERATE 35: CPT | Performed by: INTERNAL MEDICINE

## 2023-10-10 PROCEDURE — 700111 HCHG RX REV CODE 636 W/ 250 OVERRIDE (IP): Performed by: INTERNAL MEDICINE

## 2023-10-10 PROCEDURE — 770006 HCHG ROOM/CARE - MED/SURG/GYN SEMI*

## 2023-10-10 PROCEDURE — A9270 NON-COVERED ITEM OR SERVICE: HCPCS | Performed by: STUDENT IN AN ORGANIZED HEALTH CARE EDUCATION/TRAINING PROGRAM

## 2023-10-10 PROCEDURE — 99232 SBSQ HOSP IP/OBS MODERATE 35: CPT | Performed by: STUDENT IN AN ORGANIZED HEALTH CARE EDUCATION/TRAINING PROGRAM

## 2023-10-10 PROCEDURE — A9270 NON-COVERED ITEM OR SERVICE: HCPCS | Performed by: INTERNAL MEDICINE

## 2023-10-10 RX ORDER — BENZONATATE 100 MG/1
100 CAPSULE ORAL 3 TIMES DAILY PRN
Status: DISCONTINUED | OUTPATIENT
Start: 2023-10-10 | End: 2023-10-22 | Stop reason: HOSPADM

## 2023-10-10 RX ORDER — FUROSEMIDE 10 MG/ML
40 INJECTION INTRAMUSCULAR; INTRAVENOUS
Status: DISCONTINUED | OUTPATIENT
Start: 2023-10-11 | End: 2023-10-11

## 2023-10-10 RX ADMIN — POLYETHYLENE GLYCOL 3350 1 PACKET: 17 POWDER, FOR SOLUTION ORAL at 05:16

## 2023-10-10 RX ADMIN — OXYCODONE HYDROCHLORIDE 15 MG: 15 TABLET ORAL at 08:25

## 2023-10-10 RX ADMIN — OMEPRAZOLE 20 MG: 20 CAPSULE, DELAYED RELEASE ORAL at 16:43

## 2023-10-10 RX ADMIN — SENNOSIDES 8.6 MG: 8.6 TABLET, FILM COATED ORAL at 05:16

## 2023-10-10 RX ADMIN — GUAIFENESIN 600 MG: 600 TABLET, EXTENDED RELEASE ORAL at 16:43

## 2023-10-10 RX ADMIN — CITALOPRAM 40 MG: 40 TABLET, FILM COATED ORAL at 05:16

## 2023-10-10 RX ADMIN — OMEPRAZOLE 20 MG: 20 CAPSULE, DELAYED RELEASE ORAL at 05:16

## 2023-10-10 RX ADMIN — DAPTOMYCIN 500 MG: 500 INJECTION, POWDER, LYOPHILIZED, FOR SOLUTION INTRAVENOUS at 16:48

## 2023-10-10 RX ADMIN — FUROSEMIDE 60 MG: 10 INJECTION INTRAMUSCULAR; INTRAVENOUS at 05:15

## 2023-10-10 RX ADMIN — GUAIFENESIN 600 MG: 600 TABLET, EXTENDED RELEASE ORAL at 05:16

## 2023-10-10 RX ADMIN — OXYCODONE HYDROCHLORIDE 15 MG: 15 TABLET ORAL at 16:43

## 2023-10-10 RX ADMIN — SENNOSIDES 8.6 MG: 8.6 TABLET, FILM COATED ORAL at 16:43

## 2023-10-10 RX ADMIN — FERROUS SULFATE TAB 325 MG (65 MG ELEMENTAL FE) 325 MG: 325 (65 FE) TAB at 11:33

## 2023-10-10 ASSESSMENT — ENCOUNTER SYMPTOMS
MYALGIAS: 0
CONSTIPATION: 0
SHORTNESS OF BREATH: 1
MYALGIAS: 1
DIARRHEA: 0
ABDOMINAL PAIN: 0
CHILLS: 0
COUGH: 1
VOMITING: 0
NAUSEA: 0
FOCAL WEAKNESS: 0
WEAKNESS: 1
COUGH: 0
SENSORY CHANGE: 0
BLURRED VISION: 0
FEVER: 0

## 2023-10-10 ASSESSMENT — PAIN DESCRIPTION - PAIN TYPE: TYPE: ACUTE PAIN

## 2023-10-10 NOTE — PROGRESS NOTES
Hospital Medicine Daily Progress Note    Date of Service  10/10/2023    Chief Complaint  Patricia A Tietz is a 58 y.o. female admitted 9/29/2023 with anemia    Hospital Course  57 yo woman with DEBRA, urostomy, recent left knee arthroplasty complicated by infection, status post I&D with antibiotic spacer on daptomycin for MRSA, history of DVT on Eliquis who has been at SNF and found her hemoglobin to be low at 5.7 and transferred to [facility].  She has not had signs of bleeding.  GI was consulted.  EGD showed 6 white debris in esophagus, biopsies taken, diverticula in duodenum.  Hemoglobin has been stable postoperatively.  She has an RAO that has not improved.  She is very edematous and started on IV Lasix.  BNP was high, echocardiogram ordered to assess for heart failure, valve disease. Echo did not suggestive CHF and Lasix did not improve resp status or RAO. Nephro consulted, rec renal bx.     Interval Problem Update  Hgb stable after transfusion, RAO worsening, decreasing lasix dose, awaiting renal labs and bx    I have discussed this patient's plan of care and discharge plan at IDT rounds today with Case Management, Nursing, Nursing leadership, and other members of the IDT team.    Consultants/Specialty  GI, nephrology    Code Status  Full Code    Disposition  The patient is not medically cleared for discharge to home or a post-acute facility.  Anticipate discharge to: skilled nursing facility    I have placed the appropriate orders for post-discharge needs.    Review of Systems  Review of Systems   Constitutional:  Positive for malaise/fatigue. Negative for chills and fever.   HENT:  Positive for congestion.    Eyes:  Negative for blurred vision.   Respiratory:  Positive for cough and shortness of breath.    Cardiovascular:  Positive for leg swelling. Negative for chest pain.   Gastrointestinal:  Negative for abdominal pain, constipation, diarrhea, nausea and vomiting.   Genitourinary:  Negative for dysuria.    Musculoskeletal:  Negative for myalgias.   Skin:  Negative for rash.   Neurological:  Positive for weakness. Negative for sensory change and focal weakness.        Physical Exam  Temp:  [35.9 °C (96.7 °F)-36.7 °C (98 °F)] 36.5 °C (97.7 °F)  Pulse:  [60-79] 66  Resp:  [18] 18  BP: (129-163)/(78-94) 163/94  SpO2:  [95 %-99 %] 99 %    Physical Exam  Constitutional:       General: She is not in acute distress.     Appearance: Normal appearance.   HENT:      Head: Normocephalic and atraumatic.      Nose: Nose normal.      Mouth/Throat:      Mouth: Mucous membranes are moist.      Pharynx: Oropharynx is clear.   Eyes:      Conjunctiva/sclera: Conjunctivae normal.      Pupils: Pupils are equal, round, and reactive to light.   Cardiovascular:      Rate and Rhythm: Normal rate and regular rhythm.      Heart sounds: No murmur heard.  Pulmonary:      Effort: Pulmonary effort is normal.      Breath sounds: No wheezing, rhonchi or rales.   Abdominal:      General: There is no distension.      Palpations: Abdomen is soft.      Tenderness: There is no abdominal tenderness. There is no guarding or rebound.   Musculoskeletal:         General: No swelling or tenderness.      Cervical back: Normal range of motion and neck supple.      Right lower leg: Edema present.      Left lower leg: Edema present.   Skin:     General: Skin is warm and dry.   Neurological:      Mental Status: She is alert.      GCS: GCS eye subscore is 4. GCS verbal subscore is 5. GCS motor subscore is 6.      Cranial Nerves: No facial asymmetry.   Psychiatric:         Mood and Affect: Mood normal.         Behavior: Behavior normal.         Fluids    Intake/Output Summary (Last 24 hours) at 10/10/2023 1628  Last data filed at 10/10/2023 0900  Gross per 24 hour   Intake --   Output 3900 ml   Net -3900 ml         Laboratory  Recent Labs     10/08/23  1005 10/09/23  0600 10/09/23  1508 10/10/23  0530   WBC 5.9 5.6  --  6.0   RBC 2.83* 2.37*  --  2.85*   HEMOGLOBIN  8.1* 6.7* 8.3* 7.9*   HEMATOCRIT 25.3* 21.5*  --  25.3*   MCV 89.4 90.7  --  88.8   MCH 28.6 28.3  --  27.7   MCHC 32.0* 31.2*  --  31.2*   RDW 46.5 47.4  --  45.8   PLATELETCT 199 182  --  208   MPV 10.4 10.6  --  10.5       Recent Labs     10/08/23  1005 10/09/23  0600 10/10/23  0530   SODIUM 140 138 142   POTASSIUM 4.1 4.2 3.9   CHLORIDE 109 108 110   CO2 20 19* 21   GLUCOSE 113* 87 93   BUN 51* 61* 61*   CREATININE 2.74* 3.19* 3.36*   CALCIUM 10.5 9.9 10.4       Recent Labs     10/10/23  0840   INR 1.22*               Imaging  EC-ECHOCARDIOGRAM COMPLETE W/O CONT   Final Result      DX-CHEST-PORTABLE (1 VIEW)   Final Result      1.  Low lung volumes without definite acute cardiopulmonary abnormality.      US-RENAL   Final Result      1.  Normal renal ultrasound.      CT-NEEDLE BX-RENAL    (Results Pending)          Assessment/Plan  * UGIB (upper gastrointestinal bleed)- (present on admission)  Assessment & Plan  Found to have hemoglobin 5.7, given fluids, 1 unit of packed red blood cell, Protonix.  FOBT was positive in the other facility.   10/1 EGD this morning, revealed thick whitish debris in esophagus and mucosal fragility.  Biopsies which showed benign mucosal ulceration with acute on chronic inflammation  Continue PPI  Following this Hgb trended down to <7 again and required another 1 unit PRBC on 10/9      Hypoxia  Assessment & Plan  BNP elevated but CXR does not suggest edema and no improvement with aggressive diuresis, Echo shows EF 65%, low lung volumes with likely atelectasis with BMI 52 as well as anemia requiring transfusions all of which are likely contributing  -Decrease Lasix dose  -Wean O2 as tolerated    Constipation  Assessment & Plan  Continue bowel protocol    Pacemaker  Assessment & Plan  Noted    Primary hypertension  Assessment & Plan  Hold home metoprolol.      RAO (acute kidney injury) (HCC)  Assessment & Plan  RAO CRE 2.75> 2.8 (baseline 1.2)  Creatinine seems stable but remains above  baseline  Renal ultrasound was unremarkable  FeNa >1%  Urine culture negative for infection  Worsened with Lasix suggesting not hypervolemic despite her appearance, decreased dose  TTE showed EF 65% w/ mod LVH  Nephro consulted, may require kidney bx    Acute on chronic anemia  Assessment & Plan  Hb 5.7 at admission   From blood loss 2/2 GIB    History of DVT in adulthood  Assessment & Plan  Eliquis held for for GI bleed per GI    Hx MRSA infection  Assessment & Plan  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 In review of yesterday's note (10/9/2023), there are no changes except as documented above.

## 2023-10-10 NOTE — ASSESSMENT & PLAN NOTE
Remains stable at 2-3 L NC  Continue lasix  Recheck CXR, I personally reviewed this Xray on 10/18 and there are no acute findings

## 2023-10-10 NOTE — CARE PLAN
The patient is Stable - Low risk of patient condition declining or worsening    Shift Goals  Clinical Goals: pain control, monitor for bleeding. ensure safety during this shift  Patient Goals: comfort  Family Goals: ag    Progress made toward(s) clinical / shift goals:    Problem: Wound/ / Incision Healing  Goal: Patient's wound/surgical incision will decrease in size and heals properly  Outcome: Progressing     Problem: Respiratory  Goal: Patient will achieve/maintain optimum respiratory ventilation and gas exchange  Outcome: Progressing       Patient is not progressing towards the following goals:

## 2023-10-10 NOTE — CONSULTS
DATE OF SERVICE:  10/09/2023     REQUESTING PHYSICIAN:  Christiano Hernandez MD     REASON FOR CONSULTATION:  Acute kidney injury and volume overload.     The patient seen and examined, medical record reviewed.     HISTORY OF PRESENT ILLNESS:  The patient is an unfortunate 58-year-old lady   with a past medical history significant for obesity, obstructive sleep apnea,   history of left total knee replacement and periprosthetic infection.  She is   on chronic antibiotic use, she presented to the hospital on 09/29/2023 with GI   bleed.  Her hospitalization has been complicated by acute kidney injury with   a creatinine up to 3.19 today, the patient also has volume overload and was   treated with some IV Lasix.     The patient had no recent IV contrast exposure.     The patient had renal ultrasound done on 10/04/2023.  I reviewed the image   myself, showed no hydronephrosis.     The patient feels tired.  She has some joint pain, but no chest pain.  She   does have mild shortness of breath.     The patient has no recent use of NSAIDs.     PAST MEDICAL HISTORY:  Significant for:  1.  Obesity.  2.  Obstructive sleep apnea.  3.  Anemia.  4.  Knee joint infection.  5.  History of urostomy.     SOCIAL HISTORY:  The patient has no smoking history.     FAMILY HISTORY:  No known renal disease.     MEDICATIONS:  Reviewed.     ALLERGIES:  No known drug allergies.     REVIEW OF SYSTEMS:  The patient is anxious.  She is complaining of generalized   ache, but all other review of systems negative except outlined in the history   of present illness.     PHYSICAL EXAMINATION:  GENERAL:  The patient appears ill with mild respiratory distress.  VITAL SIGNS:  Showed blood pressure of 158/97, heart rate was 71, and   respiratory rate was 19.  HEENT:  Normocephalic, atraumatic.  Sclerae are anicteric.  Pupils are   reactive.  Nose normal.  Mucous membranes moist.  NECK:  No lymphadenopathy, no JVD, no thyroid mass.  CHEST:  Normal.  LUNGS:  Clear to  auscultation.  HEART:  S1, S2.  ABDOMEN:  Soft, nontender.  No hepatosplenomegaly.  There is no inguinal   lymphadenopathy.  EXTREMITIES:  There is +2 edema.  SKIN:  No skin rash.  NEUROLOGIC:  The patient is alert and oriented x3.  MOOD:  The patient is anxious.     LABORATORY DATA:  Her recent labs from today were reviewed.  Her albumin was   2.8.  Again I reviewed the renal ultrasound images myself, which showed no   hydronephrosis.  Her right kidney measured 12.45 cm and left kidney measured   9.48 cm.     ASSESSMENT:  1.  Acute kidney injury, the etiology is not very clear.  I am concerned it is   acute glomerulonephritis considering low albumin and proteinuria on   urinalysis as well as hematuria.  2.  Chronic knee joint infection.  3.  Volume overload.  4.  Hypoalbuminemia.  5.  Anemia.  6.  Metabolic acidosis.     PLAN:  1.  There is no acute need for renal replacement therapy.  2.  Check urine protein-to-creatinine ratio.  3.  Send serology including JIGAR, complements, ANCA level as well as hepatitis.  4.  Plan for a kidney biopsy.  5.  IV Lasix to manage volume overload.  6.  Continue to follow her hemoglobin level to rule out GI bleed.  7.  Renal diet.  8.  Renal dose all medications.  9.  Prognosis is guarded.     Plan discussed in detail with Dr. Hernandez.     If there is no improvement in the next 24-48 hours, we will consider dialysis.        ______________________________  FADI NAJJAR, MD    /[facility] [place]    DD:  10/09/2023 16:14  DT:  10/09/2023 19:01    Job#:  [id number]

## 2023-10-10 NOTE — PROGRESS NOTES
Nephrology Daily Progress Note    Date of Service  10/10/2023    Chief Complaint  58 y.o. female admitted 9/29/2023 with a knee joint infection, developed RAO and volume overload    Interval Problem Update  Patient has no chest pain, shortness of breath slightly better  Still complaining of generalized muscle aches    Review of Systems  Review of Systems   Constitutional:  Negative for chills, fever and malaise/fatigue.   Respiratory:  Positive for shortness of breath. Negative for cough.    Cardiovascular:  Negative for chest pain and leg swelling.   Gastrointestinal:  Negative for nausea and vomiting.   Genitourinary:  Negative for dysuria, frequency and urgency.   Musculoskeletal:  Positive for myalgias.        Physical Exam  Temp:  [35.9 °C (96.7 °F)-36.8 °C (98.3 °F)] 35.9 °C (96.7 °F)  Pulse:  [60-79] 61  Resp:  [18-19] 18  BP: (129-163)/(78-97) 163/90  SpO2:  [95 %-99 %] 97 %    Physical Exam  Vitals and nursing note reviewed.   Constitutional:       General: She is awake. She is not in acute distress.     Appearance: She is well-developed. She is ill-appearing. She is not diaphoretic.   HENT:      Head: Normocephalic and atraumatic.      Right Ear: External ear normal.      Left Ear: External ear normal.      Nose: Nose normal. No rhinorrhea.      Mouth/Throat:      Pharynx: No oropharyngeal exudate or posterior oropharyngeal erythema.   Eyes:      General: No scleral icterus.        Right eye: No discharge.         Left eye: No discharge.      Conjunctiva/sclera: Conjunctivae normal.   Neck:      Vascular: No carotid bruit.   Cardiovascular:      Rate and Rhythm: Normal rate and regular rhythm.      Heart sounds: No murmur heard.  Pulmonary:      Effort: Pulmonary effort is normal. No respiratory distress.      Breath sounds: Normal breath sounds.   Abdominal:      General: Abdomen is flat. There is no distension.      Palpations: Abdomen is soft. There is no mass.   Musculoskeletal:         General: No  tenderness.      Cervical back: No rigidity. No muscular tenderness.      Right lower leg: Edema present.      Left lower leg: Edema present.   Skin:     General: Skin is warm and dry.      Coloration: Skin is not jaundiced.   Neurological:      General: No focal deficit present.      Mental Status: She is alert and oriented to person, place, and time. Mental status is at baseline.   Psychiatric:         Mood and Affect: Mood normal.         Behavior: Behavior normal.         Thought Content: Thought content normal.         Fluids    Intake/Output Summary (Last 24 hours) at 10/10/2023 1350  Last data filed at 10/10/2023 0900  Gross per 24 hour   Intake 360 ml   Output 3900 ml   Net -3540 ml       Laboratory  Recent Labs     10/08/23  1005 10/09/23  0600 10/09/23  1508 10/10/23  0530   WBC 5.9 5.6  --  6.0   RBC 2.83* 2.37*  --  2.85*   HEMOGLOBIN 8.1* 6.7* 8.3* 7.9*   HEMATOCRIT 25.3* 21.5*  --  25.3*   MCV 89.4 90.7  --  88.8   MCH 28.6 28.3  --  27.7   MCHC 32.0* 31.2*  --  31.2*   RDW 46.5 47.4  --  45.8   PLATELETCT 199 182  --  208   MPV 10.4 10.6  --  10.5     Recent Labs     10/08/23  1005 10/09/23  0600 10/10/23  0530   SODIUM 140 138 142   POTASSIUM 4.1 4.2 3.9   CHLORIDE 109 108 110   CO2 20 19* 21   GLUCOSE 113* 87 93   BUN 51* 61* 61*   CREATININE 2.74* 3.19* 3.36*   CALCIUM 10.5 9.9 10.4     Recent Labs     10/10/23  0840   INR 1.22*     Recent Labs     10/08/23  1005   NTPROBNP 1673*           Imaging  EC-ECHOCARDIOGRAM COMPLETE W/O CONT   Final Result      DX-CHEST-PORTABLE (1 VIEW)   Final Result      1.  Low lung volumes without definite acute cardiopulmonary abnormality.      US-RENAL   Final Result      1.  Normal renal ultrasound.      CT-NEEDLE BX-RENAL    (Results Pending)         Assessment/Plan  1 RAO: Etiology is not very clear, I am concerned it is acute GN  2 volume overload  3 respiratory failure  4 obesity  5 knee joint infection  no acute need for HD, continue to evaluate  daily  Awaiting final serology results  Awaiting urine protein to creatinine ratio  Plan for kidney biopsy  Renal diet  Daily BMP, CBC.  Renal dose all meds  Avoid nephrotoxins like NSAIDs.  Prognosis guarded.

## 2023-10-11 LAB
ANION GAP SERPL CALC-SCNC: 11 MMOL/L (ref 7–16)
BUN SERPL-MCNC: 63 MG/DL (ref 8–22)
CALCIUM SERPL-MCNC: 10.6 MG/DL (ref 8.5–10.5)
CHLORIDE SERPL-SCNC: 105 MMOL/L (ref 96–112)
CO2 SERPL-SCNC: 22 MMOL/L (ref 20–33)
CREAT SERPL-MCNC: 3.14 MG/DL (ref 0.5–1.4)
CREAT UR-MCNC: 15.48 MG/DL
ERYTHROCYTE [DISTWIDTH] IN BLOOD BY AUTOMATED COUNT: 44.4 FL (ref 35.9–50)
GFR SERPLBLD CREATININE-BSD FMLA CKD-EPI: 16 ML/MIN/1.73 M 2
GLUCOSE SERPL-MCNC: 92 MG/DL (ref 65–99)
HCT VFR BLD AUTO: 26.5 % (ref 37–47)
HGB BLD-MCNC: 8.4 G/DL (ref 12–16)
MCH RBC QN AUTO: 27.4 PG (ref 27–33)
MCHC RBC AUTO-ENTMCNC: 31.7 G/DL (ref 32.2–35.5)
MCV RBC AUTO: 86.3 FL (ref 81.4–97.8)
MICROALBUMIN UR-MCNC: 4 MG/DL
MICROALBUMIN/CREAT UR: 258 MG/G (ref 0–30)
MYELOPEROXIDASE AB SER-ACNC: 0 AU/ML (ref 0–19)
NUCLEAR IGG SER QL IA: DETECTED
PLATELET # BLD AUTO: 216 K/UL (ref 164–446)
PMV BLD AUTO: 10.5 FL (ref 9–12.9)
POTASSIUM SERPL-SCNC: 3.6 MMOL/L (ref 3.6–5.5)
PROTEINASE3 AB SER-ACNC: 0 AU/ML (ref 0–19)
RBC # BLD AUTO: 3.07 M/UL (ref 4.2–5.4)
SODIUM SERPL-SCNC: 138 MMOL/L (ref 135–145)
WBC # BLD AUTO: 5.8 K/UL (ref 4.8–10.8)

## 2023-10-11 PROCEDURE — 82570 ASSAY OF URINE CREATININE: CPT

## 2023-10-11 PROCEDURE — 82043 UR ALBUMIN QUANTITATIVE: CPT

## 2023-10-11 PROCEDURE — 97530 THERAPEUTIC ACTIVITIES: CPT | Mod: CQ

## 2023-10-11 PROCEDURE — 97110 THERAPEUTIC EXERCISES: CPT | Mod: CQ

## 2023-10-11 PROCEDURE — 97530 THERAPEUTIC ACTIVITIES: CPT

## 2023-10-11 PROCEDURE — A9270 NON-COVERED ITEM OR SERVICE: HCPCS | Performed by: STUDENT IN AN ORGANIZED HEALTH CARE EDUCATION/TRAINING PROGRAM

## 2023-10-11 PROCEDURE — 99232 SBSQ HOSP IP/OBS MODERATE 35: CPT | Performed by: INTERNAL MEDICINE

## 2023-10-11 PROCEDURE — 700102 HCHG RX REV CODE 250 W/ 637 OVERRIDE(OP): Performed by: STUDENT IN AN ORGANIZED HEALTH CARE EDUCATION/TRAINING PROGRAM

## 2023-10-11 PROCEDURE — 99232 SBSQ HOSP IP/OBS MODERATE 35: CPT | Performed by: STUDENT IN AN ORGANIZED HEALTH CARE EDUCATION/TRAINING PROGRAM

## 2023-10-11 PROCEDURE — 85027 COMPLETE CBC AUTOMATED: CPT

## 2023-10-11 PROCEDURE — 302098 PASTE RING (FLAT): Performed by: STUDENT IN AN ORGANIZED HEALTH CARE EDUCATION/TRAINING PROGRAM

## 2023-10-11 PROCEDURE — 770006 HCHG ROOM/CARE - MED/SURG/GYN SEMI*

## 2023-10-11 PROCEDURE — 700111 HCHG RX REV CODE 636 W/ 250 OVERRIDE (IP): Mod: JZ | Performed by: STUDENT IN AN ORGANIZED HEALTH CARE EDUCATION/TRAINING PROGRAM

## 2023-10-11 PROCEDURE — 80048 BASIC METABOLIC PNL TOTAL CA: CPT

## 2023-10-11 PROCEDURE — A9270 NON-COVERED ITEM OR SERVICE: HCPCS | Performed by: INTERNAL MEDICINE

## 2023-10-11 PROCEDURE — 97535 SELF CARE MNGMENT TRAINING: CPT

## 2023-10-11 PROCEDURE — 700102 HCHG RX REV CODE 250 W/ 637 OVERRIDE(OP): Performed by: INTERNAL MEDICINE

## 2023-10-11 RX ORDER — FUROSEMIDE 40 MG/1
40 TABLET ORAL DAILY
Status: DISCONTINUED | OUTPATIENT
Start: 2023-10-12 | End: 2023-10-22 | Stop reason: HOSPADM

## 2023-10-11 RX ADMIN — OXYCODONE HYDROCHLORIDE 15 MG: 15 TABLET ORAL at 10:02

## 2023-10-11 RX ADMIN — OXYCODONE HYDROCHLORIDE 15 MG: 15 TABLET ORAL at 20:10

## 2023-10-11 RX ADMIN — GUAIFENESIN 600 MG: 600 TABLET, EXTENDED RELEASE ORAL at 05:27

## 2023-10-11 RX ADMIN — SENNOSIDES 8.6 MG: 8.6 TABLET, FILM COATED ORAL at 17:06

## 2023-10-11 RX ADMIN — SENNOSIDES 8.6 MG: 8.6 TABLET, FILM COATED ORAL at 05:27

## 2023-10-11 RX ADMIN — GUAIFENESIN 600 MG: 600 TABLET, EXTENDED RELEASE ORAL at 17:06

## 2023-10-11 RX ADMIN — FUROSEMIDE 40 MG: 10 INJECTION INTRAMUSCULAR; INTRAVENOUS at 05:27

## 2023-10-11 RX ADMIN — OMEPRAZOLE 20 MG: 20 CAPSULE, DELAYED RELEASE ORAL at 05:27

## 2023-10-11 RX ADMIN — OMEPRAZOLE 20 MG: 20 CAPSULE, DELAYED RELEASE ORAL at 17:06

## 2023-10-11 RX ADMIN — CITALOPRAM 40 MG: 40 TABLET, FILM COATED ORAL at 05:28

## 2023-10-11 RX ADMIN — POLYETHYLENE GLYCOL 3350 1 PACKET: 17 POWDER, FOR SOLUTION ORAL at 05:27

## 2023-10-11 ASSESSMENT — COGNITIVE AND FUNCTIONAL STATUS - GENERAL
DAILY ACTIVITIY SCORE: 14
TURNING FROM BACK TO SIDE WHILE IN FLAT BAD: UNABLE
HELP NEEDED FOR BATHING: A LOT
STANDING UP FROM CHAIR USING ARMS: TOTAL
SUGGESTED CMS G CODE MODIFIER DAILY ACTIVITY: CK
DRESSING REGULAR UPPER BODY CLOTHING: A LOT
DRESSING REGULAR LOWER BODY CLOTHING: A LOT
SUGGESTED CMS G CODE MODIFIER MOBILITY: CN
TOILETING: TOTAL
MOVING TO AND FROM BED TO CHAIR: UNABLE
MOBILITY SCORE: 6
MOVING FROM LYING ON BACK TO SITTING ON SIDE OF FLAT BED: UNABLE
PERSONAL GROOMING: A LITTLE
WALKING IN HOSPITAL ROOM: TOTAL
CLIMB 3 TO 5 STEPS WITH RAILING: TOTAL

## 2023-10-11 ASSESSMENT — ENCOUNTER SYMPTOMS
ABDOMINAL PAIN: 0
SENSORY CHANGE: 0
SHORTNESS OF BREATH: 1
CONSTIPATION: 0
FEVER: 0
CHILLS: 0
BLURRED VISION: 0
MYALGIAS: 1
VOMITING: 0
MYALGIAS: 0
COUGH: 0
FOCAL WEAKNESS: 0
WEAKNESS: 1
DIARRHEA: 0
SHORTNESS OF BREATH: 0
COUGH: 1
NAUSEA: 0

## 2023-10-11 ASSESSMENT — PAIN DESCRIPTION - PAIN TYPE
TYPE: ACUTE PAIN

## 2023-10-11 ASSESSMENT — GAIT ASSESSMENTS: GAIT LEVEL OF ASSIST: UNABLE TO PARTICIPATE

## 2023-10-11 NOTE — PROGRESS NOTES
Nephrology Daily Progress Note    Date of Service  10/11/2023    Chief Complaint  58 y.o. female admitted 9/29/2023 with a knee joint infection, developed RAO and volume overload    Interval Problem Update  Patient has no chest pain, shortness of breath slightly better  Still complaining of generalized muscle aches  10/11 patient complaining of joint and muscle pain  No chest pain, no shortness of breath  Review of Systems  Review of Systems   Constitutional:  Negative for chills, fever and malaise/fatigue.   Respiratory:  Negative for cough and shortness of breath.    Cardiovascular:  Negative for chest pain and leg swelling.   Gastrointestinal:  Negative for nausea and vomiting.   Genitourinary:  Negative for dysuria, frequency and urgency.   Musculoskeletal:  Positive for myalgias.        Physical Exam  Temp:  [36.1 °C (96.9 °F)-36.6 °C (97.8 °F)] 36.1 °C (96.9 °F)  Pulse:  [63-71] 63  Resp:  [18] 18  BP: (142-163)/(88-97) 142/93  SpO2:  [95 %-99 %] 96 %    Physical Exam  Vitals and nursing note reviewed.   Constitutional:       General: She is awake. She is not in acute distress.     Appearance: She is well-developed. She is ill-appearing. She is not diaphoretic.   HENT:      Head: Normocephalic and atraumatic.      Right Ear: External ear normal.      Left Ear: External ear normal.      Nose: Nose normal. No rhinorrhea.      Mouth/Throat:      Pharynx: No oropharyngeal exudate or posterior oropharyngeal erythema.   Eyes:      General: No scleral icterus.        Right eye: No discharge.         Left eye: No discharge.      Conjunctiva/sclera: Conjunctivae normal.   Neck:      Vascular: No carotid bruit.   Cardiovascular:      Rate and Rhythm: Normal rate and regular rhythm.      Heart sounds: No murmur heard.  Pulmonary:      Effort: Pulmonary effort is normal. No respiratory distress.      Breath sounds: Normal breath sounds.   Abdominal:      General: Abdomen is flat. There is no distension.      Palpations:  "Abdomen is soft. There is no mass.   Musculoskeletal:         General: No tenderness.      Cervical back: No rigidity. No muscular tenderness.      Right lower leg: Edema present.      Left lower leg: Edema present.   Skin:     General: Skin is warm and dry.      Coloration: Skin is not jaundiced.   Neurological:      General: No focal deficit present.      Mental Status: She is alert and oriented to person, place, and time. Mental status is at baseline.   Psychiatric:         Mood and Affect: Mood normal.         Behavior: Behavior normal.         Thought Content: Thought content normal.         Fluids    Intake/Output Summary (Last 24 hours) at 10/11/2023 1342  Last data filed at 10/11/2023 0831  Gross per 24 hour   Intake 240 ml   Output 4900 ml   Net -4660 ml         Laboratory  Recent Labs     10/09/23  0600 10/09/23  1508 10/10/23  0530 10/11/23  0540   WBC 5.6  --  6.0 5.8   RBC 2.37*  --  2.85* 3.07*   HEMOGLOBIN 6.7* 8.3* 7.9* 8.4*   HEMATOCRIT 21.5*  --  25.3* 26.5*   MCV 90.7  --  88.8 86.3   MCH 28.3  --  27.7 27.4   MCHC 31.2*  --  31.2* 31.7*   RDW 47.4  --  45.8 44.4   PLATELETCT 182  --  208 216   MPV 10.6  --  10.5 10.5       Recent Labs     10/09/23  0600 10/10/23  0530 10/11/23  0540   SODIUM 138 142 138   POTASSIUM 4.2 3.9 3.6   CHLORIDE 108 110 105   CO2 19* 21 22   GLUCOSE 87 93 92   BUN 61* 61* 63*   CREATININE 3.19* 3.36* 3.14*   CALCIUM 9.9 10.4 10.6*       Recent Labs     10/10/23  0840   INR 1.22*       No results for input(s): \"NTPROBNP\" in the last 72 hours.          Imaging  EC-ECHOCARDIOGRAM COMPLETE W/O CONT   Final Result      DX-CHEST-PORTABLE (1 VIEW)   Final Result      1.  Low lung volumes without definite acute cardiopulmonary abnormality.      US-RENAL   Final Result      1.  Normal renal ultrasound.      CT-NEEDLE BX-RENAL    (Results Pending)         Assessment/Plan  1 RAO: Etiology is not very clear, complement level and ANCA is normal  2 volume overload  3 respiratory " failure  4 obesity  5 knee joint infection  no acute need for HD, evaluate daily  Await the kidney biopsy  Renal diet  Daily BMP, CBC.  Renal dose all meds  Avoid nephrotoxins like NSAIDs.  Prognosis guarded.  Plan discussed with Dr. Pringle
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
## 2023-10-11 NOTE — CARE PLAN
The patient is Watcher - Medium risk of patient condition declining or worsening    Shift Goals  Clinical Goals: HGB, urine sample, schedule CT  Patient Goals: comfort,  Family Goals: ag    Progress made toward(s) clinical / shift goals:    Problem: Respiratory  Goal: Patient will achieve/maintain optimum respiratory ventilation and gas exchange  Description: Target End Date:  Prior to discharge or change in level of care    Document on Assessment flowsheet    1.  Assess and monitor rate, rhythm, depth and effort of respiration  2.  Breath sounds assessed qshift and/or as needed  3.  Assess O2 saturation, administer/titrate oxygen as ordered  4.  Position patient for maximum ventilatory efficiency  5.  Turn, cough, and deep breath with splinting to improve effectiveness  6.  Collaborate with RT to administer medication/treatments per order  7.  Encourage use of incentive spirometer and encourage patient to cough after use and utilize splinting techniques if applicable  8.  Airway suctioning  9.  Monitor sputum production for changes in color, consistency and frequency  10. Perform frequent oral hygiene  11. Alternate physical activity with rest periods  Outcome: Progressing       Patient is not progressing towards the following goals:

## 2023-10-11 NOTE — THERAPY
Physical Therapy   Daily Treatment     Patient Name: Patricia A Tietz  Age:  58 y.o., Sex:  female  Medical Record #: [id number]  Today's Date: 10/11/2023     Precautions  Precautions: Fall Risk;Weight Bearing As Tolerated Left Lower Extremity  Comments: Gentle ROM. Urostomy    Assessment    Pt willing to participate w/PT. Initiated tx session w/PROM Lft knee working on knee extension w/re-education on no pillows under the knee. Functionally the pt requires mod->total assist for bed mobility and did manage 1 STS w/total assist for linen change. The pt will need post acute placement prior to returning home alone.   PT will cont to follow.     Plan    Treatment Plan Status: (P) Continue Current Treatment Plan  Type of Treatment: Bed Mobility, Therapeutic Activities, Gait Training  Treatment Frequency: 3 Times per Week  Treatment Duration: Until Therapy Goals Met    DC Equipment Recommendations: (P) Unable to determine at this time  Discharge Recommendations: (P) Recommend post-acute placement for additional physical therapy services prior to discharge home    Objective       10/11/23 1140   Charge Group   PT Therapeutic Activities (Units) 2   PT Therapeutic Exercise (Units) 1   Total Time Spent   PT Therapeutic Activities Time Spent (Mins) 30   PT Therapeutic Exercise Time Spent (Mins) 8   PT Total Time Spent (Calculated) 38   Precautions   Precautions Fall Risk;Weight Bearing As Tolerated Left Lower Extremity   Comments Gentle ROM. Urostomy   Pain 0 - 10 Group   Location Back   Pain Rating Scale (NPRS) 3   Other Treatments   Other Treatments Provided Initiated tx session w/PROM Lft LE working on knee extension. EOB x 15 mins w/distant supervision.   Balance   Sitting Balance (Static) Fair +   Sitting Balance (Dynamic) Fair -   Standing Balance (Static) Poor -   Standing Balance (Dynamic) Dependent   Weight Shift Sitting Poor   Weight Shift Standing Absent   Bed Mobility    Supine to Sit Maximal Assist  (w/HOB elevated)    Sit to Supine Total Assist   Scooting Maximal Assist  (seated)   Rolling Total Assist to Rt.;Total Assist to Lt.   Gait Analysis   Gait Level Of Assist Unable to Participate   Functional Mobility   Sit to Stand Total Assist  (from EOB long enough for linen change)   Bed, Chair, Wheelchair Transfer Unable to Participate   How much difficulty does the patient currently have...   Turning over in bed (including adjusting bedclothes, sheets and blankets)? 1   Sitting down on and standing up from a chair with arms (e.g., wheelchair, bedside commode, etc.) 1   Moving from lying on back to sitting on the side of the bed? 1   How much help from another person does the patient currently need...   Moving to and from a bed to a chair (including a wheelchair)? 1   Need to walk in a hospital room? 1   Climbing 3-5 steps with a railing? 1   6 clicks Mobility Score 6   Short Term Goals    Short Term Goal # 1 Pt to move to/from eob w/ use of bed features and spv in 6 visits to improve fxl indep   Goal Outcome # 1 goal not met   Short Term Goal # 2 Pt to move sit to/from stand w/ min assist in 6 visits to improve fxl indep   Goal Outcome # 2 Goal not met   Short Term Goal # 3 Pt to TF bed to/from chair w/ min assist in 6 visits to improve fxl indep   Goal Outcome # 3 Goal not met   Education Group   Role of Physical Therapist Patient Response Patient;Acceptance;Demonstration;Reinforcement Needed   Physical Therapy Treatment Plan   Physical Therapy Treatment Plan Continue Current Treatment Plan   Anticipated Discharge Equipment and Recommendations   DC Equipment Recommendations Unable to determine at this time   Discharge Recommendations Recommend post-acute placement for additional physical therapy services prior to discharge home   Interdisciplinary Plan of Care Collaboration   IDT Collaboration with  Nursing   Patient Position at End of Therapy In Bed;Call Light within Reach;Tray Table within Reach;Phone within Reach    Collaboration Comments Nrsg witnessed pt tx efforts.   Session Information   Date / Session Number  10/11--3 (1/3, 10/15) PTA/1   Supervising Physical Therapist (PTA Treatments Only)   Supervising Physical Therapist Regina Garvin

## 2023-10-11 NOTE — CARE PLAN
The patient is Stable - Low risk of patient condition declining or worsening    Shift Goals  Clinical Goals: obtain urine sample  Patient Goals: complete biopsy  Family Goals: ag    Progress made toward(s) clinical / shift goals:    Problem: Wound/ / Incision Healing  Goal: Patient's wound/surgical incision will decrease in size and heals properly  Outcome: Progressing       Patient is not progressing towards the following goals:    Has a pending urine sample collection. Supplies for urostomy bag change ordered and delivered to the floor. Patient wants the bag changed only if paste rings are used to secure it , which are not available on the floor, order placed.

## 2023-10-11 NOTE — CARE PLAN
Patient is A&O*4.  Has a pending urine sample collection. Supplies for urostomy bag change ordered and delivered to the floor. Patient wants the bag changed only if paste rings are used to secure it , which are not available on the floor, order placed.   All needs met at this time.call light within reach.       The patient is Stable - Low risk of patient condition declining or worsening    Shift Goals  Clinical Goals: get a urib=ne sample, patient will have a pain rating of less than 3 during this shift  Patient Goals: sleep  Family Goals: ag    Progress made toward(s) clinical / shift goals:    Problem: Wound/ / Incision Healing  Goal: Patient's wound/surgical incision will decrease in size and heals properly  10/11/2023 0615 by Betty Nunez R.N.  Outcome: Progressing  10/11/2023 0338 by Betty Nunez R.N.  Outcome: Progressing     Problem: Respiratory  Goal: Patient will achieve/maintain optimum respiratory ventilation and gas exchange  10/11/2023 0615 by Betty Nunez R.N.  Outcome: Progressing  10/11/2023 0338 by Betty Nunez R.N.  Outcome: Progressing       Patient is not progressing towards the following goals:

## 2023-10-12 ENCOUNTER — APPOINTMENT (OUTPATIENT)
Dept: RADIOLOGY | Facility: MEDICAL CENTER | Age: 58
DRG: 368 | End: 2023-10-12
Attending: INTERNAL MEDICINE
Payer: MEDICARE

## 2023-10-12 LAB
ANA INTERPRETIVE COMMENT Q5143: ABNORMAL
ANA PATTERN Q5144: ABNORMAL
ANA TITER Q5145: ABNORMAL
ANION GAP SERPL CALC-SCNC: 12 MMOL/L (ref 7–16)
ANTINUCLEAR ANTIBODY (ANA), HEP-2, IGG Q5142: DETECTED
BUN SERPL-MCNC: 66 MG/DL (ref 8–22)
CALCIUM SERPL-MCNC: 9.9 MG/DL (ref 8.5–10.5)
CHLORIDE SERPL-SCNC: 104 MMOL/L (ref 96–112)
CO2 SERPL-SCNC: 22 MMOL/L (ref 20–33)
CREAT SERPL-MCNC: 3.34 MG/DL (ref 0.5–1.4)
CREAT UR-MCNC: 30.82 MG/DL
DSDNA AB TITR SER CLIF: NORMAL {TITER}
GFR SERPLBLD CREATININE-BSD FMLA CKD-EPI: 15 ML/MIN/1.73 M 2
GLUCOSE SERPL-MCNC: 91 MG/DL (ref 65–99)
HGB BLD-MCNC: 8.7 G/DL (ref 12–16)
MICROALBUMIN UR-MCNC: 7.5 MG/DL
MICROALBUMIN/CREAT UR: 243 MG/G (ref 0–30)
POTASSIUM SERPL-SCNC: 3.5 MMOL/L (ref 3.6–5.5)
SODIUM SERPL-SCNC: 138 MMOL/L (ref 135–145)

## 2023-10-12 PROCEDURE — 700102 HCHG RX REV CODE 250 W/ 637 OVERRIDE(OP): Performed by: INTERNAL MEDICINE

## 2023-10-12 PROCEDURE — A9270 NON-COVERED ITEM OR SERVICE: HCPCS | Performed by: STUDENT IN AN ORGANIZED HEALTH CARE EDUCATION/TRAINING PROGRAM

## 2023-10-12 PROCEDURE — 700102 HCHG RX REV CODE 250 W/ 637 OVERRIDE(OP): Performed by: STUDENT IN AN ORGANIZED HEALTH CARE EDUCATION/TRAINING PROGRAM

## 2023-10-12 PROCEDURE — 770006 HCHG ROOM/CARE - MED/SURG/GYN SEMI*

## 2023-10-12 PROCEDURE — 82570 ASSAY OF URINE CREATININE: CPT

## 2023-10-12 PROCEDURE — 99232 SBSQ HOSP IP/OBS MODERATE 35: CPT | Performed by: STUDENT IN AN ORGANIZED HEALTH CARE EDUCATION/TRAINING PROGRAM

## 2023-10-12 PROCEDURE — A9270 NON-COVERED ITEM OR SERVICE: HCPCS | Performed by: INTERNAL MEDICINE

## 2023-10-12 PROCEDURE — 99232 SBSQ HOSP IP/OBS MODERATE 35: CPT | Performed by: INTERNAL MEDICINE

## 2023-10-12 PROCEDURE — 700105 HCHG RX REV CODE 258: Performed by: STUDENT IN AN ORGANIZED HEALTH CARE EDUCATION/TRAINING PROGRAM

## 2023-10-12 PROCEDURE — 82043 UR ALBUMIN QUANTITATIVE: CPT

## 2023-10-12 PROCEDURE — 80048 BASIC METABOLIC PNL TOTAL CA: CPT

## 2023-10-12 PROCEDURE — 85018 HEMOGLOBIN: CPT

## 2023-10-12 PROCEDURE — 700111 HCHG RX REV CODE 636 W/ 250 OVERRIDE (IP): Mod: JZ | Performed by: STUDENT IN AN ORGANIZED HEALTH CARE EDUCATION/TRAINING PROGRAM

## 2023-10-12 RX ORDER — MIDAZOLAM HYDROCHLORIDE 1 MG/ML
INJECTION INTRAMUSCULAR; INTRAVENOUS
Status: COMPLETED
Start: 2023-10-12 | End: 2023-10-12

## 2023-10-12 RX ORDER — AMOXICILLIN 250 MG
1 CAPSULE ORAL 2 TIMES DAILY
Status: DISCONTINUED | OUTPATIENT
Start: 2023-10-12 | End: 2023-10-22 | Stop reason: HOSPADM

## 2023-10-12 RX ADMIN — DOCUSATE SODIUM 50 MG AND SENNOSIDES 8.6 MG 1 TABLET: 8.6; 5 TABLET, FILM COATED ORAL at 17:18

## 2023-10-12 RX ADMIN — DAPTOMYCIN 1000 MG: 500 INJECTION, POWDER, LYOPHILIZED, FOR SOLUTION INTRAVENOUS at 17:40

## 2023-10-12 RX ADMIN — OMEPRAZOLE 20 MG: 20 CAPSULE, DELAYED RELEASE ORAL at 05:17

## 2023-10-12 RX ADMIN — GUAIFENESIN 600 MG: 600 TABLET, EXTENDED RELEASE ORAL at 17:18

## 2023-10-12 RX ADMIN — GUAIFENESIN 600 MG: 600 TABLET, EXTENDED RELEASE ORAL at 05:17

## 2023-10-12 RX ADMIN — OXYCODONE HYDROCHLORIDE 15 MG: 15 TABLET ORAL at 21:15

## 2023-10-12 RX ADMIN — OXYCODONE HYDROCHLORIDE 15 MG: 15 TABLET ORAL at 08:37

## 2023-10-12 RX ADMIN — CITALOPRAM 40 MG: 40 TABLET, FILM COATED ORAL at 05:17

## 2023-10-12 RX ADMIN — OMEPRAZOLE 20 MG: 20 CAPSULE, DELAYED RELEASE ORAL at 17:18

## 2023-10-12 RX ADMIN — SENNOSIDES 8.6 MG: 8.6 TABLET, FILM COATED ORAL at 05:17

## 2023-10-12 RX ADMIN — FUROSEMIDE 40 MG: 40 TABLET ORAL at 05:17

## 2023-10-12 RX ADMIN — FERROUS SULFATE TAB 325 MG (65 MG ELEMENTAL FE) 325 MG: 325 (65 FE) TAB at 14:48

## 2023-10-12 ASSESSMENT — ENCOUNTER SYMPTOMS
MYALGIAS: 1
CONSTIPATION: 0
COUGH: 0
SHORTNESS OF BREATH: 0
COUGH: 1
BLURRED VISION: 0
CHILLS: 0
MYALGIAS: 0
SENSORY CHANGE: 0
FOCAL WEAKNESS: 0
SHORTNESS OF BREATH: 1
WEAKNESS: 1
ABDOMINAL PAIN: 0
NAUSEA: 0
VOMITING: 0
DIARRHEA: 0
FEVER: 0

## 2023-10-12 ASSESSMENT — PAIN DESCRIPTION - PAIN TYPE
TYPE: ACUTE PAIN

## 2023-10-12 NOTE — PROGRESS NOTES
Pt presents to CT4.     Ms Tietz was consented by MD at bedside, confirmed by this RN and consent signed at bedside. Pt transferred to CT4 table in prone position.  Initial vital signs, including CO2 waveform capnography were monitored and WNL  CT revealed bilateral hydronephrosis with multiple renal cysts. Renal biopsy cancelled by Dr. Alvarado.  No medications given. Pathology aware.    Report called to Ronal MITTAL. Pt transported by stretcher with RN to [id number].     No Specimen:

## 2023-10-12 NOTE — PROGRESS NOTES
Nephrology Daily Progress Note    Date of Service  10/12/2023    Chief Complaint  58 y.o. female admitted 9/29/2023 with a knee joint infection, developed RAO and volume overload    Interval Problem Update  Patient has no chest pain, shortness of breath slightly better  Still complaining of generalized muscle aches  10/11 patient complaining of joint and muscle pain  No chest pain, no shortness of breath  10/12 patient has no uremic symptoms  She went earlier today to have a kidney biopsy, CT scan showed bilateral hydronephrosis  Kidney biopsy was canceled  Review of Systems  Review of Systems   Constitutional:  Negative for chills, fever and malaise/fatigue.   Respiratory:  Negative for cough and shortness of breath.    Cardiovascular:  Negative for chest pain and leg swelling.   Gastrointestinal:  Negative for nausea and vomiting.   Genitourinary:  Negative for dysuria, frequency and urgency.   Musculoskeletal:  Positive for myalgias.        Physical Exam  Temp:  [36.1 °C (97 °F)-37.1 °C (98.7 °F)] 36.4 °C (97.6 °F)  Pulse:  [61-79] 79  Resp:  [16-25] 25  BP: (121-158)/(72-92) 147/83  SpO2:  [94 %-100 %] 94 %    Physical Exam  Vitals and nursing note reviewed.   Constitutional:       General: She is awake. She is not in acute distress.     Appearance: She is well-developed. She is ill-appearing. She is not diaphoretic.   HENT:      Head: Normocephalic and atraumatic.      Right Ear: External ear normal.      Left Ear: External ear normal.      Nose: Nose normal. No rhinorrhea.      Mouth/Throat:      Pharynx: No oropharyngeal exudate or posterior oropharyngeal erythema.   Eyes:      General: No scleral icterus.        Right eye: No discharge.         Left eye: No discharge.      Conjunctiva/sclera: Conjunctivae normal.   Neck:      Vascular: No carotid bruit.   Cardiovascular:      Rate and Rhythm: Normal rate and regular rhythm.      Heart sounds: No murmur heard.  Pulmonary:      Effort: Pulmonary effort is  "normal. No respiratory distress.      Breath sounds: Normal breath sounds.   Abdominal:      General: Abdomen is flat. There is no distension.      Palpations: Abdomen is soft. There is no mass.   Musculoskeletal:         General: No tenderness.      Cervical back: No rigidity. No muscular tenderness.      Right lower leg: Edema present.      Left lower leg: Edema present.   Skin:     General: Skin is warm and dry.      Coloration: Skin is not jaundiced.   Neurological:      General: No focal deficit present.      Mental Status: She is alert and oriented to person, place, and time. Mental status is at baseline.   Psychiatric:         Mood and Affect: Mood normal.         Behavior: Behavior normal.         Thought Content: Thought content normal.         Fluids    Intake/Output Summary (Last 24 hours) at 10/12/2023 1441  Last data filed at 10/12/2023 0454  Gross per 24 hour   Intake 200 ml   Output 1500 ml   Net -1300 ml         Laboratory  Recent Labs     10/10/23  0530 10/11/23  0540 10/12/23  0325   WBC 6.0 5.8  --    RBC 2.85* 3.07*  --    HEMOGLOBIN 7.9* 8.4* 8.7*   HEMATOCRIT 25.3* 26.5*  --    MCV 88.8 86.3  --    MCH 27.7 27.4  --    MCHC 31.2* 31.7*  --    RDW 45.8 44.4  --    PLATELETCT 208 216  --    MPV 10.5 10.5  --        Recent Labs     10/10/23  0530 10/11/23  0540 10/12/23  0325   SODIUM 142 138 138   POTASSIUM 3.9 3.6 3.5*   CHLORIDE 110 105 104   CO2 21 22 22   GLUCOSE 93 92 91   BUN 61* 63* 66*   CREATININE 3.36* 3.14* 3.34*   CALCIUM 10.4 10.6* 9.9       Recent Labs     10/10/23  0840   INR 1.22*       No results for input(s): \"NTPROBNP\" in the last 72 hours.          Imaging  EC-ECHOCARDIOGRAM COMPLETE W/O CONT   Final Result      DX-CHEST-PORTABLE (1 VIEW)   Final Result      1.  Low lung volumes without definite acute cardiopulmonary abnormality.      US-RENAL   Final Result      1.  Normal renal ultrasound.      CT-ABORTED CT PROCEDURE    (Results Pending)         Assessment/Plan  1 RAO: " Etiology is not very clear, however abdominal CT scan shows hydronephrosis, even though prior ultrasound showed no hydronephrosis  2 volume overload  3 respiratory failure  4 obesity  5 knee joint infection  no acute need for HD  Cancel the kidney biopsy  Urology evaluation  Renal diet  Daily BMP, CBC.  Renal dose all meds  Avoid nephrotoxins like NSAIDs.  Prognosis guarded.  Plan discussed with Dr. Pringle

## 2023-10-12 NOTE — PROGRESS NOTES
Hospital Medicine Daily Progress Note    Date of Service  10/12/2023    Chief Complaint  Patricia A Tietz is a 58 y.o. female admitted 9/29/2023 with anemia    Hospital Course  57 yo woman with DEBRA, urostomy, recent left knee arthroplasty complicated by infection, status post I&D with antibiotic spacer on daptomycin for MRSA, history of DVT on Eliquis who has been at SNF and found her hemoglobin to be low at 5.7 and transferred to [facility].  She has not had signs of bleeding.  GI was consulted.  EGD showed 6 white debris in esophagus, biopsies taken, diverticula in duodenum.  Hemoglobin has been stable postoperatively.  She has an RAO that has not improved.  She is very edematous and started on IV Lasix.  BNP was high, echocardiogram ordered to assess for heart failure, valve disease. Echo did not suggestive CHF and Lasix did not improve resp status or RAO. Nephro consulted, rec renal bx.     Interval Problem Update  Prior to kidney bx CT noted bilateral hydronephrosis despite normal renal US so bx aborted, urology consulted plan for loopogram, hgb stable no further signs of bleeding     I have discussed this patient's plan of care and discharge plan at IDT rounds today with Case Management, Nursing, Nursing leadership, and other members of the IDT team.    Consultants/Specialty  GI, nephrology    Code Status  Full Code    Disposition  The patient is not medically cleared for discharge to home or a post-acute facility.  Anticipate discharge to: home with close outpatient follow-up    I have placed the appropriate orders for post-discharge needs.    Review of Systems  Review of Systems   Constitutional:  Positive for malaise/fatigue. Negative for chills and fever.   HENT:  Positive for congestion.    Eyes:  Negative for blurred vision.   Respiratory:  Positive for cough and shortness of breath.    Cardiovascular:  Positive for leg swelling. Negative for chest pain.   Gastrointestinal:  Negative for abdominal pain,  constipation, diarrhea, nausea and vomiting.   Genitourinary:  Negative for dysuria.   Musculoskeletal:  Negative for myalgias.   Skin:  Negative for rash.   Neurological:  Positive for weakness. Negative for sensory change and focal weakness.        Physical Exam  Temp:  [36.1 °C (97 °F)-37.1 °C (98.7 °F)] 36.4 °C (97.6 °F)  Pulse:  [61-79] 79  Resp:  [16-25] 25  BP: (121-158)/(72-92) 147/83  SpO2:  [94 %-100 %] 94 %    Physical Exam  Constitutional:       General: She is not in acute distress.     Appearance: Normal appearance.   HENT:      Head: Normocephalic and atraumatic.      Nose: Nose normal.      Mouth/Throat:      Mouth: Mucous membranes are moist.      Pharynx: Oropharynx is clear.   Eyes:      Conjunctiva/sclera: Conjunctivae normal.      Pupils: Pupils are equal, round, and reactive to light.   Cardiovascular:      Rate and Rhythm: Normal rate and regular rhythm.      Heart sounds: No murmur heard.  Pulmonary:      Effort: Pulmonary effort is normal.      Breath sounds: No wheezing, rhonchi or rales.   Abdominal:      General: There is no distension.      Palpations: Abdomen is soft.      Tenderness: There is no abdominal tenderness. There is no guarding or rebound.   Musculoskeletal:         General: No swelling or tenderness.      Cervical back: Normal range of motion and neck supple.      Right lower leg: Edema present.      Left lower leg: Edema present.   Skin:     General: Skin is warm and dry.   Neurological:      Mental Status: She is alert.      GCS: GCS eye subscore is 4. GCS verbal subscore is 5. GCS motor subscore is 6.      Cranial Nerves: No facial asymmetry.   Psychiatric:         Mood and Affect: Mood normal.         Behavior: Behavior normal.         Fluids    Intake/Output Summary (Last 24 hours) at 10/12/2023 1602  Last data filed at 10/12/2023 0454  Gross per 24 hour   Intake 200 ml   Output 1500 ml   Net -1300 ml         Laboratory  Recent Labs     10/10/23  0533 10/11/23  7618  10/12/23  0325   WBC 6.0 5.8  --    RBC 2.85* 3.07*  --    HEMOGLOBIN 7.9* 8.4* 8.7*   HEMATOCRIT 25.3* 26.5*  --    MCV 88.8 86.3  --    MCH 27.7 27.4  --    MCHC 31.2* 31.7*  --    RDW 45.8 44.4  --    PLATELETCT 208 216  --    MPV 10.5 10.5  --        Recent Labs     10/10/23  0530 10/11/23  0540 10/12/23  0325   SODIUM 142 138 138   POTASSIUM 3.9 3.6 3.5*   CHLORIDE 110 105 104   CO2 21 22 22   GLUCOSE 93 92 91   BUN 61* 63* 66*   CREATININE 3.36* 3.14* 3.34*   CALCIUM 10.4 10.6* 9.9       Recent Labs     10/10/23  0840   INR 1.22*                 Imaging  EC-ECHOCARDIOGRAM COMPLETE W/O CONT   Final Result      DX-CHEST-PORTABLE (1 VIEW)   Final Result      1.  Low lung volumes without definite acute cardiopulmonary abnormality.      US-RENAL   Final Result      1.  Normal renal ultrasound.      CT-ABORTED CT PROCEDURE    (Results Pending)   DX-LOOPOGRAM (ILEAL CONDUIT)    (Results Pending)          Assessment/Plan  * UGIB (upper gastrointestinal bleed)- (present on admission)  Assessment & Plan  Found to have hemoglobin 5.7, given fluids, 1 unit of packed red blood cell, Protonix.  FOBT was positive in the other facility.   10/1 EGD this morning, revealed thick whitish debris in esophagus and mucosal fragility.  Biopsies which showed benign mucosal ulceration with acute on chronic inflammation  Following this Hgb trended down to <7 again and required another 1 unit PRBC on 10/9  -Continue PPI  -Transfuse for Hgb <7        Hypoxia  Assessment & Plan  BNP elevated but CXR does not suggest edema and no improvement with aggressive diuresis, Echo shows EF 65%, low lung volumes with likely atelectasis with BMI 52 as well as anemia requiring transfusions all of which are likely contributing  -Lasix at home dose  -Wean O2 as tolerated    Constipation  Assessment & Plan  Continue bowel protocol    Pacemaker  Assessment & Plan  Noted    Primary hypertension  Assessment & Plan  Hold home metoprolol.      RAO (acute kidney  injury) ([facility])  Assessment & Plan  Cre baseline 1.2, peaked here at ~3.5, hx of urostomy with ileal conduit since childhood  Renal ultrasound was unremarkable  FeNa >1%  Urine culture negative for infection  No improvement with initiation or deescalation of Lasix  TTE showed EF 65% w/ mod LVH  Nephro consulted, will appreciate recs  Planned for kidney bx but CT for guidance showed bilateral hydronephrosis not noted on renal US raising concern for urostomy dysfunction, appears to have had hospitalization for this at [facility] in 2022  Urology consulted, will appreciate recs    Acute on chronic anemia  Assessment & Plan  Hb 5.7 at admission   From blood loss 2/2 GIB    History of DVT in adulthood  Assessment & Plan  Eliquis held for for GI bleed per GI    Hx MRSA infection  Assessment & Plan  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)- (present on admission)  Assessment & Plan  Counseling          VTE prophylaxis:   SCDs/TEDs  GIB    I have performed a physical exam and reviewed and updated ROS and Plan today (10/12/2023). In review of yesterday's note (10/11/2023), there are no changes except as documented above.

## 2023-10-12 NOTE — CARE PLAN
The patient is Stable - Low risk of patient condition declining or worsening    Shift Goals  Clinical Goals: Safety  Patient Goals: Rest  Family Goals: ag    Patient A & O  x 4, on 3ltrs oxygen with no respiratory distress. Fall precautions in place. Hourly rounding done and all needs met.    Progress made toward(s) clinical / shift goals:  Ongoing  Problem: Respiratory  Goal: Patient will achieve/maintain optimum respiratory ventilation and gas exchange  Outcome: Progressing     Problem: Wound/ / Incision Healing  Goal: Patient's wound/surgical incision will decrease in size and heals properly  Outcome: Progressing     Problem: Fall Risk  Goal: Patient will remain free from falls  Outcome: Progressing       Patient is not progressing towards the following goals:    Patient reluctant and refuses Q2 turns despite education provided  Problem: Skin Integrity  Goal: Skin integrity is maintained or improved  Outcome: Not Progressing

## 2023-10-12 NOTE — PROGRESS NOTES
Hospital Medicine Daily Progress Note    Date of Service  10/11/2023    Chief Complaint  Patricia A Tietz is a 58 y.o. female admitted 9/29/2023 with anemia    Hospital Course  57 yo woman with DEBRA, urostomy, recent left knee arthroplasty complicated by infection, status post I&D with antibiotic spacer on daptomycin for MRSA, history of DVT on Eliquis who has been at SNF and found her hemoglobin to be low at 5.7 and transferred to [facility].  She has not had signs of bleeding.  GI was consulted.  EGD showed 6 white debris in esophagus, biopsies taken, diverticula in duodenum.  Hemoglobin has been stable postoperatively.  She has an RAO that has not improved.  She is very edematous and started on IV Lasix.  BNP was high, echocardiogram ordered to assess for heart failure, valve disease. Echo did not suggestive CHF and Lasix did not improve resp status or RAO. Nephro consulted, rec renal bx.     Interval Problem Update  Hgb stable after transfusion, RAO plateaued, decreased lasix without any change in resp status so will decrease to home dose, awaiting renal labs and kindey bx    I have discussed this patient's plan of care and discharge plan at IDT rounds today with Case Management, Nursing, Nursing leadership, and other members of the IDT team.    Consultants/Specialty  GI, nephrology    Code Status  Full Code    Disposition  The patient is not medically cleared for discharge to home or a post-acute facility.  Anticipate discharge to: skilled nursing facility    I have placed the appropriate orders for post-discharge needs.    Review of Systems  Review of Systems   Constitutional:  Positive for malaise/fatigue. Negative for chills and fever.   HENT:  Positive for congestion.    Eyes:  Negative for blurred vision.   Respiratory:  Positive for cough and shortness of breath.    Cardiovascular:  Positive for leg swelling. Negative for chest pain.   Gastrointestinal:  Negative for abdominal pain, constipation, diarrhea,  nausea and vomiting.   Genitourinary:  Negative for dysuria.   Musculoskeletal:  Negative for myalgias.   Skin:  Negative for rash.   Neurological:  Positive for weakness. Negative for sensory change and focal weakness.        Physical Exam  Temp:  [36.1 °C (96.9 °F)-36.6 °C (97.8 °F)] 36.1 °C (96.9 °F)  Pulse:  [63-71] 63  Resp:  [18] 18  BP: (142-162)/(88-97) 142/93  SpO2:  [95 %-97 %] 96 %    Physical Exam  Constitutional:       General: She is not in acute distress.     Appearance: Normal appearance.   HENT:      Head: Normocephalic and atraumatic.      Nose: Nose normal.      Mouth/Throat:      Mouth: Mucous membranes are moist.      Pharynx: Oropharynx is clear.   Eyes:      Conjunctiva/sclera: Conjunctivae normal.      Pupils: Pupils are equal, round, and reactive to light.   Cardiovascular:      Rate and Rhythm: Normal rate and regular rhythm.      Heart sounds: No murmur heard.  Pulmonary:      Effort: Pulmonary effort is normal.      Breath sounds: No wheezing, rhonchi or rales.   Abdominal:      General: There is no distension.      Palpations: Abdomen is soft.      Tenderness: There is no abdominal tenderness. There is no guarding or rebound.   Musculoskeletal:         General: No swelling or tenderness.      Cervical back: Normal range of motion and neck supple.      Right lower leg: Edema present.      Left lower leg: Edema present.   Skin:     General: Skin is warm and dry.   Neurological:      Mental Status: She is alert.      GCS: GCS eye subscore is 4. GCS verbal subscore is 5. GCS motor subscore is 6.      Cranial Nerves: No facial asymmetry.   Psychiatric:         Mood and Affect: Mood normal.         Behavior: Behavior normal.         Fluids    Intake/Output Summary (Last 24 hours) at 10/11/2023 1714  Last data filed at 10/11/2023 0831  Gross per 24 hour   Intake 0 ml   Output 4900 ml   Net -4900 ml         Laboratory  Recent Labs     10/09/23  0600 10/09/23  1508 10/10/23  0530 10/11/23  0537    WBC 5.6  --  6.0 5.8   RBC 2.37*  --  2.85* 3.07*   HEMOGLOBIN 6.7* 8.3* 7.9* 8.4*   HEMATOCRIT 21.5*  --  25.3* 26.5*   MCV 90.7  --  88.8 86.3   MCH 28.3  --  27.7 27.4   MCHC 31.2*  --  31.2* 31.7*   RDW 47.4  --  45.8 44.4   PLATELETCT 182  --  208 216   MPV 10.6  --  10.5 10.5       Recent Labs     10/09/23  0600 10/10/23  0530 10/11/23  0540   SODIUM 138 142 138   POTASSIUM 4.2 3.9 3.6   CHLORIDE 108 110 105   CO2 19* 21 22   GLUCOSE 87 93 92   BUN 61* 61* 63*   CREATININE 3.19* 3.36* 3.14*   CALCIUM 9.9 10.4 10.6*       Recent Labs     10/10/23  0840   INR 1.22*                 Imaging  EC-ECHOCARDIOGRAM COMPLETE W/O CONT   Final Result      DX-CHEST-PORTABLE (1 VIEW)   Final Result      1.  Low lung volumes without definite acute cardiopulmonary abnormality.      US-RENAL   Final Result      1.  Normal renal ultrasound.      CT-NEEDLE BX-RENAL    (Results Pending)          Assessment/Plan  * UGIB (upper gastrointestinal bleed)- (present on admission)  Assessment & Plan  Found to have hemoglobin 5.7, given fluids, 1 unit of packed red blood cell, Protonix.  FOBT was positive in the other facility.   10/1 EGD this morning, revealed thick whitish debris in esophagus and mucosal fragility.  Biopsies which showed benign mucosal ulceration with acute on chronic inflammation  Following this Hgb trended down to <7 again and required another 1 unit PRBC on 10/9  -Continue PPI  -Transfuse for Hgb <7        Hypoxia  Assessment & Plan  BNP elevated but CXR does not suggest edema and no improvement with aggressive diuresis, Echo shows EF 65%, low lung volumes with likely atelectasis with BMI 52 as well as anemia requiring transfusions all of which are likely contributing  -Lasix at home dose  -Wean O2 as tolerated    Constipation  Assessment & Plan  Continue bowel protocol    Pacemaker  Assessment & Plan  Noted    Primary hypertension  Assessment & Plan  Hold home metoprolol.      RAO (acute kidney injury)  ([facility])  Assessment & Plan  RAO CRE 2.75> 2.8 (baseline 1.2)  Creatinine seems stable but remains above baseline  Renal ultrasound was unremarkable  FeNa >1%  Urine culture negative for infection  Worsened with Lasix suggesting not hypervolemic despite her appearance, decreased dose  TTE showed EF 65% w/ mod LVH  Nephro consulted, may require kidney bx    Acute on chronic anemia  Assessment & Plan  Hb 5.7 at admission   From blood loss 2/2 GIB    History of DVT in adulthood  Assessment & Plan  Eliquis held for for GI bleed per GI    Hx MRSA infection  Assessment & Plan  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 In review of yesterday's note (10/10/2023), there are no changes except as documented above.

## 2023-10-12 NOTE — CONSULTS
"UROLOGY Consult Note:    Aleyda Cordova P.A.-C.  Date & Time note created:    10/12/2023   2:39 PM     Referring MD:  Dr. Bella    Patient ID:   Name:             Tietz , Patricia A   YOB: 1965  Age:                 58 y.o.  female   MRN:               [id number]                                                             Reason for Consult:      B/l hydronephrosis    History of Present Illness:    Per my chart review, patient is a 58yoF with PMH including DEBRA, urostomy (since age 5, followed in [place] by Dr. Venegas), recent left knee arthroplasty complicated by infection (s/p I&D with antibiotic spacer on daptomycin for MRSA), history of DVT (on Eliquis).     Patient was admitted 9/29/2023 with a knee joint infection, developed RAO and volume overload. Echocardiogram not suggestive CHF and Lasix did not improve respiratory status or RAO. Nephrology recommended renal biopsy.    Urology was consulted for bilateral hydronephrosis appreciated at time of aborted CT-guided renal biopsy. Of note, no hydronephrosis was seen on ALFRED recently on 10/4/23.     Today, patient is resting comfortably in bed, eating lunch. Reports urostomy was created in [place] at age 5 as \"the bladder didn't work.\" Has not had any issues with the urostomy over the years, has been able to manage herself at home. Reports she has not seen Dr. Venegas in a while, but does typically follow closely with him at [facility]. She states she does have a history of kidney stones.     Patient does report perhaps mild L flank pain, though patient states difficult for her to discern from baseline back pain. Denies any hematuria or changes to the urine. Denies F/C/N/V.      Review of Systems:      Constitutional: Denies fevers, Denies weight changes  Eyes: Denies changes in vision, no eye pain  Ears/Nose/Throat/Mouth: Denies nasal congestion or sore throat   Cardiovascular: no chest pain, no palpitations   Respiratory: no shortness of " breath , Denies cough  Gastrointestinal/Hepatic: Denies abdominal pain, nausea, vomiting, diarrhea, constipation or GI bleeding   Genitourinary: Denies hematuria, dysuria or frequency  Musculoskeletal/Rheum: Denies  joint pain and swelling, no edema  Skin: Denies rash  Neurological: Denies headache, confusion, memory loss or focal weakness/parasthesias  Psychiatric: denies mood disorder   Endocrine: Vidhi thyroid problems  Heme/Oncology/Lymph Nodes: Denies enlarged lymph nodes, denies brusing or known bleeding disorder  All other systems were reviewed and are negative (AMA/CMS criteria)                Past Medical History:   Past Medical History:   Diagnosis Date    Anemia     Chronic anticoagulation     Chronic pain syndrome     DVT of lower extremity (deep venous thrombosis) ([facility])     Dyspnea     Nephrolithiasis     Sinus bradycardia     Sleep apnea     Uses a CPAP with oxygen.     Active Hospital Problems    Diagnosis     Hypoxia [R09.02]     Constipation [K59.00]     Primary hypertension [I10]     Pacemaker [Z95.0]     Acute on chronic anemia [D64.9]     RAO (acute kidney injury) ([facility]) [N17.9]     UGIB (upper gastrointestinal bleed) [K92.2]     Hx MRSA infection [Z86.14]     History of DVT in adulthood [Z86.718]     Class 3 severe obesity due to excess calories with serious comorbidity and body mass index (BMI) of 50.0 to 59.9 in adult ([facility]) [E66.01, Z68.43]        Past Surgical History:  Past Surgical History:   Procedure Laterality Date    [place] UPPER GI ENDOSCOPY,DIAGNOSIS N/A 10/1/2023    Procedure: GASTROSCOPY;  Surgeon: Kelly Santos M.D.;  Location: SURGERY [facility];  Service: Gastroenterology    [place] UPPER GI ENDOSCOPY,BIOPSY N/A 10/1/2023    Procedure: GASTROSCOPY, WITH BIOPSY;  Surgeon: Kelly Santos M.D.;  Location: SURGERY [facility];  Service: Gastroenterology    APPENDECTOMY      OTHER      Neck surgery       Hospital Medications:    Current Facility-Administered Medications:      DAPTOmycin (Cubicin) 1,000 mg in NS 50 mL IVPB, 1,000 mg, Intravenous, Q48HRS, Gabriel Pringle D.O.    senna-docusate (Pericolace Or Senokot S) 8.6-50 MG per tablet 1 Tablet, 1 Tablet, Oral, BID, Gabriel Pringle D.O.    furosemide (Lasix) tablet 40 mg, 40 mg, Oral, DAILY, Gabriel Pringle D.O., 40 mg at 10/12/23 0517    benzonatate (Tessalon) capsule 100 mg, 100 mg, Oral, TID PRN, Gabriel Pringle D.O.    Respiratory Therapy Consult, , Nebulization, Continuous RT, Christiano Hernandez M.D.    ferrous sulfate tablet 325 mg, 325 mg, Oral, QDAY with Breakfast, Christiano Hernandez M.D., 325 mg at 10/10/23 1133    guaiFENesin ER (Mucinex) tablet 600 mg, 600 mg, Oral, Q12HRS, Christiano Hernandez M.D., 600 mg at 10/12/23 0517    albuterol inhaler 2 Puff, 2 Puff, Inhalation, Q4H PRN (RT), Christiano Hernandez M.D.    ondansetron (Zofran) syringe/vial injection 4 mg, 4 mg, Intravenous, Q4HRS PRN, Christiano Hernandez M.D., 4 mg at 10/05/23 2008    polyethylene glycol/lytes (Miralax) PACKET 1 Packet, 1 Packet, Oral, DAILY, Siobhan Lainez M.D., 1 Packet at 10/11/23 0527    omeprazole (PriLOSEC) capsule 20 mg, 20 mg, Oral, BID, Siobhan Lainez M.D., 20 mg at 10/12/23 0517    polyethylene glycol/lytes (Miralax) PACKET 1 Packet, 1 Packet, Oral, QDAY PRN, Siobhan Lainez M.D., 1 Packet at 10/08/23 1735    bisacodyl (Dulcolax) suppository 10 mg, 10 mg, Rectal, QDAY PRN, Siobhan Lainez M.D.    [Held by provider] amitriptyline (Elavil) tablet 50 mg, 50 mg, Oral, Nightly, Ponce Stuart M.D., 50 mg at 10/06/23 2050    citalopram (CeleXA) tablet 40 mg, 40 mg, Oral, DAILY, Ponce Stuart M.D., 40 mg at 10/12/23 0517    [Held by provider] gabapentin (Neurontin) capsule 300 mg, 300 mg, Oral, QHS, Ponce Stuart M.D., 300 mg at 10/06/23 2050    oxycodone (Oxy-IR) immediate release tablet 15 mg, 15 mg, Oral, Q6HRS PRN, Ponce Stuart M.D., 15 mg at 10/12/23 0837    Current Outpatient Medications:  Medications Prior to Admission   Medication Sig Dispense Refill Last Dose     senna-docusate (PERICOLACE OR SENOKOT S) 8.6-50 MG Tab Take 1 Tablet by mouth 2 times a day.   9/29/2023 at 1900    cyclobenzaprine (FLEXERIL) 10 mg Tab Take 10 mg by mouth 3 times a day as needed for Muscle Spasms.   > 1 WEEK at PRN    DAPTOmycin (CUBICIN) 500 MG Recon Soln Infuse 500 mg into a venous catheter every 24 hours. (9/22/2023 - 11/2/2023)   9/29/2023 at 1400    furosemide (LASIX) 40 MG Tab Take 40 mg by mouth every day.   9/29/2023 at 0800    potassium chloride SA (KDUR) 20 MEQ Tab CR Take 20 mEq by mouth every day.   9/29/2023 at 0800    metoprolol tartrate (LOPRESSOR) 25 MG Tab Take 25 mg by mouth 2 times a day.   9/29/2023 at 1900    acidophilus lactobacillus Cap Take 1 Capsule by mouth every day.   9/29/2023 at 0800    ascorbic acid (VITAMIN C) 500 MG tablet Take 500 mg by mouth every day.   9/29/2023 at 0800    traZODone (DESYREL) 50 MG Tab Take 25-50 mg by mouth at bedtime as needed for Sleep. 0.5 to 1 tablet = 25 to 50 mg.   9/28/2023 at 2000    vancomycin (VANCOCIN) 1 g Recon Soln Infuse 1,000 mg into a venous catheter every 24 hours. (6/2/2023 - 9/22/2023)   9/22/2023 at DISCONTINUED    apixaban (ELIQUIS) 5mg Tab Take 5 mg by mouth 2 times a day.   9/29/2023 at 0800    oxyCODONE immediate release (ROXICODONE) 10 MG immediate release tablet Take 10 mg by mouth every four hours as needed for Severe Pain.   9/29/2023 at 1800    gabapentin (NEURONTIN) 300 MG Cap Take 300 mg by mouth every day. In the afternoon.   9/29/2023 at 1400    citalopram (CELEXA) 40 MG TABS Take 40 mg by mouth every morning.   9/29/2023 at 0800       Medication Allergy:  No Known Allergies    Family History:  Family History   Problem Relation Age of Onset    Hypertension Mother     Kidney Disease Mother     Diabetes Mother     Coronary artery disease Father        Social History:  Social History     Socioeconomic History    Marital status: Single     Spouse name: Not on file    Number of children: Not on file    Years of  "education: Not on file    Highest education level: Not on file   Occupational History    Not on file   Tobacco Use    Smoking status: Never    Smokeless tobacco: Never   Vaping Use    Vaping Use: Not on file   Substance and Sexual Activity    Alcohol use: Not Currently     Alcohol/week: 0.6 oz     Types: 1 Cans of beer per week    Drug use: No    Sexual activity: Not on file   Other Topics Concern    Not on file   Social History Narrative    Not on file     Social Determinants of Health     Financial Resource Strain: Not on file   Food Insecurity: Not on file   Transportation Needs: Not on file   Physical Activity: Not on file   Stress: Not on file   Social Connections: Not on file   Intimate Partner Violence: Not on file   Housing Stability: Not on file         Physical Exam:  Vitals/ General Appearance:   Weight/BMI: Body mass index is 52.58 kg/m².  BP (!) 147/83   Pulse 79   Temp 36.4 °C (97.6 °F) (Temporal)   Resp (!) 25   Ht 1.575 m (5' 2\")   Wt (!) 130 kg (287 lb 7.7 oz)   SpO2 94%   Vitals:    10/12/23 0348 10/12/23 0454 10/12/23 0740 10/12/23 1325   BP:  121/72 (!) 140/92 (!) 147/83   Pulse:  62 62 79   Resp: 17 16 17 (!) 25   Temp:  36.7 °C (98.1 °F) 36.4 °C (97.6 °F)    TempSrc:  Temporal Temporal    SpO2:  98% 100% 94%   Weight:       Height:         Oxygen Therapy:  Pulse Oximetry: 94 %, O2 (LPM): 3, O2 Delivery Device: Nasal Cannula    Constitutional: No acute distress  HENMT:  Normocephalic, Atraumatic  Eyes:  EOMI  Neck:  Normal range of motion  Lungs:  Normal respiratory effort  Abdomen: Rotund. Soft, No tenderness  : Stoma present RLQ, tissue pink and healthy, draining clear yellow urine. I placed red rubber catheter at bedside.  Skin: Warm, Dry  Neurologic: Alert & oriented x 3, No focal deficits noted  Psychiatric: Affect normal, Judgment normal, Mood normal.      MDM (Data Review):     Records reviewed and summarized in current documentation    Lab Data Review:  Recent Results (from the " past 24 hour(s))   Basic Metabolic Panel    Collection Time: 10/12/23  3:25 AM   Result Value Ref Range    Sodium 138 135 - 145 mmol/L    Potassium 3.5 (L) 3.6 - 5.5 mmol/L    Chloride 104 96 - 112 mmol/L    Co2 22 20 - 33 mmol/L    Glucose 91 65 - 99 mg/dL    Bun 66 (H) 8 - 22 mg/dL    Creatinine 3.34 (H) 0.50 - 1.40 mg/dL    Calcium 9.9 8.5 - 10.5 mg/dL    Anion Gap 12.0 7.0 - 16.0   HGB    Collection Time: 10/12/23  3:25 AM   Result Value Ref Range    Hemoglobin 8.7 (L) 12.0 - 16.0 g/dL   ESTIMATED GFR    Collection Time: 10/12/23  3:25 AM   Result Value Ref Range    GFR (CKD-EPI) 15 (A) >60 mL/min/1.73 m 2   MICROALBUMIN CREAT RATIO URINE    Collection Time: 10/12/23  3:30 AM   Result Value Ref Range    Creatinine, Urine 30.82 mg/dL    Microalbumin, Urine Random 7.5 mg/dL    Micro Alb Creat Ratio 243 (H) 0 - 30 mg/g       Imaging/Procedures Review:    Reviewed    MDM (Assessment and Plan):     Active Hospital Problems    Diagnosis     Hypoxia [R09.02]     Constipation [K59.00]     Primary hypertension [I10]     Pacemaker [Z95.0]     Acute on chronic anemia [D64.9]     RAO (acute kidney injury) ([facility]) [N17.9]     UGIB (upper gastrointestinal bleed) [K92.2]     Hx MRSA infection [Z86.14]     History of DVT in adulthood [Z86.718]     Class 3 severe obesity due to excess calories with serious comorbidity and body mass index (BMI) of 50.0 to 59.9 in adult ([facility]) [E66.01, Z68.43]      58yoF with reported history of ileal conduit with urostomy in RLQ, RAO not improved with Lasix, and new found b/l hydronephrosis on  CT during aborted CT-guided renal biopsy.     Creat 3.34 (baseline 1.3), nephrology following  Normal WBC 5.8  Afebrile      PLAN:    - Continue red rubber catheter for now  - Monitor urine output  - Continue to trend creatinine  - Loopogram ordered, will await results  - Appreciate this consult, urology will follow    Dr. Garner is aware of this consultation and has directed the plan of care.     Aleyda  Art SALEH  [facility] [place]

## 2023-10-13 ENCOUNTER — APPOINTMENT (OUTPATIENT)
Dept: RADIOLOGY | Facility: MEDICAL CENTER | Age: 58
DRG: 368 | End: 2023-10-13
Attending: STUDENT IN AN ORGANIZED HEALTH CARE EDUCATION/TRAINING PROGRAM
Payer: MEDICARE

## 2023-10-13 LAB
ANION GAP SERPL CALC-SCNC: 15 MMOL/L (ref 7–16)
BUN SERPL-MCNC: 70 MG/DL (ref 8–22)
CALCIUM SERPL-MCNC: 10.9 MG/DL (ref 8.5–10.5)
CHLORIDE SERPL-SCNC: 101 MMOL/L (ref 96–112)
CK SERPL-CCNC: 21 U/L (ref 0–154)
CO2 SERPL-SCNC: 20 MMOL/L (ref 20–33)
CREAT SERPL-MCNC: 3.56 MG/DL (ref 0.5–1.4)
GFR SERPLBLD CREATININE-BSD FMLA CKD-EPI: 14 ML/MIN/1.73 M 2
GLUCOSE SERPL-MCNC: 107 MG/DL (ref 65–99)
POTASSIUM SERPL-SCNC: 3.2 MMOL/L (ref 3.6–5.5)
SODIUM SERPL-SCNC: 136 MMOL/L (ref 135–145)

## 2023-10-13 PROCEDURE — 700102 HCHG RX REV CODE 250 W/ 637 OVERRIDE(OP): Performed by: STUDENT IN AN ORGANIZED HEALTH CARE EDUCATION/TRAINING PROGRAM

## 2023-10-13 PROCEDURE — 99232 SBSQ HOSP IP/OBS MODERATE 35: CPT | Performed by: INTERNAL MEDICINE

## 2023-10-13 PROCEDURE — 80048 BASIC METABOLIC PNL TOTAL CA: CPT

## 2023-10-13 PROCEDURE — 700102 HCHG RX REV CODE 250 W/ 637 OVERRIDE(OP): Mod: JZ | Performed by: STUDENT IN AN ORGANIZED HEALTH CARE EDUCATION/TRAINING PROGRAM

## 2023-10-13 PROCEDURE — 700117 HCHG RX CONTRAST REV CODE 255: Performed by: STUDENT IN AN ORGANIZED HEALTH CARE EDUCATION/TRAINING PROGRAM

## 2023-10-13 PROCEDURE — A9270 NON-COVERED ITEM OR SERVICE: HCPCS | Performed by: STUDENT IN AN ORGANIZED HEALTH CARE EDUCATION/TRAINING PROGRAM

## 2023-10-13 PROCEDURE — 700102 HCHG RX REV CODE 250 W/ 637 OVERRIDE(OP): Performed by: INTERNAL MEDICINE

## 2023-10-13 PROCEDURE — 99232 SBSQ HOSP IP/OBS MODERATE 35: CPT | Performed by: STUDENT IN AN ORGANIZED HEALTH CARE EDUCATION/TRAINING PROGRAM

## 2023-10-13 PROCEDURE — 74425 UROGRAPHY ANTEGRADE RS&I: CPT

## 2023-10-13 PROCEDURE — 82550 ASSAY OF CK (CPK): CPT

## 2023-10-13 PROCEDURE — 770006 HCHG ROOM/CARE - MED/SURG/GYN SEMI*

## 2023-10-13 PROCEDURE — A9270 NON-COVERED ITEM OR SERVICE: HCPCS | Performed by: INTERNAL MEDICINE

## 2023-10-13 PROCEDURE — A9270 NON-COVERED ITEM OR SERVICE: HCPCS | Mod: JZ | Performed by: STUDENT IN AN ORGANIZED HEALTH CARE EDUCATION/TRAINING PROGRAM

## 2023-10-13 RX ORDER — POTASSIUM CHLORIDE 20 MEQ/1
10 TABLET, EXTENDED RELEASE ORAL ONCE
Status: COMPLETED | OUTPATIENT
Start: 2023-10-13 | End: 2023-10-13

## 2023-10-13 RX ADMIN — OXYCODONE HYDROCHLORIDE 15 MG: 15 TABLET ORAL at 11:34

## 2023-10-13 RX ADMIN — OXYCODONE HYDROCHLORIDE 15 MG: 15 TABLET ORAL at 17:13

## 2023-10-13 RX ADMIN — FUROSEMIDE 40 MG: 40 TABLET ORAL at 05:30

## 2023-10-13 RX ADMIN — OMEPRAZOLE 20 MG: 20 CAPSULE, DELAYED RELEASE ORAL at 17:13

## 2023-10-13 RX ADMIN — CITALOPRAM 40 MG: 40 TABLET, FILM COATED ORAL at 05:31

## 2023-10-13 RX ADMIN — FERROUS SULFATE TAB 325 MG (65 MG ELEMENTAL FE) 325 MG: 325 (65 FE) TAB at 08:29

## 2023-10-13 RX ADMIN — DOCUSATE SODIUM 50 MG AND SENNOSIDES 8.6 MG 1 TABLET: 8.6; 5 TABLET, FILM COATED ORAL at 05:30

## 2023-10-13 RX ADMIN — GUAIFENESIN 600 MG: 600 TABLET, EXTENDED RELEASE ORAL at 05:30

## 2023-10-13 RX ADMIN — OMEPRAZOLE 20 MG: 20 CAPSULE, DELAYED RELEASE ORAL at 05:30

## 2023-10-13 RX ADMIN — POTASSIUM CHLORIDE 10 MEQ: 1500 TABLET, EXTENDED RELEASE ORAL at 21:20

## 2023-10-13 RX ADMIN — IOHEXOL 40 ML: 240 INJECTION, SOLUTION INTRATHECAL; INTRAVASCULAR; INTRAVENOUS; ORAL at 13:30

## 2023-10-13 RX ADMIN — BISACODYL 10 MG: 10 SUPPOSITORY RECTAL at 17:15

## 2023-10-13 RX ADMIN — GUAIFENESIN 600 MG: 600 TABLET, EXTENDED RELEASE ORAL at 17:13

## 2023-10-13 ASSESSMENT — ENCOUNTER SYMPTOMS
COUGH: 0
FOCAL WEAKNESS: 0
DIARRHEA: 0
MYALGIAS: 0
CONSTIPATION: 0
NAUSEA: 0
ABDOMINAL PAIN: 1
BLURRED VISION: 0
SHORTNESS OF BREATH: 0
SENSORY CHANGE: 0
VOMITING: 0
WEAKNESS: 1
SHORTNESS OF BREATH: 1
COUGH: 1
CHILLS: 0
FEVER: 0

## 2023-10-13 ASSESSMENT — PAIN DESCRIPTION - PAIN TYPE
TYPE: ACUTE PAIN
TYPE: ACUTE PAIN

## 2023-10-13 NOTE — PROGRESS NOTES
Nephrology Daily Progress Note    Date of Service  10/13/2023    Chief Complaint  58 y.o. female admitted 9/29/2023 with a knee joint infection, developed RAO and volume overload    Interval Problem Update  Patient has no chest pain, shortness of breath slightly better  Still complaining of generalized muscle aches  10/11 patient complaining of joint and muscle pain  No chest pain, no shortness of breath  10/12 patient has no uremic symptoms  She went earlier today to have a kidney biopsy, CT scan showed bilateral hydronephrosis  Kidney biopsy was canceled  10/13 the patient has no new complaints  Appreciate urology input  Review of Systems  Review of Systems   Constitutional:  Negative for chills, fever and malaise/fatigue.   Respiratory:  Negative for cough and shortness of breath.    Cardiovascular:  Negative for chest pain and leg swelling.   Gastrointestinal:  Negative for nausea and vomiting.   Genitourinary:  Negative for dysuria, frequency and urgency.        Physical Exam  Temp:  [36 °C (96.8 °F)-36.4 °C (97.6 °F)] 36.4 °C (97.6 °F)  Pulse:  [62-79] 63  Resp:  [14-17] 14  BP: (109-146)/(52-89) 146/89  SpO2:  [97 %-100 %] 100 %    Physical Exam  Vitals and nursing note reviewed.   Constitutional:       General: She is not in acute distress.     Appearance: Normal appearance. She is well-developed. She is ill-appearing. She is not diaphoretic.   HENT:      Head: Normocephalic and atraumatic.      Right Ear: External ear normal.      Left Ear: External ear normal.      Nose: Nose normal.   Eyes:      General: No scleral icterus.        Right eye: No discharge.         Left eye: No discharge.      Conjunctiva/sclera: Conjunctivae normal.   Cardiovascular:      Rate and Rhythm: Normal rate and regular rhythm.      Heart sounds: No murmur heard.  Pulmonary:      Effort: Pulmonary effort is normal. No respiratory distress.      Breath sounds: Normal breath sounds.   Musculoskeletal:         General: No tenderness.  "     Right lower leg: Edema present.      Left lower leg: Edema present.   Skin:     General: Skin is warm and dry.      Findings: No erythema.   Neurological:      General: No focal deficit present.      Mental Status: She is alert and oriented to person, place, and time.      Cranial Nerves: No cranial nerve deficit.   Psychiatric:         Mood and Affect: Mood normal.         Behavior: Behavior normal.         Fluids    Intake/Output Summary (Last 24 hours) at 10/13/2023 1339  Last data filed at 10/13/2023 1147  Gross per 24 hour   Intake 520 ml   Output 3200 ml   Net -2680 ml         Laboratory  Recent Labs     10/11/23  0540 10/12/23  0325   WBC 5.8  --    RBC 3.07*  --    HEMOGLOBIN 8.4* 8.7*   HEMATOCRIT 26.5*  --    MCV 86.3  --    MCH 27.4  --    MCHC 31.7*  --    RDW 44.4  --    PLATELETCT 216  --    MPV 10.5  --        Recent Labs     10/11/23  0540 10/12/23  0325   SODIUM 138 138   POTASSIUM 3.6 3.5*   CHLORIDE 105 104   CO2 22 22   GLUCOSE 92 91   BUN 63* 66*   CREATININE 3.14* 3.34*   CALCIUM 10.6* 9.9             No results for input(s): \"NTPROBNP\" in the last 72 hours.          Imaging  DX-LOOPOGRAM (ILEAL CONDUIT)   Final Result      No evidence of reflux of contrast into the ureters or kidneys.      CT-ABORTED CT PROCEDURE   Final Result      Aborted CT-guided renal biopsy.      EC-ECHOCARDIOGRAM COMPLETE W/O CONT   Final Result      DX-CHEST-PORTABLE (1 VIEW)   Final Result      1.  Low lung volumes without definite acute cardiopulmonary abnormality.      US-RENAL   Final Result      1.  Normal renal ultrasound.            Assessment/Plan  1 RAO: Most likely obstructive uropathy  2 volume overload  3 respiratory failure  4 obesity  5 knee joint infection  6 hypokalemia  no acute need for HD  Await urology work-up  Replace potassium  Renal diet  Daily BMP, CBC.  Renal dose all meds  Avoid nephrotoxins like NSAIDs.  Prognosis guarded.              "

## 2023-10-13 NOTE — CARE PLAN
The patient is Stable - Low risk of patient condition declining or worsening    Shift Goals  Clinical Goals: Safety  Patient Goals: rest  Family Goals: ag    Progress made toward(s) clinical / shift goals:  Ongoing    Patient is progressing towards the following goals:    Problem: Respiratory  Goal: Patient will achieve/maintain optimum respiratory ventilation and gas exchange  Outcome: Progressing     Problem: Wound/ / Incision Healing  Goal: Patient's wound/surgical incision will decrease in size and heals properly  Outcome: Progressing     Problem: Fall Risk  Goal: Patient will remain free from falls  Outcome: Progressing     Problem: Skin Integrity  Goal: Skin integrity is maintained or improved  Outcome: Progressing

## 2023-10-13 NOTE — PROGRESS NOTES
"UROLOGY Progress Note:    History of Present Illness:    Per my chart review, patient is a 58yoF with PMH including DEBRA, urostomy (since age 5, followed in [place] by Dr. Veengas), recent left knee arthroplasty complicated by infection (s/p I&D with antibiotic spacer on daptomycin for MRSA), history of DVT (on Eliquis).     Patient was admitted 9/29/2023 with a knee joint infection, developed RAO and volume overload. Echocardiogram not suggestive CHF and Lasix did not improve respiratory status or RAO. Nephrology recommended renal biopsy.    Urology was consulted for bilateral hydronephrosis appreciated at time of aborted CT-guided renal biopsy. Of note, no hydronephrosis was seen on ALFRED recently on 10/4/23.     Reports urostomy was created in [place] at age 5 as \"the bladder didn't work.\" Has not had any issues with the urostomy over the years, has been able to manage herself at home. Reports she has not seen Dr. Venegas in a while, but does typically follow closely with him at [facility]. She states she does have a history of kidney stones.     Interval Updates:    10/13. Patient with breakfast tray at bedside. Went for loopogram today. Urine draining light clear yellow. Good UOP. No new pain or discomfort. No new CBC or BMP available for review. AFVSS, elevated BP noted.     10/12. Today, patient is resting comfortably in bed, eating lunch. Patient does report perhaps mild L flank pain, though patient states difficult for her to discern from baseline back pain. Denies any hematuria or changes to the urine. Denies F/C/N/V. I placed red rubber catheter in stoma with RN assist.       Review of Systems:      Constitutional: Denies fevers, Denies weight changes  Eyes: Denies changes in vision, no eye pain  Ears/Nose/Throat/Mouth: Denies nasal congestion or sore throat   Cardiovascular: no chest pain, no palpitations   Respiratory: no shortness of breath , Denies cough  Gastrointestinal/Hepatic: Denies " abdominal pain, nausea, vomiting, diarrhea, constipation or GI bleeding   Genitourinary: Denies hematuria, dysuria or frequency  Musculoskeletal/Rheum: Denies  joint pain and swelling, no edema  Skin: Denies rash  Neurological: Denies headache, confusion, memory loss or focal weakness/parasthesias  Psychiatric: denies mood disorder   Endocrine: Vidhi thyroid problems  Heme/Oncology/Lymph Nodes: Denies enlarged lymph nodes, denies brusing or known bleeding disorder  All other systems were reviewed and are negative (AMA/CMS criteria)                Past Medical History:   Past Medical History:   Diagnosis Date    Anemia     Chronic anticoagulation     Chronic pain syndrome     DVT of lower extremity (deep venous thrombosis) (formerly [facility])     Dyspnea     Nephrolithiasis     Sinus bradycardia     Sleep apnea     Uses a CPAP with oxygen.     Active Hospital Problems    Diagnosis     Hypoxia [R09.02]     Constipation [K59.00]     Primary hypertension [I10]     Pacemaker [Z95.0]     Acute on chronic anemia [D64.9]     RAO (acute kidney injury) (formerly [facility]) [N17.9]     UGIB (upper gastrointestinal bleed) [K92.2]     Hx MRSA infection [Z86.14]     History of DVT in adulthood [Z86.718]     Class 3 severe obesity due to excess calories with serious comorbidity and body mass index (BMI) of 50.0 to 59.9 in adult (formerly [facility]) [E66.01, Z68.43]        Past Surgical History:  Past Surgical History:   Procedure Laterality Date    MI UPPER GI ENDOSCOPY,DIAGNOSIS N/A 10/1/2023    Procedure: GASTROSCOPY;  Surgeon: Kelly Santos M.D.;  Location: SURGERY [facility];  Service: Gastroenterology    MI UPPER GI ENDOSCOPY,BIOPSY N/A 10/1/2023    Procedure: GASTROSCOPY, WITH BIOPSY;  Surgeon: Kelly Santos M.D.;  Location: SURGERY [facility];  Service: Gastroenterology    APPENDECTOMY      OTHER      Neck surgery       Hospital Medications:    Current Facility-Administered Medications:     DAPTOmycin (Cubicin) 1,000 mg in NS 50 mL IVPB, 1,000 mg,  Intravenous, Q48HRS, Gabriel Pringle D.O., Last Rate: 100 mL/hr at 10/12/23 1740, 1,000 mg at 10/12/23 1740    senna-docusate (Pericolace Or Senokot S) 8.6-50 MG per tablet 1 Tablet, 1 Tablet, Oral, BID, Gabriel Pringle D.O., 1 Tablet at 10/13/23 0530    furosemide (Lasix) tablet 40 mg, 40 mg, Oral, DAILY, Gabriel Pringle D.O., 40 mg at 10/13/23 0530    benzonatate (Tessalon) capsule 100 mg, 100 mg, Oral, TID PRN, Gabriel Pringle D.O.    Respiratory Therapy Consult, , Nebulization, Continuous RT, Christiano Hernandez M.D.    ferrous sulfate tablet 325 mg, 325 mg, Oral, QDAY with Breakfast, Christiano Hernandez M.D., 325 mg at 10/13/23 0829    guaiFENesin ER (Mucinex) tablet 600 mg, 600 mg, Oral, Q12HRS, Christiano Hernandez M.D., 600 mg at 10/13/23 0530    albuterol inhaler 2 Puff, 2 Puff, Inhalation, Q4H PRN (RT), Christiano Hernandez M.D.    ondansetron (Zofran) syringe/vial injection 4 mg, 4 mg, Intravenous, Q4HRS PRN, Christiano Hernandez M.D., 4 mg at 10/05/23 2008    polyethylene glycol/lytes (Miralax) PACKET 1 Packet, 1 Packet, Oral, DAILY, Siobhan Lainez M.D., 1 Packet at 10/11/23 0527    omeprazole (PriLOSEC) capsule 20 mg, 20 mg, Oral, BID, Siobhan Lainez M.D., 20 mg at 10/13/23 0530    polyethylene glycol/lytes (Miralax) PACKET 1 Packet, 1 Packet, Oral, QDAY PRN, Siobhan Lainez M.D., 1 Packet at 10/08/23 1735    bisacodyl (Dulcolax) suppository 10 mg, 10 mg, Rectal, QDAY PRN, Siobhan Lainez M.D.    [Held by provider] amitriptyline (Elavil) tablet 50 mg, 50 mg, Oral, Nightly, Ponce Stuart M.D., 50 mg at 10/06/23 2050    citalopram (CeleXA) tablet 40 mg, 40 mg, Oral, DAILY, Ponce Stuart M.D., 40 mg at 10/13/23 0531    [Held by provider] gabapentin (Neurontin) capsule 300 mg, 300 mg, Oral, QHS, Ponce Stuart M.D., 300 mg at 10/06/23 2050    oxycodone (Oxy-IR) immediate release tablet 15 mg, 15 mg, Oral, Q6HRS PRN, Ponce Stuart M.D., 15 mg at 10/13/23 1134    Current Outpatient Medications:  Medications Prior to Admission   Medication Sig  Dispense Refill Last Dose    senna-docusate (PERICOLACE OR SENOKOT S) 8.6-50 MG Tab Take 1 Tablet by mouth 2 times a day.   9/29/2023 at 1900    cyclobenzaprine (FLEXERIL) 10 mg Tab Take 10 mg by mouth 3 times a day as needed for Muscle Spasms.   > 1 WEEK at PRN    DAPTOmycin (CUBICIN) 500 MG Recon Soln Infuse 500 mg into a venous catheter every 24 hours. (9/22/2023 - 11/2/2023)   9/29/2023 at 1400    furosemide (LASIX) 40 MG Tab Take 40 mg by mouth every day.   9/29/2023 at 0800    potassium chloride SA (KDUR) 20 MEQ Tab CR Take 20 mEq by mouth every day.   9/29/2023 at 0800    metoprolol tartrate (LOPRESSOR) 25 MG Tab Take 25 mg by mouth 2 times a day.   9/29/2023 at 1900    acidophilus lactobacillus Cap Take 1 Capsule by mouth every day.   9/29/2023 at 0800    ascorbic acid (VITAMIN C) 500 MG tablet Take 500 mg by mouth every day.   9/29/2023 at 0800    traZODone (DESYREL) 50 MG Tab Take 25-50 mg by mouth at bedtime as needed for Sleep. 0.5 to 1 tablet = 25 to 50 mg.   9/28/2023 at 2000    vancomycin (VANCOCIN) 1 g Recon Soln Infuse 1,000 mg into a venous catheter every 24 hours. (6/2/2023 - 9/22/2023)   9/22/2023 at DISCONTINUED    apixaban (ELIQUIS) 5mg Tab Take 5 mg by mouth 2 times a day.   9/29/2023 at 0800    oxyCODONE immediate release (ROXICODONE) 10 MG immediate release tablet Take 10 mg by mouth every four hours as needed for Severe Pain.   9/29/2023 at 1800    gabapentin (NEURONTIN) 300 MG Cap Take 300 mg by mouth every day. In the afternoon.   9/29/2023 at 1400    citalopram (CELEXA) 40 MG TABS Take 40 mg by mouth every morning.   9/29/2023 at 0800       Medication Allergy:  No Known Allergies    Family History:  Family History   Problem Relation Age of Onset    Hypertension Mother     Kidney Disease Mother     Diabetes Mother     Coronary artery disease Father        Social History:  Social History     Socioeconomic History    Marital status: Single     Spouse name: Not on file    Number of children:  "Not on file    Years of education: Not on file    Highest education level: Not on file   Occupational History    Not on file   Tobacco Use    Smoking status: Never    Smokeless tobacco: Never   Vaping Use    Vaping Use: Not on file   Substance and Sexual Activity    Alcohol use: Not Currently     Alcohol/week: 0.6 oz     Types: 1 Cans of beer per week    Drug use: No    Sexual activity: Not on file   Other Topics Concern    Not on file   Social History Narrative    Not on file     Social Determinants of Health     Financial Resource Strain: Not on file   Food Insecurity: Not on file   Transportation Needs: Not on file   Physical Activity: Not on file   Stress: Not on file   Social Connections: Not on file   Intimate Partner Violence: Not on file   Housing Stability: Not on file         Physical Exam:  Vitals/ General Appearance:   Weight/BMI: Body mass index is 52.58 kg/m².  BP (!) 146/89   Pulse 63   Temp 36.4 °C (97.6 °F) (Temporal)   Resp 14   Ht 1.575 m (5' 2\")   Wt (!) 130 kg (287 lb 7.7 oz)   SpO2 100%   Vitals:    10/12/23 2115 10/12/23 2320 10/13/23 0329 10/13/23 0715   BP:   128/75 (!) 146/89   Pulse:   62 63   Resp: 17 17 16 14   Temp:   36.4 °C (97.6 °F) 36.4 °C (97.6 °F)   TempSrc:   Temporal Temporal   SpO2:   98% 100%   Weight:       Height:         Oxygen Therapy:  Pulse Oximetry: 100 %, O2 (LPM): 3, O2 Delivery Device: Silicone Nasal Cannula    Constitutional: No acute distress  HENMT:  Normocephalic, Atraumatic  Eyes:  EOMI  Neck:  Normal range of motion  Lungs:  Normal respiratory effort  Abdomen: Rotund. Soft, No tenderness  : Stoma present RLQ, tissue pink and healthy, draining clear yellow urine, red rubber catheter in place  Skin: Warm, Dry  Neurologic: Alert & oriented x 3, No focal deficits noted  Psychiatric: Affect normal, Judgment normal, Mood normal.      MDM (Data Review):     Records reviewed and summarized in current documentation    Lab Data Review:  Recent Results (from the " past 24 hour(s))   CREATINE KINASE    Collection Time: 10/13/23  3:55 AM   Result Value Ref Range    CPK Total 21 0 - 154 U/L       Imaging/Procedures Review:    Reviewed    MDM (Assessment and Plan):     Active Hospital Problems    Diagnosis     Hypoxia [R09.02]     Constipation [K59.00]     Primary hypertension [I10]     Pacemaker [Z95.0]     Acute on chronic anemia [D64.9]     RAO (acute kidney injury) (HCC) [N17.9]     UGIB (upper gastrointestinal bleed) [K92.2]     Hx MRSA infection [Z86.14]     History of DVT in adulthood [Z86.718]     Class 3 severe obesity due to excess calories with serious comorbidity and body mass index (BMI) of 50.0 to 59.9 in adult ([facility]) [E66.01, Z68.43]      58yoF with reported history of ileal conduit with urostomy in RLQ, RAO not improved with Lasix, and new found b/l hydronephrosis on  CT during aborted CT-guided renal biopsy.     Creat 3.34 (baseline 1.3), nephrology following  Normal WBC 5.8  Afebrile    UOP clear yellow, 2700cc/24hrs    Loopogram with no evidence of reflux, will review with MD  No new pain on exam, denies any flank pain      PLAN:    - Monitor urine output  - Continue to trend creatinine  - Urology following    Plan of care discussed with patient and urology team.    Aleyda Cordova PA-C  [facility] [place]

## 2023-10-14 LAB
ANION GAP SERPL CALC-SCNC: 14 MMOL/L (ref 7–16)
BUN SERPL-MCNC: 74 MG/DL (ref 8–22)
CALCIUM SERPL-MCNC: 10.4 MG/DL (ref 8.5–10.5)
CHLORIDE SERPL-SCNC: 102 MMOL/L (ref 96–112)
CO2 SERPL-SCNC: 21 MMOL/L (ref 20–33)
CREAT SERPL-MCNC: 3.5 MG/DL (ref 0.5–1.4)
ERYTHROCYTE [DISTWIDTH] IN BLOOD BY AUTOMATED COUNT: 44.3 FL (ref 35.9–50)
GFR SERPLBLD CREATININE-BSD FMLA CKD-EPI: 14 ML/MIN/1.73 M 2
GLUCOSE SERPL-MCNC: 103 MG/DL (ref 65–99)
HCT VFR BLD AUTO: 27.2 % (ref 37–47)
HGB BLD-MCNC: 8.5 G/DL (ref 12–16)
MCH RBC QN AUTO: 27.3 PG (ref 27–33)
MCHC RBC AUTO-ENTMCNC: 31.3 G/DL (ref 32.2–35.5)
MCV RBC AUTO: 87.5 FL (ref 81.4–97.8)
PLATELET # BLD AUTO: 255 K/UL (ref 164–446)
PMV BLD AUTO: 10.2 FL (ref 9–12.9)
POTASSIUM SERPL-SCNC: 3.3 MMOL/L (ref 3.6–5.5)
RBC # BLD AUTO: 3.11 M/UL (ref 4.2–5.4)
SODIUM SERPL-SCNC: 137 MMOL/L (ref 135–145)
U1 SNRNP IGG SER QL: 6 UNITS (ref 0–19)
WBC # BLD AUTO: 6.8 K/UL (ref 4.8–10.8)

## 2023-10-14 PROCEDURE — 700102 HCHG RX REV CODE 250 W/ 637 OVERRIDE(OP): Performed by: STUDENT IN AN ORGANIZED HEALTH CARE EDUCATION/TRAINING PROGRAM

## 2023-10-14 PROCEDURE — A9270 NON-COVERED ITEM OR SERVICE: HCPCS | Performed by: INTERNAL MEDICINE

## 2023-10-14 PROCEDURE — 99232 SBSQ HOSP IP/OBS MODERATE 35: CPT | Performed by: STUDENT IN AN ORGANIZED HEALTH CARE EDUCATION/TRAINING PROGRAM

## 2023-10-14 PROCEDURE — 700111 HCHG RX REV CODE 636 W/ 250 OVERRIDE (IP): Mod: JZ | Performed by: STUDENT IN AN ORGANIZED HEALTH CARE EDUCATION/TRAINING PROGRAM

## 2023-10-14 PROCEDURE — 85027 COMPLETE CBC AUTOMATED: CPT

## 2023-10-14 PROCEDURE — 700111 HCHG RX REV CODE 636 W/ 250 OVERRIDE (IP): Mod: JZ | Performed by: INTERNAL MEDICINE

## 2023-10-14 PROCEDURE — 700102 HCHG RX REV CODE 250 W/ 637 OVERRIDE(OP): Performed by: INTERNAL MEDICINE

## 2023-10-14 PROCEDURE — 80048 BASIC METABOLIC PNL TOTAL CA: CPT

## 2023-10-14 PROCEDURE — A9270 NON-COVERED ITEM OR SERVICE: HCPCS | Performed by: STUDENT IN AN ORGANIZED HEALTH CARE EDUCATION/TRAINING PROGRAM

## 2023-10-14 PROCEDURE — 770001 HCHG ROOM/CARE - MED/SURG/GYN PRIV*

## 2023-10-14 PROCEDURE — 99232 SBSQ HOSP IP/OBS MODERATE 35: CPT | Performed by: INTERNAL MEDICINE

## 2023-10-14 PROCEDURE — 700105 HCHG RX REV CODE 258: Performed by: STUDENT IN AN ORGANIZED HEALTH CARE EDUCATION/TRAINING PROGRAM

## 2023-10-14 RX ADMIN — FERROUS SULFATE TAB 325 MG (65 MG ELEMENTAL FE) 325 MG: 325 (65 FE) TAB at 10:17

## 2023-10-14 RX ADMIN — DOCUSATE SODIUM 50 MG AND SENNOSIDES 8.6 MG 1 TABLET: 8.6; 5 TABLET, FILM COATED ORAL at 05:27

## 2023-10-14 RX ADMIN — CITALOPRAM 40 MG: 40 TABLET, FILM COATED ORAL at 05:28

## 2023-10-14 RX ADMIN — GUAIFENESIN 600 MG: 600 TABLET, EXTENDED RELEASE ORAL at 16:49

## 2023-10-14 RX ADMIN — OXYCODONE HYDROCHLORIDE 15 MG: 15 TABLET ORAL at 09:40

## 2023-10-14 RX ADMIN — ONDANSETRON 4 MG: 2 INJECTION INTRAMUSCULAR; INTRAVENOUS at 19:55

## 2023-10-14 RX ADMIN — OXYCODONE HYDROCHLORIDE 15 MG: 15 TABLET ORAL at 16:00

## 2023-10-14 RX ADMIN — FUROSEMIDE 40 MG: 40 TABLET ORAL at 05:28

## 2023-10-14 RX ADMIN — OMEPRAZOLE 20 MG: 20 CAPSULE, DELAYED RELEASE ORAL at 16:49

## 2023-10-14 RX ADMIN — DAPTOMYCIN 1000 MG: 500 INJECTION, POWDER, LYOPHILIZED, FOR SOLUTION INTRAVENOUS at 16:52

## 2023-10-14 RX ADMIN — DOCUSATE SODIUM 50 MG AND SENNOSIDES 8.6 MG 1 TABLET: 8.6; 5 TABLET, FILM COATED ORAL at 16:49

## 2023-10-14 RX ADMIN — OMEPRAZOLE 20 MG: 20 CAPSULE, DELAYED RELEASE ORAL at 05:27

## 2023-10-14 RX ADMIN — GUAIFENESIN 600 MG: 600 TABLET, EXTENDED RELEASE ORAL at 05:28

## 2023-10-14 ASSESSMENT — ENCOUNTER SYMPTOMS
COUGH: 0
ABDOMINAL PAIN: 1
SHORTNESS OF BREATH: 1
CHILLS: 0
COUGH: 1
SHORTNESS OF BREATH: 0
BLURRED VISION: 0
FOCAL WEAKNESS: 0
SENSORY CHANGE: 0
FEVER: 0
NAUSEA: 0
MYALGIAS: 0
WEAKNESS: 1
DIARRHEA: 0
VOMITING: 0
CONSTIPATION: 0

## 2023-10-14 ASSESSMENT — PAIN DESCRIPTION - PAIN TYPE
TYPE: ACUTE PAIN

## 2023-10-14 ASSESSMENT — FIBROSIS 4 INDEX: FIB4 SCORE: 1.24

## 2023-10-14 NOTE — PROGRESS NOTES
Hospital Medicine Daily Progress Note    Date of Service  10/13/2023    Chief Complaint  Patricia A Tietz is a 58 y.o. female admitted 9/29/2023 with anemia    Hospital Course  57 yo woman with DEBRA, urostomy, recent left knee arthroplasty complicated by infection, status post I&D with antibiotic spacer on daptomycin for MRSA, history of DVT on Eliquis who has been at SNF and found her hemoglobin to be low at 5.7 and transferred to [facility].  She has not had signs of bleeding.  GI was consulted.  EGD showed 6 white debris in esophagus, biopsies taken, diverticula in duodenum.  Hemoglobin has been stable postoperatively.  She has an RAO that has not improved.  She is very edematous and started on IV Lasix.  BNP was high, echocardiogram ordered to assess for heart failure, valve disease. Echo did not suggestive CHF and Lasix did not improve resp status or RAO. Nephro consulted, rec renal bx.     Interval Problem Update  Loopogram today showed no reflux into ureters/kidney, uro following, still awaiting labs     I have discussed this patient's plan of care and discharge plan at IDT rounds today with Case Management, Nursing, Nursing leadership, and other members of the IDT team.    Consultants/Specialty  GI, nephrology    Code Status  Full Code    Disposition  The patient is not medically cleared for discharge to home or a post-acute facility.  Anticipate discharge to: home with organized home healthcare and close outpatient follow-up    I have placed the appropriate orders for post-discharge needs.    Review of Systems  Review of Systems   Constitutional:  Positive for malaise/fatigue. Negative for chills and fever.   HENT:  Positive for congestion.    Eyes:  Negative for blurred vision.   Respiratory:  Positive for cough and shortness of breath.    Cardiovascular:  Positive for leg swelling. Negative for chest pain.   Gastrointestinal:  Positive for abdominal pain. Negative for constipation, diarrhea, nausea and  vomiting.   Genitourinary:  Negative for dysuria.   Musculoskeletal:  Negative for myalgias.   Skin:  Negative for rash.   Neurological:  Positive for weakness. Negative for sensory change and focal weakness.        Physical Exam  Temp:  [36.3 °C (97.3 °F)-36.4 °C (97.6 °F)] 36.3 °C (97.3 °F)  Pulse:  [62-72] 72  Resp:  [14-20] 20  BP: (116-146)/(61-89) 146/84  SpO2:  [97 %-100 %] 97 %    Physical Exam  Constitutional:       General: She is not in acute distress.     Appearance: Normal appearance.   HENT:      Head: Normocephalic and atraumatic.      Nose: Nose normal.      Mouth/Throat:      Mouth: Mucous membranes are moist.      Pharynx: Oropharynx is clear.   Eyes:      Conjunctiva/sclera: Conjunctivae normal.      Pupils: Pupils are equal, round, and reactive to light.   Cardiovascular:      Rate and Rhythm: Normal rate and regular rhythm.      Heart sounds: No murmur heard.  Pulmonary:      Effort: Pulmonary effort is normal.      Breath sounds: No wheezing, rhonchi or rales.   Abdominal:      General: There is no distension.      Palpations: Abdomen is soft.      Tenderness: There is abdominal tenderness. There is no guarding or rebound.      Comments: Urostomy in place   Musculoskeletal:         General: No swelling or tenderness.      Cervical back: Normal range of motion and neck supple.      Right lower leg: Edema present.      Left lower leg: Edema present.   Skin:     General: Skin is warm and dry.   Neurological:      Mental Status: She is alert.      GCS: GCS eye subscore is 4. GCS verbal subscore is 5. GCS motor subscore is 6.      Cranial Nerves: No facial asymmetry.   Psychiatric:         Mood and Affect: Mood normal.         Behavior: Behavior normal.         Fluids    Intake/Output Summary (Last 24 hours) at 10/13/2023 1733  Last data filed at 10/13/2023 1147  Gross per 24 hour   Intake 120 ml   Output 3200 ml   Net -3080 ml         Laboratory  Recent Labs     10/11/23  0540 10/12/23  0325   WBC  5.8  --    RBC 3.07*  --    HEMOGLOBIN 8.4* 8.7*   HEMATOCRIT 26.5*  --    MCV 86.3  --    MCH 27.4  --    MCHC 31.7*  --    RDW 44.4  --    PLATELETCT 216  --    MPV 10.5  --        Recent Labs     10/11/23  0540 10/12/23  0325   SODIUM 138 138   POTASSIUM 3.6 3.5*   CHLORIDE 105 104   CO2 22 22   GLUCOSE 92 91   BUN 63* 66*   CREATININE 3.14* 3.34*   CALCIUM 10.6* 9.9                       Imaging  DX-LOOPOGRAM (ILEAL CONDUIT)   Final Result      No evidence of reflux of contrast into the ureters or kidneys.      CT-ABORTED CT PROCEDURE   Final Result      Aborted CT-guided renal biopsy.      EC-ECHOCARDIOGRAM COMPLETE W/O CONT   Final Result      DX-CHEST-PORTABLE (1 VIEW)   Final Result      1.  Low lung volumes without definite acute cardiopulmonary abnormality.      US-RENAL   Final Result      1.  Normal renal ultrasound.             Assessment/Plan  * UGIB (upper gastrointestinal bleed)- (present on admission)  Assessment & Plan  Found to have hemoglobin 5.7, given fluids, 1 unit of packed red blood cell, Protonix.  FOBT was positive in the other facility.   10/1 EGD this morning, revealed thick whitish debris in esophagus and mucosal fragility.  Biopsies which showed benign mucosal ulceration with acute on chronic inflammation  Following this Hgb trended down to <7 again and required another 1 unit PRBC on 10/9  -Continue PPI  -Transfuse for Hgb <7        Hypoxia  Assessment & Plan  BNP elevated but CXR does not suggest edema and no improvement with aggressive diuresis, Echo shows EF 65%, low lung volumes with likely atelectasis with BMI 52 as well as anemia requiring transfusions all of which are likely contributing  -Lasix at home dose  -Wean O2 as tolerated    Constipation  Assessment & Plan  Continue bowel protocol    Pacemaker  Assessment & Plan  Noted    Primary hypertension  Assessment & Plan  Hold home metoprolol.      RAO (acute kidney injury) (HCC)  Assessment & Plan  Cre baseline 1.2, peaked here  at ~3.5, hx of urostomy with ileal conduit since childhood  Renal ultrasound was unremarkable  FeNa >1%  Urine culture negative for infection  No improvement with initiation or deescalation of Lasix  TTE showed EF 65% w/ mod LVH  Nephro consulted, will appreciate recs  Planned for kidney bx but CT for guidance showed bilateral hydronephrosis not noted on renal US raising concern for urostomy dysfunction, appears to have had hospitalization for this at [facility] in 2022  Loopogram showed contrast was injected into the stoma with filling of ileal conduit. There is no reflux of contrast seen into the ureters. 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 In review of yesterday's note (10/12/2023), there are no changes except as documented above.

## 2023-10-14 NOTE — PROGRESS NOTES
Hospital Medicine Daily Progress Note    Date of Service  10/14/2023    Chief Complaint  Patricia A Tietz is a 58 y.o. female admitted 9/29/2023 with anemia    Hospital Course  59 yo woman with DEBRA, urostomy, recent left knee arthroplasty complicated by infection, status post I&D with antibiotic spacer on daptomycin for MRSA, history of DVT on Eliquis who has been at SNF and found her hemoglobin to be low at 5.7 and transferred to [facility].  She has not had signs of bleeding.  GI was consulted.  EGD showed 6 white debris in esophagus, biopsies taken, diverticula in duodenum.  Hemoglobin has been stable postoperatively.  She has an RAO that has not improved.  She is very edematous and started on IV Lasix.  BNP was high, echocardiogram ordered to assess for heart failure, valve disease. Echo did not suggestive CHF and Lasix did not improve resp status or RAO. Nephro consulted, rec renal bx.     Interval Problem Update  Uro recs b/l nephrostomy tubes for pt, plan for IR placement will make NPO at midnight, renal function remains elevated but stable, holding AC for GIB/procedure     I have discussed this patient's plan of care and discharge plan at IDT rounds today with Case Management, Nursing, Nursing leadership, and other members of the IDT team.    Consultants/Specialty  GI, nephrology    Code Status  Full Code    Disposition  The patient is not medically cleared for discharge to home or a post-acute facility.  Anticipate discharge to: home with organized home healthcare and close outpatient follow-up    I have placed the appropriate orders for post-discharge needs.    Review of Systems  Review of Systems   Constitutional:  Positive for malaise/fatigue. Negative for chills and fever.   HENT:  Positive for congestion.    Eyes:  Negative for blurred vision.   Respiratory:  Positive for cough and shortness of breath.    Cardiovascular:  Positive for leg swelling. Negative for chest pain.   Gastrointestinal:  Positive for  abdominal pain. Negative for constipation, diarrhea, nausea and vomiting.   Genitourinary:  Negative for dysuria.   Musculoskeletal:  Negative for myalgias.   Skin:  Negative for rash.   Neurological:  Positive for weakness. Negative for sensory change and focal weakness.        Physical Exam  Temp:  [36.1 °C (97 °F)-36.3 °C (97.3 °F)] 36.2 °C (97.2 °F)  Pulse:  [61-78] 66  Resp:  [18-20] 18  BP: (111-146)/(67-84) 131/83  SpO2:  [95 %-98 %] 98 %    Physical Exam  Constitutional:       General: She is not in acute distress.     Appearance: Normal appearance.   HENT:      Head: Normocephalic and atraumatic.      Nose: Nose normal.      Mouth/Throat:      Mouth: Mucous membranes are moist.      Pharynx: Oropharynx is clear.   Eyes:      Conjunctiva/sclera: Conjunctivae normal.      Pupils: Pupils are equal, round, and reactive to light.   Cardiovascular:      Rate and Rhythm: Normal rate and regular rhythm.      Heart sounds: No murmur heard.  Pulmonary:      Effort: Pulmonary effort is normal.      Breath sounds: No wheezing, rhonchi or rales.   Abdominal:      General: There is no distension.      Palpations: Abdomen is soft.      Tenderness: There is abdominal tenderness. There is no guarding or rebound.      Comments: Urostomy in place   Musculoskeletal:         General: No swelling or tenderness.      Cervical back: Normal range of motion and neck supple.      Right lower leg: Edema present.      Left lower leg: Edema present.   Skin:     General: Skin is warm and dry.   Neurological:      Mental Status: She is alert.      GCS: GCS eye subscore is 4. GCS verbal subscore is 5. GCS motor subscore is 6.      Cranial Nerves: No facial asymmetry.   Psychiatric:         Mood and Affect: Mood normal.         Behavior: Behavior normal.         Fluids    Intake/Output Summary (Last 24 hours) at 10/14/2023 1333  Last data filed at 10/14/2023 1030  Gross per 24 hour   Intake 480 ml   Output 500 ml   Net -20 ml          Laboratory  Recent Labs     10/12/23  0325 10/14/23  0345   WBC  --  6.8   RBC  --  3.11*   HEMOGLOBIN 8.7* 8.5*   HEMATOCRIT  --  27.2*   MCV  --  87.5   MCH  --  27.3   MCHC  --  31.3*   RDW  --  44.3   PLATELETCT  --  255   MPV  --  10.2       Recent Labs     10/12/23  0325 10/13/23  1900 10/14/23  0345   SODIUM 138 136 137   POTASSIUM 3.5* 3.2* 3.3*   CHLORIDE 104 101 102   CO2 22 20 21   GLUCOSE 91 107* 103*   BUN 66* 70* 74*   CREATININE 3.34* 3.56* 3.50*   CALCIUM 9.9 10.9* 10.4                       Imaging  DX-LOOPOGRAM (ILEAL CONDUIT)   Final Result      No evidence of reflux of contrast into the ureters or kidneys.      CT-ABORTED CT PROCEDURE   Final Result      Aborted CT-guided renal biopsy.      EC-ECHOCARDIOGRAM COMPLETE W/O CONT   Final Result      DX-CHEST-PORTABLE (1 VIEW)   Final Result      1.  Low lung volumes without definite acute cardiopulmonary abnormality.      US-RENAL   Final Result      1.  Normal renal ultrasound.             Assessment/Plan  * UGIB (upper gastrointestinal bleed)- (present on admission)  Assessment & Plan  Found to have hemoglobin 5.7, given fluids, 1 unit of packed red blood cell, Protonix.  FOBT was positive in the other facility.   10/1 EGD this morning, revealed thick whitish debris in esophagus and mucosal fragility.  Biopsies which showed benign mucosal ulceration with acute on chronic inflammation  Following this Hgb trended down to <7 again and required another 1 unit PRBC on 10/9  -Continue PPI  -Transfuse for Hgb <7        Hypoxia  Assessment & Plan  BNP elevated but CXR does not suggest edema and no improvement with aggressive diuresis, Echo shows EF 65%, low lung volumes with likely atelectasis with BMI 52 as well as anemia requiring transfusions all of which are likely contributing  -Lasix at home dose  -Wean O2 as tolerated    Constipation  Assessment & Plan  Continue bowel protocol    Pacemaker  Assessment & Plan  Noted    Primary  hypertension  Assessment & Plan  Hold home metoprolol.      RAO (acute kidney injury) (formerly [facility])  Assessment & Plan  Cre baseline 1.2, peaked here at ~3.5, hx of urostomy with ileal conduit since childhood  Initial renal ultrasound was unremarkable  Planned for kidney bx but CT for guidance showed bilateral hydronephrosis not noted on renal US raising concern for urostomy dysfunction, appears to have had hospitalization for this at [facility] in 2022  Loopogram showed contrast was injected into the stoma with filling of ileal conduit. There is no reflux of contrast seen into the ureters. Due to the pressure, the catheter with inflated balloon popped out of the stoma  Urology consulted, pt will need b/l nephrostomy tubes via IR  Nephro consulted, will appreciate recs    Acute on chronic anemia  Assessment & Plan  Hb 5.7 at admission   From blood loss 2/2 GIB    History of DVT in adulthood  Assessment & Plan  Eliquis held for for GI bleed per GI    Hx MRSA infection  Assessment & Plan  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  Continue daptomycin, she is supposed to be on for 6 weeks  Has PICC    Class 3 severe obesity due to excess calories with serious comorbidity and body mass index (BMI) of 50.0 to 59.9 in adult (formerly [facility])- (present on admission)  Assessment & Plan  Counseling          VTE prophylaxis:   SCDs/TEDs  GIB    I have performed a physical exam and reviewed and updated ROS and Plan today (10/14/2023). In review of yesterday's note (10/13/2023), there are no changes except as documented above.

## 2023-10-14 NOTE — PROGRESS NOTES
Nephrology Daily Progress Note    Date of Service  10/14/2023    Chief Complaint  58 y.o. female admitted 9/29/2023 with a knee joint infection, developed RAO and volume overload    Interval Problem Update  Patient has no chest pain, shortness of breath slightly better  Still complaining of generalized muscle aches  10/11 patient complaining of joint and muscle pain  No chest pain, no shortness of breath  10/12 patient has no uremic symptoms  She went earlier today to have a kidney biopsy, CT scan showed bilateral hydronephrosis  Kidney biopsy was canceled  10/13 the patient has no new complaints  Appreciate urology input  10/14 patient has no chest pain, no shortness of breath  Review of Systems  Review of Systems   Constitutional:  Negative for chills, fever and malaise/fatigue.   Respiratory:  Negative for cough and shortness of breath.    Cardiovascular:  Negative for chest pain and leg swelling.   Gastrointestinal:  Negative for nausea and vomiting.   Genitourinary:  Negative for dysuria, frequency and urgency.        Physical Exam  Temp:  [36.1 °C (97 °F)-36.3 °C (97.3 °F)] 36.2 °C (97.2 °F)  Pulse:  [61-78] 66  Resp:  [18-20] 18  BP: (111-146)/(67-84) 131/83  SpO2:  [95 %-98 %] 98 %    Physical Exam  Vitals and nursing note reviewed.   Constitutional:       General: She is awake. She is not in acute distress.     Appearance: She is well-developed. She is not ill-appearing or diaphoretic.   HENT:      Head: Normocephalic and atraumatic.      Right Ear: External ear normal.      Left Ear: External ear normal.      Nose: Nose normal. No rhinorrhea.      Mouth/Throat:      Pharynx: No oropharyngeal exudate or posterior oropharyngeal erythema.   Eyes:      General: No scleral icterus.        Right eye: No discharge.         Left eye: No discharge.      Conjunctiva/sclera: Conjunctivae normal.   Neck:      Vascular: No carotid bruit.   Cardiovascular:      Rate and Rhythm: Normal rate and regular rhythm.      Heart  "sounds: No murmur heard.  Pulmonary:      Effort: Pulmonary effort is normal. No respiratory distress.      Breath sounds: Normal breath sounds.   Abdominal:      General: Abdomen is flat. There is no distension.      Palpations: Abdomen is soft. There is no mass.   Musculoskeletal:         General: No tenderness.      Cervical back: No rigidity. No muscular tenderness.      Right lower leg: Edema present.      Left lower leg: Edema present.   Skin:     General: Skin is warm and dry.      Coloration: Skin is not jaundiced.   Neurological:      General: No focal deficit present.      Mental Status: She is alert and oriented to person, place, and time. Mental status is at baseline.   Psychiatric:         Mood and Affect: Mood normal.         Behavior: Behavior normal.         Thought Content: Thought content normal.         Fluids    Intake/Output Summary (Last 24 hours) at 10/14/2023 1235  Last data filed at 10/13/2023 2100  Gross per 24 hour   Intake --   Output 500 ml   Net -500 ml         Laboratory  Recent Labs     10/12/23  0325 10/14/23  0345   WBC  --  6.8   RBC  --  3.11*   HEMOGLOBIN 8.7* 8.5*   HEMATOCRIT  --  27.2*   MCV  --  87.5   MCH  --  27.3   MCHC  --  31.3*   RDW  --  44.3   PLATELETCT  --  255   MPV  --  10.2       Recent Labs     10/12/23  0325 10/13/23  1900 10/14/23  0345   SODIUM 138 136 137   POTASSIUM 3.5* 3.2* 3.3*   CHLORIDE 104 101 102   CO2 22 20 21   GLUCOSE 91 107* 103*   BUN 66* 70* 74*   CREATININE 3.34* 3.56* 3.50*   CALCIUM 9.9 10.9* 10.4             No results for input(s): \"NTPROBNP\" in the last 72 hours.          Imaging  DX-LOOPOGRAM (ILEAL CONDUIT)   Final Result      No evidence of reflux of contrast into the ureters or kidneys.      CT-ABORTED CT PROCEDURE   Final Result      Aborted CT-guided renal biopsy.      EC-ECHOCARDIOGRAM COMPLETE W/O CONT   Final Result      DX-CHEST-PORTABLE (1 VIEW)   Final Result      1.  Low lung volumes without definite acute cardiopulmonary " abnormality.      US-RENAL   Final Result      1.  Normal renal ultrasound.            Assessment/Plan  1 RAO: Most likely obstructive uropathy, kidney function is not improving yet  2 volume overload  3 respiratory failure  4 obesity  5 knee joint infection  6 hypokalemia  no acute need for HD  Await final urology recommendation  Renal diet  Daily BMP, CBC.  Renal dose all meds  Avoid nephrotoxins like NSAIDs.  Prognosis guarded.

## 2023-10-14 NOTE — CARE PLAN
The patient is Stable - Low risk of patient condition declining or worsening    Shift Goals  Clinical Goals: Safety  Patient Goals: rest  Family Goals: ag    Progress made toward(s) clinical / shift goals:  Ongoing    Patient is progressing towards the following goals:      Problem: Wound/ / Incision Healing  Goal: Patient's wound/surgical incision will decrease in size and heals properly  Outcome: Progressing     Problem: Fall Risk  Goal: Patient will remain free from falls  Outcome: Progressing     Problem: Skin Integrity  Goal: Skin integrity is maintained or improved  Outcome: Progressing

## 2023-10-14 NOTE — PROGRESS NOTES
"UROLOGY Progress Note:    History of Present Illness:    Per my chart review, patient is a 58yoF with PMH including DEBRA, urostomy (since age 5, followed in [place] by Dr. Venegas), recent left knee arthroplasty complicated by infection (s/p I&D with antibiotic spacer on daptomycin for MRSA), history of DVT (on Eliquis).     Patient was admitted 9/29/2023 with a knee joint infection, developed RAO and volume overload. Echocardiogram not suggestive CHF and Lasix did not improve respiratory status or RAO. Nephrology recommended renal biopsy.    Urology was consulted for bilateral hydronephrosis appreciated at time of aborted CT-guided renal biopsy. Of note, no hydronephrosis was seen on ALFRED recently on 10/4/23.     Reports urostomy was created in [place] at age 5 as \"the bladder didn't work.\" Has not had any issues with the urostomy over the years, has been able to manage herself at home. Reports she has not seen Dr. Venegas in a while, but does typically follow closely with him at [facility]. She states she does have a history of kidney stones.     Interval Updates:    10/14. Reviewed imaging with Dr. Noel. Urostomy with good UOP. AFVSS. Labs stable, creatinine 3.50.     10/13. Patient with breakfast tray at bedside. Went for loopogram today. Urine draining light clear yellow. Good UOP. No new pain or discomfort. No new CBC or BMP available for review. AFVSS, elevated BP noted.     10/12. Today, patient is resting comfortably in bed, eating lunch. Patient does report perhaps mild L flank pain, though patient states difficult for her to discern from baseline back pain. Denies any hematuria or changes to the urine. Denies F/C/N/V. I placed red rubber catheter in stoma with RN assist.       Review of Systems:      Constitutional: Denies fevers, Denies weight changes  Eyes: Denies changes in vision, no eye pain  Ears/Nose/Throat/Mouth: Denies nasal congestion or sore throat   Cardiovascular: no chest pain, no " palpitations   Respiratory: no shortness of breath , Denies cough  Gastrointestinal/Hepatic: Denies abdominal pain, nausea, vomiting, diarrhea, constipation or GI bleeding   Genitourinary: Denies hematuria, dysuria or frequency  Musculoskeletal/Rheum: Denies  joint pain and swelling, no edema  Skin: Denies rash  Neurological: Denies headache, confusion, memory loss or focal weakness/parasthesias  Psychiatric: denies mood disorder   Endocrine: Vidhi thyroid problems  Heme/Oncology/Lymph Nodes: Denies enlarged lymph nodes, denies brusing or known bleeding disorder  All other systems were reviewed and are negative (AMA/CMS criteria)                Past Medical History:   Past Medical History:   Diagnosis Date    Anemia     Chronic anticoagulation     Chronic pain syndrome     DVT of lower extremity (deep venous thrombosis) ([facility])     Dyspnea     Nephrolithiasis     Sinus bradycardia     Sleep apnea     Uses a CPAP with oxygen.     Active Hospital Problems    Diagnosis     Hypoxia [R09.02]     Constipation [K59.00]     Primary hypertension [I10]     Pacemaker [Z95.0]     Acute on chronic anemia [D64.9]     RAO (acute kidney injury) ([facility]) [N17.9]     UGIB (upper gastrointestinal bleed) [K92.2]     Hx MRSA infection [Z86.14]     History of DVT in adulthood [Z86.718]     Class 3 severe obesity due to excess calories with serious comorbidity and body mass index (BMI) of 50.0 to 59.9 in adult ([facility]) [E66.01, Z68.43]        Past Surgical History:  Past Surgical History:   Procedure Laterality Date    SC UPPER GI ENDOSCOPY,DIAGNOSIS N/A 10/1/2023    Procedure: GASTROSCOPY;  Surgeon: Kelly Santos M.D.;  Location: SURGERY [facility];  Service: Gastroenterology    SC UPPER GI ENDOSCOPY,BIOPSY N/A 10/1/2023    Procedure: GASTROSCOPY, WITH BIOPSY;  Surgeon: Kelly Santos M.D.;  Location: SURGERY [facility];  Service: Gastroenterology    APPENDECTOMY      OTHER      Neck surgery       Hospital Medications:    Current  Facility-Administered Medications:     DAPTOmycin (Cubicin) 1,000 mg in NS 50 mL IVPB, 1,000 mg, Intravenous, Q48HRS, Gabriel Pringle D.O., Last Rate: 100 mL/hr at 10/12/23 1740, 1,000 mg at 10/12/23 1740    senna-docusate (Pericolace Or Senokot S) 8.6-50 MG per tablet 1 Tablet, 1 Tablet, Oral, BID, Gabriel Pringle D.O., 1 Tablet at 10/14/23 0527    furosemide (Lasix) tablet 40 mg, 40 mg, Oral, DAILY, Gabriel Pringle D.O., 40 mg at 10/14/23 0528    benzonatate (Tessalon) capsule 100 mg, 100 mg, Oral, TID PRN, Gabriel Pringle D.O.    Respiratory Therapy Consult, , Nebulization, Continuous RT, Christiano Hernandez M.D.    ferrous sulfate tablet 325 mg, 325 mg, Oral, QDAY with Breakfast, Christiano Hernandez M.D., 325 mg at 10/14/23 1017    guaiFENesin ER (Mucinex) tablet 600 mg, 600 mg, Oral, Q12HRS, Christiano Hernandez M.D., 600 mg at 10/14/23 0528    albuterol inhaler 2 Puff, 2 Puff, Inhalation, Q4H PRN (RT), Christiano Hernandez M.D.    ondansetron (Zofran) syringe/vial injection 4 mg, 4 mg, Intravenous, Q4HRS PRN, Christiano Hernandez M.D., 4 mg at 10/05/23 2008    polyethylene glycol/lytes (Miralax) PACKET 1 Packet, 1 Packet, Oral, DAILY, Siobhan Lainez M.D., 1 Packet at 10/11/23 0527    omeprazole (PriLOSEC) capsule 20 mg, 20 mg, Oral, BID, Siobhan Lainez M.D., 20 mg at 10/14/23 0527    polyethylene glycol/lytes (Miralax) PACKET 1 Packet, 1 Packet, Oral, QDAY PRN, Siobhan Lainez M.D., 1 Packet at 10/08/23 1735    bisacodyl (Dulcolax) suppository 10 mg, 10 mg, Rectal, QDAY PRN, Siobhan Lainez M.D., 10 mg at 10/13/23 1715    [Held by provider] amitriptyline (Elavil) tablet 50 mg, 50 mg, Oral, Nightly, Ponce Stuart M.D., 50 mg at 10/06/23 2050    citalopram (CeleXA) tablet 40 mg, 40 mg, Oral, DAILY, Ponce Stuart M.D., 40 mg at 10/14/23 0528    [Held by provider] gabapentin (Neurontin) capsule 300 mg, 300 mg, Oral, QHS, Ponce Stuart M.D., 300 mg at 10/06/23 2050    oxycodone (Oxy-IR) immediate release tablet 15 mg, 15 mg, Oral, Q6HRS PRN, Ponce Diez  CHANELL Stuart, 15 mg at 10/14/23 09    Current Outpatient Medications:  Medications Prior to Admission   Medication Sig Dispense Refill Last Dose    senna-docusate (PERICOLACE OR SENOKOT S) 8.6-50 MG Tab Take 1 Tablet by mouth 2 times a day.   9/29/2023 at 1900    cyclobenzaprine (FLEXERIL) 10 mg Tab Take 10 mg by mouth 3 times a day as needed for Muscle Spasms.   > 1 WEEK at PRN    DAPTOmycin (CUBICIN) 500 MG Recon Soln Infuse 500 mg into a venous catheter every 24 hours. (9/22/2023 - 11/2/2023)   9/29/2023 at 1400    furosemide (LASIX) 40 MG Tab Take 40 mg by mouth every day.   9/29/2023 at 0800    potassium chloride SA (KDUR) 20 MEQ Tab CR Take 20 mEq by mouth every day.   9/29/2023 at 0800    metoprolol tartrate (LOPRESSOR) 25 MG Tab Take 25 mg by mouth 2 times a day.   9/29/2023 at 1900    acidophilus lactobacillus Cap Take 1 Capsule by mouth every day.   9/29/2023 at 0800    ascorbic acid (VITAMIN C) 500 MG tablet Take 500 mg by mouth every day.   9/29/2023 at 0800    traZODone (DESYREL) 50 MG Tab Take 25-50 mg by mouth at bedtime as needed for Sleep. 0.5 to 1 tablet = 25 to 50 mg.   9/28/2023 at 2000    vancomycin (VANCOCIN) 1 g Recon Soln Infuse 1,000 mg into a venous catheter every 24 hours. (6/2/2023 - 9/22/2023)   9/22/2023 at DISCONTINUED    apixaban (ELIQUIS) 5mg Tab Take 5 mg by mouth 2 times a day.   9/29/2023 at 0800    oxyCODONE immediate release (ROXICODONE) 10 MG immediate release tablet Take 10 mg by mouth every four hours as needed for Severe Pain.   9/29/2023 at 1800    gabapentin (NEURONTIN) 300 MG Cap Take 300 mg by mouth every day. In the afternoon.   9/29/2023 at 1400    citalopram (CELEXA) 40 MG TABS Take 40 mg by mouth every morning.   9/29/2023 at 0800       Medication Allergy:  No Known Allergies    Family History:  Family History   Problem Relation Age of Onset    Hypertension Mother     Kidney Disease Mother     Diabetes Mother     Coronary artery disease Father        Social  "History:  Social History     Socioeconomic History    Marital status: Single     Spouse name: Not on file    Number of children: Not on file    Years of education: Not on file    Highest education level: Not on file   Occupational History    Not on file   Tobacco Use    Smoking status: Never    Smokeless tobacco: Never   Vaping Use    Vaping Use: Not on file   Substance and Sexual Activity    Alcohol use: Not Currently     Alcohol/week: 0.6 oz     Types: 1 Cans of beer per week    Drug use: No    Sexual activity: Not on file   Other Topics Concern    Not on file   Social History Narrative    Not on file     Social Determinants of Health     Financial Resource Strain: Not on file   Food Insecurity: Not on file   Transportation Needs: Not on file   Physical Activity: Not on file   Stress: Not on file   Social Connections: Not on file   Intimate Partner Violence: Not on file   Housing Stability: Not on file         Physical Exam:  Vitals/ General Appearance:   Weight/BMI: Body mass index is 52.58 kg/m².  /83   Pulse 66   Temp 36.2 °C (97.2 °F) (Temporal)   Resp 18   Ht 1.575 m (5' 2\")   Wt (!) 130 kg (287 lb 7.7 oz)   SpO2 98%   Vitals:    10/13/23 1622 10/13/23 1910 10/14/23 0433 10/14/23 0658   BP: (!) 146/84 133/75 111/67 131/83   Pulse: 72 78 61 66   Resp: 20 20 20 18   Temp: 36.3 °C (97.3 °F) 36.2 °C (97.2 °F) 36.1 °C (97 °F) 36.2 °C (97.2 °F)   TempSrc: Temporal Temporal Temporal Temporal   SpO2: 97% 97% 95% 98%   Weight:       Height:         Oxygen Therapy:  Pulse Oximetry: 98 %, O2 (LPM): 2.5, O2 Delivery Device: Silicone Nasal Cannula    Constitutional: No acute distress  HENMT:  Normocephalic, Atraumatic  Eyes:  EOMI  Neck:  Normal range of motion  Lungs:  Normal respiratory effort  Abdomen: Rotund. Soft, No tenderness.  : Stoma present RLQ, tissue pink and healthy, draining clear yellow urine  Skin: Warm, Dry  Neurologic: Alert & oriented x 3, No focal deficits noted  Psychiatric: Affect " normal, Judgment normal, Mood normal.      MDM (Data Review):     Records reviewed and summarized in current documentation    Lab Data Review:  Recent Results (from the past 24 hour(s))   Basic Metabolic Panel    Collection Time: 10/13/23  7:00 PM   Result Value Ref Range    Sodium 136 135 - 145 mmol/L    Potassium 3.2 (L) 3.6 - 5.5 mmol/L    Chloride 101 96 - 112 mmol/L    Co2 20 20 - 33 mmol/L    Glucose 107 (H) 65 - 99 mg/dL    Bun 70 (H) 8 - 22 mg/dL    Creatinine 3.56 (H) 0.50 - 1.40 mg/dL    Calcium 10.9 (H) 8.5 - 10.5 mg/dL    Anion Gap 15.0 7.0 - 16.0   ESTIMATED GFR    Collection Time: 10/13/23  7:00 PM   Result Value Ref Range    GFR (CKD-EPI) 14 (A) >60 mL/min/1.73 m 2   Basic Metabolic Panel    Collection Time: 10/14/23  3:45 AM   Result Value Ref Range    Sodium 137 135 - 145 mmol/L    Potassium 3.3 (L) 3.6 - 5.5 mmol/L    Chloride 102 96 - 112 mmol/L    Co2 21 20 - 33 mmol/L    Glucose 103 (H) 65 - 99 mg/dL    Bun 74 (H) 8 - 22 mg/dL    Creatinine 3.50 (H) 0.50 - 1.40 mg/dL    Calcium 10.4 8.5 - 10.5 mg/dL    Anion Gap 14.0 7.0 - 16.0   CBC WITHOUT DIFFERENTIAL    Collection Time: 10/14/23  3:45 AM   Result Value Ref Range    WBC 6.8 4.8 - 10.8 K/uL    RBC 3.11 (L) 4.20 - 5.40 M/uL    Hemoglobin 8.5 (L) 12.0 - 16.0 g/dL    Hematocrit 27.2 (L) 37.0 - 47.0 %    MCV 87.5 81.4 - 97.8 fL    MCH 27.3 27.0 - 33.0 pg    MCHC 31.3 (L) 32.2 - 35.5 g/dL    RDW 44.3 35.9 - 50.0 fL    Platelet Count 255 164 - 446 K/uL    MPV 10.2 9.0 - 12.9 fL   ESTIMATED GFR    Collection Time: 10/14/23  3:45 AM   Result Value Ref Range    GFR (CKD-EPI) 14 (A) >60 mL/min/1.73 m 2       Imaging/Procedures Review:    Reviewed    MDM (Assessment and Plan):     Active Hospital Problems    Diagnosis     Hypoxia [R09.02]     Constipation [K59.00]     Primary hypertension [I10]     Pacemaker [Z95.0]     Acute on chronic anemia [D64.9]     RAO (acute kidney injury) ([facility]) [N17.9]     UGIB (upper gastrointestinal bleed) [K92.2]     Hx MRSA  infection [Z86.14]     History of DVT in adulthood [Z86.718]     Class 3 severe obesity due to excess calories with serious comorbidity and body mass index (BMI) of 50.0 to 59.9 in adult ([facility]) [E66.01, Z68.43]      58yoF with reported history of ileal conduit with urostomy in RLQ, RAO not improved with Lasix, and new found b/l hydronephrosis on  CT during aborted CT-guided renal biopsy.     Creat 3.50 (baseline 1.3), nephrology following  Normal WBC 6.8  AVSS    UOP clear yellow, 1000cc/24hrs    Discussed imaging results with Dr. Noel, recommend b/l NPTs to relieve obstruction      PLAN:    - Recommend bilateral percutaneous nephrostomy tubes with IR team  - Hold any anticoagulation in anticipation of procedure  - NPO status the midnight before IR procedure  - Monitor urine output  - Continue to trend creatinine  - Urology following    Plan of care discussed with patient, hospitalist Dr. Pringle, and urology team - Dr. Noel.    Aleyda Cordova PA-C  [facility]

## 2023-10-14 NOTE — PROGRESS NOTES
Bedside report from night RN, pt care assumed. VSS on 3L o2, pt assessment complete. Pt A&Ox4,  c/o 7/10 pain at this time. POC discussed with pt and verbalizes no questions. Pt denies any additional needs at this time. Bed locked and in lowest position, bed alarm on. Pt educated on fall risk and verbalized understanding, call light within reach, hourly rounding initiated.     PRN pain medication given this shift

## 2023-10-15 ENCOUNTER — APPOINTMENT (OUTPATIENT)
Dept: RADIOLOGY | Facility: MEDICAL CENTER | Age: 58
DRG: 368 | End: 2023-10-15
Attending: RADIOLOGY
Payer: MEDICARE

## 2023-10-15 LAB
ANION GAP SERPL CALC-SCNC: 13 MMOL/L (ref 7–16)
BUN SERPL-MCNC: 78 MG/DL (ref 8–22)
CALCIUM SERPL-MCNC: 10.3 MG/DL (ref 8.5–10.5)
CHLORIDE SERPL-SCNC: 105 MMOL/L (ref 96–112)
CO2 SERPL-SCNC: 20 MMOL/L (ref 20–33)
CREAT SERPL-MCNC: 3.79 MG/DL (ref 0.5–1.4)
ERYTHROCYTE [DISTWIDTH] IN BLOOD BY AUTOMATED COUNT: 44.7 FL (ref 35.9–50)
GFR SERPLBLD CREATININE-BSD FMLA CKD-EPI: 13 ML/MIN/1.73 M 2
GLUCOSE SERPL-MCNC: 118 MG/DL (ref 65–99)
GRAM STN SPEC: NORMAL
GRAM STN SPEC: NORMAL
HCT VFR BLD AUTO: 26.9 % (ref 37–47)
HGB BLD-MCNC: 8.5 G/DL (ref 12–16)
MCH RBC QN AUTO: 27.9 PG (ref 27–33)
MCHC RBC AUTO-ENTMCNC: 31.6 G/DL (ref 32.2–35.5)
MCV RBC AUTO: 88.2 FL (ref 81.4–97.8)
PLATELET # BLD AUTO: 270 K/UL (ref 164–446)
PMV BLD AUTO: 10.5 FL (ref 9–12.9)
POTASSIUM SERPL-SCNC: 3.3 MMOL/L (ref 3.6–5.5)
RBC # BLD AUTO: 3.05 M/UL (ref 4.2–5.4)
SIGNIFICANT IND 70042: NORMAL
SIGNIFICANT IND 70042: NORMAL
SITE SITE: NORMAL
SITE SITE: NORMAL
SODIUM SERPL-SCNC: 138 MMOL/L (ref 135–145)
SOURCE SOURCE: NORMAL
SOURCE SOURCE: NORMAL
WBC # BLD AUTO: 7 K/UL (ref 4.8–10.8)

## 2023-10-15 PROCEDURE — 99232 SBSQ HOSP IP/OBS MODERATE 35: CPT | Performed by: STUDENT IN AN ORGANIZED HEALTH CARE EDUCATION/TRAINING PROGRAM

## 2023-10-15 PROCEDURE — 700101 HCHG RX REV CODE 250

## 2023-10-15 PROCEDURE — 85027 COMPLETE CBC AUTOMATED: CPT

## 2023-10-15 PROCEDURE — A9270 NON-COVERED ITEM OR SERVICE: HCPCS | Performed by: STUDENT IN AN ORGANIZED HEALTH CARE EDUCATION/TRAINING PROGRAM

## 2023-10-15 PROCEDURE — 87205 SMEAR GRAM STAIN: CPT

## 2023-10-15 PROCEDURE — 99232 SBSQ HOSP IP/OBS MODERATE 35: CPT | Performed by: INTERNAL MEDICINE

## 2023-10-15 PROCEDURE — 700117 HCHG RX CONTRAST REV CODE 255: Performed by: RADIOLOGY

## 2023-10-15 PROCEDURE — 87086 URINE CULTURE/COLONY COUNT: CPT | Mod: 91

## 2023-10-15 PROCEDURE — 0T9330Z DRAINAGE OF RIGHT KIDNEY PELVIS WITH DRAINAGE DEVICE, PERCUTANEOUS APPROACH: ICD-10-PCS | Performed by: RADIOLOGY

## 2023-10-15 PROCEDURE — 700111 HCHG RX REV CODE 636 W/ 250 OVERRIDE (IP): Mod: JZ

## 2023-10-15 PROCEDURE — A9270 NON-COVERED ITEM OR SERVICE: HCPCS | Performed by: INTERNAL MEDICINE

## 2023-10-15 PROCEDURE — 87077 CULTURE AEROBIC IDENTIFY: CPT | Mod: 91

## 2023-10-15 PROCEDURE — 700102 HCHG RX REV CODE 250 W/ 637 OVERRIDE(OP): Performed by: STUDENT IN AN ORGANIZED HEALTH CARE EDUCATION/TRAINING PROGRAM

## 2023-10-15 PROCEDURE — 700111 HCHG RX REV CODE 636 W/ 250 OVERRIDE (IP): Performed by: RADIOLOGY

## 2023-10-15 PROCEDURE — 80048 BASIC METABOLIC PNL TOTAL CA: CPT

## 2023-10-15 PROCEDURE — 700102 HCHG RX REV CODE 250 W/ 637 OVERRIDE(OP): Performed by: INTERNAL MEDICINE

## 2023-10-15 PROCEDURE — 770001 HCHG ROOM/CARE - MED/SURG/GYN PRIV*

## 2023-10-15 PROCEDURE — 76942 ECHO GUIDE FOR BIOPSY: CPT

## 2023-10-15 PROCEDURE — 700105 HCHG RX REV CODE 258: Performed by: RADIOLOGY

## 2023-10-15 PROCEDURE — 0T9430Z DRAINAGE OF LEFT KIDNEY PELVIS WITH DRAINAGE DEVICE, PERCUTANEOUS APPROACH: ICD-10-PCS | Performed by: RADIOLOGY

## 2023-10-15 RX ORDER — ONDANSETRON 2 MG/ML
4 INJECTION INTRAMUSCULAR; INTRAVENOUS PRN
Status: ACTIVE | OUTPATIENT
Start: 2023-10-15 | End: 2023-10-15

## 2023-10-15 RX ORDER — LIDOCAINE HYDROCHLORIDE 10 MG/ML
INJECTION, SOLUTION INFILTRATION; PERINEURAL
Status: COMPLETED
Start: 2023-10-15 | End: 2023-10-15

## 2023-10-15 RX ORDER — SODIUM CHLORIDE 9 MG/ML
500 INJECTION, SOLUTION INTRAVENOUS
Status: ACTIVE | OUTPATIENT
Start: 2023-10-15 | End: 2023-10-15

## 2023-10-15 RX ORDER — CEFAZOLIN SODIUM 1 G/3ML
INJECTION, POWDER, FOR SOLUTION INTRAMUSCULAR; INTRAVENOUS
Status: COMPLETED
Start: 2023-10-15 | End: 2023-10-15

## 2023-10-15 RX ORDER — MIDAZOLAM HYDROCHLORIDE 1 MG/ML
.5-2 INJECTION INTRAMUSCULAR; INTRAVENOUS PRN
Status: ACTIVE | OUTPATIENT
Start: 2023-10-15 | End: 2023-10-15

## 2023-10-15 RX ORDER — MIDAZOLAM HYDROCHLORIDE 1 MG/ML
INJECTION INTRAMUSCULAR; INTRAVENOUS
Status: COMPLETED
Start: 2023-10-15 | End: 2023-10-15

## 2023-10-15 RX ORDER — ONDANSETRON 2 MG/ML
INJECTION INTRAMUSCULAR; INTRAVENOUS
Status: COMPLETED
Start: 2023-10-15 | End: 2023-10-15

## 2023-10-15 RX ADMIN — FENTANYL CITRATE 25 MCG: 50 INJECTION, SOLUTION INTRAMUSCULAR; INTRAVENOUS at 10:38

## 2023-10-15 RX ADMIN — LIDOCAINE HYDROCHLORIDE: 10 INJECTION, SOLUTION INFILTRATION; PERINEURAL at 10:30

## 2023-10-15 RX ADMIN — MIDAZOLAM 1 MG: 1 INJECTION, SOLUTION INTRAMUSCULAR; INTRAVENOUS at 10:28

## 2023-10-15 RX ADMIN — FUROSEMIDE 40 MG: 40 TABLET ORAL at 05:12

## 2023-10-15 RX ADMIN — OXYCODONE HYDROCHLORIDE 15 MG: 15 TABLET ORAL at 11:33

## 2023-10-15 RX ADMIN — CEFAZOLIN 2 G: 2 INJECTION, POWDER, FOR SOLUTION INTRAMUSCULAR; INTRAVENOUS at 10:26

## 2023-10-15 RX ADMIN — ONDANSETRON 4 MG: 2 INJECTION INTRAMUSCULAR; INTRAVENOUS at 10:28

## 2023-10-15 RX ADMIN — MIDAZOLAM 0.5 MG: 1 INJECTION, SOLUTION INTRAMUSCULAR; INTRAVENOUS at 10:51

## 2023-10-15 RX ADMIN — DOCUSATE SODIUM 50 MG AND SENNOSIDES 8.6 MG 1 TABLET: 8.6; 5 TABLET, FILM COATED ORAL at 05:10

## 2023-10-15 RX ADMIN — FENTANYL CITRATE 25 MCG: 50 INJECTION, SOLUTION INTRAMUSCULAR; INTRAVENOUS at 10:28

## 2023-10-15 RX ADMIN — OMEPRAZOLE 20 MG: 20 CAPSULE, DELAYED RELEASE ORAL at 17:55

## 2023-10-15 RX ADMIN — OXYCODONE HYDROCHLORIDE 15 MG: 15 TABLET ORAL at 17:55

## 2023-10-15 RX ADMIN — OMEPRAZOLE 20 MG: 20 CAPSULE, DELAYED RELEASE ORAL at 05:10

## 2023-10-15 RX ADMIN — FENTANYL CITRATE 25 MCG: 50 INJECTION, SOLUTION INTRAMUSCULAR; INTRAVENOUS at 10:51

## 2023-10-15 RX ADMIN — GUAIFENESIN 600 MG: 600 TABLET, EXTENDED RELEASE ORAL at 05:10

## 2023-10-15 RX ADMIN — CEFAZOLIN: 1 INJECTION, POWDER, FOR SOLUTION INTRAMUSCULAR; INTRAVENOUS at 10:30

## 2023-10-15 RX ADMIN — IOHEXOL 30 ML: 300 INJECTION, SOLUTION INTRAVENOUS at 11:30

## 2023-10-15 RX ADMIN — MIDAZOLAM 0.5 MG: 1 INJECTION, SOLUTION INTRAMUSCULAR; INTRAVENOUS at 10:45

## 2023-10-15 RX ADMIN — FENTANYL CITRATE 25 MCG: 50 INJECTION, SOLUTION INTRAMUSCULAR; INTRAVENOUS at 10:45

## 2023-10-15 RX ADMIN — CITALOPRAM 40 MG: 40 TABLET, FILM COATED ORAL at 05:10

## 2023-10-15 RX ADMIN — GUAIFENESIN 600 MG: 600 TABLET, EXTENDED RELEASE ORAL at 17:55

## 2023-10-15 RX ADMIN — DOCUSATE SODIUM 50 MG AND SENNOSIDES 8.6 MG 1 TABLET: 8.6; 5 TABLET, FILM COATED ORAL at 17:55

## 2023-10-15 ASSESSMENT — ENCOUNTER SYMPTOMS
CHILLS: 0
ABDOMINAL PAIN: 1
FOCAL WEAKNESS: 0
DIARRHEA: 0
CONSTIPATION: 0
SHORTNESS OF BREATH: 1
NAUSEA: 0
VOMITING: 0
COUGH: 1
WEAKNESS: 1
BLURRED VISION: 0
MYALGIAS: 0
SENSORY CHANGE: 0
FEVER: 0
SHORTNESS OF BREATH: 0
COUGH: 0

## 2023-10-15 ASSESSMENT — PAIN DESCRIPTION - PAIN TYPE
TYPE: ACUTE PAIN

## 2023-10-15 NOTE — OR SURGEON
Immediate Post- Operative Note        Findings: Bilateral hydronephrosis.       Procedure(s): B antegrade nephrostograms. B PCN placement.       Estimated Blood Loss: Less than 5 ml        Complications: None            10/15/2023     1058 AM     Ayaan Brody M.D.

## 2023-10-15 NOTE — PROGRESS NOTES
Nephrology Daily Progress Note    Date of Service  10/15/2023    Chief Complaint  58 y.o. female admitted 9/29/2023 with a knee joint infection, developed RAO and volume overload    Interval Problem Update  Patient has no chest pain, shortness of breath slightly better  Still complaining of generalized muscle aches  10/11 patient complaining of joint and muscle pain  No chest pain, no shortness of breath  10/12 patient has no uremic symptoms  She went earlier today to have a kidney biopsy, CT scan showed bilateral hydronephrosis  Kidney biopsy was canceled  10/13 the patient has no new complaints  Appreciate urology input  10/14 patient has no chest pain, no shortness of breath  10/15 pt had B/L nephrostomy tubes placement  Patient has no new complaints  Review of Systems  Review of Systems   Constitutional:  Negative for chills, fever and malaise/fatigue.   Respiratory:  Negative for cough and shortness of breath.    Cardiovascular:  Negative for chest pain and leg swelling.   Gastrointestinal:  Negative for nausea and vomiting.   Genitourinary:  Negative for dysuria, frequency and urgency.        Physical Exam  Temp:  [36.2 °C (97.2 °F)-36.6 °C (97.9 °F)] 36.2 °C (97.2 °F)  Pulse:  [62-80] 62  Resp:  [13-24] 18  BP: ()/() 131/78  SpO2:  [95 %-100 %] 100 %    Physical Exam  Vitals and nursing note reviewed.   Constitutional:       General: She is awake. She is not in acute distress.     Appearance: She is well-developed. She is not ill-appearing or diaphoretic.   HENT:      Head: Normocephalic and atraumatic.      Right Ear: External ear normal.      Left Ear: External ear normal.      Nose: Nose normal. No rhinorrhea.      Mouth/Throat:      Pharynx: No oropharyngeal exudate or posterior oropharyngeal erythema.   Eyes:      General: No scleral icterus.        Right eye: No discharge.         Left eye: No discharge.      Conjunctiva/sclera: Conjunctivae normal.   Neck:      Vascular: No carotid bruit.  "  Cardiovascular:      Rate and Rhythm: Normal rate and regular rhythm.      Heart sounds: No murmur heard.  Pulmonary:      Effort: Pulmonary effort is normal. No respiratory distress.      Breath sounds: Normal breath sounds.   Abdominal:      General: Abdomen is flat. There is no distension.      Palpations: Abdomen is soft. There is no mass.   Musculoskeletal:         General: No tenderness.      Cervical back: No rigidity. No muscular tenderness.      Right lower leg: Edema present.      Left lower leg: Edema present.   Skin:     General: Skin is warm and dry.      Coloration: Skin is not jaundiced.   Neurological:      General: No focal deficit present.      Mental Status: She is alert and oriented to person, place, and time. Mental status is at baseline.   Psychiatric:         Mood and Affect: Mood normal.         Behavior: Behavior normal.         Thought Content: Thought content normal.         Fluids    Intake/Output Summary (Last 24 hours) at 10/15/2023 1208  Last data filed at 10/15/2023 1115  Gross per 24 hour   Intake 60 ml   Output 1550 ml   Net -1490 ml         Laboratory  Recent Labs     10/14/23  0345 10/15/23  0440   WBC 6.8 7.0   RBC 3.11* 3.05*   HEMOGLOBIN 8.5* 8.5*   HEMATOCRIT 27.2* 26.9*   MCV 87.5 88.2   MCH 27.3 27.9   MCHC 31.3* 31.6*   RDW 44.3 44.7   PLATELETCT 255 270   MPV 10.2 10.5       Recent Labs     10/13/23  1900 10/14/23  0345 10/15/23  0440   SODIUM 136 137 138   POTASSIUM 3.2* 3.3* 3.3*   CHLORIDE 101 102 105   CO2 20 21 20   GLUCOSE 107* 103* 118*   BUN 70* 74* 78*   CREATININE 3.56* 3.50* 3.79*   CALCIUM 10.9* 10.4 10.3             No results for input(s): \"NTPROBNP\" in the last 72 hours.          Imaging  DX-LOOPOGRAM (ILEAL CONDUIT)   Final Result      No evidence of reflux of contrast into the ureters or kidneys.      CT-ABORTED CT PROCEDURE   Final Result      Aborted CT-guided renal biopsy.      EC-ECHOCARDIOGRAM COMPLETE W/O CONT   Final Result      DX-CHEST-PORTABLE " (1 VIEW)   Final Result      1.  Low lung volumes without definite acute cardiopulmonary abnormality.      US-RENAL   Final Result      1.  Normal renal ultrasound.      IR-NEPHROSTOGRAM W/ NEW TUBE PLACEMENT (ALL RADIOLOGY) BOTH    (Results Pending)         Assessment/Plan  1 RAO: Most likely obstructive uropathy, kidney function is not improving yet  2 volume overload  3 respiratory failure  4 obesity  5 knee joint infection  6 hypokalemia  no acute need for HD today, evaluate daily  Replace potassium  Status post bilateral nephrostomy tube placement  Renal diet  Daily BMP, CBC.  Renal dose all meds  Avoid nephrotoxins like NSAIDs.  Prognosis guarded.

## 2023-10-15 NOTE — PROGRESS NOTES
Assumed care and received report. Patient A & O x 4., on 3ltr oxygen per nasal cannula with no obvious respiratory distress. Call bell and personal belongings within reach. Patient complained of nausea and was medicated as per MAR. All needs met.

## 2023-10-15 NOTE — CARE PLAN
The patient is Stable - Low risk of patient condition declining or worsening    Shift Goals  Clinical Goals: Skin integrity  Patient Goals: rest  Family Goals: ag    Patient NPO since midnight. No complaints of pain. Patient was medicated for nausea as per MAR with effect.    Progress made toward(s) clinical / shift goals:  Ongoing    Patient is progressing towards the following goals:      Problem: Wound/ / Incision Healing  Goal: Patient's wound/surgical incision will decrease in size and heals properly  Outcome: Progressing     Problem: Fall Risk  Goal: Patient will remain free from falls  Outcome: Progressing     Problem: Skin Integrity  Goal: Skin integrity is maintained or improved  Outcome: Progressing     Problem: Respiratory  Goal: Patient will achieve/maintain optimum respiratory ventilation and gas exchange  Outcome: Progressing

## 2023-10-15 NOTE — CARE PLAN
The patient is Stable - Low risk of patient condition declining or worsening    Shift Goals  Clinical Goals: Skin integrity  Patient Goals: rest and pain  Family Goals: ag    Progress made toward(s) clinical / shift goals:    Problem: Skin Integrity  Goal: Skin integrity is maintained or improved  Description: Target End Date:  Prior to discharge or change in level of care    Document interventions on Skin Risk/Jaquan flowsheet groups and corresponding LDA    1.  Assess and monitor skin integrity, appearance and/or temperature  2.  Assess risk factors for impaired skin integrity and/or pressures ulcers  3.  Implement precautions to protect skin integrity in collaboration with interdisciplinary team  4.  Implement pressure ulcer prevention protocol if at risk for skin breakdown  5.  Confirm wound care consult if at risk for skin breakdown  6.  Ensure patient use of pressure relieving devices  (Low air loss bed, waffle overlay, heel protectors, ROHO cushion, etc)  Outcome: Progressing  Note: Q2 turns and Waffle mattress in place       Patient is not progressing towards the following goals:      Problem: Respiratory  Goal: Patient will achieve/maintain optimum respiratory ventilation and gas exchange  Description: Target End Date:  Prior to discharge or change in level of care    Document on Assessment flowsheet    1.  Assess and monitor rate, rhythm, depth and effort of respiration  2.  Breath sounds assessed qshift and/or as needed  3.  Assess O2 saturation, administer/titrate oxygen as ordered  4.  Position patient for maximum ventilatory efficiency  5.  Turn, cough, and deep breath with splinting to improve effectiveness  6.  Collaborate with RT to administer medication/treatments per order  7.  Encourage use of incentive spirometer and encourage patient to cough after use and utilize splinting techniques if applicable  8.  Airway suctioning  9.  Monitor sputum production for changes in color, consistency and  frequency  10. Perform frequent oral hygiene  11. Alternate physical activity with rest periods  Outcome: Not Progressing  Note: Patient on 3 L O2 diminished lung sound bilateral lower lobes.

## 2023-10-15 NOTE — PROGRESS NOTES
Hospital Medicine Daily Progress Note    Date of Service  10/15/2023    Chief Complaint  Patricia A Tietz is a 58 y.o. female admitted 9/29/2023 with anemia    Hospital Course  59 yo woman with DEBRA, urostomy, recent left knee arthroplasty complicated by infection, status post I&D with antibiotic spacer on daptomycin for MRSA, history of DVT on Eliquis who has been at SNF and found her hemoglobin to be low at 5.7 and transferred to [facility].  She has not had signs of bleeding.  GI was consulted.  EGD showed 6 white debris in esophagus, biopsies taken, diverticula in duodenum.  Hemoglobin has been stable postoperatively.  She has an RAO that has not improved.  She is very edematous and started on IV Lasix.  BNP was high, echocardiogram ordered to assess for heart failure, valve disease. Echo did not suggestive CHF and Lasix did not improve resp status or RAO. Nephro consulted, rec renal bx.     Interval Problem Update  b/l nephrostomy tubes placed today w/o incident, restart AC tomorrow, pt states symptoms stable    I have discussed this patient's plan of care and discharge plan at IDT rounds today with Case Management, Nursing, Nursing leadership, and other members of the IDT team.    Consultants/Specialty  GI, nephrology    Code Status  Full Code    Disposition  The patient is not medically cleared for discharge to home or a post-acute facility.  Anticipate discharge to: skilled nursing facility    I have placed the appropriate orders for post-discharge needs.    Review of Systems  Review of Systems   Constitutional:  Positive for malaise/fatigue. Negative for chills and fever.   HENT:  Positive for congestion.    Eyes:  Negative for blurred vision.   Respiratory:  Positive for cough and shortness of breath.    Cardiovascular:  Positive for leg swelling. Negative for chest pain.   Gastrointestinal:  Positive for abdominal pain. Negative for constipation, diarrhea, nausea and vomiting.   Genitourinary:  Negative for  dysuria.   Musculoskeletal:  Negative for myalgias.   Skin:  Negative for rash.   Neurological:  Positive for weakness. Negative for sensory change and focal weakness.        Physical Exam  Temp:  [36.2 °C (97.2 °F)-36.6 °C (97.9 °F)] 36.3 °C (97.4 °F)  Pulse:  [62-80] 66  Resp:  [13-24] 18  BP: ()/() 144/78  SpO2:  [95 %-100 %] 98 %    Physical Exam  Constitutional:       General: She is not in acute distress.     Appearance: Normal appearance.   HENT:      Head: Normocephalic and atraumatic.      Nose: Nose normal.      Mouth/Throat:      Mouth: Mucous membranes are moist.      Pharynx: Oropharynx is clear.   Eyes:      Conjunctiva/sclera: Conjunctivae normal.      Pupils: Pupils are equal, round, and reactive to light.   Cardiovascular:      Rate and Rhythm: Normal rate and regular rhythm.      Heart sounds: No murmur heard.  Pulmonary:      Effort: Pulmonary effort is normal.      Breath sounds: No wheezing, rhonchi or rales.   Abdominal:      General: There is no distension.      Palpations: Abdomen is soft.      Tenderness: There is abdominal tenderness. There is no guarding or rebound.      Comments: Urostomy in place   Musculoskeletal:         General: No swelling or tenderness.      Cervical back: Normal range of motion and neck supple.      Right lower leg: Edema present.      Left lower leg: Edema present.   Skin:     General: Skin is warm and dry.   Neurological:      Mental Status: She is alert.      GCS: GCS eye subscore is 4. GCS verbal subscore is 5. GCS motor subscore is 6.      Cranial Nerves: No facial asymmetry.   Psychiatric:         Mood and Affect: Mood normal.         Behavior: Behavior normal.         Fluids    Intake/Output Summary (Last 24 hours) at 10/15/2023 1348  Last data filed at 10/15/2023 1115  Gross per 24 hour   Intake 60 ml   Output 1550 ml   Net -1490 ml         Laboratory  Recent Labs     10/14/23  0345 10/15/23  0440   WBC 6.8 7.0   RBC 3.11* 3.05*   HEMOGLOBIN  8.5* 8.5*   HEMATOCRIT 27.2* 26.9*   MCV 87.5 88.2   MCH 27.3 27.9   MCHC 31.3* 31.6*   RDW 44.3 44.7   PLATELETCT 255 270   MPV 10.2 10.5       Recent Labs     10/13/23  1900 10/14/23  0345 10/15/23  0440   SODIUM 136 137 138   POTASSIUM 3.2* 3.3* 3.3*   CHLORIDE 101 102 105   CO2 20 21 20   GLUCOSE 107* 103* 118*   BUN 70* 74* 78*   CREATININE 3.56* 3.50* 3.79*   CALCIUM 10.9* 10.4 10.3                       Imaging  IR-NEPHROSTOGRAM W/ NEW TUBE PLACEMENT (ALL RADIOLOGY) BOTH   Final Result      1. Ultrasound and fluoroscopic guided placement of bilateral 8 Slovak pigtail locking loop percutaneous nephrostomy catheter.      2. Nephrostogram showing satisfactory catheter position without extravasation.      DX-LOOPOGRAM (ILEAL CONDUIT)   Final Result      No evidence of reflux of contrast into the ureters or kidneys.      CT-ABORTED CT PROCEDURE   Final Result      Aborted CT-guided renal biopsy.      EC-ECHOCARDIOGRAM COMPLETE W/O CONT   Final Result      DX-CHEST-PORTABLE (1 VIEW)   Final Result      1.  Low lung volumes without definite acute cardiopulmonary abnormality.      US-RENAL   Final Result      1.  Normal renal ultrasound.             Assessment/Plan  * UGIB (upper gastrointestinal bleed)- (present on admission)  Assessment & Plan  Found to have hemoglobin 5.7, given fluids, 1 unit of packed red blood cell, Protonix.  FOBT was positive in the other facility.   10/1 EGD this morning, revealed thick whitish debris in esophagus and mucosal fragility.  Biopsies which showed benign mucosal ulceration with acute on chronic inflammation  Following this Hgb trended down to <7 again and required another 1 unit PRBC on 10/9  -Continue PPI  -Transfuse for Hgb <7        Hypoxia  Assessment & Plan  BNP elevated but CXR does not suggest edema and no improvement with aggressive diuresis, Echo shows EF 65%, low lung volumes with likely atelectasis with BMI 52 as well as anemia requiring transfusions all of which are  likely contributing  -Lasix at home dose  -Wean O2 as tolerated    Constipation  Assessment & Plan  Continue bowel protocol    Pacemaker  Assessment & Plan  Noted    Primary hypertension  Assessment & Plan  Hold home metoprolol.      RAO (acute kidney injury) ([facility])  Assessment & Plan  Cre baseline 1.2, peaked here at ~3.5, hx of urostomy with ileal conduit since childhood  Initial renal ultrasound was unremarkable  Planned for kidney bx but CT for guidance showed bilateral hydronephrosis not noted on renal US raising concern for urostomy dysfunction, appears to have had hospitalization for this at [facility] in 2022  Loopogram showed contrast was injected into the stoma with filling of ileal conduit. There is no reflux of contrast seen into the ureters. 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 In review of yesterday's note (10/14/2023), there are no changes except as documented above.

## 2023-10-15 NOTE — PROGRESS NOTES
Pt presents to IR. Pt was consented by MD at bedside, confirmed by this RN and consent at bedside. Pt transferred to IR table in prone position. Patient underwent a bilateral nephrostomy tube placement by Dr. Brody. Procedure site was marked by MD and verified using imaging guidance. Pt placed on monitor, prepped and draped in a sterile fashion. Vitals were taken every 5 minutes and remained stable during procedure (see doc flow sheet for results). CO2 waveform capnography was monitored and remained WNL throughout procedure. Report called to Camille MITTAL. Pt transported by stretcher with RN to [id number].     IR narrator would not allow for sedation start and end times:   Sedation start time: 1028  Sedation end time: 1056    Specimen: 8ML from right kidney, 10ml from left kidney hand delivered to lab.    Right neph tube  BS Flexima regular [id number]  REF: [id number]   LOT: [id number]  EXP: 08/09/2026     Left neph tube  BS Flexima Regular [id number]  REF: [id number]  LOT: [id number]  EXP: 08/09/2026

## 2023-10-16 LAB
ANION GAP SERPL CALC-SCNC: 14 MMOL/L (ref 7–16)
BUN SERPL-MCNC: 67 MG/DL (ref 8–22)
CALCIUM SERPL-MCNC: 10.1 MG/DL (ref 8.5–10.5)
CHLORIDE SERPL-SCNC: 101 MMOL/L (ref 96–112)
CO2 SERPL-SCNC: 20 MMOL/L (ref 20–33)
CREAT SERPL-MCNC: 3.35 MG/DL (ref 0.5–1.4)
ENA JO1 AB TITR SER: 1 AU/ML (ref 0–40)
ENA SCL70 IGG SER QL: 22 AU/ML (ref 0–40)
ENA SM IGG SER-ACNC: 9 AU/ML (ref 0–40)
ENA SS-B IGG SER IA-ACNC: 0 AU/ML (ref 0–40)
ERYTHROCYTE [DISTWIDTH] IN BLOOD BY AUTOMATED COUNT: 43.6 FL (ref 35.9–50)
GFR SERPLBLD CREATININE-BSD FMLA CKD-EPI: 15 ML/MIN/1.73 M 2
GLUCOSE SERPL-MCNC: 200 MG/DL (ref 65–99)
HCT VFR BLD AUTO: 29.7 % (ref 37–47)
HGB BLD-MCNC: 9.7 G/DL (ref 12–16)
MCH RBC QN AUTO: 28.2 PG (ref 27–33)
MCHC RBC AUTO-ENTMCNC: 32.7 G/DL (ref 32.2–35.5)
MCV RBC AUTO: 86.3 FL (ref 81.4–97.8)
PLATELET # BLD AUTO: 287 K/UL (ref 164–446)
PMV BLD AUTO: 10.6 FL (ref 9–12.9)
POTASSIUM SERPL-SCNC: 3.2 MMOL/L (ref 3.6–5.5)
RBC # BLD AUTO: 3.44 M/UL (ref 4.2–5.4)
SODIUM SERPL-SCNC: 135 MMOL/L (ref 135–145)
SSA52 R0ENA AB IGG Q0420: 5 AU/ML (ref 0–40)
SSA60 R0ENA AB IGG Q0419: 11 AU/ML (ref 0–40)
WBC # BLD AUTO: 8.2 K/UL (ref 4.8–10.8)

## 2023-10-16 PROCEDURE — 700105 HCHG RX REV CODE 258: Performed by: STUDENT IN AN ORGANIZED HEALTH CARE EDUCATION/TRAINING PROGRAM

## 2023-10-16 PROCEDURE — 700111 HCHG RX REV CODE 636 W/ 250 OVERRIDE (IP): Mod: JZ | Performed by: STUDENT IN AN ORGANIZED HEALTH CARE EDUCATION/TRAINING PROGRAM

## 2023-10-16 PROCEDURE — 700102 HCHG RX REV CODE 250 W/ 637 OVERRIDE(OP): Performed by: STUDENT IN AN ORGANIZED HEALTH CARE EDUCATION/TRAINING PROGRAM

## 2023-10-16 PROCEDURE — A9270 NON-COVERED ITEM OR SERVICE: HCPCS | Performed by: STUDENT IN AN ORGANIZED HEALTH CARE EDUCATION/TRAINING PROGRAM

## 2023-10-16 PROCEDURE — A9270 NON-COVERED ITEM OR SERVICE: HCPCS | Performed by: INTERNAL MEDICINE

## 2023-10-16 PROCEDURE — 97530 THERAPEUTIC ACTIVITIES: CPT | Mod: CQ

## 2023-10-16 PROCEDURE — 770001 HCHG ROOM/CARE - MED/SURG/GYN PRIV*

## 2023-10-16 PROCEDURE — 36415 COLL VENOUS BLD VENIPUNCTURE: CPT

## 2023-10-16 PROCEDURE — 80048 BASIC METABOLIC PNL TOTAL CA: CPT

## 2023-10-16 PROCEDURE — 99232 SBSQ HOSP IP/OBS MODERATE 35: CPT | Performed by: STUDENT IN AN ORGANIZED HEALTH CARE EDUCATION/TRAINING PROGRAM

## 2023-10-16 PROCEDURE — 85027 COMPLETE CBC AUTOMATED: CPT

## 2023-10-16 PROCEDURE — 700102 HCHG RX REV CODE 250 W/ 637 OVERRIDE(OP): Performed by: INTERNAL MEDICINE

## 2023-10-16 RX ORDER — POTASSIUM CHLORIDE 20 MEQ/1
10 TABLET, EXTENDED RELEASE ORAL ONCE
Status: COMPLETED | OUTPATIENT
Start: 2023-10-16 | End: 2023-10-16

## 2023-10-16 RX ADMIN — OXYCODONE HYDROCHLORIDE 15 MG: 15 TABLET ORAL at 10:13

## 2023-10-16 RX ADMIN — OMEPRAZOLE 20 MG: 20 CAPSULE, DELAYED RELEASE ORAL at 17:07

## 2023-10-16 RX ADMIN — GUAIFENESIN 600 MG: 600 TABLET, EXTENDED RELEASE ORAL at 17:07

## 2023-10-16 RX ADMIN — DAPTOMYCIN 1000 MG: 500 INJECTION, POWDER, LYOPHILIZED, FOR SOLUTION INTRAVENOUS at 17:07

## 2023-10-16 RX ADMIN — DOCUSATE SODIUM 50 MG AND SENNOSIDES 8.6 MG 1 TABLET: 8.6; 5 TABLET, FILM COATED ORAL at 17:07

## 2023-10-16 RX ADMIN — OMEPRAZOLE 20 MG: 20 CAPSULE, DELAYED RELEASE ORAL at 05:37

## 2023-10-16 RX ADMIN — FUROSEMIDE 40 MG: 40 TABLET ORAL at 05:36

## 2023-10-16 RX ADMIN — OXYCODONE HYDROCHLORIDE 15 MG: 15 TABLET ORAL at 16:16

## 2023-10-16 RX ADMIN — FERROUS SULFATE TAB 325 MG (65 MG ELEMENTAL FE) 325 MG: 325 (65 FE) TAB at 08:12

## 2023-10-16 RX ADMIN — GUAIFENESIN 600 MG: 600 TABLET, EXTENDED RELEASE ORAL at 05:37

## 2023-10-16 RX ADMIN — DOCUSATE SODIUM 50 MG AND SENNOSIDES 8.6 MG 1 TABLET: 8.6; 5 TABLET, FILM COATED ORAL at 05:36

## 2023-10-16 RX ADMIN — OXYCODONE HYDROCHLORIDE 15 MG: 15 TABLET ORAL at 03:40

## 2023-10-16 RX ADMIN — CITALOPRAM 40 MG: 40 TABLET, FILM COATED ORAL at 05:37

## 2023-10-16 RX ADMIN — POTASSIUM CHLORIDE 10 MEQ: 1500 TABLET, EXTENDED RELEASE ORAL at 17:07

## 2023-10-16 ASSESSMENT — COGNITIVE AND FUNCTIONAL STATUS - GENERAL
CLIMB 3 TO 5 STEPS WITH RAILING: TOTAL
MOBILITY SCORE: 6
TURNING FROM BACK TO SIDE WHILE IN FLAT BAD: UNABLE
STANDING UP FROM CHAIR USING ARMS: TOTAL
SUGGESTED CMS G CODE MODIFIER MOBILITY: CN
MOVING FROM LYING ON BACK TO SITTING ON SIDE OF FLAT BED: UNABLE
WALKING IN HOSPITAL ROOM: TOTAL
MOVING TO AND FROM BED TO CHAIR: UNABLE

## 2023-10-16 ASSESSMENT — ENCOUNTER SYMPTOMS
WEAKNESS: 1
VOMITING: 0
MYALGIAS: 0
HEADACHES: 0
PALPITATIONS: 0
SHORTNESS OF BREATH: 0
BLURRED VISION: 0
COUGH: 1
CHILLS: 0
DIARRHEA: 0
FEVER: 0
FOCAL WEAKNESS: 0
CONSTIPATION: 0
SENSORY CHANGE: 0
ABDOMINAL PAIN: 0
SHORTNESS OF BREATH: 1
NAUSEA: 0
ABDOMINAL PAIN: 1
FLANK PAIN: 1

## 2023-10-16 ASSESSMENT — PAIN DESCRIPTION - PAIN TYPE
TYPE: ACUTE PAIN

## 2023-10-16 ASSESSMENT — PATIENT HEALTH QUESTIONNAIRE - PHQ9
6. FEELING BAD ABOUT YOURSELF - OR THAT YOU ARE A FAILURE OR HAVE LET YOURSELF OR YOUR FAMILY DOWN: SEVERAL DAYS
SUM OF ALL RESPONSES TO PHQ9 QUESTIONS 1 AND 2: 2
5. POOR APPETITE OR OVEREATING: NOT AT ALL
3. TROUBLE FALLING OR STAYING ASLEEP OR SLEEPING TOO MUCH: SEVERAL DAYS
9. THOUGHTS THAT YOU WOULD BE BETTER OFF DEAD, OR OF HURTING YOURSELF: NOT AT ALL
4. FEELING TIRED OR HAVING LITTLE ENERGY: SEVERAL DAYS
8. MOVING OR SPEAKING SO SLOWLY THAT OTHER PEOPLE COULD HAVE NOTICED. OR THE OPPOSITE, BEING SO FIGETY OR RESTLESS THAT YOU HAVE BEEN MOVING AROUND A LOT MORE THAN USUAL: NOT AT ALL
1. LITTLE INTEREST OR PLEASURE IN DOING THINGS: SEVERAL DAYS
SUM OF ALL RESPONSES TO PHQ QUESTIONS 1-9: 5
7. TROUBLE CONCENTRATING ON THINGS, SUCH AS READING THE NEWSPAPER OR WATCHING TELEVISION: NOT AT ALL
2. FEELING DOWN, DEPRESSED, IRRITABLE, OR HOPELESS: SEVERAL DAYS

## 2023-10-16 ASSESSMENT — GAIT ASSESSMENTS: GAIT LEVEL OF ASSIST: UNABLE TO PARTICIPATE

## 2023-10-16 NOTE — PROGRESS NOTES
Radiology Progress Note   Author: IRWIN Cano Date & Time created: 10/16/2023  11:27 AM   Date of admission  9/29/2023  Note to reader: this note follows the APSO format rather than the historical SOAP format. Assessment and plan located at the top of the note for ease of use.    Chief Complaint  58 y.o. female admitted 9/29/2023 with anemia      HPI  58-year-old woman with PMH DEBRA, urostomy, kidney stones, DVT on Eliquis and recent surgical history of left knee arthroplasty status post infection requiring I&D and antibiotic spacer on daptomycin for MRSA transferred 09/29/23 from [facility] with low hemoglobin of 5.7.  At the hospital, lab work revealed BUN 76 and creatinine 2.75.  During hospital stay, renal biopsy was ordered; however it was aborted after finding of severe hydronephrosis of bilateral kidneys.  Loopogram was ordered showing no reflux into the ureters.  Urology recommended bilateral nephrostomy tube placement which was performed by IR Dr. Brody on 10/15/23.     Interval history:  10/16/23 -right neph tube with 1800 mL clear yellow urine in the last 24 hours.  Left neph tube with 925 light pink output in the last 24 hours.  Patient reports mild pain at insertion sites.  Renal function slightly improved today.  Cultures preliminarily positive for yeast.    Assessment/Plan     Principal Problem:    UGIB (upper gastrointestinal bleed)  Active Problems:    Class 3 severe obesity due to excess calories with serious comorbidity and body mass index (BMI) of 50.0 to 59.9 in adult ([facility])    Hx MRSA infection    History of DVT in adulthood    Acute on chronic anemia    RAO (acute kidney injury) ([facility])    Primary hypertension    Pacemaker    Constipation    Hypoxia      Plan IR  - Only flush neph tubes if no output; do not aspirate back  - Fluid cultures pending   - Urology and nephrology following   - Wound education provided to patient  - Education provided on S/S of infection   - Ok to shower with  catheter as long as site is covered with waterproof dressing   - No baths or submerging site under water until catheter removed      -Thank you for allowing Interventional Radiology team to participate in the patients care, if any additional care or requests are needed in the future please do not hesitate call or place IR order   [phone]    -          Review of Systems  Physical Exam   Review of Systems   Constitutional:  Positive for malaise/fatigue. Negative for chills and fever.   Respiratory:  Negative for shortness of breath.    Cardiovascular:  Negative for chest pain and palpitations.   Gastrointestinal:  Negative for nausea and vomiting.   Genitourinary:         Bilateral flank pain at nephrostomy tube insertion site.   Neurological:  Positive for weakness. Negative for headaches.      Vitals:    10/16/23 0744   BP: 106/73   Pulse: 63   Resp: 16   Temp: 36.4 °C (97.5 °F)   SpO2: 100%        Physical Exam  Constitutional:       Appearance: Normal appearance.   Cardiovascular:      Rate and Rhythm: Normal rate.   Abdominal:      Palpations: Abdomen is soft.   Genitourinary:     Comments: Urostomy  Bilateral nephrostomy tubes  Musculoskeletal:      Right lower leg: Edema present.      Left lower leg: Edema present.   Skin:     General: Skin is warm and dry.   Neurological:      General: No focal deficit present.      Mental Status: She is alert and oriented to person, place, and time.   Psychiatric:         Mood and Affect: Mood normal.         Behavior: Behavior normal.             Labs    Recent Labs     10/14/23  0345 10/15/23  0440   WBC 6.8 7.0   RBC 3.11* 3.05*   HEMOGLOBIN 8.5* 8.5*   HEMATOCRIT 27.2* 26.9*   MCV 87.5 88.2   MCH 27.3 27.9   MCHC 31.3* 31.6*   RDW 44.3 44.7   PLATELETCT 255 270   MPV 10.2 10.5     Recent Labs     10/13/23  1900 10/14/23  0345 10/15/23  0440   SODIUM 136 137 138   POTASSIUM 3.2* 3.3* 3.3*   CHLORIDE 101 102 105   CO2 20 21 20   GLUCOSE 107* 103* 118*   BUN 70* 74* 78*  "  CREATININE 3.56* 3.50* 3.79*   CALCIUM 10.9* 10.4 10.3     Recent Labs     10/13/23  1900 10/14/23  0345 10/15/23  0440   CREATININE 3.56* 3.50* 3.79*     IR-NEPHROSTOGRAM W/ NEW TUBE PLACEMENT (ALL RADIOLOGY) BOTH   Final Result      1. Ultrasound and fluoroscopic guided placement of bilateral 8 Arabic pigtail locking loop percutaneous nephrostomy catheter.      2. Nephrostogram showing satisfactory catheter position without extravasation.      DX-LOOPOGRAM (ILEAL CONDUIT)   Final Result      No evidence of reflux of contrast into the ureters or kidneys.      CT-ABORTED CT PROCEDURE   Final Result      Aborted CT-guided renal biopsy.      EC-ECHOCARDIOGRAM COMPLETE W/O CONT   Final Result      DX-CHEST-PORTABLE (1 VIEW)   Final Result      1.  Low lung volumes without definite acute cardiopulmonary abnormality.      US-RENAL   Final Result      1.  Normal renal ultrasound.          INR   Date Value Ref Range Status   10/10/2023 1.22 (H) 0.87 - 1.13 Final     Comment:     INR - Non-therapeutic Reference Range: 0.87-1.13  INR - Therapeutic Reference Range: 2.0-4.0       No results found for: \"POCINR\"     Intake/Output Summary (Last 24 hours) at 10/16/2023 1127  Last data filed at 10/16/2023 0812  Gross per 24 hour   Intake 360 ml   Output 3325 ml   Net -2965 ml      Labs not explicitly included in this progress note were reviewed by the author. Radiology/imaging not explicitly included in this progress note was reviewed by the author.     I have performed a physical exam and reviewed and updated ROS and Plan today (10/16/2023).     45 minutes in directly providing and coordinating care and extensive data review.  No time overlap and excludes procedures.   "

## 2023-10-16 NOTE — CARE PLAN
The patient is Stable - Low risk of patient condition declining or worsening    Problem: Fall Risk  Goal: Patient will remain free from falls  Outcome: Progressing  Note: Patient will remain free from falls and other accidents for the duration of the shift. Appropriate safety protocols are in place such as bed alarm.     Problem: Skin Integrity  Goal: Skin integrity is maintained or improved  Outcome: Progressing  Note: Patient will not experience skin breakdown this shift. Q2 turns are in place and patient understands the importance of frequent repositioning. Areas at high risk for breakdown have been assessed.      Problem: Hemodynamics  Goal: Patient's hemodynamics, fluid balance and neurologic status will be stable or improve  Outcome: Progressing  Note: Patient will have stable vitals signs for the duration of the shift.      Shift Goals  Clinical Goals: Comfort, monitor nephrostomy  Patient Goals: COntrol pain  Family Goals: Not present    Progress made toward(s) clinical / shift goals:  pain control addressed, skin assessed     Patient is not progressing towards the following goals:

## 2023-10-16 NOTE — CARE PLAN
The patient is Stable - Low risk of patient condition declining or worsening    Shift Goals  Clinical Goals: Surgical procedure, comfort  Patient Goals: To control back pain  Family Goals: ag    Progress made toward(s) clinical / shift goals:    Problem: Fall Risk  Goal: Patient will remain free from falls  Description: Target End Date:  Prior to discharge or change in level of care    Document interventions on the Priscila Delgado Fall Risk Assessment    1.  Assess for fall risk factors  2.  Implement fall precautions  Outcome: Progressing  Note: No falls this shift, bed in lowest position and locked, non slip socks on and bed alarm on.        Problem: Skin Integrity  Goal: Skin integrity is maintained or improved  Description: Target End Date:  Prior to discharge or change in level of care    Document interventions on Skin Risk/Jaquan flowsheet groups and corresponding LDA    1.  Assess and monitor skin integrity, appearance and/or temperature  2.  Assess risk factors for impaired skin integrity and/or pressures ulcers  3.  Implement precautions to protect skin integrity in collaboration with interdisciplinary team  4.  Implement pressure ulcer prevention protocol if at risk for skin breakdown  5.  Confirm wound care consult if at risk for skin breakdown  6.  Ensure patient use of pressure relieving devices  (Low air loss bed, waffle overlay, heel protectors, ROHO cushion, etc)  Outcome: Progressing  Note: Mepilex covered side for neph tubes to decrease chance to skin breakdown        Patient is not progressing towards the following goals:

## 2023-10-16 NOTE — PROGRESS NOTES
Assumed care of patient. Assessment performed. Q2 turns are in place. Urine from nephrostomy tubes is red-tinged with some sediment. Patient had concerns about pain control, which have been addressed and medications related to pain control have been discussed. Call light and belongings within reach.

## 2023-10-17 ENCOUNTER — APPOINTMENT (OUTPATIENT)
Dept: RADIOLOGY | Facility: MEDICAL CENTER | Age: 58
DRG: 368 | End: 2023-10-17
Attending: INTERNAL MEDICINE
Payer: MEDICARE

## 2023-10-17 PROBLEM — N39.0 COMPLICATED UTI (URINARY TRACT INFECTION): Status: ACTIVE | Noted: 2023-10-17

## 2023-10-17 PROBLEM — J96.01 ACUTE RESPIRATORY FAILURE WITH HYPOXIA (HCC): Status: ACTIVE | Noted: 2023-10-10

## 2023-10-17 LAB
ANION GAP SERPL CALC-SCNC: 13 MMOL/L (ref 7–16)
BUN SERPL-MCNC: 63 MG/DL (ref 8–22)
CALCIUM SERPL-MCNC: 10.3 MG/DL (ref 8.5–10.5)
CHLORIDE SERPL-SCNC: 102 MMOL/L (ref 96–112)
CO2 SERPL-SCNC: 22 MMOL/L (ref 20–33)
CREAT SERPL-MCNC: 2.91 MG/DL (ref 0.5–1.4)
ERYTHROCYTE [DISTWIDTH] IN BLOOD BY AUTOMATED COUNT: 43.8 FL (ref 35.9–50)
GFR SERPLBLD CREATININE-BSD FMLA CKD-EPI: 18 ML/MIN/1.73 M 2
GLUCOSE SERPL-MCNC: 124 MG/DL (ref 65–99)
HCT VFR BLD AUTO: 29 % (ref 37–47)
HGB BLD-MCNC: 9.5 G/DL (ref 12–16)
MCH RBC QN AUTO: 27.9 PG (ref 27–33)
MCHC RBC AUTO-ENTMCNC: 32.8 G/DL (ref 32.2–35.5)
MCV RBC AUTO: 85 FL (ref 81.4–97.8)
PLATELET # BLD AUTO: 242 K/UL (ref 164–446)
PMV BLD AUTO: 10.5 FL (ref 9–12.9)
POTASSIUM SERPL-SCNC: 3 MMOL/L (ref 3.6–5.5)
RBC # BLD AUTO: 3.41 M/UL (ref 4.2–5.4)
SODIUM SERPL-SCNC: 137 MMOL/L (ref 135–145)
WBC # BLD AUTO: 6.6 K/UL (ref 4.8–10.8)

## 2023-10-17 PROCEDURE — A9270 NON-COVERED ITEM OR SERVICE: HCPCS | Performed by: INTERNAL MEDICINE

## 2023-10-17 PROCEDURE — 700102 HCHG RX REV CODE 250 W/ 637 OVERRIDE(OP): Performed by: INTERNAL MEDICINE

## 2023-10-17 PROCEDURE — 770006 HCHG ROOM/CARE - MED/SURG/GYN SEMI*

## 2023-10-17 PROCEDURE — A9270 NON-COVERED ITEM OR SERVICE: HCPCS | Performed by: STUDENT IN AN ORGANIZED HEALTH CARE EDUCATION/TRAINING PROGRAM

## 2023-10-17 PROCEDURE — 99233 SBSQ HOSP IP/OBS HIGH 50: CPT | Mod: MISDOCU | Performed by: INTERNAL MEDICINE

## 2023-10-17 PROCEDURE — 80048 BASIC METABOLIC PNL TOTAL CA: CPT

## 2023-10-17 PROCEDURE — 99233 SBSQ HOSP IP/OBS HIGH 50: CPT | Performed by: INTERNAL MEDICINE

## 2023-10-17 PROCEDURE — 97535 SELF CARE MNGMENT TRAINING: CPT

## 2023-10-17 PROCEDURE — 700102 HCHG RX REV CODE 250 W/ 637 OVERRIDE(OP): Performed by: STUDENT IN AN ORGANIZED HEALTH CARE EDUCATION/TRAINING PROGRAM

## 2023-10-17 PROCEDURE — 85027 COMPLETE CBC AUTOMATED: CPT

## 2023-10-17 PROCEDURE — 71046 X-RAY EXAM CHEST 2 VIEWS: CPT

## 2023-10-17 RX ORDER — FLUCONAZOLE 200 MG/1
200 TABLET ORAL EVERY EVENING
Status: DISCONTINUED | OUTPATIENT
Start: 2023-10-17 | End: 2023-10-22 | Stop reason: HOSPADM

## 2023-10-17 RX ORDER — POTASSIUM CHLORIDE 20 MEQ/1
20 TABLET, EXTENDED RELEASE ORAL DAILY
Status: DISCONTINUED | OUTPATIENT
Start: 2023-10-17 | End: 2023-10-18

## 2023-10-17 RX ADMIN — DOCUSATE SODIUM 50 MG AND SENNOSIDES 8.6 MG 1 TABLET: 8.6; 5 TABLET, FILM COATED ORAL at 17:02

## 2023-10-17 RX ADMIN — OMEPRAZOLE 20 MG: 20 CAPSULE, DELAYED RELEASE ORAL at 17:02

## 2023-10-17 RX ADMIN — FUROSEMIDE 40 MG: 40 TABLET ORAL at 06:43

## 2023-10-17 RX ADMIN — DOCUSATE SODIUM 50 MG AND SENNOSIDES 8.6 MG 1 TABLET: 8.6; 5 TABLET, FILM COATED ORAL at 06:43

## 2023-10-17 RX ADMIN — CITALOPRAM 40 MG: 40 TABLET, FILM COATED ORAL at 06:44

## 2023-10-17 RX ADMIN — OMEPRAZOLE 20 MG: 20 CAPSULE, DELAYED RELEASE ORAL at 06:43

## 2023-10-17 RX ADMIN — FLUCONAZOLE 200 MG: 200 TABLET ORAL at 17:10

## 2023-10-17 RX ADMIN — POLYETHYLENE GLYCOL 3350 1 PACKET: 17 POWDER, FOR SOLUTION ORAL at 06:47

## 2023-10-17 RX ADMIN — GUAIFENESIN 600 MG: 600 TABLET, EXTENDED RELEASE ORAL at 17:02

## 2023-10-17 RX ADMIN — POTASSIUM CHLORIDE 20 MEQ: 1500 TABLET, EXTENDED RELEASE ORAL at 10:51

## 2023-10-17 RX ADMIN — APIXABAN 5 MG: 5 TABLET, FILM COATED ORAL at 17:02

## 2023-10-17 RX ADMIN — FERROUS SULFATE TAB 325 MG (65 MG ELEMENTAL FE) 325 MG: 325 (65 FE) TAB at 06:44

## 2023-10-17 RX ADMIN — GUAIFENESIN 600 MG: 600 TABLET, EXTENDED RELEASE ORAL at 06:41

## 2023-10-17 RX ADMIN — OXYCODONE HYDROCHLORIDE 15 MG: 15 TABLET ORAL at 10:51

## 2023-10-17 RX ADMIN — OXYCODONE HYDROCHLORIDE 15 MG: 15 TABLET ORAL at 17:03

## 2023-10-17 ASSESSMENT — PAIN DESCRIPTION - PAIN TYPE
TYPE: ACUTE PAIN

## 2023-10-17 ASSESSMENT — COGNITIVE AND FUNCTIONAL STATUS - GENERAL
DRESSING REGULAR UPPER BODY CLOTHING: A LOT
SUGGESTED CMS G CODE MODIFIER DAILY ACTIVITY: CL
DRESSING REGULAR LOWER BODY CLOTHING: TOTAL
HELP NEEDED FOR BATHING: TOTAL
EATING MEALS: A LITTLE
TOILETING: TOTAL
DAILY ACTIVITIY SCORE: 11
PERSONAL GROOMING: A LITTLE

## 2023-10-17 ASSESSMENT — PATIENT HEALTH QUESTIONNAIRE - PHQ9
7. TROUBLE CONCENTRATING ON THINGS, SUCH AS READING THE NEWSPAPER OR WATCHING TELEVISION: NOT AT ALL
9. THOUGHTS THAT YOU WOULD BE BETTER OFF DEAD, OR OF HURTING YOURSELF: NOT AT ALL
4. FEELING TIRED OR HAVING LITTLE ENERGY: SEVERAL DAYS
6. FEELING BAD ABOUT YOURSELF - OR THAT YOU ARE A FAILURE OR HAVE LET YOURSELF OR YOUR FAMILY DOWN: SEVERAL DAYS
SUM OF ALL RESPONSES TO PHQ9 QUESTIONS 1 AND 2: 2
5. POOR APPETITE OR OVEREATING: NOT AT ALL
3. TROUBLE FALLING OR STAYING ASLEEP OR SLEEPING TOO MUCH: SEVERAL DAYS
2. FEELING DOWN, DEPRESSED, IRRITABLE, OR HOPELESS: SEVERAL DAYS
8. MOVING OR SPEAKING SO SLOWLY THAT OTHER PEOPLE COULD HAVE NOTICED. OR THE OPPOSITE, BEING SO FIGETY OR RESTLESS THAT YOU HAVE BEEN MOVING AROUND A LOT MORE THAN USUAL: NOT AT ALL
1. LITTLE INTEREST OR PLEASURE IN DOING THINGS: SEVERAL DAYS
SUM OF ALL RESPONSES TO PHQ QUESTIONS 1-9: 5

## 2023-10-17 ASSESSMENT — ENCOUNTER SYMPTOMS
BACK PAIN: 1
ABDOMINAL PAIN: 1
CHILLS: 0
SHORTNESS OF BREATH: 0
COUGH: 1
FEVER: 0
NAUSEA: 0
PALPITATIONS: 0
SENSORY CHANGE: 0
FOCAL WEAKNESS: 0
HEADACHES: 0
COUGH: 0
BLURRED VISION: 0
SHORTNESS OF BREATH: 1
ABDOMINAL PAIN: 0
DIARRHEA: 0
MYALGIAS: 0
CONSTIPATION: 0
VOMITING: 0
WEAKNESS: 1

## 2023-10-17 NOTE — PROGRESS NOTES
Radiology Progress Note   Author: IRWIN Cano Date & Time created: 10/17/2023  1:00 PM   Date of admission  9/29/2023  Note to reader: this note follows the APSO format rather than the historical SOAP format. Assessment and plan located at the top of the note for ease of use.    Chief Complaint  58 y.o. female admitted 9/29/2023 with anemia      HPI  58-year-old woman with PMH DEBRA, urostomy, kidney stones, DVT on Eliquis and recent surgical history of left knee arthroplasty status post infection requiring I&D and antibiotic spacer on daptomycin for MRSA transferred 09/29/23 from [facility] with low hemoglobin of 5.7.  At the hospital, lab work revealed BUN 76 and creatinine 2.75.  During hospital stay, renal biopsy was ordered; however it was aborted after finding of severe hydronephrosis of bilateral kidneys.  Loopogram was ordered showing no reflux into the ureters.  Urology recommended bilateral nephrostomy tube placement which was performed by IR Dr. Brody on 10/15/23.     Interval history:  10/16/23 -right neph tube with 1800 mL clear yellow urine in the last 24 hours.  Left neph tube with 925 light pink output in the last 24 hours.  Patient reports mild pain at insertion sites.  Renal function slightly improved today.  Cultures preliminarily positive for yeast.    10/17/23 - right neph tube with 2800 mL clear yellow urine in the last 24 hours.  Left neph tube with 750 light pink output in the last 24 hours.  Renal function continues to improve.  Cultures positive for juan r glabrata.    Assessment/Plan     Principal Problem:    UGIB (upper gastrointestinal bleed)  Active Problems:    Class 3 severe obesity due to excess calories with serious comorbidity and body mass index (BMI) of 50.0 to 59.9 in adult ([facility])    Hx MRSA infection    History of DVT in adulthood    Acute on chronic anemia    RAO (acute kidney injury) ([facility])    Primary hypertension    Pacemaker    Constipation    Hypoxia      Plan  IR  - Only flush neph tubes if no output; do not aspirate back  - Fluid cultures positive for candida glabrata  - Urology and nephrology following   - Wound education provided to patient  - Education provided on S/S of infection   - Ok to shower with catheter as long as site is covered with waterproof dressing   - No baths or submerging site under water until catheter removed      -Thank you for allowing Interventional Radiology team to participate in the patients care, if any additional care or requests are needed in the future please do not hesitate call or place IR order   [phone]    -          Review of Systems  Physical Exam   Review of Systems   Constitutional:  Positive for malaise/fatigue. Negative for chills and fever.   Respiratory:  Negative for shortness of breath.    Cardiovascular:  Negative for chest pain and palpitations.   Gastrointestinal:  Negative for nausea and vomiting.   Genitourinary:         Bilateral flank pain at nephrostomy tube insertion site improving   Musculoskeletal:  Positive for back pain.   Neurological:  Positive for weakness. Negative for headaches.      Vitals:    10/17/23 1051   BP:    Pulse: 80   Resp: (!) 8   Temp:    SpO2:         Physical Exam  Constitutional:       Appearance: Normal appearance.   Cardiovascular:      Rate and Rhythm: Normal rate.   Abdominal:      Palpations: Abdomen is soft.   Genitourinary:     Comments: Urostomy  Bilateral nephrostomy tubes  Musculoskeletal:      Right lower leg: Edema present.      Left lower leg: Edema present.   Skin:     General: Skin is warm and dry.   Neurological:      General: No focal deficit present.      Mental Status: She is alert and oriented to person, place, and time.   Psychiatric:         Mood and Affect: Mood normal.         Behavior: Behavior normal.             Labs    Recent Labs     10/15/23  0440 10/16/23  1135 10/17/23  0730   WBC 7.0 8.2 6.6   RBC 3.05* 3.44* 3.41*   HEMOGLOBIN 8.5* 9.7* 9.5*   HEMATOCRIT  "26.9* 29.7* 29.0*   MCV 88.2 86.3 85.0   MCH 27.9 28.2 27.9   MCHC 31.6* 32.7 32.8   RDW 44.7 43.6 43.8   PLATELETCT 270 287 242   MPV 10.5 10.6 10.5       Recent Labs     10/15/23  0440 10/16/23  1135 10/17/23  0730   SODIUM 138 135 137   POTASSIUM 3.3* 3.2* 3.0*   CHLORIDE 105 101 102   CO2 20 20 22   GLUCOSE 118* 200* 124*   BUN 78* 67* 63*   CREATININE 3.79* 3.35* 2.91*   CALCIUM 10.3 10.1 10.3       Recent Labs     10/15/23  0440 10/16/23  1135 10/17/23  0730   CREATININE 3.79* 3.35* 2.91*       IR-NEPHROSTOGRAM W/ NEW TUBE PLACEMENT (ALL RADIOLOGY) BOTH   Final Result      1. Ultrasound and fluoroscopic guided placement of bilateral 8 Latvian pigtail locking loop percutaneous nephrostomy catheter.      2. Nephrostogram showing satisfactory catheter position without extravasation.      DX-LOOPOGRAM (ILEAL CONDUIT)   Final Result      No evidence of reflux of contrast into the ureters or kidneys.      CT-ABORTED CT PROCEDURE   Final Result      Aborted CT-guided renal biopsy.      EC-ECHOCARDIOGRAM COMPLETE W/O CONT   Final Result      DX-CHEST-PORTABLE (1 VIEW)   Final Result      1.  Low lung volumes without definite acute cardiopulmonary abnormality.      US-RENAL   Final Result      1.  Normal renal ultrasound.          INR   Date Value Ref Range Status   10/10/2023 1.22 (H) 0.87 - 1.13 Final     Comment:     INR - Non-therapeutic Reference Range: 0.87-1.13  INR - Therapeutic Reference Range: 2.0-4.0       No results found for: \"POCINR\"     Intake/Output Summary (Last 24 hours) at 10/16/2023 1127  Last data filed at 10/16/2023 0812  Gross per 24 hour   Intake 360 ml   Output 3325 ml   Net -2965 ml      Labs not explicitly included in this progress note were reviewed by the author. Radiology/imaging not explicitly included in this progress note was reviewed by the author.     I have performed a physical exam and reviewed and updated ROS and Plan today (10/17/2023).     45 minutes in directly providing and " coordinating care and extensive data review.  No time overlap and excludes procedures.

## 2023-10-17 NOTE — PROGRESS NOTES
Hospital Medicine Daily Progress Note    Date of Service  10/17/2023    Chief Complaint  Patricia A Tietz is a 58 y.o. female admitted 9/29/2023 with anemia    Hospital Course  59 yo woman with DEBRA, urostomy, recent left knee arthroplasty complicated by infection, status post I&D with antibiotic spacer on daptomycin for MRSA, history of DVT on Eliquis who has been at SNF and found her hemoglobin to be low at 5.7 and transferred to [facility].  She has not had signs of bleeding.  GI was consulted.  EGD showed 6 white debris in esophagus, biopsies taken, diverticula in duodenum.  Hemoglobin has been stable postoperatively.  She has an RAO that has not improved.  She is very edematous and started on IV Lasix.  BNP was high, echocardiogram ordered to assess for heart failure, valve disease. Echo did not suggestive CHF and Lasix did not improve resp status or RAO. Nephro consulted, rec renal bx this was attempted but hydropnephrosis was noted, uro consulted rec b/l nephrostomy tubes placed on 10/15. Hgb trended up and RAO improved.    Interval Problem Update  10/17  Patient feels poor in general, but denies any new symptoms. Good urine output from both PCNT, more on the R than L. Some urine output in the urostomy. No apparent blood. PCNT cx growing Esvin glabrata. Knee incision appears ok, but likely needs sutures out. Overall, patient feels poor. Restarted eliquis today.     I have discussed this patient's plan of care and discharge plan at IDT rounds today with Case Management, Nursing, Nursing leadership, and other members of the IDT team.    Consultants/Specialty  GI, nephrology    Code Status  Full Code    Disposition  The patient is not medically cleared for discharge to home or a post-acute facility.  Anticipate discharge to: skilled nursing facility    I have placed the appropriate orders for post-discharge needs.    Review of Systems  Review of Systems   Constitutional:  Positive for malaise/fatigue. Negative for  chills and fever.        No improvement across the board     HENT:  Negative for congestion.    Eyes:  Negative for blurred vision.   Respiratory:  Positive for cough and shortness of breath.    Cardiovascular:  Positive for leg swelling. Negative for chest pain.   Gastrointestinal:  Positive for abdominal pain. Negative for constipation, diarrhea, nausea and vomiting.   Genitourinary:  Negative for dysuria.   Musculoskeletal:  Negative for myalgias.   Skin:  Negative for rash.   Neurological:  Positive for weakness. Negative for sensory change and focal weakness.        Physical Exam  Temp:  [36.3 °C (97.3 °F)-36.8 °C (98.2 °F)] 36.3 °C (97.4 °F)  Pulse:  [65-80] 80  Resp:  [16-18] 18  BP: (116-140)/(65-74) 116/69  SpO2:  [96 %-100 %] 96 %    Physical Exam  Constitutional:       General: She is not in acute distress.     Appearance: Normal appearance. She is ill-appearing (chronically).      Comments: Body mass index is 42.54 kg/m².     HENT:      Head: Normocephalic and atraumatic.      Nose: Nose normal.      Comments: NC in place     Mouth/Throat:      Mouth: Mucous membranes are moist.      Pharynx: Oropharynx is clear.   Eyes:      Conjunctiva/sclera: Conjunctivae normal.      Pupils: Pupils are equal, round, and reactive to light.   Cardiovascular:      Rate and Rhythm: Normal rate and regular rhythm.      Heart sounds: No murmur heard.  Pulmonary:      Effort: Pulmonary effort is normal.      Breath sounds: No wheezing, rhonchi or rales.   Abdominal:      General: There is no distension.      Palpations: Abdomen is soft.      Tenderness: There is abdominal tenderness. There is no guarding or rebound.      Comments: Urostomy in place, normal appearing yellow urine appreciated   Genitourinary:     Comments: B/L PCNT's in place  Both yellow appearing urine in place  Insertion sites appear C/D/I  Musculoskeletal:         General: No swelling or tenderness.      Cervical back: Normal range of motion and neck  supple.      Right lower leg: Edema present.      Left lower leg: Edema present.      Comments: L knee with surgical incision site, appears C/D/I  Distal sutures remain in place with some overlying granulation tissue   Skin:     General: Skin is warm and dry.   Neurological:      Mental Status: She is alert.      GCS: GCS eye subscore is 4. GCS verbal subscore is 5. GCS motor subscore is 6.      Cranial Nerves: No facial asymmetry.   Psychiatric:         Mood and Affect: Mood normal.         Behavior: Behavior normal.         Fluids    Intake/Output Summary (Last 24 hours) at 10/17/2023 1614  Last data filed at 10/17/2023 1530  Gross per 24 hour   Intake 480 ml   Output 2900 ml   Net -2420 ml       Laboratory  Recent Labs     10/15/23  0440 10/16/23  1135 10/17/23  0730   WBC 7.0 8.2 6.6   RBC 3.05* 3.44* 3.41*   HEMOGLOBIN 8.5* 9.7* 9.5*   HEMATOCRIT 26.9* 29.7* 29.0*   MCV 88.2 86.3 85.0   MCH 27.9 28.2 27.9   MCHC 31.6* 32.7 32.8   RDW 44.7 43.6 43.8   PLATELETCT 270 287 242   MPV 10.5 10.6 10.5     Recent Labs     10/15/23  0440 10/16/23  1135 10/17/23  0730   SODIUM 138 135 137   POTASSIUM 3.3* 3.2* 3.0*   CHLORIDE 105 101 102   CO2 20 20 22   GLUCOSE 118* 200* 124*   BUN 78* 67* 63*   CREATININE 3.79* 3.35* 2.91*   CALCIUM 10.3 10.1 10.3                     Imaging  IR-NEPHROSTOGRAM W/ NEW TUBE PLACEMENT (ALL RADIOLOGY) BOTH   Final Result      1. Ultrasound and fluoroscopic guided placement of bilateral 8 Dive[place] pigtail locking loop percutaneous nephrostomy catheter.      2. Nephrostogram showing satisfactory catheter position without extravasation.      DX-LOOPOGRAM (ILEAL CONDUIT)   Final Result      No evidence of reflux of contrast into the ureters or kidneys.      CT-ABORTED CT PROCEDURE   Final Result      Aborted CT-guided renal biopsy.      EC-ECHOCARDIOGRAM COMPLETE W/O CONT   Final Result      DX-CHEST-PORTABLE (1 VIEW)   Final Result      1.  Low lung volumes without definite acute cardiopulmonary  abnormality.      US-RENAL   Final Result      1.  Normal renal ultrasound.             Assessment/Plan  * UGIB (upper gastrointestinal bleed)- (present on admission)  Assessment & Plan  Hb remains stable on 10/17  Restarted eliquis on 10/17, monitor for bleeding closely  Daily CBC with conservative transfusion threshold  10/1 EGD this morning, revealed thick whitish debris in esophagus and mucosal fragility.  Biopsies which showed benign mucosal ulceration with acute on chronic inflammation, no e/o fungal elements    I personally reviewed the cbc on 10/17        Complicated UTI (urinary tract infection)- (present on admission)  Assessment & Plan  PCNT cultures growing candida glabrata  Start oral fluconazole 200 mg daily, F/U final sensitivities    I personally reviewed this urine culture on 10/17    Acute respiratory failure with hypoxia (HCC)- (present on admission)  Assessment & Plan  Slowly improving  Continue lasix  Recheck CXR    Constipation- (present on admission)  Assessment & Plan  Continue bowel protocol    Pacemaker- (present on admission)  Assessment & Plan  Noted    Primary hypertension- (present on admission)  Assessment & Plan  Hold home metoprolol  Consider restarting soon    RAO (acute kidney injury) (HCC)- (present on admission)  Assessment & Plan  Cre baseline 1.2, peaked here at ~3.5, hx of urostomy with ileal conduit since childhood  Cr continues to improve, now 2.8 on 10/17  Initial renal ultrasound was unremarkable  Imaging showed B/L hydronephrosis, s/p placement of B/L PCNT's on 10/15 with subsequent good urine output  Loopogram negative for ileal conduit leak  -Urology consulted, will appreciate recs  -IR consulted, b/l nephrostomy tubes placed on 10/15  -Nephro consulted, will appreciate recs    I personally reviewed the BMP on 10/15    Acute on chronic anemia- (present on admission)  Assessment & Plan  Stabilized as noted above    History of DVT in adulthood- (present on  admission)  Assessment & Plan  Eliquis held for for GI bleed and then nephrostomy tube placement initially  Restarted on 10/17 as noted above    Hx MRSA infection- (present on admission)  Assessment & Plan  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 In review of yesterday's note (10/16/2023), there are no changes except as documented above.

## 2023-10-17 NOTE — THERAPY
Physical Therapy   Daily Treatment     Patient Name: Patricia A Tietz  Age:  58 y.o., Sex:  female  Medical Record #: [id number]  Today's Date: 10/16/2023     Precautions  Precautions: (P) Fall Risk;Weight Bearing As Tolerated Left Lower Extremity  Comments: (P) Urostomy and bilat nephrostomy tubes    Assessment    Pt now POD 1 bilat nephrostomy tube placement. The pt c/o feeling sore but was willing to participate w/PT. The pt conts to require max<->total assist for bed mobility skills. No standing attempts today. The pt is motivated but very debilitated w/limited activity tolerance.   PT will cont to follow.     Plan    Treatment Plan Status: (P) Continue Current Treatment Plan  Type of Treatment: Bed Mobility, Therapeutic Activities, Gait Training  Treatment Frequency: 3 Times per Week  Treatment Duration: Until Therapy Goals Met    DC Equipment Recommendations: (P) Unable to determine at this time  Discharge Recommendations: (P) Recommend post-acute placement for additional physical therapy services prior to discharge home    Objective       10/16/23 1703   Charge Group   PT Therapeutic Activities (Units) 2   Total Time Spent   PT Therapeutic Activities Time Spent (Mins) 35   Precautions   Precautions Fall Risk;Weight Bearing As Tolerated Left Lower Extremity   Comments Urostomy and bilat nephrostomy tubes   Other Treatments   Other Treatments Provided EOB x 12 mins w/supervision.   Balance   Sitting Balance (Static) Fair +   Sitting Balance (Dynamic) Fair   Weight Shift Sitting Poor   Bed Mobility    Supine to Sit Maximal Assist   Sit to Supine Maximal Assist   Scooting Maximal Assist  (seated)   Rolling Maximal Assist to Rt.;Maximum Assist to Lt.   Gait Analysis   Gait Level Of Assist Unable to Participate   Functional Mobility   Sit to Stand Unable to Participate   Bed, Chair, Wheelchair Transfer Unable to Participate   How much difficulty does the patient currently have...   Turning over in bed (including  adjusting bedclothes, sheets and blankets)? 1   Sitting down on and standing up from a chair with arms (e.g., wheelchair, bedside commode, etc.) 1   Moving from lying on back to sitting on the side of the bed? 1   How much help from another person does the patient currently need...   Moving to and from a bed to a chair (including a wheelchair)? 1   Need to walk in a hospital room? 1   Climbing 3-5 steps with a railing? 1   6 clicks Mobility Score 6   Short Term Goals    Short Term Goal # 1 Pt to move to/from eob w/ use of bed features and spv in 6 visits to improve fxl indep   Goal Outcome # 1 goal not met   Short Term Goal # 2 Pt to move sit to/from stand w/ min assist in 6 visits to improve fxl indep   Goal Outcome # 2 Goal not met   Short Term Goal # 3 Pt to TF bed to/from chair w/ min assist in 6 visits to improve fxl indep   Goal Outcome # 3 Goal not met   Education Group   Role of Physical Therapist Patient Response Patient;Acceptance;Explanation;Reinforcement Needed   Physical Therapy Treatment Plan   Physical Therapy Treatment Plan Continue Current Treatment Plan   Anticipated Discharge Equipment and Recommendations   DC Equipment Recommendations Unable to determine at this time   Discharge Recommendations Recommend post-acute placement for additional physical therapy services prior to discharge home   Interdisciplinary Plan of Care Collaboration   IDT Collaboration with  Nursing   Patient Position at End of Therapy In Bed;Call Light within Reach;Tray Table within Reach;Phone within Reach   Collaboration Comments Nrsg notified   Session Information   Date / Session Number  10/16--4 (1/3, 10/22) PTA/2   Supervising Physical Therapist (PTA Treatments Only)   Supervising Physical Therapist Rachael Cabrera

## 2023-10-17 NOTE — ASSESSMENT & PLAN NOTE
PCNT cultures growing candida glabrata  Start oral fluconazole 200 mg daily, F/U final sensitivities-none available on 10/20    I personally reviewed this urine culture on 10/17

## 2023-10-17 NOTE — CARE PLAN
Problem: Fall Risk  Goal: Patient will remain free from falls  Outcome: Progressing     Problem: Skin Integrity  Goal: Skin integrity is maintained or improved  Outcome: Progressing     Problem: Respiratory  Goal: Patient will achieve/maintain optimum respiratory ventilation and gas exchange  Outcome: Progressing     Problem: Wound/ / Incision Healing  Goal: Patient's wound/surgical incision will decrease in size and heals properly  Outcome: Progressing   The patient is Stable - Low risk of patient condition declining or worsening    Shift Goals  Clinical Goals: Monitor neph  Patient Goals: pain control, comfort  Family Goals: Not present    Progress made toward(s) clinical / shift goals:  Patient's pain well controlled with prn pain medications. Neph tubes output measured.     Patient is not progressing towards the following goals:

## 2023-10-17 NOTE — CARE PLAN
The patient is Stable - Low risk of patient condition declining or worsening    Shift Goals  Clinical Goals: Monitor labs  Patient Goals: Pain Management Comfort and rest  Family Goals: Not present    Progress made toward(s) clinical / shift goals:  on going    Patient is  progressing towards the following goals:  Problem: Respiratory  Goal: Patient will achieve/maintain optimum respiratory ventilation and gas exchange  Description: Target End Date:  Prior to discharge or change in level of care    Document on Assessment flowsheet    1.  Assess and monitor rate, rhythm, depth and effort of respiration  2.  Breath sounds assessed qshift and/or as needed  3.  Assess O2 saturation, administer/titrate oxygen as ordered  4.  Position patient for maximum ventilatory efficiency  5.  Turn, cough, and deep breath with splinting to improve effectiveness  6.  Collaborate with RT to administer medication/treatments per order  7.  Encourage use of incentive spirometer and encourage patient to cough after use and utilize splinting techniques if applicable  8.  Airway suctioning  9.  Monitor sputum production for changes in color, consistency and frequency  10. Perform frequent oral hygiene  11. Alternate physical activity with rest periods  Outcome: Progressing     Problem: Skin Integrity  Goal: Skin integrity is maintained or improved  Description: Target End Date:  Prior to discharge or change in level of care    Document interventions on Skin Risk/Jaquan flowsheet groups and corresponding LDA    1.  Assess and monitor skin integrity, appearance and/or temperature  2.  Assess risk factors for impaired skin integrity and/or pressures ulcers  3.  Implement precautions to protect skin integrity in collaboration with interdisciplinary team  4.  Implement pressure ulcer prevention protocol if at risk for skin breakdown  5.  Confirm wound care consult if at risk for skin breakdown  6.  Ensure patient use of pressure relieving devices   (Low air loss bed, waffle overlay, heel protectors, ROHO cushion, etc)  Outcome: Progressing     Problem: Fall Risk  Goal: Patient will remain free from falls  Description: Target End Date:  Prior to discharge or change in level of care    Document interventions on the Priscila Delgado Fall Risk Assessment    1.  Assess for fall risk factors  2.  Implement fall precautions  Outcome: Progressing

## 2023-10-17 NOTE — PROGRESS NOTES
Nephrology Daily Progress Note    Date of Service  10/16/23    Chief Complaint  58 y.o. female admitted 9/29/2023 with a knee joint infection, developed RAO and volume overload    Interval Problem Update  Patient has no chest pain, shortness of breath slightly better  Still complaining of generalized muscle aches  10/11 patient complaining of joint and muscle pain  No chest pain, no shortness of breath  10/12 patient has no uremic symptoms  She went earlier today to have a kidney biopsy, CT scan showed bilateral hydronephrosis  Kidney biopsy was canceled  10/13 the patient has no new complaints  Appreciate urology input  10/14 patient has no chest pain, no shortness of breath  10/15 pt had B/L nephrostomy tubes placement  Patient has no new complaints  Review of Systems  Review of Systems   Constitutional:  Negative for chills, fever and malaise/fatigue.   Respiratory:  Negative for cough and shortness of breath.    Cardiovascular:  Negative for chest pain and leg swelling.   Gastrointestinal:  Negative for nausea and vomiting.   Genitourinary:  Negative for dysuria, frequency and urgency.        Physical Exam  Temp:  [36.3 °C (97.3 °F)-36.8 °C (98.2 °F)] 36.3 °C (97.4 °F)  Pulse:  [65-73] 71  Resp:  [16-17] 17  BP: (116-140)/(65-74) 116/69  SpO2:  [96 %-100 %] 96 %    GEN: alert and oriented in no acute distress.  HEENT: moist oropharygneal mucous membranes  CV:RRR, normal s1 s2  PULM: clear to auscultation bilaterally  ABD: soft non tender non distended  EXT: warm well perfused, no lower extremity edema  : R nephrostomy tube in place.     Fluids    Intake/Output Summary (Last 24 hours) at 10/17/2023 0923  Last data filed at 10/17/2023 0400  Gross per 24 hour   Intake 480 ml   Output 2950 ml   Net -2470 ml         Laboratory  Recent Labs     10/15/23  0440 10/16/23  1135 10/17/23  0730   WBC 7.0 8.2 6.6   RBC 3.05* 3.44* 3.41*   HEMOGLOBIN 8.5* 9.7* 9.5*   HEMATOCRIT 26.9* 29.7* 29.0*   MCV 88.2 86.3 85.0   MCH  "27.9 28.2 27.9   MCHC 31.6* 32.7 32.8   RDW 44.7 43.6 43.8   PLATELETCT 270 287 242   MPV 10.5 10.6 10.5       Recent Labs     10/15/23  0440 10/16/23  1135 10/17/23  0730   SODIUM 138 135 137   POTASSIUM 3.3* 3.2* 3.0*   CHLORIDE 105 101 102   CO2 20 20 22   GLUCOSE 118* 200* 124*   BUN 78* 67* 63*   CREATININE 3.79* 3.35* 2.91*   CALCIUM 10.3 10.1 10.3             No results for input(s): \"NTPROBNP\" in the last 72 hours.          Imaging  IR-NEPHROSTOGRAM W/ NEW TUBE PLACEMENT (ALL RADIOLOGY) BOTH   Final Result      1. Ultrasound and fluoroscopic guided placement of bilateral 8 [place] pigtail locking loop percutaneous nephrostomy catheter.      2. Nephrostogram showing satisfactory catheter position without extravasation.      DX-LOOPOGRAM (ILEAL CONDUIT)   Final Result      No evidence of reflux of contrast into the ureters or kidneys.      CT-ABORTED CT PROCEDURE   Final Result      Aborted CT-guided renal biopsy.      EC-ECHOCARDIOGRAM COMPLETE W/O CONT   Final Result      DX-CHEST-PORTABLE (1 VIEW)   Final Result      1.  Low lung volumes without definite acute cardiopulmonary abnormality.      US-RENAL   Final Result      1.  Normal renal ultrasound.            Assessment/Plan  1 RAO:  due to obstructive uropathy, s/p nephrostomy tube placement on 10/14/23  2 volume overload, improving.  3 respiratory failure, supplemental oxygen.   4 obesity  5 knee joint infection  6 hypokalemia , worsening    no acute need for HD today, evaluate daily  Recommend gentle potassium repletion.   Status post bilateral nephrostomy tube placement  Renal diet  Daily BMP, CBC.  Renal dose all meds  Avoid nephrotoxins like NSAIDs.    Continue to monitor renal function  Monitor I/O                  "

## 2023-10-17 NOTE — THERAPY
"Occupational Therapy  Daily Treatment     Patient Name: Patricia A Tietz  Age:  58 y.o., Sex:  female  Medical Record #: [id number]  Today's Date: 10/17/2023     Precautions  Precautions: Fall Risk, Weight Bearing As Tolerated Left Lower Extremity  Comments: urostomy and B neph tubes    Assessment    Pt participated in seated ADLs today, however was limited by fatigue and spasms in her L lower back. She was able to tolerate around 15 min seated EOB. She ultimately requested to lay back down due to fatigue. Remains limited by weakness, fatigue, impaired balance, pain.    Plan    Treatment Plan Status: Continue Current Treatment Plan  Type of Treatment: Self Care / Activities of Daily Living, Adaptive Equipment, Therapeutic Exercises, Neuro Re-Education / Balance, Therapeutic Activity  Treatment Frequency: 3 Times per Week  Treatment Duration: Until Therapy Goals Met    DC Equipment Recommendations: Unable to determine at this time  Discharge Recommendations: Recommend post-acute placement for additional occupational therapy services prior to discharge home    Subjective    \"I'm having a lot of spasms in my left lower back.\"     Objective       10/17/23 1407   Cognition    Cognition / Consciousness WDL   Level of Consciousness Alert   Balance   Sitting Balance (Static) Fair   Sitting Balance (Dynamic) Fair -   Weight Shift Sitting Fair   Skilled Intervention Verbal Cuing;Tactile Cuing   Bed Mobility    Supine to Sit Maximal Assist   Sit to Supine Maximal Assist   Scooting Maximal Assist   Skilled Intervention Verbal Cuing;Tactile Cuing   Activities of Daily Living   Grooming Moderate Assist;Seated  (wash face w/ supv, maxA hair care (baseline))   Lower Body Dressing Total Assist   Skilled Intervention Verbal Cuing;Tactile Cuing;Sequencing   How much help from another person does the patient currently need...   Putting on and taking off regular lower body clothing? 1   Bathing (including washing, rinsing, and drying)? 1 "   Toileting, which includes using a toilet, bedpan, or urinal? 1   Putting on and taking off regular upper body clothing? 2   Taking care of personal grooming such as brushing teeth? 3   Eating meals? 3   6 Clicks Daily Activity Score 11   Functional Mobility   Sit to Stand Unable to Participate   Bed, Chair, Wheelchair Transfer Unable to Participate   Mobility EOB only   Patient / Family Goals   Patient / Family Goal #1 get better and go home   Short Term Goals   Short Term Goal # 1 pt will demo ADL txfs with Mine   Goal Outcome # 1 Goal not met   Short Term Goal # 2 pt will dress LB with Mine and AE prn   Goal Outcome # 2 Goal not met   Short Term Goal # 3 pt will demo UB dressing w/ supv   Goal Outcome # 3 Progressing as expected

## 2023-10-17 NOTE — PROGRESS NOTES
"Nephrology Daily Progress Note    Date of Service  10/17/23    Chief Complaint  58 y.o. female admitted 9/29/2023 with a knee joint infection, developed RAO and volume overload    Interval Problem Update   Doing well no complaints.  Nephrostomy tube in place.   Review of Systems  Review of Systems   Constitutional:  Negative for chills, fever and malaise/fatigue.   Respiratory:  Negative for cough and shortness of breath.    Cardiovascular:  Negative for chest pain and leg swelling.   Gastrointestinal:  Negative for nausea and vomiting.   Genitourinary:  Negative for dysuria, frequency and urgency.        Physical Exam  Temp:  [36.3 °C (97.3 °F)-36.8 °C (98.2 °F)] 36.3 °C (97.4 °F)  Pulse:  [65-73] 71  Resp:  [16-17] 17  BP: (116-140)/(65-74) 116/69  SpO2:  [96 %-100 %] 96 %    GEN: alert and oriented in no acute distress.  HEENT: moist oropharygneal mucous membranes  CV:RRR, normal s1 s2  PULM: clear to auscultation bilaterally  ABD: soft non tender non distended  EXT: warm well perfused, no lower extremity edema  : R nephrostomy tube in place.     Fluids    Intake/Output Summary (Last 24 hours) at 10/17/2023 0929  Last data filed at 10/17/2023 0400  Gross per 24 hour   Intake 480 ml   Output 2950 ml   Net -2470 ml         Laboratory  Recent Labs     10/15/23  0440 10/16/23  1135 10/17/23  0730   WBC 7.0 8.2 6.6   RBC 3.05* 3.44* 3.41*   HEMOGLOBIN 8.5* 9.7* 9.5*   HEMATOCRIT 26.9* 29.7* 29.0*   MCV 88.2 86.3 85.0   MCH 27.9 28.2 27.9   MCHC 31.6* 32.7 32.8   RDW 44.7 43.6 43.8   PLATELETCT 270 287 242   MPV 10.5 10.6 10.5       Recent Labs     10/15/23  0440 10/16/23  1135 10/17/23  0730   SODIUM 138 135 137   POTASSIUM 3.3* 3.2* 3.0*   CHLORIDE 105 101 102   CO2 20 20 22   GLUCOSE 118* 200* 124*   BUN 78* 67* 63*   CREATININE 3.79* 3.35* 2.91*   CALCIUM 10.3 10.1 10.3             No results for input(s): \"NTPROBNP\" in the last 72 hours.          Imaging  IR-NEPHROSTOGRAM W/ NEW TUBE PLACEMENT (ALL RADIOLOGY) " BOTH   Final Result      1. Ultrasound and fluoroscopic guided placement of bilateral 8 [place] pigtail locking loop percutaneous nephrostomy catheter.      2. Nephrostogram showing satisfactory catheter position without extravasation.      DX-LOOPOGRAM (ILEAL CONDUIT)   Final Result      No evidence of reflux of contrast into the ureters or kidneys.      CT-ABORTED CT PROCEDURE   Final Result      Aborted CT-guided renal biopsy.      EC-ECHOCARDIOGRAM COMPLETE W/O CONT   Final Result      DX-CHEST-PORTABLE (1 VIEW)   Final Result      1.  Low lung volumes without definite acute cardiopulmonary abnormality.      US-RENAL   Final Result      1.  Normal renal ultrasound.            Assessment/Plan  1 RAO:  due to obstructive uropathy, s/p nephrostomy tube placement on 10/14/23  2 volume overload, improving.  3 respiratory failure, supplemental oxygen.   4 obesity  5 knee joint infection  6 hypokalemia , worsening    no acute need for HD today, evaluate daily  Recommend gentle potassium repletion.   Appreciate urology recommendations for further management of nephrostomy tube   Status post bilateral nephrostomy tube placement  Renal diet  Daily BMP, CBC.  Renal dose all meds  Avoid nephrotoxins like NSAIDs.    Continue to monitor renal function  Monitor I/O    Recommend nephrology follow up in clinic 2 weeks from discharge.

## 2023-10-17 NOTE — CARE PLAN
The patient is Stable - Low risk of patient condition declining or worsening    Shift Goals  Clinical Goals: Monitor labs  Patient Goals: Pain control  Family Goals: Not present    Progress made toward(s) clinical / shift goals:      Patient is not progressing towards the following goals:      Problem: Pain - Standard  Goal: Alleviation of pain or a reduction in pain to the patient’s comfort goal  Description: Target End Date:  Prior to discharge or change in level of care    Document on Vitals flowsheet    1.  Document pain using the appropriate pain scale per order or unit policy  2.  Educate and implement non-pharmacologic comfort measures (i.e. relaxation, distraction, massage, cold/heat therapy, etc.)  3.  Pain management medications as ordered  4.  Reassess pain after pain med administration per policy  5.  If opiods administered assess patient's response to pain medication is appropriate per POSS sedation scale  6.  Follow pain management plan developed in collaboration with patient and interdisciplinary team (including palliative care or pain specialists if applicable)  Outcome: Not Progressing  Note: Pt is A&$x4 c/o lower back pain. Pain managed with pharmacological and non-pharmacological strategies with effectiveness.Patient will verbalize ability to sleep with minimal discomfort. Call light personal belonging within reach. Safety measures in place.

## 2023-10-17 NOTE — PROGRESS NOTES
Note to reader: this note follows the APSO format rather than the historical SOAP format. Assessment and plan located at the top of the note for ease of use.    Chief Complaint  58 y.o. year old female here with No chief complaint on file.      Assessment/Plan  Interval History   Active Hospital Problems    Diagnosis     Complicated UTI (urinary tract infection) [N39.0]     Acute respiratory failure with hypoxia ([facility]) [J96.01]     Constipation [K59.00]     Primary hypertension [I10]     Pacemaker [Z95.0]     Acute on chronic anemia [D64.9]     RAO (acute kidney injury) ([facility]) [N17.9]     UGIB (upper gastrointestinal bleed) [K92.2]     Hx MRSA infection [Z86.14]     History of DVT in adulthood [Z86.718]     Class 3 severe obesity due to excess calories with serious comorbidity and body mass index (BMI) of 50.0 to 59.9 in adult ([facility]) [E66.01, Z68.43]      10/17- Pt complains of mild back pain from NPT. Urine clear yellow. Cr improved 2.9(3.35). Hbg stable.      10/16. Underwent placement of CROW NPT yesterday with IR. Reports mild flank pain with movement. Urine in each NPT is clear yellow, cultures prelim positive for yeast. Not on antimicrobials at present. Denies N/V/F/C. Cr improved to 3.35 (3.79), 1025cc UOP from L NPT and 3600cc UOP from R NPT since placement.    Plan:   - Continue to trend UOP, serum Cr  - Consider antifungal, pending urine culture results  - Patient expressed desire to follow up with her established urologist in [place] upon discharge.   - Ultimately will discharge with CROW NPT in place  - Urology will continue to follow     Dr Gao is aware of consult and has directed this patient's plan of care.    Review of Systems  Physical Exam   Review of Systems   Constitutional:  Negative for chills and fever.   Gastrointestinal:  Negative for abdominal pain, nausea and vomiting.   Genitourinary:  Negative for hematuria.   All other systems reviewed and are negative.    Vitals:    10/16/23 2008  10/17/23 0400 10/17/23 0829 10/17/23 1051   BP: 137/74 (!) 140/65 116/69    Pulse: 65 71 71 80   Resp: 16 16 17 18   Temp: 36.3 °C (97.3 °F) 36.8 °C (98.2 °F) 36.3 °C (97.4 °F)    TempSrc: Temporal Oral Oral    SpO2: 100% 96% 96%    Weight:       Height:         Physical Exam  Vitals and nursing note reviewed.   Constitutional:       General: She is not in acute distress.  HENT:      Head: Normocephalic.      Nose: Nose normal.      Mouth/Throat:      Pharynx: Oropharynx is clear.   Eyes:      Conjunctiva/sclera: Conjunctivae normal.   Pulmonary:      Effort: Pulmonary effort is normal.   Abdominal:      General: There is no distension.      Palpations: Abdomen is soft.      Comments: CROW NPT in place, clear yellow urine noted in each bag   Musculoskeletal:         General: Normal range of motion.      Cervical back: Normal range of motion.   Skin:     General: Skin is warm and dry.   Neurological:      General: No focal deficit present.      Mental Status: She is alert and oriented to person, place, and time.   Psychiatric:         Mood and Affect: Mood normal.         Behavior: Behavior normal.          Hematology Chemistry   Lab Results   Component Value Date/Time    WBC 6.6 10/17/2023 07:30 AM    HEMOGLOBIN 9.5 (L) 10/17/2023 07:30 AM    HEMATOCRIT 29.0 (L) 10/17/2023 07:30 AM    PLATELETCT 242 10/17/2023 07:30 AM     Lab Results   Component Value Date/Time    SODIUM 137 10/17/2023 07:30 AM    POTASSIUM 3.0 (L) 10/17/2023 07:30 AM    CHLORIDE 102 10/17/2023 07:30 AM    CO2 22 10/17/2023 07:30 AM    GLUCOSE 124 (H) 10/17/2023 07:30 AM    BUN 63 (H) 10/17/2023 07:30 AM    CREATININE 2.91 (H) 10/17/2023 07:30 AM    GLOMRATE 18 (L) 09/29/2023 04:30 PM         Labs not explicitly included in this progress note were reviewed by the author.   Radiology/imaging not explicitly included in this progress note was reviewed by the author.     Labs reviewed, Radiology images reviewed and Medications reviewed

## 2023-10-17 NOTE — DISCHARGE PLANNING
Case Management Discharge Planning    Admission Date: 9/29/2023  GMLOS: 4.2  ALOS: 18    6-Clicks ADL Score: 14  6-Clicks Mobility Score: 6  PT and/or OT Eval ordered: Yes  Post-acute Referrals Ordered: Yes  Post-acute Choice Obtained: Yes  Has referral(s) been sent to post-acute provider:  Yes      Anticipated Discharge Dispo: Discharge Disposition: D/T to SNF with Medicare cert in anticipation of skilled care (03)    DME Needed: No    Action(s) Taken: Updated Provider/Nurse on Discharge Plan    Escalations Completed: None    Medically Clear: No    Next Steps: forward choice to [facility]     Barriers to Discharge: Medical clearance and Pending Placement    Is the patient up for discharge tomorrow: No  Discussed patient's discharge barriers during IDT rounds.  Paitent is not medically clear, progressing though.  Will be starting eliquis/if medically stable for two days, then could possibly be ready for discharge.    Patient was accepted by [facility].

## 2023-10-18 LAB
ANION GAP SERPL CALC-SCNC: 14 MMOL/L (ref 7–16)
BACTERIA UR CULT: ABNORMAL
BASOPHILS # BLD AUTO: 0.4 % (ref 0–1.8)
BASOPHILS # BLD: 0.03 K/UL (ref 0–0.12)
BUN SERPL-MCNC: 60 MG/DL (ref 8–22)
CALCIUM SERPL-MCNC: 10.5 MG/DL (ref 8.5–10.5)
CHLORIDE SERPL-SCNC: 101 MMOL/L (ref 96–112)
CO2 SERPL-SCNC: 23 MMOL/L (ref 20–33)
CREAT SERPL-MCNC: 2.56 MG/DL (ref 0.5–1.4)
EOSINOPHIL # BLD AUTO: 0.33 K/UL (ref 0–0.51)
EOSINOPHIL NFR BLD: 4 % (ref 0–6.9)
ERYTHROCYTE [DISTWIDTH] IN BLOOD BY AUTOMATED COUNT: 43.9 FL (ref 35.9–50)
GFR SERPLBLD CREATININE-BSD FMLA CKD-EPI: 21 ML/MIN/1.73 M 2
GLUCOSE SERPL-MCNC: 166 MG/DL (ref 65–99)
GRAM STN SPEC: ABNORMAL
GRAM STN SPEC: ABNORMAL
HCT VFR BLD AUTO: 31.4 % (ref 37–47)
HGB BLD-MCNC: 10.3 G/DL (ref 12–16)
IMM GRANULOCYTES # BLD AUTO: 0.08 K/UL (ref 0–0.11)
IMM GRANULOCYTES NFR BLD AUTO: 1 % (ref 0–0.9)
LYMPHOCYTES # BLD AUTO: 1.21 K/UL (ref 1–4.8)
LYMPHOCYTES NFR BLD: 14.8 % (ref 22–41)
MCH RBC QN AUTO: 27.9 PG (ref 27–33)
MCHC RBC AUTO-ENTMCNC: 32.8 G/DL (ref 32.2–35.5)
MCV RBC AUTO: 85.1 FL (ref 81.4–97.8)
MONOCYTES # BLD AUTO: 0.66 K/UL (ref 0–0.85)
MONOCYTES NFR BLD AUTO: 8.1 % (ref 0–13.4)
NEUTROPHILS # BLD AUTO: 5.85 K/UL (ref 1.82–7.42)
NEUTROPHILS NFR BLD: 71.7 % (ref 44–72)
NRBC # BLD AUTO: 0 K/UL
NRBC BLD-RTO: 0 /100 WBC (ref 0–0.2)
PLATELET # BLD AUTO: 324 K/UL (ref 164–446)
PMV BLD AUTO: 10.1 FL (ref 9–12.9)
POTASSIUM SERPL-SCNC: 3.1 MMOL/L (ref 3.6–5.5)
RBC # BLD AUTO: 3.69 M/UL (ref 4.2–5.4)
SIGNIFICANT IND 70042: ABNORMAL
SIGNIFICANT IND 70042: ABNORMAL
SITE SITE: ABNORMAL
SITE SITE: ABNORMAL
SODIUM SERPL-SCNC: 138 MMOL/L (ref 135–145)
SOURCE SOURCE: ABNORMAL
SOURCE SOURCE: ABNORMAL
WBC # BLD AUTO: 8.2 K/UL (ref 4.8–10.8)

## 2023-10-18 PROCEDURE — 700102 HCHG RX REV CODE 250 W/ 637 OVERRIDE(OP): Performed by: STUDENT IN AN ORGANIZED HEALTH CARE EDUCATION/TRAINING PROGRAM

## 2023-10-18 PROCEDURE — 700105 HCHG RX REV CODE 258: Performed by: STUDENT IN AN ORGANIZED HEALTH CARE EDUCATION/TRAINING PROGRAM

## 2023-10-18 PROCEDURE — 700102 HCHG RX REV CODE 250 W/ 637 OVERRIDE(OP): Performed by: INTERNAL MEDICINE

## 2023-10-18 PROCEDURE — A9270 NON-COVERED ITEM OR SERVICE: HCPCS | Performed by: INTERNAL MEDICINE

## 2023-10-18 PROCEDURE — A9270 NON-COVERED ITEM OR SERVICE: HCPCS | Performed by: STUDENT IN AN ORGANIZED HEALTH CARE EDUCATION/TRAINING PROGRAM

## 2023-10-18 PROCEDURE — 85025 COMPLETE CBC W/AUTO DIFF WBC: CPT

## 2023-10-18 PROCEDURE — 700111 HCHG RX REV CODE 636 W/ 250 OVERRIDE (IP): Mod: JZ | Performed by: STUDENT IN AN ORGANIZED HEALTH CARE EDUCATION/TRAINING PROGRAM

## 2023-10-18 PROCEDURE — 99999 PR NO CHARGE: CPT | Performed by: INTERNAL MEDICINE

## 2023-10-18 PROCEDURE — 80048 BASIC METABOLIC PNL TOTAL CA: CPT

## 2023-10-18 PROCEDURE — 770006 HCHG ROOM/CARE - MED/SURG/GYN SEMI*

## 2023-10-18 PROCEDURE — 99232 SBSQ HOSP IP/OBS MODERATE 35: CPT | Performed by: INTERNAL MEDICINE

## 2023-10-18 RX ORDER — POTASSIUM CHLORIDE 20 MEQ/1
20 TABLET, EXTENDED RELEASE ORAL 2 TIMES DAILY
Status: COMPLETED | OUTPATIENT
Start: 2023-10-18 | End: 2023-10-20

## 2023-10-18 RX ADMIN — FERROUS SULFATE TAB 325 MG (65 MG ELEMENTAL FE) 325 MG: 325 (65 FE) TAB at 10:01

## 2023-10-18 RX ADMIN — DAPTOMYCIN 1000 MG: 500 INJECTION, POWDER, LYOPHILIZED, FOR SOLUTION INTRAVENOUS at 17:51

## 2023-10-18 RX ADMIN — OXYCODONE HYDROCHLORIDE 15 MG: 15 TABLET ORAL at 12:32

## 2023-10-18 RX ADMIN — GUAIFENESIN 600 MG: 600 TABLET, EXTENDED RELEASE ORAL at 06:12

## 2023-10-18 RX ADMIN — APIXABAN 5 MG: 5 TABLET, FILM COATED ORAL at 06:12

## 2023-10-18 RX ADMIN — OXYCODONE HYDROCHLORIDE 15 MG: 15 TABLET ORAL at 06:13

## 2023-10-18 RX ADMIN — OMEPRAZOLE 20 MG: 20 CAPSULE, DELAYED RELEASE ORAL at 17:52

## 2023-10-18 RX ADMIN — GUAIFENESIN 600 MG: 600 TABLET, EXTENDED RELEASE ORAL at 17:51

## 2023-10-18 RX ADMIN — CITALOPRAM 40 MG: 40 TABLET, FILM COATED ORAL at 06:12

## 2023-10-18 RX ADMIN — APIXABAN 5 MG: 5 TABLET, FILM COATED ORAL at 17:51

## 2023-10-18 RX ADMIN — DOCUSATE SODIUM 50 MG AND SENNOSIDES 8.6 MG 1 TABLET: 8.6; 5 TABLET, FILM COATED ORAL at 06:12

## 2023-10-18 RX ADMIN — FUROSEMIDE 40 MG: 40 TABLET ORAL at 06:13

## 2023-10-18 RX ADMIN — OMEPRAZOLE 20 MG: 20 CAPSULE, DELAYED RELEASE ORAL at 06:13

## 2023-10-18 RX ADMIN — FLUCONAZOLE 200 MG: 200 TABLET ORAL at 17:52

## 2023-10-18 RX ADMIN — POTASSIUM CHLORIDE 20 MEQ: 1500 TABLET, EXTENDED RELEASE ORAL at 06:14

## 2023-10-18 RX ADMIN — OXYCODONE HYDROCHLORIDE 15 MG: 15 TABLET ORAL at 21:26

## 2023-10-18 RX ADMIN — POTASSIUM CHLORIDE 20 MEQ: 1500 TABLET, EXTENDED RELEASE ORAL at 17:51

## 2023-10-18 ASSESSMENT — ENCOUNTER SYMPTOMS
BACK PAIN: 1
MYALGIAS: 0
SHORTNESS OF BREATH: 1
FEVER: 0
CONSTIPATION: 0
ABDOMINAL PAIN: 1
COUGH: 0
SENSORY CHANGE: 0
NAUSEA: 0
DIARRHEA: 0
BLURRED VISION: 0
WEAKNESS: 1
COUGH: 1
VOMITING: 0
FOCAL WEAKNESS: 0
SHORTNESS OF BREATH: 0
PALPITATIONS: 0
CHILLS: 0
HEADACHES: 0
ABDOMINAL PAIN: 0

## 2023-10-18 ASSESSMENT — PAIN DESCRIPTION - PAIN TYPE
TYPE: ACUTE PAIN
TYPE: ACUTE PAIN

## 2023-10-18 NOTE — PROGRESS NOTES
"Nephrology Daily Progress Note    Date of Service  10/18/23    Chief Complaint  58 y.o. female admitted 9/29/2023 with a knee joint infection, developed RAO and volume overload    Interval Problem Update  Creatinine improve to 2.9 from 3.35 from 3.79.   No new labs today.     Review of Systems  Review of Systems   Constitutional:  Negative for chills, fever and malaise/fatigue.   Respiratory:  Negative for cough and shortness of breath.    Cardiovascular:  Negative for chest pain and leg swelling.   Gastrointestinal:  Negative for nausea and vomiting.   Genitourinary:  Negative for dysuria, frequency and urgency.        Physical Exam  Temp:  [36.2 °C (97.2 °F)-36.9 °C (98.5 °F)] 36.2 °C (97.2 °F)  Pulse:  [62-71] 66  Resp:  [16-18] 18  BP: (108-120)/(66-70) 111/66  SpO2:  [97 %-98 %] 98 %        Fluids    Intake/Output Summary (Last 24 hours) at 10/18/2023 1235  Last data filed at 10/18/2023 1030  Gross per 24 hour   Intake 800 ml   Output 1550 ml   Net -750 ml         Laboratory  Recent Labs     10/16/23  1135 10/17/23  0730   WBC 8.2 6.6   RBC 3.44* 3.41*   HEMOGLOBIN 9.7* 9.5*   HEMATOCRIT 29.7* 29.0*   MCV 86.3 85.0   MCH 28.2 27.9   MCHC 32.7 32.8   RDW 43.6 43.8   PLATELETCT 287 242   MPV 10.6 10.5       Recent Labs     10/16/23  1135 10/17/23  0730   SODIUM 135 137   POTASSIUM 3.2* 3.0*   CHLORIDE 101 102   CO2 20 22   GLUCOSE 200* 124*   BUN 67* 63*   CREATININE 3.35* 2.91*   CALCIUM 10.1 10.3             No results for input(s): \"NTPROBNP\" in the last 72 hours.          Imaging  DX-CHEST-2 VIEWS   Final Result      1. No acute findings in the chest.   2. Other stable chronic degenerative and post surgical changes.      IR-NEPHROSTOGRAM W/ NEW TUBE PLACEMENT (ALL RADIOLOGY) BOTH   Final Result      1. Ultrasound and fluoroscopic guided placement of bilateral 8 [place] pigtail locking loop percutaneous nephrostomy catheter.      2. Nephrostogram showing satisfactory catheter position without extravasation.    "   DX-LOOPOGRAM (ILEAL CONDUIT)   Final Result      No evidence of reflux of contrast into the ureters or kidneys.      CT-ABORTED CT PROCEDURE   Final Result      Aborted CT-guided renal biopsy.      EC-ECHOCARDIOGRAM COMPLETE W/O CONT   Final Result      DX-CHEST-PORTABLE (1 VIEW)   Final Result      1.  Low lung volumes without definite acute cardiopulmonary abnormality.      US-RENAL   Final Result      1.  Normal renal ultrasound.            Assessment/Plan  1 RAO:  due to obstructive uropathy, s/p nephrostomy tube placement on 10/14/23  2 volume overload, improving.  3 respiratory failure, supplemental oxygen.   4 obesity  5 knee joint infection  6 hypokalemia , persistent     No plans for HD at this time given appropriate output from nephrostomy tube    Recommend gentle potassium repletion as appropriate., likely due to urinary potassium losses.   Appreciate urology recommendations for further management of nephrostomy tube   Status post bilateral nephrostomy tube placement  Renal diet  Daily BMP, CBC.  Renal dose all meds  Avoid nephrotoxins like NSAIDs.    Continue to monitor renal function  Monitor I/O    Recommend nephrology follow up in clinic 2 weeks from discharge.     Will sign off at this time

## 2023-10-18 NOTE — PROGRESS NOTES
Sent ARTUR Dumont a message regarding physician orders for blood draw and her PICC line does not draw back at all so lab must come

## 2023-10-18 NOTE — PROGRESS NOTES
Assumed care of patient. Assessment performed. Q2 turns in place. Patient is refusing most Q2 turns and understands that pressure ulcers may develop if not frequently repositioning and offloading areas at high risk for skin breakdown. Will continue to encourage repositioning.

## 2023-10-18 NOTE — CARE PLAN
The patient is Stable - Low risk of patient condition declining or worsening    Problem: Pain - Standard  Goal: Alleviation of pain or a reduction in pain to the patient’s comfort goal  Outcome: Progressing  Note: Pain will be maintained at a tolerable level for the duration of the shift using medication as per MAR and other methods such as repositioning and distraction.      Problem: Fall Risk  Goal: Patient will remain free from falls  Outcome: Progressing  Note: Patient will remain free from falls and other accidents using safety protocols such as the bed alarm. Patient understands not to mobilize without staff present.      Problem: Respiratory  Goal: Patient will achieve/maintain optimum respiratory ventilation and gas exchange  Outcome: Progressing  Note: Patient's oxygen needs will not increase for the duration of the shift, and patient will stay at baseline oxygen usage (2L).      Shift Goals  Clinical Goals: Monitor labs, Q2 turns  Patient Goals: Sleep  Family Goals: Not present    Progress made toward(s) clinical / shift goals: breathing status assessed, pain addressed     Patient is not progressing towards the following goals:

## 2023-10-18 NOTE — PROGRESS NOTES
Assumed patient care at 0700. Patient Aox4. Patient denies need for pain interventions at this time. Patient belongings are nearby, bed set in low position, locked, and call light within reach. Will continue to monitor patient during shift accordingly.

## 2023-10-18 NOTE — PROGRESS NOTES
Hospital Medicine Daily Progress Note    Date of Service  10/18/2023    Chief Complaint  Patricia A Tietz is a 58 y.o. female admitted 9/29/2023 with anemia    Hospital Course  57 yo woman with DEBRA, urostomy, recent left knee arthroplasty complicated by infection, status post I&D with antibiotic spacer on daptomycin for MRSA, history of DVT on Eliquis who has been at SNF and found her hemoglobin to be low at 5.7 and transferred to [facility].  She has not had signs of bleeding.  GI was consulted.  EGD showed 6 white debris in esophagus, biopsies taken, diverticula in duodenum.  Hemoglobin has been stable postoperatively.  She has an RAO that has not improved.  She is very edematous and started on IV Lasix.  BNP was high, echocardiogram ordered to assess for heart failure, valve disease. Echo did not suggestive CHF and Lasix did not improve resp status or RAO. Nephro consulted, rec renal bx this was attempted but hydropnephrosis was noted, uro consulted rec b/l nephrostomy tubes placed on 10/15. Hgb trended up and RAO improved.    Interval Problem Update  10/17  Patient feels poor in general, but denies any new symptoms. Good urine output from both PCNT, more on the R than L. Some urine output in the urostomy. No apparent blood. PCNT cx growing Esvin glabrata. Knee incision appears ok, but likely needs sutures out. Overall, patient feels poor. Restarted eliquis today.     10/18  Patient feels a bit better today. Remains afebrile. See labs below. Will remove knee sutures today.     I have discussed this patient's plan of care and discharge plan at IDT rounds today with Case Management, Nursing, Nursing leadership, and other members of the IDT team.    Consultants/Specialty  GI, nephrology    Code Status  Full Code    Disposition  The patient is not medically cleared for discharge to home or a post-acute facility.  Anticipate discharge to: skilled nursing facility    I have placed the appropriate orders for post-discharge  needs.    Review of Systems  Review of Systems   Constitutional:  Positive for malaise/fatigue (some improvement noted). Negative for chills and fever.   HENT:  Negative for congestion.    Eyes:  Negative for blurred vision.   Respiratory:  Positive for cough and shortness of breath (nearer her baseline).    Cardiovascular:  Positive for leg swelling. Negative for chest pain.   Gastrointestinal:  Positive for abdominal pain (tolerable). Negative for constipation, diarrhea, nausea and vomiting.   Genitourinary:  Negative for dysuria.   Musculoskeletal:  Negative for myalgias.   Skin:  Negative for rash.   Neurological:  Positive for weakness. Negative for sensory change and focal weakness.        Physical Exam  Temp:  [36.2 °C (97.2 °F)-36.9 °C (98.5 °F)] 36.2 °C (97.2 °F)  Pulse:  [62-75] 75  Resp:  [16-18] 18  BP: (108-135)/(66-75) 135/75  SpO2:  [97 %-100 %] 100 %    Physical Exam  Constitutional:       General: She is not in acute distress.     Appearance: Normal appearance. She is ill-appearing (chronically).      Comments: Body mass index is 42.54 kg/m².  A bit more interactive today     HENT:      Head: Normocephalic and atraumatic.      Nose: Nose normal.      Comments: NC in place     Mouth/Throat:      Mouth: Mucous membranes are moist.      Pharynx: Oropharynx is clear.   Eyes:      Conjunctiva/sclera: Conjunctivae normal.      Pupils: Pupils are equal, round, and reactive to light.   Cardiovascular:      Rate and Rhythm: Normal rate and regular rhythm.      Heart sounds: No murmur heard.  Pulmonary:      Effort: Pulmonary effort is normal.      Breath sounds: No wheezing, rhonchi or rales.   Abdominal:      General: There is no distension.      Palpations: Abdomen is soft.      Tenderness: There is abdominal tenderness (mild). There is no guarding or rebound.      Comments: Urostomy in place, normal appearing yellow urine appreciated   Genitourinary:     Comments: B/L PCNT's in place  Both yellow  appearing urine in place  Insertion sites appear C/D/I  Musculoskeletal:         General: No swelling or tenderness.      Cervical back: Normal range of motion and neck supple.      Right lower leg: Edema present.      Left lower leg: Edema present.      Comments: L knee with surgical incision site, appears C/D/I  Distal sutures remain in place with some overlying granulation tissue   Skin:     General: Skin is warm and dry.   Neurological:      Mental Status: She is alert.      GCS: GCS eye subscore is 4. GCS verbal subscore is 5. GCS motor subscore is 6.      Cranial Nerves: No facial asymmetry.   Psychiatric:         Mood and Affect: Mood normal.         Behavior: Behavior normal.         Fluids    Intake/Output Summary (Last 24 hours) at 10/18/2023 1457  Last data filed at 10/18/2023 1300  Gross per 24 hour   Intake 800 ml   Output 2450 ml   Net -1650 ml       Laboratory  Recent Labs     10/16/23  1135 10/17/23  0730 10/18/23  1336   WBC 8.2 6.6 8.2   RBC 3.44* 3.41* 3.69*   HEMOGLOBIN 9.7* 9.5* 10.3*   HEMATOCRIT 29.7* 29.0* 31.4*   MCV 86.3 85.0 85.1   MCH 28.2 27.9 27.9   MCHC 32.7 32.8 32.8   RDW 43.6 43.8 43.9   PLATELETCT 287 242 324   MPV 10.6 10.5 10.1     Recent Labs     10/16/23  1135 10/17/23  0730 10/18/23  1336   SODIUM 135 137 138   POTASSIUM 3.2* 3.0* 3.1*   CHLORIDE 101 102 101   CO2 20 22 23   GLUCOSE 200* 124* 166*   BUN 67* 63* 60*   CREATININE 3.35* 2.91* 2.56*   CALCIUM 10.1 10.3 10.5                     Imaging  DX-CHEST-2 VIEWS   Final Result      1. No acute findings in the chest.   2. Other stable chronic degenerative and post surgical changes.      IR-NEPHROSTOGRAM W/ NEW TUBE PLACEMENT (ALL RADIOLOGY) BOTH   Final Result      1. Ultrasound and fluoroscopic guided placement of bilateral 8 Armenian pigtail locking loop percutaneous nephrostomy catheter.      2. Nephrostogram showing satisfactory catheter position without extravasation.      DX-LOOPOGRAM (ILEAL CONDUIT)   Final Result       No evidence of reflux of contrast into the ureters or kidneys.      CT-ABORTED CT PROCEDURE   Final Result      Aborted CT-guided renal biopsy.      EC-ECHOCARDIOGRAM COMPLETE W/O CONT   Final Result      DX-CHEST-PORTABLE (1 VIEW)   Final Result      1.  Low lung volumes without definite acute cardiopulmonary abnormality.      US-RENAL   Final Result      1.  Normal renal ultrasound.             Assessment/Plan  * UGIB (upper gastrointestinal bleed)- (present on admission)  Assessment & Plan  Hb continues to improve, noted 10/18  Restarted eliquis on 10/17, monitor for bleeding closely  Daily CBC with conservative transfusion threshold  10/1 EGD this morning, revealed thick whitish debris in esophagus and mucosal fragility.  Biopsies which showed benign mucosal ulceration with acute on chronic inflammation, no e/o fungal elements    I personally reviewed the cbc on 10/18        Complicated UTI (urinary tract infection)- (present on admission)  Assessment & Plan  PCNT cultures growing candida glabrata  Start oral fluconazole 200 mg daily, F/U final sensitivities    I personally reviewed this urine culture on 10/17    Acute respiratory failure with hypoxia (HCC)- (present on admission)  Assessment & Plan  Remains stable at 3 L NC  Continue lasix  Recheck CXR, I personally reviewed this Xray on 10/18 and there are no acute findings    Constipation- (present on admission)  Assessment & Plan  Continue bowel protocol    Pacemaker- (present on admission)  Assessment & Plan  Noted    Primary hypertension- (present on admission)  Assessment & Plan  Hold home metoprolol  Consider restarting soon    RAO (acute kidney injury) (HCC)- (present on admission)  Assessment & Plan  Cre baseline 1.2, peaked here at ~3.5, hx of urostomy with ileal conduit since childhood  Cr continues to improve, now 2.6 on 10/18  Initial renal ultrasound was unremarkable  Imaging showed B/L hydronephrosis, s/p placement of B/L PCNT's on 10/15 with  subsequent good urine output  Loopogram negative for ileal conduit leak  -IR consulted, b/l nephrostomy tubes placed on 10/15  -Nephro and urology following  Unclear when patient will be deemed medically stable for SNF tx    I personally reviewed the BMP on 10/18      Acute on chronic anemia- (present on admission)  Assessment & Plan  Stabilized as noted above    History of DVT in adulthood- (present on admission)  Assessment & Plan  Eliquis held for for GI bleed and then nephrostomy tube placement initially  Restarted on 10/17 as noted above    Hx MRSA infection- (present on admission)  Assessment & Plan  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 In review of yesterday's note (10/17/2023), there are no changes except as documented above.

## 2023-10-18 NOTE — PROGRESS NOTES
Radiology Progress Note   Author: IRWIN Cano Date & Time created: 10/18/2023  3:48 PM   Date of admission  9/29/2023  Note to reader: this note follows the APSO format rather than the historical SOAP format. Assessment and plan located at the top of the note for ease of use.    Chief Complaint  58 y.o. female admitted 9/29/2023 with anemia      HPI  58-year-old woman with PMH DEBRA, urostomy, kidney stones, DVT on Eliquis and recent surgical history of left knee arthroplasty status post infection requiring I&D and antibiotic spacer on daptomycin for MRSA transferred 09/29/23 from [facility] with low hemoglobin of 5.7.  At the hospital, lab work revealed BUN 76 and creatinine 2.75.  During hospital stay, renal biopsy was ordered; however it was aborted after finding of severe hydronephrosis of bilateral kidneys.  Loopogram was ordered showing no reflux into the ureters.  Urology recommended bilateral nephrostomy tube placement which was performed by IR Dr. Brody on 10/15/23.     Interval history:  10/16/23 -right neph tube with 1800 mL clear yellow urine in the last 24 hours.  Left neph tube with 925 light pink output in the last 24 hours.  Patient reports mild pain at insertion sites.  Renal function slightly improved today.  Cultures preliminarily positive for yeast.    10/17/23 - right neph tube with 2800 mL clear yellow urine in the last 24 hours.  Left neph tube with 750 light pink output in the last 24 hours.  Renal function continues to improve.  Cultures positive for juan r glabrata.    10/18/23 - right neph tube with 2100 mL clear yellow urine in the last 24 hours.  Left neph tube with 1150 clear yellow urine output in the last 24 hours.  Renal function continues to improve.  Cultures positive for candida glabrata.  Patient started on fluconazole.    Assessment/Plan     Principal Problem:    UGIB (upper gastrointestinal bleed)  Active Problems:    Class 3 severe obesity due to excess calories  with serious comorbidity and body mass index (BMI) of 50.0 to 59.9 in adult (HCC)    Hx MRSA infection    History of DVT in adulthood    Acute on chronic anemia    RAO (acute kidney injury) ([facility])    Primary hypertension    Pacemaker    Constipation    Acute respiratory failure with hypoxia ([facility])    Complicated UTI (urinary tract infection)      Plan IR  - Only flush neph tubes if no output; do not aspirate back  - Fluid cultures positive for candida glabrata  - Urology and nephrology following   - Wound education provided to patient  - Education provided on S/S of infection   - Ok to shower with catheter as long as site is covered with waterproof dressing   - No baths or submerging site under water until catheter removed   - Plan to discharge patient to SNF with bilateral neph tubes in place.  Patient will follow-up with her urologist in [place].     -Thank you for allowing Interventional Radiology team to participate in the patients care, if any additional care or requests are needed in the future please do not hesitate call or place IR order   [phone]    -          Review of Systems  Physical Exam   Review of Systems   Constitutional:  Positive for malaise/fatigue. Negative for chills and fever.   Respiratory:  Negative for shortness of breath.    Cardiovascular:  Negative for chest pain and palpitations.   Gastrointestinal:  Negative for nausea and vomiting.   Genitourinary:         Bilateral flank pain at nephrostomy tube insertion site improving   Musculoskeletal:  Positive for back pain.   Neurological:  Positive for weakness. Negative for headaches.      Vitals:    10/18/23 1448   BP: 135/75   Pulse: 75   Resp: 18   Temp: 36.2 °C (97.2 °F)   SpO2: 100%        Physical Exam  Constitutional:       Appearance: Normal appearance.   Cardiovascular:      Rate and Rhythm: Normal rate.   Abdominal:      Palpations: Abdomen is soft.   Genitourinary:     Comments: Urostomy  Bilateral nephrostomy  tubes  Musculoskeletal:      Right lower leg: Edema present.      Left lower leg: Edema present.   Skin:     General: Skin is warm and dry.   Neurological:      General: No focal deficit present.      Mental Status: She is alert and oriented to person, place, and time.   Psychiatric:         Mood and Affect: Mood normal.         Behavior: Behavior normal.             Labs    Recent Labs     10/16/23  1135 10/17/23  0730 10/18/23  1336   WBC 8.2 6.6 8.2   RBC 3.44* 3.41* 3.69*   HEMOGLOBIN 9.7* 9.5* 10.3*   HEMATOCRIT 29.7* 29.0* 31.4*   MCV 86.3 85.0 85.1   MCH 28.2 27.9 27.9   MCHC 32.7 32.8 32.8   RDW 43.6 43.8 43.9   PLATELETCT 287 242 324   MPV 10.6 10.5 10.1       Recent Labs     10/16/23  1135 10/17/23  0730 10/18/23  1336   SODIUM 135 137 138   POTASSIUM 3.2* 3.0* 3.1*   CHLORIDE 101 102 101   CO2 20 22 23   GLUCOSE 200* 124* 166*   BUN 67* 63* 60*   CREATININE 3.35* 2.91* 2.56*   CALCIUM 10.1 10.3 10.5       Recent Labs     10/16/23  1135 10/17/23  0730 10/18/23  1336   CREATININE 3.35* 2.91* 2.56*       DX-CHEST-2 VIEWS   Final Result      1. No acute findings in the chest.   2. Other stable chronic degenerative and post surgical changes.      IR-NEPHROSTOGRAM W/ NEW TUBE PLACEMENT (ALL RADIOLOGY) BOTH   Final Result      1. Ultrasound and fluoroscopic guided placement of bilateral 8 Khmer pigtail locking loop percutaneous nephrostomy catheter.      2. Nephrostogram showing satisfactory catheter position without extravasation.      DX-LOOPOGRAM (ILEAL CONDUIT)   Final Result      No evidence of reflux of contrast into the ureters or kidneys.      CT-ABORTED CT PROCEDURE   Final Result      Aborted CT-guided renal biopsy.      EC-ECHOCARDIOGRAM COMPLETE W/O CONT   Final Result      DX-CHEST-PORTABLE (1 VIEW)   Final Result      1.  Low lung volumes without definite acute cardiopulmonary abnormality.      US-RENAL   Final Result      1.  Normal renal ultrasound.          INR   Date Value Ref Range Status  "  10/10/2023 1.22 (H) 0.87 - 1.13 Final     Comment:     INR - Non-therapeutic Reference Range: 0.87-1.13  INR - Therapeutic Reference Range: 2.0-4.0       No results found for: \"POCINR\"     Intake/Output Summary (Last 24 hours) at 10/16/2023 1127  Last data filed at 10/16/2023 0812  Gross per 24 hour   Intake 360 ml   Output 3325 ml   Net -2965 ml      Labs not explicitly included in this progress note were reviewed by the author. Radiology/imaging not explicitly included in this progress note was reviewed by the author.     I have performed a physical exam and reviewed and updated ROS and Plan today (10/18/2023).     30 minutes in directly providing and coordinating care and extensive data review.  No time overlap and excludes procedures.   "

## 2023-10-19 PROBLEM — E87.6 HYPOKALEMIA: Status: ACTIVE | Noted: 2023-10-19

## 2023-10-19 LAB
ANION GAP SERPL CALC-SCNC: 12 MMOL/L (ref 7–16)
BASOPHILS # BLD AUTO: 0.3 % (ref 0–1.8)
BASOPHILS # BLD: 0.03 K/UL (ref 0–0.12)
BUN SERPL-MCNC: 59 MG/DL (ref 8–22)
CALCIUM SERPL-MCNC: 10.4 MG/DL (ref 8.5–10.5)
CHLORIDE SERPL-SCNC: 103 MMOL/L (ref 96–112)
CO2 SERPL-SCNC: 22 MMOL/L (ref 20–33)
CREAT SERPL-MCNC: 2.42 MG/DL (ref 0.5–1.4)
EOSINOPHIL # BLD AUTO: 0.4 K/UL (ref 0–0.51)
EOSINOPHIL NFR BLD: 4.5 % (ref 0–6.9)
ERYTHROCYTE [DISTWIDTH] IN BLOOD BY AUTOMATED COUNT: 45.2 FL (ref 35.9–50)
GFR SERPLBLD CREATININE-BSD FMLA CKD-EPI: 22 ML/MIN/1.73 M 2
GLUCOSE SERPL-MCNC: 126 MG/DL (ref 65–99)
HCT VFR BLD AUTO: 29.3 % (ref 37–47)
HGB BLD-MCNC: 9.3 G/DL (ref 12–16)
IMM GRANULOCYTES # BLD AUTO: 0.07 K/UL (ref 0–0.11)
IMM GRANULOCYTES NFR BLD AUTO: 0.8 % (ref 0–0.9)
LYMPHOCYTES # BLD AUTO: 1.52 K/UL (ref 1–4.8)
LYMPHOCYTES NFR BLD: 17.1 % (ref 22–41)
MAGNESIUM SERPL-MCNC: 1.9 MG/DL (ref 1.5–2.5)
MCH RBC QN AUTO: 27.5 PG (ref 27–33)
MCHC RBC AUTO-ENTMCNC: 31.7 G/DL (ref 32.2–35.5)
MCV RBC AUTO: 86.7 FL (ref 81.4–97.8)
MONOCYTES # BLD AUTO: 0.72 K/UL (ref 0–0.85)
MONOCYTES NFR BLD AUTO: 8.1 % (ref 0–13.4)
NEUTROPHILS # BLD AUTO: 6.14 K/UL (ref 1.82–7.42)
NEUTROPHILS NFR BLD: 69.2 % (ref 44–72)
NRBC # BLD AUTO: 0 K/UL
NRBC BLD-RTO: 0 /100 WBC (ref 0–0.2)
PLATELET # BLD AUTO: 299 K/UL (ref 164–446)
PMV BLD AUTO: 10.7 FL (ref 9–12.9)
POTASSIUM SERPL-SCNC: 3.4 MMOL/L (ref 3.6–5.5)
RBC # BLD AUTO: 3.38 M/UL (ref 4.2–5.4)
SODIUM SERPL-SCNC: 137 MMOL/L (ref 135–145)
WBC # BLD AUTO: 8.9 K/UL (ref 4.8–10.8)

## 2023-10-19 PROCEDURE — 700102 HCHG RX REV CODE 250 W/ 637 OVERRIDE(OP): Performed by: INTERNAL MEDICINE

## 2023-10-19 PROCEDURE — 700102 HCHG RX REV CODE 250 W/ 637 OVERRIDE(OP): Performed by: STUDENT IN AN ORGANIZED HEALTH CARE EDUCATION/TRAINING PROGRAM

## 2023-10-19 PROCEDURE — 85025 COMPLETE CBC W/AUTO DIFF WBC: CPT

## 2023-10-19 PROCEDURE — A9270 NON-COVERED ITEM OR SERVICE: HCPCS | Performed by: STUDENT IN AN ORGANIZED HEALTH CARE EDUCATION/TRAINING PROGRAM

## 2023-10-19 PROCEDURE — 83735 ASSAY OF MAGNESIUM: CPT

## 2023-10-19 PROCEDURE — 99232 SBSQ HOSP IP/OBS MODERATE 35: CPT | Performed by: INTERNAL MEDICINE

## 2023-10-19 PROCEDURE — 700102 HCHG RX REV CODE 250 W/ 637 OVERRIDE(OP): Mod: JZ | Performed by: INTERNAL MEDICINE

## 2023-10-19 PROCEDURE — A9270 NON-COVERED ITEM OR SERVICE: HCPCS | Performed by: INTERNAL MEDICINE

## 2023-10-19 PROCEDURE — 80048 BASIC METABOLIC PNL TOTAL CA: CPT

## 2023-10-19 PROCEDURE — 770001 HCHG ROOM/CARE - MED/SURG/GYN PRIV*

## 2023-10-19 RX ADMIN — OXYCODONE HYDROCHLORIDE 15 MG: 15 TABLET ORAL at 09:39

## 2023-10-19 RX ADMIN — APIXABAN 5 MG: 5 TABLET, FILM COATED ORAL at 04:49

## 2023-10-19 RX ADMIN — GUAIFENESIN 600 MG: 600 TABLET, EXTENDED RELEASE ORAL at 17:13

## 2023-10-19 RX ADMIN — DOCUSATE SODIUM 50 MG AND SENNOSIDES 8.6 MG 1 TABLET: 8.6; 5 TABLET, FILM COATED ORAL at 04:49

## 2023-10-19 RX ADMIN — FERROUS SULFATE TAB 325 MG (65 MG ELEMENTAL FE) 325 MG: 325 (65 FE) TAB at 11:28

## 2023-10-19 RX ADMIN — POTASSIUM CHLORIDE 20 MEQ: 1500 TABLET, EXTENDED RELEASE ORAL at 17:13

## 2023-10-19 RX ADMIN — FLUCONAZOLE 200 MG: 200 TABLET ORAL at 17:13

## 2023-10-19 RX ADMIN — APIXABAN 5 MG: 5 TABLET, FILM COATED ORAL at 17:12

## 2023-10-19 RX ADMIN — OMEPRAZOLE 20 MG: 20 CAPSULE, DELAYED RELEASE ORAL at 17:12

## 2023-10-19 RX ADMIN — POTASSIUM CHLORIDE 20 MEQ: 1500 TABLET, EXTENDED RELEASE ORAL at 04:49

## 2023-10-19 RX ADMIN — OMEPRAZOLE 20 MG: 20 CAPSULE, DELAYED RELEASE ORAL at 04:49

## 2023-10-19 RX ADMIN — OXYCODONE HYDROCHLORIDE 15 MG: 15 TABLET ORAL at 20:24

## 2023-10-19 RX ADMIN — CITALOPRAM 40 MG: 40 TABLET, FILM COATED ORAL at 04:49

## 2023-10-19 RX ADMIN — DOCUSATE SODIUM 50 MG AND SENNOSIDES 8.6 MG 1 TABLET: 8.6; 5 TABLET, FILM COATED ORAL at 17:12

## 2023-10-19 RX ADMIN — GUAIFENESIN 600 MG: 600 TABLET, EXTENDED RELEASE ORAL at 04:49

## 2023-10-19 RX ADMIN — FUROSEMIDE 40 MG: 40 TABLET ORAL at 04:49

## 2023-10-19 ASSESSMENT — PATIENT HEALTH QUESTIONNAIRE - PHQ9
7. TROUBLE CONCENTRATING ON THINGS, SUCH AS READING THE NEWSPAPER OR WATCHING TELEVISION: NOT AT ALL
1. LITTLE INTEREST OR PLEASURE IN DOING THINGS: SEVERAL DAYS
SUM OF ALL RESPONSES TO PHQ9 QUESTIONS 1 AND 2: 2
8. MOVING OR SPEAKING SO SLOWLY THAT OTHER PEOPLE COULD HAVE NOTICED. OR THE OPPOSITE, BEING SO FIGETY OR RESTLESS THAT YOU HAVE BEEN MOVING AROUND A LOT MORE THAN USUAL: NOT AT ALL
2. FEELING DOWN, DEPRESSED, IRRITABLE, OR HOPELESS: SEVERAL DAYS
3. TROUBLE FALLING OR STAYING ASLEEP OR SLEEPING TOO MUCH: SEVERAL DAYS
SUM OF ALL RESPONSES TO PHQ QUESTIONS 1-9: 5
9. THOUGHTS THAT YOU WOULD BE BETTER OFF DEAD, OR OF HURTING YOURSELF: NOT AT ALL
6. FEELING BAD ABOUT YOURSELF - OR THAT YOU ARE A FAILURE OR HAVE LET YOURSELF OR YOUR FAMILY DOWN: SEVERAL DAYS
4. FEELING TIRED OR HAVING LITTLE ENERGY: SEVERAL DAYS
5. POOR APPETITE OR OVEREATING: NOT AT ALL

## 2023-10-19 ASSESSMENT — PAIN DESCRIPTION - PAIN TYPE
TYPE: ACUTE PAIN

## 2023-10-19 ASSESSMENT — ENCOUNTER SYMPTOMS
DIARRHEA: 0
SHORTNESS OF BREATH: 0
CONSTIPATION: 0
FEVER: 0
COUGH: 0
ABDOMINAL PAIN: 1
FOCAL WEAKNESS: 0
WEAKNESS: 1
MYALGIAS: 0
VOMITING: 0
NAUSEA: 0
SENSORY CHANGE: 0
BLURRED VISION: 0
CHILLS: 0

## 2023-10-19 NOTE — PROGRESS NOTES
Hospital Medicine Daily Progress Note    Date of Service  10/19/2023    Chief Complaint  Patricia A Tietz is a 58 y.o. female admitted 9/29/2023 with anemia    Hospital Course  59 yo woman with DEBRA, urostomy, recent left knee arthroplasty complicated by infection, status post I&D with antibiotic spacer on daptomycin for MRSA, history of DVT on Eliquis who has been at SNF and found her hemoglobin to be low at 5.7 and transferred to [facility].  She has not had signs of bleeding.  GI was consulted.  EGD showed 6 white debris in esophagus, biopsies taken, diverticula in duodenum.  Hemoglobin has been stable postoperatively.  She has an RAO that has not improved.  She is very edematous and started on IV Lasix.  BNP was high, echocardiogram ordered to assess for heart failure, valve disease. Echo did not suggestive CHF and Lasix did not improve resp status or RAO. Nephro consulted, rec renal bx this was attempted but hydropnephrosis was noted, uro consulted rec b/l nephrostomy tubes placed on 10/15. Hgb trended up and RAO improved.    Interval Problem Update  10/17  Patient feels poor in general, but denies any new symptoms. Good urine output from both PCNT, more on the R than L. Some urine output in the urostomy. No apparent blood. PCNT cx growing Esvin glabrata. Knee incision appears ok, but likely needs sutures out. Overall, patient feels poor. Restarted eliquis today.     10/18  Patient feels a bit better today. Remains afebrile. See labs below. Will remove knee sutures today.     10/19  Feels about the same today. Good urine output from both PCNT, marginal from urostomy. Remains afebrile.     I have discussed this patient's plan of care and discharge plan at IDT rounds today with Case Management, Nursing, Nursing leadership, and other members of the IDT team.    Consultants/Specialty  GI, nephrology    Code Status  Full Code    Disposition  The patient is not medically cleared for discharge to home or a post-acute  facility.  Anticipate discharge to: skilled nursing facility    I have placed the appropriate orders for post-discharge needs.    Review of Systems  Review of Systems   Constitutional:  Positive for malaise/fatigue (some improvement noted). Negative for chills and fever.        No obvious improvements noted today   HENT:  Negative for congestion.    Eyes:  Negative for blurred vision.   Respiratory:  Negative for cough and shortness of breath.    Cardiovascular:  Positive for leg swelling. Negative for chest pain.   Gastrointestinal:  Positive for abdominal pain (tolerable). Negative for constipation, diarrhea, nausea and vomiting.   Genitourinary:  Negative for dysuria.   Musculoskeletal:  Negative for myalgias.   Skin:  Negative for rash.   Neurological:  Positive for weakness (unchanged). Negative for sensory change and focal weakness.        Physical Exam  Temp:  [36.5 °C (97.7 °F)-36.9 °C (98.4 °F)] 36.5 °C (97.7 °F)  Pulse:  [62-75] 68  Resp:  [16-18] 18  BP: (109-133)/(66-76) 133/74  SpO2:  [96 %-100 %] 100 %    Physical Exam  Constitutional:       General: She is not in acute distress.     Appearance: Normal appearance. She is ill-appearing (chronically).      Comments: Body mass index is 42.54 kg/m².  A bit more interactive today     HENT:      Head: Normocephalic and atraumatic.      Nose: Nose normal.      Comments: NC in place     Mouth/Throat:      Mouth: Mucous membranes are moist.      Pharynx: Oropharynx is clear.   Eyes:      Conjunctiva/sclera: Conjunctivae normal.      Pupils: Pupils are equal, round, and reactive to light.   Cardiovascular:      Rate and Rhythm: Normal rate and regular rhythm.      Heart sounds: No murmur heard.  Pulmonary:      Effort: Pulmonary effort is normal.      Breath sounds: No wheezing, rhonchi or rales.   Abdominal:      General: There is no distension.      Palpations: Abdomen is soft.      Tenderness: There is abdominal tenderness (mild). There is no guarding or  rebound.      Comments: Urostomy in place, normal appearing yellow urine appreciated    No changes noted on 10/19   Genitourinary:     Comments: B/L PCNT's in place  Both yellow appearing urine in place  Insertion sites appear C/D/I    Remains unchanged on 10/19  Musculoskeletal:         General: No swelling or tenderness.      Cervical back: Normal range of motion and neck supple.      Right lower leg: Edema present.      Left lower leg: Edema present.      Comments: L knee with surgical incision site, appears C/D/I  Distal sutures remain in place with some overlying granulation tissue   Skin:     General: Skin is warm and dry.   Neurological:      Mental Status: She is alert.      GCS: GCS eye subscore is 4. GCS verbal subscore is 5. GCS motor subscore is 6.      Cranial Nerves: No facial asymmetry.   Psychiatric:         Mood and Affect: Mood normal.         Behavior: Behavior normal.         Fluids    Intake/Output Summary (Last 24 hours) at 10/19/2023 1654  Last data filed at 10/19/2023 1425  Gross per 24 hour   Intake 1080 ml   Output 1075 ml   Net 5 ml       Laboratory  Recent Labs     10/17/23  0730 10/18/23  1336 10/19/23  0500   WBC 6.6 8.2 8.9   RBC 3.41* 3.69* 3.38*   HEMOGLOBIN 9.5* 10.3* 9.3*   HEMATOCRIT 29.0* 31.4* 29.3*   MCV 85.0 85.1 86.7   MCH 27.9 27.9 27.5   MCHC 32.8 32.8 31.7*   RDW 43.8 43.9 45.2   PLATELETCT 242 324 299   MPV 10.5 10.1 10.7     Recent Labs     10/17/23  0730 10/18/23  1336 10/19/23  0500   SODIUM 137 138 137   POTASSIUM 3.0* 3.1* 3.4*   CHLORIDE 102 101 103   CO2 22 23 22   GLUCOSE 124* 166* 126*   BUN 63* 60* 59*   CREATININE 2.91* 2.56* 2.42*   CALCIUM 10.3 10.5 10.4                     Imaging  DX-CHEST-2 VIEWS   Final Result      1. No acute findings in the chest.   2. Other stable chronic degenerative and post surgical changes.      IR-NEPHROSTOGRAM W/ NEW TUBE PLACEMENT (ALL RADIOLOGY) BOTH   Final Result      1. Ultrasound and fluoroscopic guided placement of  bilateral 8 Mosotho pigtail locking loop percutaneous nephrostomy catheter.      2. Nephrostogram showing satisfactory catheter position without extravasation.      DX-LOOPOGRAM (ILEAL CONDUIT)   Final Result      No evidence of reflux of contrast into the ureters or kidneys.      CT-ABORTED CT PROCEDURE   Final Result      Aborted CT-guided renal biopsy.      EC-ECHOCARDIOGRAM COMPLETE W/O CONT   Final Result      DX-CHEST-PORTABLE (1 VIEW)   Final Result      1.  Low lung volumes without definite acute cardiopulmonary abnormality.      US-RENAL   Final Result      1.  Normal renal ultrasound.             Assessment/Plan  * UGIB (upper gastrointestinal bleed)- (present on admission)  Assessment & Plan  Hb remains stable  Restarted eliquis on 10/17, monitor for bleeding closely  Daily CBC with conservative transfusion threshold  10/1 EGD this morning, revealed thick whitish debris in esophagus and mucosal fragility.  Biopsies which showed benign mucosal ulceration with acute on chronic inflammation, no e/o fungal elements    I personally reviewed the cbc on 10/19      Hypokalemia- (present on admission)  Assessment & Plan  Mild, slowly improving  Judicious replacement given impaired kidney function    Complicated UTI (urinary tract infection)- (present on admission)  Assessment & Plan  PCNT cultures growing candida glabrata  Start oral fluconazole 200 mg daily, F/U final sensitivities-none available on 10/19    I personally reviewed this urine culture on 10/17    Acute respiratory failure with hypoxia (HCC)- (present on admission)  Assessment & Plan  Remains stable at 2-3 L NC  Continue lasix  Recheck CXR, I personally reviewed this Xray on 10/18 and there are no acute findings    Constipation- (present on admission)  Assessment & Plan  Continue bowel protocol    Pacemaker- (present on admission)  Assessment & Plan  Noted    Primary hypertension- (present on admission)  Assessment & Plan  Hold home  metoprolol  Consider restarting soon    RAO (acute kidney injury) (HCC)- (present on admission)  Assessment & Plan  Cre baseline 1.2, peaked here at ~3.5, hx of urostomy with ileal conduit since childhood  Cr continues to improve, now 2.4 on 10/19  Initial renal ultrasound was unremarkable  Imaging showed B/L hydronephrosis, s/p placement of B/L PCNT's on 10/15 with subsequent good urine output  Loopogram negative for ileal conduit leak  -IR consulted, b/l nephrostomy tubes placed on 10/15  -Nephro and urology following  Likely will be medically stable for discharge to SNF on 10/20    I personally reviewed the BMP on 10/19      Acute on chronic anemia- (present on admission)  Assessment & Plan  Stabilized as noted above    History of DVT in adulthood- (present on admission)  Assessment & Plan  Eliquis held for for GI bleed and then nephrostomy tube placement initially  Restarted on 10/17 as noted above    Hx MRSA infection- (present on admission)  Assessment & Plan  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 In review of yesterday's note (10/18/2023), there are no changes except as documented above.

## 2023-10-19 NOTE — CARE PLAN
The patient is Stable - Low risk of patient condition declining or worsening    Shift Goals  Clinical Goals: Monitor output  Patient Goals: Rest  Family Goals: Not present    Patient A&Ox4. Assessed and medicated for pain per MAR orders. Bilateral nephrostomy tube intact. Urostomy functioning. Updated on plan of care and concerns addressed. Safety intervention in place.   Progress made toward(s) clinical / shift goals:    Patient is not progressing towards the following goals:

## 2023-10-19 NOTE — DISCHARGE PLANNING
Case Management Discharge Planning    Admission Date: 9/29/2023  GMLOS: 4.2  ALOS: 20    6-Clicks ADL Score: 11  6-Clicks Mobility Score: 6  PT and/or OT Eval ordered: Yes  Post-acute Referrals Ordered: Yes  Post-acute Choice Obtained: Yes  Has referral(s) been sent to post-acute provider:  Yes      Anticipated Discharge Dispo: Discharge Disposition: D/T to SNF with Medicare cert in anticipation of skilled care (03)    DME Needed: No    Action(s) Taken: Updated Provider/Nurse on Discharge Plan    Escalations Completed: None    Medically Clear: No    Next Steps: arrange transport once patient is medically clear to discharge    Barriers to Discharge: None    Is the patient up for discharge tomorrow: No  Dr Crespo stated patient should be medically clear tomorrow.  IRIS spoke to patient on her discharge plan, patient is from [place] and would like to go back to the facility.  Iris given verbal permission to submit a referral.    IRIS faxed Choice to [facility]

## 2023-10-19 NOTE — CARE PLAN
The patient is Stable - Low risk of patient condition declining or worsening    Shift Goals  Clinical Goals: Monitor output  Patient Goals: Rest  Family Goals: Not present    Progress made toward(s) clinical / shift goals: safety  Patient is not progressing towards the following goals:      Problem: Pain - Standard  Goal: Alleviation of pain or a reduction in pain to the patient’s comfort goal  Description: Target End Date:  Prior to discharge or change in level of care    Document on Vitals flowsheet    1.  Document pain using the appropriate pain scale per order or unit policy  2.  Educate and implement non-pharmacologic comfort measures (i.e. relaxation, distraction, massage, cold/heat therapy, etc.)  3.  Pain management medications as ordered  4.  Reassess pain after pain med administration per policy  5.  If opiods administered assess patient's response to pain medication is appropriate per POSS sedation scale  6.  Follow pain management plan developed in collaboration with patient and interdisciplinary team (including palliative care or pain specialists if applicable)  Outcome: Not Progressing  Note: Pt is A&Ox4, c/o lower back pain 7/10. PRN Oxy-IR 15 mg administered with effectiveness. Pt will rest with minimal discomfort. Safety measures in place.

## 2023-10-19 NOTE — ASSESSMENT & PLAN NOTE
Resolved, I personally reviewed the bmp on 10/21  Judicious replacement given impaired kidney function

## 2023-10-20 ENCOUNTER — PATIENT OUTREACH (OUTPATIENT)
Dept: SCHEDULING | Facility: IMAGING CENTER | Age: 58
End: 2023-10-20
Payer: MEDICARE

## 2023-10-20 LAB
ANION GAP SERPL CALC-SCNC: 11 MMOL/L (ref 7–16)
BUN SERPL-MCNC: 56 MG/DL (ref 8–22)
CALCIUM SERPL-MCNC: 10.7 MG/DL (ref 8.5–10.5)
CHLORIDE SERPL-SCNC: 102 MMOL/L (ref 96–112)
CK SERPL-CCNC: 97 U/L (ref 0–154)
CO2 SERPL-SCNC: 23 MMOL/L (ref 20–33)
CREAT SERPL-MCNC: 2.26 MG/DL (ref 0.5–1.4)
GFR SERPLBLD CREATININE-BSD FMLA CKD-EPI: 24 ML/MIN/1.73 M 2
GLUCOSE SERPL-MCNC: 109 MG/DL (ref 65–99)
POTASSIUM SERPL-SCNC: 3.7 MMOL/L (ref 3.6–5.5)
SODIUM SERPL-SCNC: 136 MMOL/L (ref 135–145)

## 2023-10-20 PROCEDURE — 700102 HCHG RX REV CODE 250 W/ 637 OVERRIDE(OP): Performed by: STUDENT IN AN ORGANIZED HEALTH CARE EDUCATION/TRAINING PROGRAM

## 2023-10-20 PROCEDURE — 82550 ASSAY OF CK (CPK): CPT

## 2023-10-20 PROCEDURE — 99232 SBSQ HOSP IP/OBS MODERATE 35: CPT | Performed by: INTERNAL MEDICINE

## 2023-10-20 PROCEDURE — 700102 HCHG RX REV CODE 250 W/ 637 OVERRIDE(OP): Performed by: INTERNAL MEDICINE

## 2023-10-20 PROCEDURE — A9270 NON-COVERED ITEM OR SERVICE: HCPCS | Performed by: STUDENT IN AN ORGANIZED HEALTH CARE EDUCATION/TRAINING PROGRAM

## 2023-10-20 PROCEDURE — A9270 NON-COVERED ITEM OR SERVICE: HCPCS | Performed by: INTERNAL MEDICINE

## 2023-10-20 PROCEDURE — 80048 BASIC METABOLIC PNL TOTAL CA: CPT

## 2023-10-20 PROCEDURE — 700102 HCHG RX REV CODE 250 W/ 637 OVERRIDE(OP): Mod: JZ | Performed by: INTERNAL MEDICINE

## 2023-10-20 PROCEDURE — 770001 HCHG ROOM/CARE - MED/SURG/GYN PRIV*

## 2023-10-20 PROCEDURE — 700111 HCHG RX REV CODE 636 W/ 250 OVERRIDE (IP): Mod: JZ | Performed by: STUDENT IN AN ORGANIZED HEALTH CARE EDUCATION/TRAINING PROGRAM

## 2023-10-20 PROCEDURE — 700105 HCHG RX REV CODE 258: Performed by: STUDENT IN AN ORGANIZED HEALTH CARE EDUCATION/TRAINING PROGRAM

## 2023-10-20 RX ORDER — FLUCONAZOLE 200 MG/1
200 TABLET ORAL EVERY EVENING
Qty: 6 TABLET | Refills: 0 | Status: ACTIVE | DISCHARGE
Start: 2023-10-20 | End: 2023-10-26

## 2023-10-20 RX ORDER — AMITRIPTYLINE HYDROCHLORIDE 50 MG/1
50 TABLET, FILM COATED ORAL NIGHTLY
Qty: 30 TABLET | Refills: 0 | Status: SHIPPED
Start: 2023-10-20

## 2023-10-20 RX ORDER — FERROUS SULFATE 325(65) MG
325 TABLET ORAL
Qty: 30 TABLET | Status: SHIPPED
Start: 2023-10-20

## 2023-10-20 RX ORDER — POLYETHYLENE GLYCOL 3350 17 G/17G
17 POWDER, FOR SOLUTION ORAL DAILY
Qty: 30 EACH | Refills: 3 | Status: SHIPPED | OUTPATIENT
Start: 2023-10-21

## 2023-10-20 RX ORDER — OMEPRAZOLE 20 MG/1
20 CAPSULE, DELAYED RELEASE ORAL 2 TIMES DAILY
Qty: 30 CAPSULE | Status: SHIPPED
Start: 2023-10-20

## 2023-10-20 RX ORDER — VANCOMYCIN HYDROCHLORIDE 1 G/20ML
1000 INJECTION, POWDER, LYOPHILIZED, FOR SOLUTION INTRAVENOUS EVERY 24 HOURS
Qty: 12 G | Refills: 0 | Status: ACTIVE | DISCHARGE
Start: 2023-10-20 | End: 2023-11-01

## 2023-10-20 RX ADMIN — CITALOPRAM 40 MG: 40 TABLET, FILM COATED ORAL at 04:38

## 2023-10-20 RX ADMIN — APIXABAN 5 MG: 5 TABLET, FILM COATED ORAL at 18:10

## 2023-10-20 RX ADMIN — OXYCODONE HYDROCHLORIDE 15 MG: 15 TABLET ORAL at 10:56

## 2023-10-20 RX ADMIN — OMEPRAZOLE 20 MG: 20 CAPSULE, DELAYED RELEASE ORAL at 04:38

## 2023-10-20 RX ADMIN — OXYCODONE HYDROCHLORIDE 15 MG: 15 TABLET ORAL at 18:26

## 2023-10-20 RX ADMIN — DOCUSATE SODIUM 50 MG AND SENNOSIDES 8.6 MG 1 TABLET: 8.6; 5 TABLET, FILM COATED ORAL at 18:10

## 2023-10-20 RX ADMIN — OXYCODONE HYDROCHLORIDE 15 MG: 15 TABLET ORAL at 04:39

## 2023-10-20 RX ADMIN — DAPTOMYCIN 1000 MG: 500 INJECTION, POWDER, LYOPHILIZED, FOR SOLUTION INTRAVENOUS at 18:23

## 2023-10-20 RX ADMIN — FLUCONAZOLE 200 MG: 200 TABLET ORAL at 18:10

## 2023-10-20 RX ADMIN — FERROUS SULFATE TAB 325 MG (65 MG ELEMENTAL FE) 325 MG: 325 (65 FE) TAB at 10:49

## 2023-10-20 RX ADMIN — FUROSEMIDE 40 MG: 40 TABLET ORAL at 04:39

## 2023-10-20 RX ADMIN — GUAIFENESIN 600 MG: 600 TABLET, EXTENDED RELEASE ORAL at 18:10

## 2023-10-20 RX ADMIN — POTASSIUM CHLORIDE 20 MEQ: 1500 TABLET, EXTENDED RELEASE ORAL at 04:39

## 2023-10-20 RX ADMIN — OMEPRAZOLE 20 MG: 20 CAPSULE, DELAYED RELEASE ORAL at 18:10

## 2023-10-20 RX ADMIN — GUAIFENESIN 600 MG: 600 TABLET, EXTENDED RELEASE ORAL at 04:37

## 2023-10-20 RX ADMIN — APIXABAN 5 MG: 5 TABLET, FILM COATED ORAL at 04:39

## 2023-10-20 ASSESSMENT — PAIN DESCRIPTION - PAIN TYPE
TYPE: ACUTE PAIN
TYPE: ACUTE PAIN;CHRONIC PAIN
TYPE: ACUTE PAIN

## 2023-10-20 ASSESSMENT — ENCOUNTER SYMPTOMS
BACK PAIN: 1
SHORTNESS OF BREATH: 0
VOMITING: 0
FEVER: 0
PALPITATIONS: 0
WEAKNESS: 1
CHILLS: 0
HEADACHES: 0
NAUSEA: 0

## 2023-10-20 NOTE — PROGRESS NOTES
Radiology Progress Note   Author: IRWIN Cano Date & Time created: 10/20/2023  3:57 PM   Date of admission  9/29/2023  Note to reader: this note follows the APSO format rather than the historical SOAP format. Assessment and plan located at the top of the note for ease of use.    Chief Complaint  58 y.o. female admitted 9/29/2023 with anemia      HPI  58-year-old woman with PMH DEBRA, urostomy, kidney stones, DVT on Eliquis and recent surgical history of left knee arthroplasty status post infection requiring I&D and antibiotic spacer on daptomycin for MRSA transferred 09/29/23 from [facility] with low hemoglobin of 5.7.  At the hospital, lab work revealed BUN 76 and creatinine 2.75.  During hospital stay, renal biopsy was ordered; however it was aborted after finding of severe hydronephrosis of bilateral kidneys.  Loopogram was ordered showing no reflux into the ureters.  Urology recommended bilateral nephrostomy tube placement which was performed by IR Dr. Brody on 10/15/23.     Interval history:  10/16/23 -right neph tube with 1800 mL clear yellow urine in the last 24 hours.  Left neph tube with 925 light pink output in the last 24 hours.  Patient reports mild pain at insertion sites.  Renal function slightly improved today.  Cultures preliminarily positive for yeast.    10/17/23 - right neph tube with 2800 mL clear yellow urine in the last 24 hours.  Left neph tube with 750 light pink output in the last 24 hours.  Renal function continues to improve.  Cultures positive for juan r glabrata.    10/18/23 - right neph tube with 2100 mL clear yellow urine in the last 24 hours.  Left neph tube with 1150 clear yellow urine output in the last 24 hours.  Renal function continues to improve.  Cultures positive for candida glabrata.  Patient started on fluconazole.    10/20/23 - right neph tube with 1025 mL light pink urine output in the last 24 hours.  Left neph tube with 575 light pink urine output in the last  24 hours.  Bilateral neph tubes flushed with 5 mL NS.  Renal function continues to improve.  Cultures positive for candida glabrata.  Patient on fluconazole.  Plan is to DC to SNF today.    Assessment/Plan     Principal Problem:    UGIB (upper gastrointestinal bleed)  Active Problems:    Class 3 severe obesity due to excess calories with serious comorbidity and body mass index (BMI) of 50.0 to 59.9 in adult ([facility])    Hx MRSA infection    History of DVT in adulthood    Acute on chronic anemia    RAO (acute kidney injury) ([facility])    Primary hypertension    Pacemaker    Constipation    Acute respiratory failure with hypoxia ([facility])    Complicated UTI (urinary tract infection)    Hypokalemia      Plan IR  - Only flush neph tubes if no output; do not aspirate back  - Fluid cultures positive for candida glabrata  - Urology and nephrology following   - Wound education provided to patient  - Education provided on S/S of infection   - Ok to shower with catheter as long as site is covered with waterproof dressing   - No baths or submerging site under water until catheter removed   - Plan to discharge patient to SNF with bilateral neph tubes in place.  Patient will follow-up with her urologist in [place].     -Thank you for allowing Interventional Radiology team to participate in the patients care, if any additional care or requests are needed in the future please do not hesitate call or place IR order   [phone]    -          Review of Systems  Physical Exam   Review of Systems   Constitutional:  Positive for malaise/fatigue. Negative for chills and fever.   Respiratory:  Negative for shortness of breath.    Cardiovascular:  Negative for chest pain and palpitations.   Gastrointestinal:  Negative for nausea and vomiting.   Genitourinary:         Bilateral flank pain at nephrostomy tube insertion site improving   Musculoskeletal:  Positive for back pain.   Neurological:  Positive for weakness. Negative for headaches.       Vitals:    10/20/23 1102   BP: 130/64   Pulse: 62   Resp: 18   Temp: 36.4 °C (97.6 °F)   SpO2: 100%        Physical Exam  Constitutional:       Appearance: Normal appearance.   Cardiovascular:      Rate and Rhythm: Normal rate.   Abdominal:      Palpations: Abdomen is soft.   Genitourinary:     Comments: Urostomy  Bilateral nephrostomy tubes  Musculoskeletal:      Right lower leg: Edema present.      Left lower leg: Edema present.   Skin:     General: Skin is warm and dry.   Neurological:      General: No focal deficit present.      Mental Status: She is alert and oriented to person, place, and time.   Psychiatric:         Mood and Affect: Mood normal.         Behavior: Behavior normal.             Labs    Recent Labs     10/18/23  1336 10/19/23  0500   WBC 8.2 8.9   RBC 3.69* 3.38*   HEMOGLOBIN 10.3* 9.3*   HEMATOCRIT 31.4* 29.3*   MCV 85.1 86.7   MCH 27.9 27.5   MCHC 32.8 31.7*   RDW 43.9 45.2   PLATELETCT 324 299   MPV 10.1 10.7       Recent Labs     10/18/23  1336 10/19/23  0500 10/20/23  0350   SODIUM 138 137 136   POTASSIUM 3.1* 3.4* 3.7   CHLORIDE 101 103 102   CO2 23 22 23   GLUCOSE 166* 126* 109*   BUN 60* 59* 56*   CREATININE 2.56* 2.42* 2.26*   CALCIUM 10.5 10.4 10.7*       Recent Labs     10/18/23  1336 10/19/23  0500 10/20/23  0350   CREATININE 2.56* 2.42* 2.26*       DX-CHEST-2 VIEWS   Final Result      1. No acute findings in the chest.   2. Other stable chronic degenerative and post surgical changes.      IR-NEPHROSTOGRAM W/ NEW TUBE PLACEMENT (ALL RADIOLOGY) BOTH   Final Result      1. Ultrasound and fluoroscopic guided placement of bilateral 8 [place]bourger pigtail locking loop percutaneous nephrostomy catheter.      2. Nephrostogram showing satisfactory catheter position without extravasation.      DX-LOOPOGRAM (ILEAL CONDUIT)   Final Result      No evidence of reflux of contrast into the ureters or kidneys.      CT-ABORTED CT PROCEDURE   Final Result      Aborted CT-guided renal biopsy.     "  EC-ECHOCARDIOGRAM COMPLETE W/O CONT   Final Result      DX-CHEST-PORTABLE (1 VIEW)   Final Result      1.  Low lung volumes without definite acute cardiopulmonary abnormality.      US-RENAL   Final Result      1.  Normal renal ultrasound.          INR   Date Value Ref Range Status   10/10/2023 1.22 (H) 0.87 - 1.13 Final     Comment:     INR - Non-therapeutic Reference Range: 0.87-1.13  INR - Therapeutic Reference Range: 2.0-4.0       No results found for: \"POCINR\"     Intake/Output Summary (Last 24 hours) at 10/16/2023 1127  Last data filed at 10/16/2023 0812  Gross per 24 hour   Intake 360 ml   Output 3325 ml   Net -2965 ml      Labs not explicitly included in this progress note were reviewed by the author. Radiology/imaging not explicitly included in this progress note was reviewed by the author.     I have performed a physical exam and reviewed and updated ROS and Plan today (10/20/2023).     30 minutes in directly providing and coordinating care and extensive data review.  No time overlap and excludes procedures.   "

## 2023-10-20 NOTE — CARE PLAN
The patient is Stable - Low risk of patient condition declining or worsening    Shift Goals  Clinical Goals: monitor nephrostomy and urostomy output  Patient Goals: pain management  Family Goals: ag    Progress made toward(s) clinical / shift goals:  Patient calls appropriately and reports that pain is well managed on PRN medications      Problem: Fall Risk  Goal: Patient will remain free from falls  Outcome: Progressing     Problem: Pain - Standard  Goal: Alleviation of pain or a reduction in pain to the patient’s comfort goal  Outcome: Progressing       Patient is not progressing towards the following goals:

## 2023-10-20 NOTE — PROGRESS NOTES
KYRIE Khanna called [facility] in [place], [place] to give report on patient for 1300 transfer.     Connected to Director of Nursing, who, alongside admissions department, informed RN that [facility] is not able to receive patient because they were not notified of pt discharge needs, including medication reconciliation for long-term IV abx Dapto.     DI informed and spoke directly to NATTY, who confirmed that patient will not be accepted to [facility] today.     [facility] transport arrived at 1310, however RN dismissed them since discharge has been cancelled for now due to the aforementioned reasons.

## 2023-10-20 NOTE — DISCHARGE PLANNING
[facility] transport cancelled- [facility]   [facility]

## 2023-10-20 NOTE — DISCHARGE SUMMARY
Medical Social Work  PC to [facility] [phone], has not received updated d/c summary.  Requested SW fax it to [phone] will review to see if they can take.  DI went over adjustments that had been made on the IV ABX

## 2023-10-20 NOTE — CARE PLAN
The patient is Stable - Low risk of patient condition declining or worsening    Shift Goals  Clinical Goals: Discharge to SNF  Patient Goals: Discharge to SNF  Family Goals: N/A    Progress made toward(s) clinical / shift goals:  Discharge to snf    Patient is not progressing towards the following goals:

## 2023-10-20 NOTE — DISCHARGE PLANNING
Medical Social Work  PC to [facility] [phone], has not received updated d/c summary.  Requested SW fax it to [phone] will review to see if they can take.  DI went over adjustments that had been made on the IV ABX.    DI faxed updated d/c summary to [facility]

## 2023-10-20 NOTE — DISCHARGE PLANNING
DC Transport Scheduled    Received request at: 10/20/2023 at 1020    Transport Company Scheduled:  [employer]  Spoke with Keila at [facility] to schedule transport.    Scheduled Date: 10/20/2023  Scheduled Time: 1300    Destination: [facility] at [address], [place]     Notified care team of scheduled transport via [employer].     If there are any changes needed to the DC transportation scheduled, please contact [facility] Line at ext. [phone] between the hours of [phone] Mon-Fri. If outside those hours, contact the ED Case Manager at ext. [phone].

## 2023-10-20 NOTE — DISCHARGE PLANNING
Medical Social Work  Patient's nurse stated facility is decling patient until IV ABX can be address    SW spoke to [facility] who stated that they did not know patient was coming, until [facility] nurse call to make report.  Further, they had left a message for someone to call them back about patient being on dapto.    DI spoke to Melina who stated dapto is too expensive, that when patient was with them before she was on vanco.  Requesting to know if medication can be changed, SW to  call back [phone] with an update    Teams to Dr Weiss with an update

## 2023-10-20 NOTE — DISCHARGE PLANNING
Case Management Discharge Planning    Admission Date: 9/29/2023  GMLOS: 4.2  ALOS: 21    6-Clicks ADL Score: 11  6-Clicks Mobility Score: 6  PT and/or OT Eval ordered: Yes  Post-acute Referrals Ordered: Yes  Post-acute Choice Obtained: Yes  Has referral(s) been sent to post-acute provider:  Yes      Anticipated Discharge Dispo: Discharge Disposition: D/T to SNF with Medicare cert in anticipation of skilled care (03)    DME Needed: No    Action(s) Taken: Updated Provider/Nurse on Discharge Plan    Escalations Completed: None    Medically Clear: Yes    Next Steps: n/a    Barriers to Discharge: None    Is the patient up for discharge tomorrow: No  Patient is medically clear to discharge  SW faxed transport communication/[employer] forms to [employer] [facility] for a tentative transport time of 1:00 to [facility] in [place]

## 2023-10-20 NOTE — DISCHARGE SUMMARY
Discharge Summary    CHIEF COMPLAINT ON ADMISSION  No chief complaint on file.      Reason for Admission  upper GI Bleed    Admission Date  9/29/2023     CODE STATUS  Full Code    HPI & HOSPITAL COURSE  This is a 58 y.o. female here with with massive obesity class III with a BMI of 43, recent left knee arthroplasty complicated by MRSA infection status post I&D with antibiotic spacer on daptomycin and history of DVT on Eliquis who admitted to [facility] and found to have a acute blood loss anemia and hemoglobin of 5.7.  She had no obvious significant symptoms.  She was also found to have worsening kidney function with RAO.  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    GI was consulted and she received several units of packed red blood cells.  EGD was performed on October 1, 2023 where the esophagus was consistent with thick whitish debris and mucosal fragility, soft normal stomach, altered anatomy with second portion of blind limb and multiple diverticulum in the duodenum.  Random biopsies were negative for any metaplasia or dysplasia or fungal elements including H. pylori.  Her outpatient Eliquis was held during this admission.    Several days into her hospitalization she continued to have worsening volume overload and kidney dysfunction.  Nephrology was consulted on October 9 and they were concerned that she could have acute glomerulonephritis.  However though a CT scan and renal ultrasound showed bilateral hydronephrosis which was likely accountable for her RAO.  Urology was consulted.  Patient underwent bilateral percutaneous nephrostomy tube placement by IR on October 15, 2023.  She had excellent urine output out of both nephrostomy tubes and moderate output out of her urostomy.  Patient's creatinine peaked at 3.8 and on day of transfer to skilled nursing facility is 2.26.  She had mild hypokalemia that required judicious replacement is now  normalized on day of discharge.  She has mildly elevated hypercalcemia of 10.7.    On her urine cultures from the nephrostomy tube she was growing Candida glabrata and was placed on empiric oral fluconazole which will complete a course on October 26, 2023.  She remains afebrile.  Her Eliquis was restarted on October 17, 2023 without any recurrence of her GI bleed and an improving hemoglobin.  Her discharge H&H is 9.3 and 29.3 with normal platelets and normal white blood cell count.    Patient needs significant physical therapy and Occupational Therapy and is severely deconditioned.  She is now medically stable for transfer to a skilled nursing facility.    As for underlying MRSA total knee arthroplasty infection she was initially transitioned to IV daptomycin during her hospitalization here given acute kidney injury.  However though her kidney function continues to improve and I discussed with our pharmacy team here that she can be transitioned back to IV vancomycin 1 g every 24 hours and terminate this on November 1, 2023.    Patient remains on 2 to 3 L nasal cannula for mild hypoxia.  Her chest x-ray shows no current evidence of acute findings.  I suspect a large element of this is atelectasis and deconditioning while laying flat in bed with class III obesity.    Therefore, she is discharged in good and stable condition to skilled nursing facility.    The patient met 2-midnight criteria for an inpatient stay at the time of discharge.      FOLLOW UP ITEMS POST DISCHARGE  Repeat a BMP every 48 hours and follow kidney function and potassium levels closely.  Check a serum calcium every 48 hours  Follow-up with orthopedic surgery in [place] in 1 month  Follow-up with her urologist in [place] for bilateral percutaneous nephrostomy tubes and urostomy in place.  Aggressive pulmonary hygiene and mobilization and try to wean off oxygen in the next week or 2.  Check a vancomycin trough in 3 days on October 23, 2023.   Adjust vancomycin dosing per SNF pharmacy.    DISCHARGE DIAGNOSES  Principal Problem:    UGIB (upper gastrointestinal bleed) (POA: Yes)  Active Problems:    Class 3 severe obesity due to excess calories with serious comorbidity and body mass index (BMI) of 50.0 to 59.9 in adult (HCC) (POA: Yes)    Hx MRSA infection (POA: Yes)    History of DVT in adulthood (POA: Yes)    Acute on chronic anemia (POA: Yes)    RAO (acute kidney injury) ([facility]) (POA: Yes)    Primary hypertension (POA: Yes)    Pacemaker (POA: Yes)    Constipation (POA: Yes)    Acute respiratory failure with hypoxia ([facility]) (POA: Yes)    Complicated UTI (urinary tract infection) (POA: Yes)    Hypokalemia (POA: Yes)  Resolved Problems:    Anemia (POA: Yes)      FOLLOW UP  No future appointments.  [facility]  [address] [address]  [phone]        Pedrito Muniz M.D.  [address]  [facility] [address]  [phone]    Follow up  Please call your primary care provider to schedule a hospital follow up. Thank you.      MEDICATIONS ON DISCHARGE     Medication List        START taking these medications        Instructions   ferrous sulfate 325 (65 Fe) MG tablet   Take 1 Tablet by mouth every morning with breakfast.  Dose: 325 mg     fluconazole 200 MG Tabs  Commonly known as: Diflucan   Take 1 Tablet by mouth every evening for 6 days.  Dose: 200 mg     omeprazole 20 MG delayed-release capsule  Commonly known as: PriLOSEC   Take 1 Capsule by mouth 2 times a day.  Dose: 20 mg     polyethylene glycol/lytes 17 g Pack  Commonly known as: Miralax   Take 1 Packet by mouth every day.  Dose: 17 g            CHANGE how you take these medications        Instructions   vancomycin 1 g Solr  What changed: additional instructions  Commonly known as: Vancocin   Infuse 1,000 mg into a venous catheter every 24 hours for 12 days.  Dose: 1,000 mg            CONTINUE taking these medications        Instructions   acidophilus  lactobacillus Caps   Take 1 Capsule by mouth every day.  Dose: 1 Capsule     amitriptyline 50 MG Tabs  Commonly known as: Elavil   Take 1 Tablet by mouth every evening.  Dose: 50 mg     apixaban 5mg Tabs  Commonly known as: Eliquis   Take 5 mg by mouth 2 times a day.  Dose: 5 mg     ascorbic acid 500 MG tablet  Commonly known as: Vitamin C   Take 500 mg by mouth every day.  Dose: 500 mg     citalopram 40 MG Tabs  Commonly known as: CeleXA   Take 40 mg by mouth every morning.  Dose: 40 mg     cyclobenzaprine 10 mg Tabs  Commonly known as: Flexeril   Take 10 mg by mouth 3 times a day as needed for Muscle Spasms.  Dose: 10 mg     furosemide 40 MG Tabs  Commonly known as: Lasix   Take 40 mg by mouth every day.  Dose: 40 mg     gabapentin 300 MG Caps  Commonly known as: Neurontin   Take 300 mg by mouth every day. In the afternoon.  Dose: 300 mg     metoprolol tartrate 25 MG Tabs  Commonly known as: Lopressor   Take 25 mg by mouth 2 times a day.  Dose: 25 mg     oxyCODONE immediate release 10 MG immediate release tablet  Commonly known as: Roxicodone   Take 10 mg by mouth every four hours as needed for Severe Pain.  Dose: 10 mg     potassium chloride SA 20 MEQ Tbcr  Commonly known as: Kdur   Take 20 mEq by mouth every day.  Dose: 20 mEq     senna-docusate 8.6-50 MG Tabs  Commonly known as: Pericolace Or Senokot S   Take 1 Tablet by mouth 2 times a day.  Dose: 1 Tablet     traZODone 50 MG Tabs  Commonly known as: Desyrel   Take 25-50 mg by mouth at bedtime as needed for Sleep. 0.5 to 1 tablet = 25 to 50 mg.  Dose: 25-50 mg            STOP taking these medications      DAPTOmycin 500 MG Solr  Commonly known as: Cubicin              Allergies  No Known Allergies    DIET  Orders Placed This Encounter   Procedures    Diet Order Diet: Renal     Standing Status:   Standing     Number of Occurrences:   1     Order Specific Question:   Diet:     Answer:   Renal [8]       ACTIVITY  As tolerated and directed by skilled  nursing.  Weight bearing as tolerated    LINES, DRAINS, AND WOUNDS  This is an automated list. Peripheral IVs will be removed prior to discharge.  PICC Single Lumen  Right Brachial (Active)   Site Assessment Clean;Dry;Intact 10/19/23 2024   Line Status Scrubbed the hub prior to access;Flushed;Blood return noted 10/19/23 2024   Line Secured at (cm) 1 cm 09/29/23 2040   Extremity Circumference (cm) 40 cm 09/29/23 2040   Dressing Type Biopatch;Transparent 10/19/23 2024   Dressing Status Clean;Dry;Intact 10/19/23 2024   Dressing Intervention N/A 10/19/23 1019   Dressing Change Due 10/21/23 10/19/23 2024   Date Primary Tubing Changed 10/14/23 10/14/23 2100   Date Secondary Tubing Changed 10/14/23 10/14/23 2100   Date IV Connector(s) Changed 10/14/23 10/14/23 2100   NEXT Primary Tubing Change  10/21/23 10/14/23 2100   NEXT Secondary Tubing Change  10/14/23 10/14/23 2100   NEXT IV Connector(s) Change 10/14/23 10/14/23 2100   Line Necessity Assessed Antibiotic Therapy Greater than 7 Days 10/19/23 2024     Nephrostomy Right 8 Fr. (Active)   Site Assessment Clean;Intact 10/19/23 2022   Dressing Status Clean;Dry;Intact 10/19/23 2022   Dressing Type Dry dressing 10/19/23 2022   Securement Method Tape 10/19/23 2022   Output (mL) 425 mL 10/20/23 0400       Nephrostomy Left 8 Fr. (Active)   Site Assessment Clean;Intact 10/19/23 2022   Dressing Status Clean;Dry;Intact 10/19/23 2022   Dressing Type Dry dressing 10/19/23 2022   Securement Method Tape 10/19/23 2022   Output (mL) 50 mL 10/19/23 1800      Wound 09/29/23 Partial Thickness Wound Sacrum (Active)   Wound Image   10/08/23 0830   Site Assessment Dry;Clean;Intact 10/16/23 0812   Periwound Assessment Intact;Red 10/19/23 2024   Margins Attached edges 10/19/23 2024   Closure Open to air 10/19/23 2024   Drainage Amount None 10/19/23 2024   Treatments Offloading 10/15/23 2100   Offloading/DME Other (comment) 10/02/23 1000   Wound Cleansing Soap and Water 10/15/23 2100   Periwound  Protectant Barrier Paste 10/19/23 2024   Dressing Status Open to Air 10/19/23 2024   Dressing Changed Observed 10/12/23 2140   Dressing Cleansing/Solutions Not Applicable 10/15/23 2100   Dressing Options Offloading Dressing - Sacral 10/05/23 0030   Dressing Change/Treatment Frequency Every 72 hrs, and As Needed 10/19/23 0845   NEXT Dressing Change/Treatment Date 10/05/23 10/05/23 0030   NEXT Weekly Photo (Inpatient Only) 10/07/23 10/05/23 0030   Wound Team Following Not following 09/30/23 1500   Non-staged Wound Description Partial thickness 09/30/23 1500       Wound 09/29/23 Incision Closed Incision Knee Anterior Left Post Knee surgery, with staples intact (Active)   Site Assessment Clean;Dry;Black 10/19/23 2024   Periwound Assessment Dry;Intact 10/19/23 2024   Margins Attached edges 10/19/23 2024   Closure Open to air 10/19/23 2024   Drainage Amount None 10/19/23 2024   Drainage Description MELISSA 10/17/23 2100   Treatments Site care 10/18/23 2126   Wound Cleansing Normal Saline Irrigation 10/14/23 2100   Periwound Protectant Not Applicable 10/15/23 2100   Dressing Status Open to Air 10/19/23 2024   Dressing Changed Observed 10/19/23 2024   Dressing Options Open to Air 10/19/23 2024      PICC Single Lumen  Right Brachial (Active)   Site Assessment Clean;Dry;Intact 10/19/23 2024   Line Status Scrubbed the hub prior to access;Flushed;Blood return noted 10/19/23 2024   Line Secured at (cm) 1 cm 09/29/23 2040   Extremity Circumference (cm) 40 cm 09/29/23 2040   Dressing Type Biopatch;Transparent 10/19/23 2024   Dressing Status Clean;Dry;Intact 10/19/23 2024   Dressing Intervention N/A 10/19/23 1019   Dressing Change Due 10/21/23 10/19/23 2024   Date Primary Tubing Changed 10/14/23 10/14/23 2100   Date Secondary Tubing Changed 10/14/23 10/14/23 2100   Date IV Connector(s) Changed 10/14/23 10/14/23 2100   NEXT Primary Tubing Change  10/21/23 10/14/23 2100   NEXT Secondary Tubing Change  10/14/23 10/14/23 2100   NEXT IV  Connector(s) Change 10/14/23 10/14/23 2100   Line Necessity Assessed Antibiotic Therapy Greater than 7 Days 10/19/23 2024                MENTAL STATUS ON TRANSFER  Alert and oriented x4          CONSULTATIONS  Interventional radiology, nephrology, urology, GI    PROCEDURES  EGD, bilateral percutaneous nephrostomy tube placement    DX-CHEST-2 VIEWS   Final Result      1. No acute findings in the chest.   2. Other stable chronic degenerative and post surgical changes.      IR-NEPHROSTOGRAM W/ NEW TUBE PLACEMENT (ALL RADIOLOGY) BOTH   Final Result      1. Ultrasound and fluoroscopic guided placement of bilateral 8 Vincentian pigtail locking loop percutaneous nephrostomy catheter.      2. Nephrostogram showing satisfactory catheter position without extravasation.      DX-LOOPOGRAM (ILEAL CONDUIT)   Final Result      No evidence of reflux of contrast into the ureters or kidneys.      CT-ABORTED CT PROCEDURE   Final Result      Aborted CT-guided renal biopsy.      EC-ECHOCARDIOGRAM COMPLETE W/O CONT   Final Result      DX-CHEST-PORTABLE (1 VIEW)   Final Result      1.  Low lung volumes without definite acute cardiopulmonary abnormality.      US-RENAL   Final Result      1.  Normal renal ultrasound.            LABORATORY  Lab Results   Component Value Date    SODIUM 133 (L) 10/21/2023    POTASSIUM 3.7 10/21/2023    CHLORIDE 98 10/21/2023    CO2 24 10/21/2023    GLUCOSE 131 (H) 10/21/2023    BUN 61 (H) 10/21/2023    CREATININE 2.34 (H) 10/21/2023    GLOMRATE 18 (L) 09/29/2023        Lab Results   Component Value Date    WBC 9.1 10/21/2023    HEMOGLOBIN 9.6 (L) 10/21/2023    HEMATOCRIT 29.7 (L) 10/21/2023    PLATELETCT 304 10/21/2023        Total time of the discharge process exceeds 43 minutes.

## 2023-10-21 LAB
ANION GAP SERPL CALC-SCNC: 11 MMOL/L (ref 7–16)
BASOPHILS # BLD AUTO: 0.3 % (ref 0–1.8)
BASOPHILS # BLD: 0.03 K/UL (ref 0–0.12)
BUN SERPL-MCNC: 61 MG/DL (ref 8–22)
CALCIUM SERPL-MCNC: 10.5 MG/DL (ref 8.5–10.5)
CHLORIDE SERPL-SCNC: 98 MMOL/L (ref 96–112)
CK SERPL-CCNC: 82 U/L (ref 0–154)
CO2 SERPL-SCNC: 24 MMOL/L (ref 20–33)
CREAT SERPL-MCNC: 2.34 MG/DL (ref 0.5–1.4)
EOSINOPHIL # BLD AUTO: 0.38 K/UL (ref 0–0.51)
EOSINOPHIL NFR BLD: 4.2 % (ref 0–6.9)
ERYTHROCYTE [DISTWIDTH] IN BLOOD BY AUTOMATED COUNT: 44.4 FL (ref 35.9–50)
GFR SERPLBLD CREATININE-BSD FMLA CKD-EPI: 23 ML/MIN/1.73 M 2
GLUCOSE SERPL-MCNC: 131 MG/DL (ref 65–99)
HCT VFR BLD AUTO: 29.7 % (ref 37–47)
HGB BLD-MCNC: 9.6 G/DL (ref 12–16)
IMM GRANULOCYTES # BLD AUTO: 0.03 K/UL (ref 0–0.11)
IMM GRANULOCYTES NFR BLD AUTO: 0.3 % (ref 0–0.9)
LYMPHOCYTES # BLD AUTO: 1.42 K/UL (ref 1–4.8)
LYMPHOCYTES NFR BLD: 15.5 % (ref 22–41)
MCH RBC QN AUTO: 27.9 PG (ref 27–33)
MCHC RBC AUTO-ENTMCNC: 32.3 G/DL (ref 32.2–35.5)
MCV RBC AUTO: 86.3 FL (ref 81.4–97.8)
MONOCYTES # BLD AUTO: 0.57 K/UL (ref 0–0.85)
MONOCYTES NFR BLD AUTO: 6.2 % (ref 0–13.4)
NEUTROPHILS # BLD AUTO: 6.71 K/UL (ref 1.82–7.42)
NEUTROPHILS NFR BLD: 73.5 % (ref 44–72)
NRBC # BLD AUTO: 0 K/UL
NRBC BLD-RTO: 0 /100 WBC (ref 0–0.2)
PLATELET # BLD AUTO: 304 K/UL (ref 164–446)
PMV BLD AUTO: 10.3 FL (ref 9–12.9)
POTASSIUM SERPL-SCNC: 3.7 MMOL/L (ref 3.6–5.5)
RBC # BLD AUTO: 3.44 M/UL (ref 4.2–5.4)
SODIUM SERPL-SCNC: 133 MMOL/L (ref 135–145)
WBC # BLD AUTO: 9.1 K/UL (ref 4.8–10.8)

## 2023-10-21 PROCEDURE — 82550 ASSAY OF CK (CPK): CPT

## 2023-10-21 PROCEDURE — A9270 NON-COVERED ITEM OR SERVICE: HCPCS | Performed by: INTERNAL MEDICINE

## 2023-10-21 PROCEDURE — 770001 HCHG ROOM/CARE - MED/SURG/GYN PRIV*

## 2023-10-21 PROCEDURE — 80048 BASIC METABOLIC PNL TOTAL CA: CPT

## 2023-10-21 PROCEDURE — 700102 HCHG RX REV CODE 250 W/ 637 OVERRIDE(OP): Performed by: INTERNAL MEDICINE

## 2023-10-21 PROCEDURE — A9270 NON-COVERED ITEM OR SERVICE: HCPCS | Performed by: STUDENT IN AN ORGANIZED HEALTH CARE EDUCATION/TRAINING PROGRAM

## 2023-10-21 PROCEDURE — 700102 HCHG RX REV CODE 250 W/ 637 OVERRIDE(OP): Performed by: STUDENT IN AN ORGANIZED HEALTH CARE EDUCATION/TRAINING PROGRAM

## 2023-10-21 PROCEDURE — 99232 SBSQ HOSP IP/OBS MODERATE 35: CPT | Performed by: INTERNAL MEDICINE

## 2023-10-21 PROCEDURE — 85025 COMPLETE CBC W/AUTO DIFF WBC: CPT

## 2023-10-21 RX ADMIN — DOCUSATE SODIUM 50 MG AND SENNOSIDES 8.6 MG 1 TABLET: 8.6; 5 TABLET, FILM COATED ORAL at 05:34

## 2023-10-21 RX ADMIN — CITALOPRAM 40 MG: 40 TABLET, FILM COATED ORAL at 05:34

## 2023-10-21 RX ADMIN — GUAIFENESIN 600 MG: 600 TABLET, EXTENDED RELEASE ORAL at 16:25

## 2023-10-21 RX ADMIN — FERROUS SULFATE TAB 325 MG (65 MG ELEMENTAL FE) 325 MG: 325 (65 FE) TAB at 10:36

## 2023-10-21 RX ADMIN — APIXABAN 5 MG: 5 TABLET, FILM COATED ORAL at 05:34

## 2023-10-21 RX ADMIN — DOCUSATE SODIUM 50 MG AND SENNOSIDES 8.6 MG 1 TABLET: 8.6; 5 TABLET, FILM COATED ORAL at 16:25

## 2023-10-21 RX ADMIN — OXYCODONE HYDROCHLORIDE 15 MG: 15 TABLET ORAL at 09:23

## 2023-10-21 RX ADMIN — OMEPRAZOLE 20 MG: 20 CAPSULE, DELAYED RELEASE ORAL at 05:35

## 2023-10-21 RX ADMIN — OMEPRAZOLE 20 MG: 20 CAPSULE, DELAYED RELEASE ORAL at 16:25

## 2023-10-21 RX ADMIN — GUAIFENESIN 600 MG: 600 TABLET, EXTENDED RELEASE ORAL at 05:34

## 2023-10-21 RX ADMIN — FUROSEMIDE 40 MG: 40 TABLET ORAL at 05:34

## 2023-10-21 RX ADMIN — OXYCODONE HYDROCHLORIDE 15 MG: 15 TABLET ORAL at 21:39

## 2023-10-21 RX ADMIN — APIXABAN 5 MG: 5 TABLET, FILM COATED ORAL at 16:25

## 2023-10-21 RX ADMIN — FLUCONAZOLE 200 MG: 200 TABLET ORAL at 16:25

## 2023-10-21 ASSESSMENT — PAIN DESCRIPTION - PAIN TYPE
TYPE: ACUTE PAIN
TYPE: ACUTE PAIN

## 2023-10-21 ASSESSMENT — ENCOUNTER SYMPTOMS
MYALGIAS: 0
VOMITING: 0
CONSTIPATION: 0
CHILLS: 0
SENSORY CHANGE: 0
FEVER: 0
DIARRHEA: 0
ABDOMINAL PAIN: 1
BLURRED VISION: 0
COUGH: 0
WEAKNESS: 1
FOCAL WEAKNESS: 0
NAUSEA: 0
SHORTNESS OF BREATH: 0

## 2023-10-21 ASSESSMENT — FIBROSIS 4 INDEX: FIB4 SCORE: 1.06

## 2023-10-21 NOTE — PROGRESS NOTES
Hospital Medicine Daily Progress Note    Date of Service  10/21/2023    Chief Complaint  Patricia A Tietz is a 58 y.o. female admitted 9/29/2023 with anemia    Hospital Course  57 yo woman with DEBRA, urostomy, recent left knee arthroplasty complicated by infection, status post I&D with antibiotic spacer on daptomycin for MRSA, history of DVT on Eliquis who has been at SNF and found her hemoglobin to be low at 5.7 and transferred to [facility].  She has not had signs of bleeding.  GI was consulted.  EGD showed 6 white debris in esophagus, biopsies taken, diverticula in duodenum.  Hemoglobin has been stable postoperatively.  She has an RAO that has not improved.  She is very edematous and started on IV Lasix.  BNP was high, echocardiogram ordered to assess for heart failure, valve disease. Echo did not suggestive CHF and Lasix did not improve resp status or RAO. Nephro consulted, rec renal bx this was attempted but hydropnephrosis was noted, uro consulted rec b/l nephrostomy tubes placed on 10/15. Hgb trended up and RAO improved.    Interval Problem Update  10/17  Patient feels poor in general, but denies any new symptoms. Good urine output from both PCNT, more on the R than L. Some urine output in the urostomy. No apparent blood. PCNT cx growing Esvin glabrata. Knee incision appears ok, but likely needs sutures out. Overall, patient feels poor. Restarted eliquis today.     10/18  Patient feels a bit better today. Remains afebrile. See labs below. Will remove knee sutures today.     10/19  Feels about the same today. Good urine output from both PCNT, marginal from urostomy. Remains afebrile.     10/20  Patient stable for SNF. SNF blocked transfer initially given they cant do dapto. I switched to vanco and they still didn't accept yesterday. Patient's kidney function improving. No other new issues.     10/21  No acute events noted overnight. Urine output in the PCNT's is roibus: L with 1400 and R with 975, urostomy with  minimal output. Somewhat darker in color, but no blood appreciated. She remains afebrile with no tachycardia.     I have discussed this patient's plan of care and discharge plan at IDT rounds today with Case Management, Nursing, Nursing leadership, and other members of the IDT team.    Consultants/Specialty  GI, nephrology    Code Status  Full Code    Disposition  The patient is medically cleared for discharge to home or a post-acute facility.  Anticipate discharge to: skilled nursing facility    I have placed the appropriate orders for post-discharge needs.    Review of Systems  Review of Systems   Constitutional:  Positive for malaise/fatigue. Negative for chills and fever.        No clear changes noted 10/21   HENT:  Negative for congestion.    Eyes:  Negative for blurred vision.   Respiratory:  Negative for cough and shortness of breath.    Cardiovascular:  Positive for leg swelling. Negative for chest pain.   Gastrointestinal:  Positive for abdominal pain (tolerable). Negative for constipation, diarrhea, nausea and vomiting.   Genitourinary:  Negative for dysuria.   Musculoskeletal:  Negative for myalgias.   Skin:  Negative for rash.   Neurological:  Positive for weakness (unchanged). Negative for sensory change and focal weakness.        Physical Exam  Temp:  [35.9 °C (96.7 °F)-36.6 °C (97.9 °F)] 36.6 °C (97.9 °F)  Pulse:  [59-63] 62  Resp:  [16-20] 16  BP: (107-128)/(58-82) 120/64  SpO2:  [99 %-100 %] 99 %    Physical Exam  Constitutional:       General: She is not in acute distress.     Appearance: Normal appearance. She is ill-appearing (chronically).      Comments: Body mass index is 42.54 kg/m².  Seems about the same on 10/21   HENT:      Head: Normocephalic and atraumatic.      Nose: Nose normal.      Comments: NC in place     Mouth/Throat:      Mouth: Mucous membranes are moist.      Pharynx: Oropharynx is clear.   Eyes:      Conjunctiva/sclera: Conjunctivae normal.      Pupils: Pupils are equal, round,  and reactive to light.   Cardiovascular:      Rate and Rhythm: Normal rate and regular rhythm.      Heart sounds: No murmur heard.  Pulmonary:      Effort: Pulmonary effort is normal.      Breath sounds: No wheezing, rhonchi or rales.   Abdominal:      General: There is no distension.      Palpations: Abdomen is soft.      Tenderness: There is abdominal tenderness (mild). There is no guarding or rebound.      Comments: Urostomy in place, normal appearing yellow urine appreciated    No changes noted on 10/19   Genitourinary:     Comments: B/L PCNT's in place  Dark tiffanie urine present  Insertion sites appear C/D/I    Musculoskeletal:         General: No swelling or tenderness.      Cervical back: Normal range of motion and neck supple.      Right lower leg: Edema present.      Left lower leg: Edema present.      Comments: L knee with surgical incision site, appears C/D/I  Distal sutures remain in place with some overlying granulation tissue   Skin:     General: Skin is warm and dry.   Neurological:      Mental Status: She is alert.      GCS: GCS eye subscore is 4. GCS verbal subscore is 5. GCS motor subscore is 6.      Cranial Nerves: No facial asymmetry.   Psychiatric:         Mood and Affect: Mood normal.         Behavior: Behavior normal.         Fluids    Intake/Output Summary (Last 24 hours) at 10/21/2023 1201  Last data filed at 10/21/2023 0400  Gross per 24 hour   Intake 1040 ml   Output 875 ml   Net 165 ml       Laboratory  Recent Labs     10/18/23  1336 10/19/23  0500   WBC 8.2 8.9   RBC 3.69* 3.38*   HEMOGLOBIN 10.3* 9.3*   HEMATOCRIT 31.4* 29.3*   MCV 85.1 86.7   MCH 27.9 27.5   MCHC 32.8 31.7*   RDW 43.9 45.2   PLATELETCT 324 299   MPV 10.1 10.7     Recent Labs     10/18/23  1336 10/19/23  0500 10/20/23  0350   SODIUM 138 137 136   POTASSIUM 3.1* 3.4* 3.7   CHLORIDE 101 103 102   CO2 23 22 23   GLUCOSE 166* 126* 109*   BUN 60* 59* 56*   CREATININE 2.56* 2.42* 2.26*   CALCIUM 10.5 10.4 10.7*                      Imaging  DX-CHEST-2 VIEWS   Final Result      1. No acute findings in the chest.   2. Other stable chronic degenerative and post surgical changes.      IR-NEPHROSTOGRAM W/ NEW TUBE PLACEMENT (ALL RADIOLOGY) BOTH   Final Result      1. Ultrasound and fluoroscopic guided placement of bilateral 8 Slovenian pigtail locking loop percutaneous nephrostomy catheter.      2. Nephrostogram showing satisfactory catheter position without extravasation.      DX-LOOPOGRAM (ILEAL CONDUIT)   Final Result      No evidence of reflux of contrast into the ureters or kidneys.      CT-ABORTED CT PROCEDURE   Final Result      Aborted CT-guided renal biopsy.      EC-ECHOCARDIOGRAM COMPLETE W/O CONT   Final Result      DX-CHEST-PORTABLE (1 VIEW)   Final Result      1.  Low lung volumes without definite acute cardiopulmonary abnormality.      US-RENAL   Final Result      1.  Normal renal ultrasound.             Assessment/Plan  * UGIB (upper gastrointestinal bleed)- (present on admission)  Assessment & Plan  Hb remains stable  Restarted eliquis on 10/17, monitor for bleeding closely  Daily CBC with conservative transfusion threshold  10/1 EGD this morning, revealed thick whitish debris in esophagus and mucosal fragility.  Biopsies which showed benign mucosal ulceration with acute on chronic inflammation, no e/o fungal elements    No e/o clinical overt bleeding, check CBC today    Hypokalemia- (present on admission)  Assessment & Plan  Resolved, I personally reviewed the bmp on 10/21  Judicious replacement given impaired kidney function    Complicated UTI (urinary tract infection)- (present on admission)  Assessment & Plan  PCNT cultures growing candida glabrata  Start oral fluconazole 200 mg daily, F/U final sensitivities-none available on 10/20    I personally reviewed this urine culture on 10/17    Acute respiratory failure with hypoxia (HCC)- (present on admission)  Assessment & Plan  Remains stable at 2-3 L NC  Continue lasix  Recheck  CXR, I personally reviewed this Xray on 10/18 and there are no acute findings    Constipation- (present on admission)  Assessment & Plan  Continue bowel protocol    Pacemaker- (present on admission)  Assessment & Plan  Noted    Primary hypertension- (present on admission)  Assessment & Plan  Hold home metoprolol  Consider restarting soon    RAO (acute kidney injury) (HCC)- (present on admission)  Assessment & Plan  Cre baseline 1.2, peaked here at ~3.5, hx of urostomy with ileal conduit since childhood  Check CR today, some darker urine, but uop is well over 2.5 liters daily  Initial renal ultrasound was unremarkable  Imaging showed B/L hydronephrosis, s/p placement of B/L PCNT's on 10/15  Loopogram negative for ileal conduit leak  -IR consulted, b/l nephrostomy tubes placed on 10/15  -Nephro and urology following      Acute on chronic anemia- (present on admission)  Assessment & Plan  Stabilized as noted above    History of DVT in adulthood- (present on admission)  Assessment & Plan  Eliquis held for for GI bleed and then nephrostomy tube placement initially  Restarted on 10/17 as noted above    Hx MRSA infection- (present on admission)  Assessment & Plan  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  Continue daptomycin, she is supposed to be on for 6 weeks, continue while she remains in our hospital  Has PICC  Knee sutures can be removed, will place nursing communication order  Check weekly CPK, baseline CPK is 97 on 10/19  Will switch to IV vancomycin 1 g Q24 hours upon tx to SNF    Class 3 severe obesity due to excess calories with serious comorbidity and body mass index (BMI) of 50.0 to 59.9 in adult (HCC)- (present on admission)  Assessment & Plan  Counseling          VTE prophylaxis:    therapeutic anticoagulation with eliquis 5 mg BID      I have performed a physical exam and reviewed and updated ROS and Plan today (10/21/2023). In review of yesterday's note (10/20/2023), there are no  changes except as documented above.

## 2023-10-21 NOTE — CARE PLAN
The patient is Stable - Low risk of patient condition declining or worsening    Shift Goals  Clinical Goals: patients pain will remain less than 5 throughout shift  Patient Goals: Discharge to SNF  Family Goals: ag    Progress made toward(s) clinical / shift goals:  patients pain remained controlled    Patient is not progressing towards the following goals:

## 2023-10-21 NOTE — PROGRESS NOTES
1550 RN JUSTINE spoke to Daly from [facility] in [place].  Per Daly, they can take pt  tomorrow anytime after 11.  RN CM collaborated with Judy to set up transportation via [employer].  Pending completion of transfer packet.

## 2023-10-21 NOTE — CARE PLAN
The patient is Stable - Low risk of patient condition declining or worsening    Shift Goals  Clinical Goals: Patient pain will decrease to <5/10 T/O shift  Patient Goals: Discharge to SNF  Family Goals: N/A    Progress made toward(s) clinical / shift goals:  Patient didn't call and asked for pain reliever T/O this shift.    Patient is not progressing towards the following goals:

## 2023-10-21 NOTE — PROGRESS NOTES
Hospital Medicine Daily Progress Note    Date of Service  10/21/2023    Chief Complaint  Patricia A Tietz is a 58 y.o. female admitted 9/29/2023 with anemia    Hospital Course  57 yo woman with DEBRA, urostomy, recent left knee arthroplasty complicated by infection, status post I&D with antibiotic spacer on daptomycin for MRSA, history of DVT on Eliquis who has been at SNF and found her hemoglobin to be low at 5.7 and transferred to [facility].  She has not had signs of bleeding.  GI was consulted.  EGD showed 6 white debris in esophagus, biopsies taken, diverticula in duodenum.  Hemoglobin has been stable postoperatively.  She has an RAO that has not improved.  She is very edematous and started on IV Lasix.  BNP was high, echocardiogram ordered to assess for heart failure, valve disease. Echo did not suggestive CHF and Lasix did not improve resp status or RAO. Nephro consulted, rec renal bx this was attempted but hydropnephrosis was noted, uro consulted rec b/l nephrostomy tubes placed on 10/15. Hgb trended up and RAO improved.    Interval Problem Update  10/17  Patient feels poor in general, but denies any new symptoms. Good urine output from both PCNT, more on the R than L. Some urine output in the urostomy. No apparent blood. PCNT cx growing Esvin glabrata. Knee incision appears ok, but likely needs sutures out. Overall, patient feels poor. Restarted eliquis today.     10/18  Patient feels a bit better today. Remains afebrile. See labs below. Will remove knee sutures today.     10/19  Feels about the same today. Good urine output from both PCNT, marginal from urostomy. Remains afebrile.     10/20  Patient stable for SNF. SNF blocked transfer initially given they cant do dapto. I switched to vanco and they still didn't accept yesterday. Patient's kidney function improving. No other new issues.     I have discussed this patient's plan of care and discharge plan at IDT rounds today with Case Management, Nursing, Nursing  leadership, and other members of the IDT team.    Consultants/Specialty  GI, nephrology    Code Status  Full Code    Disposition  The patient is medically cleared for discharge to home or a post-acute facility.  Anticipate discharge to: skilled nursing facility    I have placed the appropriate orders for post-discharge needs.    Review of Systems  Review of Systems   Constitutional:  Positive for malaise/fatigue. Negative for chills and fever.        She remains about the same   HENT:  Negative for congestion.    Eyes:  Negative for blurred vision.   Respiratory:  Negative for cough and shortness of breath.    Cardiovascular:  Positive for leg swelling. Negative for chest pain.   Gastrointestinal:  Positive for abdominal pain (tolerable). Negative for constipation, diarrhea, nausea and vomiting.   Genitourinary:  Negative for dysuria.   Musculoskeletal:  Negative for myalgias.   Skin:  Negative for rash.   Neurological:  Positive for weakness (unchanged). Negative for sensory change and focal weakness.        Physical Exam  Temp:  [35.9 °C (96.7 °F)-36.4 °C (97.6 °F)] 35.9 °C (96.7 °F)  Pulse:  [59-63] 62  Resp:  [16-20] 16  BP: (107-130)/(58-82) 107/58  SpO2:  [99 %-100 %] 99 %    Physical Exam  Constitutional:       General: She is not in acute distress.     Appearance: Normal appearance. She is ill-appearing (chronically).      Comments: Body mass index is 42.54 kg/m².  Seems about the same on 10/20     HENT:      Head: Normocephalic and atraumatic.      Nose: Nose normal.      Comments: NC in place     Mouth/Throat:      Mouth: Mucous membranes are moist.      Pharynx: Oropharynx is clear.   Eyes:      Conjunctiva/sclera: Conjunctivae normal.      Pupils: Pupils are equal, round, and reactive to light.   Cardiovascular:      Rate and Rhythm: Normal rate and regular rhythm.      Heart sounds: No murmur heard.  Pulmonary:      Effort: Pulmonary effort is normal.      Breath sounds: No wheezing, rhonchi or rales.    Abdominal:      General: There is no distension.      Palpations: Abdomen is soft.      Tenderness: There is abdominal tenderness (mild). There is no guarding or rebound.      Comments: Urostomy in place, normal appearing yellow urine appreciated    No changes noted on 10/19   Genitourinary:     Comments: B/L PCNT's in place  Both yellow appearing urine in place  Insertion sites appear C/D/I    Remains unchanged on 10/19  Musculoskeletal:         General: No swelling or tenderness.      Cervical back: Normal range of motion and neck supple.      Right lower leg: Edema present.      Left lower leg: Edema present.      Comments: L knee with surgical incision site, appears C/D/I  Distal sutures remain in place with some overlying granulation tissue   Skin:     General: Skin is warm and dry.   Neurological:      Mental Status: She is alert.      GCS: GCS eye subscore is 4. GCS verbal subscore is 5. GCS motor subscore is 6.      Cranial Nerves: No facial asymmetry.   Psychiatric:         Mood and Affect: Mood normal.         Behavior: Behavior normal.         Fluids    Intake/Output Summary (Last 24 hours) at 10/21/2023 0656  Last data filed at 10/21/2023 0400  Gross per 24 hour   Intake 1280 ml   Output 2375 ml   Net -1095 ml       Laboratory  Recent Labs     10/18/23  1336 10/19/23  0500   WBC 8.2 8.9   RBC 3.69* 3.38*   HEMOGLOBIN 10.3* 9.3*   HEMATOCRIT 31.4* 29.3*   MCV 85.1 86.7   MCH 27.9 27.5   MCHC 32.8 31.7*   RDW 43.9 45.2   PLATELETCT 324 299   MPV 10.1 10.7     Recent Labs     10/18/23  1336 10/19/23  0500 10/20/23  0350   SODIUM 138 137 136   POTASSIUM 3.1* 3.4* 3.7   CHLORIDE 101 103 102   CO2 23 22 23   GLUCOSE 166* 126* 109*   BUN 60* 59* 56*   CREATININE 2.56* 2.42* 2.26*   CALCIUM 10.5 10.4 10.7*                     Imaging  DX-CHEST-2 VIEWS   Final Result      1. No acute findings in the chest.   2. Other stable chronic degenerative and post surgical changes.      IR-NEPHROSTOGRAM W/ NEW TUBE  PLACEMENT (ALL RADIOLOGY) BOTH   Final Result      1. Ultrasound and fluoroscopic guided placement of bilateral 8 Uzbek pigtail locking loop percutaneous nephrostomy catheter.      2. Nephrostogram showing satisfactory catheter position without extravasation.      DX-LOOPOGRAM (ILEAL CONDUIT)   Final Result      No evidence of reflux of contrast into the ureters or kidneys.      CT-ABORTED CT PROCEDURE   Final Result      Aborted CT-guided renal biopsy.      EC-ECHOCARDIOGRAM COMPLETE W/O CONT   Final Result      DX-CHEST-PORTABLE (1 VIEW)   Final Result      1.  Low lung volumes without definite acute cardiopulmonary abnormality.      US-RENAL   Final Result      1.  Normal renal ultrasound.             Assessment/Plan  * UGIB (upper gastrointestinal bleed)- (present on admission)  Assessment & Plan  Hb remains stable  Restarted eliquis on 10/17, monitor for bleeding closely  Daily CBC with conservative transfusion threshold  10/1 EGD this morning, revealed thick whitish debris in esophagus and mucosal fragility.  Biopsies which showed benign mucosal ulceration with acute on chronic inflammation, no e/o fungal elements    I personally reviewed the cbc on 10/20      Hypokalemia- (present on admission)  Assessment & Plan  Mild, slowly improving  Judicious replacement given impaired kidney function    Complicated UTI (urinary tract infection)- (present on admission)  Assessment & Plan  PCNT cultures growing candida glabrata  Start oral fluconazole 200 mg daily, F/U final sensitivities-none available on 10/20    I personally reviewed this urine culture on 10/17    Acute respiratory failure with hypoxia (HCC)- (present on admission)  Assessment & Plan  Remains stable at 2-3 L NC  Continue lasix  Recheck CXR, I personally reviewed this Xray on 10/18 and there are no acute findings    Constipation- (present on admission)  Assessment & Plan  Continue bowel protocol    Pacemaker- (present on admission)  Assessment &  Plan  Noted    Primary hypertension- (present on admission)  Assessment & Plan  Hold home metoprolol  Consider restarting soon    RAO (acute kidney injury) (HCC)- (present on admission)  Assessment & Plan  Cre baseline 1.2, peaked here at ~3.5, hx of urostomy with ileal conduit since childhood  Cr continues to improve, now 2.2 on 10/20  Initial renal ultrasound was unremarkable  Imaging showed B/L hydronephrosis, s/p placement of B/L PCNT's on 10/15 with subsequent good urine output  Loopogram negative for ileal conduit leak  -IR consulted, b/l nephrostomy tubes placed on 10/15  -Nephro and urology following  Likely will be medically stable for discharge to SNF on 10/21    I personally reviewed the BMP on 10/20      Acute on chronic anemia- (present on admission)  Assessment & Plan  Stabilized as noted above    History of DVT in adulthood- (present on admission)  Assessment & Plan  Eliquis held for for GI bleed and then nephrostomy tube placement initially  Restarted on 10/17 as noted above    Hx MRSA infection- (present on admission)  Assessment & Plan  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  Continue daptomycin, she is supposed to be on for 6 weeks  Has PICC  Knee sutures can be removed, will place nursing communication order  Check weekly CPK, baseline CPK is 97 on 10/19  Will switch to IV vancomycin 1 g Q24 hours upon tx to SNF    Class 3 severe obesity due to excess calories with serious comorbidity and body mass index (BMI) of 50.0 to 59.9 in adult (HCC)- (present on admission)  Assessment & Plan  Counseling          VTE prophylaxis:    therapeutic anticoagulation with eliquis 5 mg BID      I have performed a physical exam and reviewed and updated ROS and Plan today (10/21/2023). In review of yesterday's note (10/20/2023), there are no changes except as documented above.

## 2023-10-22 VITALS
RESPIRATION RATE: 16 BRPM | OXYGEN SATURATION: 97 % | DIASTOLIC BLOOD PRESSURE: 69 MMHG | HEART RATE: 68 BPM | WEIGHT: 235.89 LBS | BODY MASS INDEX: 43.41 KG/M2 | SYSTOLIC BLOOD PRESSURE: 113 MMHG | HEIGHT: 62 IN | TEMPERATURE: 97.9 F

## 2023-10-22 PROCEDURE — 700102 HCHG RX REV CODE 250 W/ 637 OVERRIDE(OP): Performed by: STUDENT IN AN ORGANIZED HEALTH CARE EDUCATION/TRAINING PROGRAM

## 2023-10-22 PROCEDURE — 700102 HCHG RX REV CODE 250 W/ 637 OVERRIDE(OP): Performed by: INTERNAL MEDICINE

## 2023-10-22 PROCEDURE — A9270 NON-COVERED ITEM OR SERVICE: HCPCS | Performed by: INTERNAL MEDICINE

## 2023-10-22 PROCEDURE — A9270 NON-COVERED ITEM OR SERVICE: HCPCS | Performed by: STUDENT IN AN ORGANIZED HEALTH CARE EDUCATION/TRAINING PROGRAM

## 2023-10-22 PROCEDURE — 99239 HOSP IP/OBS DSCHRG MGMT >30: CPT | Performed by: INTERNAL MEDICINE

## 2023-10-22 RX ORDER — OXYCODONE HYDROCHLORIDE 15 MG/1
15 TABLET ORAL EVERY 6 HOURS PRN
Status: DISCONTINUED | OUTPATIENT
Start: 2023-10-22 | End: 2023-10-22 | Stop reason: HOSPADM

## 2023-10-22 RX ORDER — OXYCODONE HYDROCHLORIDE 10 MG/1
10 TABLET ORAL EVERY 4 HOURS PRN
Qty: 20 TABLET | Refills: 0 | Status: SHIPPED | OUTPATIENT
Start: 2023-10-22 | End: 2023-10-25

## 2023-10-22 RX ADMIN — OXYCODONE HYDROCHLORIDE 15 MG: 15 TABLET ORAL at 10:21

## 2023-10-22 RX ADMIN — APIXABAN 5 MG: 5 TABLET, FILM COATED ORAL at 04:29

## 2023-10-22 RX ADMIN — DOCUSATE SODIUM 50 MG AND SENNOSIDES 8.6 MG 1 TABLET: 8.6; 5 TABLET, FILM COATED ORAL at 04:29

## 2023-10-22 RX ADMIN — GUAIFENESIN 600 MG: 600 TABLET, EXTENDED RELEASE ORAL at 04:29

## 2023-10-22 RX ADMIN — FUROSEMIDE 40 MG: 40 TABLET ORAL at 04:29

## 2023-10-22 RX ADMIN — OMEPRAZOLE 20 MG: 20 CAPSULE, DELAYED RELEASE ORAL at 04:34

## 2023-10-22 RX ADMIN — FERROUS SULFATE TAB 325 MG (65 MG ELEMENTAL FE) 325 MG: 325 (65 FE) TAB at 11:46

## 2023-10-22 RX ADMIN — CITALOPRAM 40 MG: 40 TABLET, FILM COATED ORAL at 04:29

## 2023-10-22 ASSESSMENT — PAIN DESCRIPTION - PAIN TYPE: TYPE: ACUTE PAIN

## 2023-10-22 NOTE — DISCHARGE PLANNING
Patient to transfer to [facility] 10/22/23 at 1300 via [facility]. Call placed to [facility], spoke with Olivia, patients insurance has termed as of 9/27/23. RN JUSTINE Grady advised of transport time.

## 2023-10-22 NOTE — CARE PLAN
The patient is Stable - Low risk of patient condition declining or worsening    Shift Goals  Clinical Goals: Patient pain will <5/10 T/O this shift  Patient Goals: discharge to SNF  Family Goals: MELISSA    Progress made toward(s) clinical / shift goals:  Patient able to rest and sleep after the pain reliever. Re-assessment done patient sleeping comfortable in bed. No signs of distress noted.     Patient is not progressing towards the following goals:

## 2023-10-22 NOTE — DISCHARGE PLANNING
Case Management Discharge Planning    Admission Date: 9/29/2023  GMLOS: 4.2  ALOS: 23    6-Clicks ADL Score: 11  6-Clicks Mobility Score: 6  PT and/or OT Eval ordered: Yes  Post-acute Referrals Ordered: Yes  Post-acute Choice Obtained: Yes  Has referral(s) been sent to post-acute provider:  Yes      Anticipated Discharge Dispo: Discharge Disposition: D/T to SNF with Medicare cert in anticipation of skilled care (03)    DME Needed: No    Action(s) Taken: Acceptance Received Transport arranged    Escalations Completed: None    Medically Clear: Yes    Next Steps: transfer scheduled for 1300    Barriers to Discharge: None    Is the patient up for discharge today: Yes    Is transport arranged for discharge disposition: Yes BRIAN    Called Blaise at [phone] and walter Tietze/ at [phone] to inform of planning.

## 2024-06-06 ENCOUNTER — HOSPITAL ENCOUNTER (OUTPATIENT)
Dept: RADIOLOGY | Facility: MEDICAL CENTER | Age: 59
End: 2024-06-06
Payer: MEDICARE

## 2024-07-24 ENCOUNTER — APPOINTMENT (OUTPATIENT)
Dept: ADMISSIONS | Facility: MEDICAL CENTER | Age: 59
End: 2024-07-24
Attending: SURGERY
Payer: MEDICARE

## 2024-07-31 ENCOUNTER — PRE-ADMISSION TESTING (OUTPATIENT)
Dept: ADMISSIONS | Facility: MEDICAL CENTER | Age: 59
DRG: 335 | End: 2024-07-31
Attending: SURGERY
Payer: MEDICARE

## 2024-07-31 NOTE — OR NURSING
Attempted to do telephone RN DEYA. No answer x 3, left voicemail x3. Also, left voicemail for brothlima Larson to please have pt call to reschedule PAT. Was able to get a hold of brother Skyla who lives in [place]. Skyla will text pt to have her call back to reschedule PAT. Skyla states patient is currently in a nursing home ([facility]) due to having difficulty walking but that patient's contact is still the number on file.

## 2024-08-09 ENCOUNTER — PRE-ADMISSION TESTING (OUTPATIENT)
Dept: ADMISSIONS | Facility: MEDICAL CENTER | Age: 59
End: 2024-08-09
Attending: SURGERY
Payer: MEDICARE

## 2024-08-09 RX ORDER — SODIUM BICARBONATE 650 MG/1
1300 TABLET ORAL 3 TIMES DAILY
COMMUNITY

## 2024-08-09 RX ORDER — CARVEDILOL 12.5 MG/1
12.5 TABLET ORAL 2 TIMES DAILY
COMMUNITY

## 2024-08-09 RX ORDER — LACTULOSE 10 G/15ML
30 SOLUTION ORAL 2 TIMES DAILY
COMMUNITY
Start: 2024-08-04

## 2024-08-09 RX ORDER — METHOCARBAMOL 750 MG/1
750 TABLET, FILM COATED ORAL 4 TIMES DAILY PRN
COMMUNITY

## 2024-08-09 RX ORDER — HYDRALAZINE HYDROCHLORIDE 25 MG/1
25 TABLET, FILM COATED ORAL 3 TIMES DAILY
COMMUNITY

## 2024-08-09 RX ORDER — ACETAMINOPHEN 325 MG/1
650 TABLET ORAL EVERY 4 HOURS PRN
COMMUNITY

## 2024-08-09 RX ORDER — AMLODIPINE BESYLATE 5 MG/1
5 TABLET ORAL 2 TIMES DAILY
COMMUNITY

## 2024-08-09 RX ORDER — OXYCODONE AND ACETAMINOPHEN 7.5; 325 MG/1; MG/1
1 TABLET ORAL EVERY 6 HOURS PRN
Status: ON HOLD | COMMUNITY
End: 2024-09-04

## 2024-08-09 RX ORDER — LISINOPRIL 5 MG/1
5 TABLET ORAL DAILY
COMMUNITY

## 2024-08-09 NOTE — OR NURSING
DEYA RN interview partially completed with patient. Patient was unable to verify medications and request that I speak with the [place] Nurse for [facility] ([phone]). All other information and instructions reviewed with patient and patient denied further questions. RN called following patient interview. Medications reviewed a MED REC updated. Medication instructions faxed to Lesley MITTAL to review with physicians. No further questions and interview completed at 1153.    Return transport will need to be setup with receiving facility when patient is appropriate for discharge and report called to Nurse at [phone].

## 2024-08-14 NOTE — OR NURSING
"Preadmit: Received call from patient who had questions about her medication instructions for surgery scheduled on 8/20/24 with Dr. Gusman and Dr. Mercado. Patient states she is on Eliquis and per the urologist office it said in the instructions to hold any \"blood thinners\" 7-10 days prior to surgery. This RN explained that OTC meds like aspirin, aleve, or ibuprofen would qualify under those instructions, but prescribed blood thinner instructions would need to be coordinated with the prescribing MD. Patient then said she was currently at [facility]. RN advised patient to speak to her nurse at the facility to help get those needed instructions from the cardiologist and then let her surgeon's office know what the instructions are so everyone is aware and updated to the instructions. Patient also mentioned that she is prescribed iron and vit c for treatment of anemia. RN advised patient that medicine instructions for these meds would also need to be coordinated through the provider. Patient states she will speak to her nurse at the facility today to get help with obtaining medication instructions for her blood thinner, iron, and vit c.   "

## 2024-08-20 ENCOUNTER — ANESTHESIA EVENT (OUTPATIENT)
Dept: SURGERY | Facility: MEDICAL CENTER | Age: 59
End: 2024-08-20
Payer: MEDICARE

## 2024-08-20 ENCOUNTER — HOSPITAL ENCOUNTER (INPATIENT)
Facility: MEDICAL CENTER | Age: 59
End: 2024-08-20
Attending: SURGERY | Admitting: UROLOGY
Payer: MEDICARE

## 2024-08-20 ENCOUNTER — ANESTHESIA (OUTPATIENT)
Dept: SURGERY | Facility: MEDICAL CENTER | Age: 59
End: 2024-08-20
Payer: MEDICARE

## 2024-08-20 DIAGNOSIS — K92.2 UGIB (UPPER GASTROINTESTINAL BLEED): ICD-10-CM

## 2024-08-20 DIAGNOSIS — N17.9 AKI (ACUTE KIDNEY INJURY) (HCC): ICD-10-CM

## 2024-08-20 DIAGNOSIS — J96.01 ACUTE RESPIRATORY FAILURE WITH HYPOXIA (HCC): ICD-10-CM

## 2024-08-20 DIAGNOSIS — Z86.14 HX MRSA INFECTION: ICD-10-CM

## 2024-08-20 DIAGNOSIS — R57.9 SHOCK (HCC): ICD-10-CM

## 2024-08-20 DIAGNOSIS — E87.6 HYPOKALEMIA: ICD-10-CM

## 2024-08-20 DIAGNOSIS — N39.0 COMPLICATED UTI (URINARY TRACT INFECTION): ICD-10-CM

## 2024-08-20 DIAGNOSIS — G89.4 CHRONIC PAIN SYNDROME: ICD-10-CM

## 2024-08-20 DIAGNOSIS — E87.5 HYPERKALEMIA: ICD-10-CM

## 2024-08-20 DIAGNOSIS — J96.11 CHRONIC RESPIRATORY FAILURE WITH HYPOXIA (HCC): ICD-10-CM

## 2024-08-20 PROBLEM — I48.91 ATRIAL FIBRILLATION (HCC): Status: ACTIVE | Noted: 2024-08-20

## 2024-08-20 PROBLEM — E66.9 OBESITY: Status: ACTIVE | Noted: 2024-08-20

## 2024-08-20 LAB
ABO GROUP BLD: NORMAL
ANION GAP SERPL CALC-SCNC: 12 MMOL/L (ref 7–16)
BLD GP AB SCN SERPL QL: NORMAL
BUN SERPL-MCNC: 27 MG/DL (ref 8–22)
CALCIUM SERPL-MCNC: 9.8 MG/DL (ref 8.5–10.5)
CHLORIDE SERPL-SCNC: 107 MMOL/L (ref 96–112)
CO2 SERPL-SCNC: 20 MMOL/L (ref 20–33)
CORTIS SERPL-MCNC: 30.3 UG/DL (ref 0–23)
CREAT SERPL-MCNC: 1.15 MG/DL (ref 0.5–1.4)
EKG IMPRESSION: NORMAL
ERYTHROCYTE [DISTWIDTH] IN BLOOD BY AUTOMATED COUNT: 45.1 FL (ref 35.9–50)
ERYTHROCYTE [DISTWIDTH] IN BLOOD BY AUTOMATED COUNT: 47.1 FL (ref 35.9–50)
GFR SERPLBLD CREATININE-BSD FMLA CKD-EPI: 54 ML/MIN/1.73 M 2
GLUCOSE SERPL-MCNC: 115 MG/DL (ref 65–99)
HCT VFR BLD AUTO: 33 % (ref 37–47)
HCT VFR BLD AUTO: 36.1 % (ref 37–47)
HGB BLD-MCNC: 10.4 G/DL (ref 12–16)
HGB BLD-MCNC: 11.7 G/DL (ref 12–16)
MCH RBC QN AUTO: 30.1 PG (ref 27–33)
MCH RBC QN AUTO: 30.2 PG (ref 27–33)
MCHC RBC AUTO-ENTMCNC: 31.5 G/DL (ref 32.2–35.5)
MCHC RBC AUTO-ENTMCNC: 32.4 G/DL (ref 32.2–35.5)
MCV RBC AUTO: 93 FL (ref 81.4–97.8)
MCV RBC AUTO: 95.7 FL (ref 81.4–97.8)
PLATELET # BLD AUTO: 226 K/UL (ref 164–446)
PLATELET # BLD AUTO: 239 K/UL (ref 164–446)
PMV BLD AUTO: 10.3 FL (ref 9–12.9)
PMV BLD AUTO: 10.5 FL (ref 9–12.9)
POTASSIUM SERPL-SCNC: 4.4 MMOL/L (ref 3.6–5.5)
RBC # BLD AUTO: 3.45 M/UL (ref 4.2–5.4)
RBC # BLD AUTO: 3.88 M/UL (ref 4.2–5.4)
RH BLD: NORMAL
SODIUM SERPL-SCNC: 139 MMOL/L (ref 135–145)
WBC # BLD AUTO: 11.4 K/UL (ref 4.8–10.8)
WBC # BLD AUTO: 4.9 K/UL (ref 4.8–10.8)

## 2024-08-20 PROCEDURE — 110371 HCHG SHELL REV 272: Performed by: SURGERY

## 2024-08-20 PROCEDURE — C1781 MESH (IMPLANTABLE): HCPCS | Performed by: SURGERY

## 2024-08-20 PROCEDURE — 0WUF0JZ SUPPLEMENT ABDOMINAL WALL WITH SYNTHETIC SUBSTITUTE, OPEN APPROACH: ICD-10-PCS | Performed by: SURGERY

## 2024-08-20 PROCEDURE — 700101 HCHG RX REV CODE 250: Performed by: UROLOGY

## 2024-08-20 PROCEDURE — 160042 HCHG SURGERY MINUTES - EA ADDL 1 MIN LEVEL 5: Performed by: SURGERY

## 2024-08-20 PROCEDURE — 0DN80ZZ RELEASE SMALL INTESTINE, OPEN APPROACH: ICD-10-PCS | Performed by: SURGERY

## 2024-08-20 PROCEDURE — 160036 HCHG PACU - EA ADDL 30 MINS PHASE I: Performed by: SURGERY

## 2024-08-20 PROCEDURE — 86901 BLOOD TYPING SEROLOGIC RH(D): CPT

## 2024-08-20 PROCEDURE — 85027 COMPLETE CBC AUTOMATED: CPT

## 2024-08-20 PROCEDURE — 700105 HCHG RX REV CODE 258: Performed by: SURGERY

## 2024-08-20 PROCEDURE — A9270 NON-COVERED ITEM OR SERVICE: HCPCS | Performed by: ANESTHESIOLOGY

## 2024-08-20 PROCEDURE — 0KXK0Z6 TRANSFER RIGHT ABDOMEN MUSCLE, TRANSVERSE RECTUS ABDOMINIS MYOCUTANEOUS FLAP, OPEN APPROACH: ICD-10-PCS | Performed by: SURGERY

## 2024-08-20 PROCEDURE — 86850 RBC ANTIBODY SCREEN: CPT

## 2024-08-20 PROCEDURE — 93005 ELECTROCARDIOGRAM TRACING: CPT | Performed by: SURGERY

## 2024-08-20 PROCEDURE — 700111 HCHG RX REV CODE 636 W/ 250 OVERRIDE (IP): Performed by: ANESTHESIOLOGY

## 2024-08-20 PROCEDURE — 770000 HCHG ROOM/CARE - INTERMEDIATE ICU *

## 2024-08-20 PROCEDURE — 0T9B00Z DRAINAGE OF BLADDER WITH DRAINAGE DEVICE, OPEN APPROACH: ICD-10-PCS | Performed by: UROLOGY

## 2024-08-20 PROCEDURE — 160035 HCHG PACU - 1ST 60 MINS PHASE I: Performed by: SURGERY

## 2024-08-20 PROCEDURE — 80048 BASIC METABOLIC PNL TOTAL CA: CPT

## 2024-08-20 PROCEDURE — 502714 HCHG ROBOTIC SURGERY SERVICES: Performed by: SURGERY

## 2024-08-20 PROCEDURE — 700105 HCHG RX REV CODE 258: Performed by: ANESTHESIOLOGY

## 2024-08-20 PROCEDURE — 160002 HCHG RECOVERY MINUTES (STAT): Performed by: SURGERY

## 2024-08-20 PROCEDURE — 93010 ELECTROCARDIOGRAM REPORT: CPT | Performed by: INTERNAL MEDICINE

## 2024-08-20 PROCEDURE — 160048 HCHG OR STATISTICAL LEVEL 1-5: Performed by: SURGERY

## 2024-08-20 PROCEDURE — 700101 HCHG RX REV CODE 250: Performed by: ANESTHESIOLOGY

## 2024-08-20 PROCEDURE — 0DNW0ZZ RELEASE PERITONEUM, OPEN APPROACH: ICD-10-PCS | Performed by: SURGERY

## 2024-08-20 PROCEDURE — 160031 HCHG SURGERY MINUTES - 1ST 30 MINS LEVEL 5: Performed by: SURGERY

## 2024-08-20 PROCEDURE — 700101 HCHG RX REV CODE 250: Performed by: SURGERY

## 2024-08-20 PROCEDURE — 99223 1ST HOSP IP/OBS HIGH 75: CPT | Performed by: HOSPITALIST

## 2024-08-20 PROCEDURE — 700101 HCHG RX REV CODE 250: Performed by: HOSPITALIST

## 2024-08-20 PROCEDURE — 700102 HCHG RX REV CODE 250 W/ 637 OVERRIDE(OP): Performed by: SURGERY

## 2024-08-20 PROCEDURE — 86900 BLOOD TYPING SEROLOGIC ABO: CPT

## 2024-08-20 PROCEDURE — 700102 HCHG RX REV CODE 250 W/ 637 OVERRIDE(OP): Performed by: ANESTHESIOLOGY

## 2024-08-20 PROCEDURE — 82533 TOTAL CORTISOL: CPT

## 2024-08-20 PROCEDURE — 160009 HCHG ANES TIME/MIN: Performed by: SURGERY

## 2024-08-20 PROCEDURE — 700105 HCHG RX REV CODE 258: Performed by: HOSPITALIST

## 2024-08-20 PROCEDURE — A9270 NON-COVERED ITEM OR SERVICE: HCPCS | Performed by: SURGERY

## 2024-08-20 DEVICE — MESH PHASIX 15.2CM X 20.3 (1EA/CA): Type: IMPLANTABLE DEVICE | Site: ABDOMEN | Status: FUNCTIONAL

## 2024-08-20 DEVICE — IMPLANTABLE DEVICE: Type: IMPLANTABLE DEVICE | Site: ABDOMEN | Status: FUNCTIONAL

## 2024-08-20 DEVICE — MESH PHASIX ST 15X20CM: Type: IMPLANTABLE DEVICE | Site: ABDOMEN | Status: FUNCTIONAL

## 2024-08-20 RX ORDER — METRONIDAZOLE 500 MG/100ML
INJECTION, SOLUTION INTRAVENOUS PRN
Status: DISCONTINUED | OUTPATIENT
Start: 2024-08-20 | End: 2024-08-20 | Stop reason: SURG

## 2024-08-20 RX ORDER — GUAIFENESIN 600 MG/1
600 TABLET, EXTENDED RELEASE ORAL EVERY 12 HOURS
COMMUNITY

## 2024-08-20 RX ORDER — LISINOPRIL 5 MG/1
5 TABLET ORAL DAILY
Status: DISCONTINUED | OUTPATIENT
Start: 2024-08-20 | End: 2024-08-29

## 2024-08-20 RX ORDER — HYDROMORPHONE HYDROCHLORIDE 1 MG/ML
0.4 INJECTION, SOLUTION INTRAMUSCULAR; INTRAVENOUS; SUBCUTANEOUS
Status: DISCONTINUED | OUTPATIENT
Start: 2024-08-20 | End: 2024-08-20 | Stop reason: HOSPADM

## 2024-08-20 RX ORDER — OXYCODONE HYDROCHLORIDE 5 MG/1
5 TABLET ORAL
Status: DISCONTINUED | OUTPATIENT
Start: 2024-08-20 | End: 2024-09-04 | Stop reason: HOSPADM

## 2024-08-20 RX ORDER — CEFOTETAN DISODIUM 2 G/20ML
INJECTION, POWDER, FOR SOLUTION INTRAMUSCULAR; INTRAVENOUS PRN
Status: DISCONTINUED | OUTPATIENT
Start: 2024-08-20 | End: 2024-08-20 | Stop reason: SURG

## 2024-08-20 RX ORDER — NOREPINEPHRINE BITARTRATE 0.03 MG/ML
0-1 INJECTION, SOLUTION INTRAVENOUS CONTINUOUS
Status: DISCONTINUED | OUTPATIENT
Start: 2024-08-20 | End: 2024-08-22

## 2024-08-20 RX ORDER — ERGOCALCIFEROL 1.25 MG/1
50000 CAPSULE, LIQUID FILLED ORAL DAILY
COMMUNITY

## 2024-08-20 RX ORDER — LABETALOL HYDROCHLORIDE 5 MG/ML
5 INJECTION, SOLUTION INTRAVENOUS
Status: DISCONTINUED | OUTPATIENT
Start: 2024-08-20 | End: 2024-08-20 | Stop reason: HOSPADM

## 2024-08-20 RX ORDER — HYDROMORPHONE HYDROCHLORIDE 1 MG/ML
0.5 INJECTION, SOLUTION INTRAMUSCULAR; INTRAVENOUS; SUBCUTANEOUS
Status: DISCONTINUED | OUTPATIENT
Start: 2024-08-20 | End: 2024-09-04 | Stop reason: HOSPADM

## 2024-08-20 RX ORDER — BUPIVACAINE HYDROCHLORIDE AND EPINEPHRINE 2.5; 5 MG/ML; UG/ML
INJECTION, SOLUTION EPIDURAL; INFILTRATION; INTRACAUDAL; PERINEURAL
Status: DISCONTINUED | OUTPATIENT
Start: 2024-08-20 | End: 2024-08-20 | Stop reason: HOSPADM

## 2024-08-20 RX ORDER — HYDROMORPHONE HYDROCHLORIDE 1 MG/ML
0.2 INJECTION, SOLUTION INTRAMUSCULAR; INTRAVENOUS; SUBCUTANEOUS
Status: DISCONTINUED | OUTPATIENT
Start: 2024-08-20 | End: 2024-08-20 | Stop reason: HOSPADM

## 2024-08-20 RX ORDER — GABAPENTIN 300 MG/1
300 CAPSULE ORAL DAILY
Status: DISCONTINUED | OUTPATIENT
Start: 2024-08-20 | End: 2024-09-04 | Stop reason: HOSPADM

## 2024-08-20 RX ORDER — HYDROMORPHONE HYDROCHLORIDE 2 MG/ML
INJECTION, SOLUTION INTRAMUSCULAR; INTRAVENOUS; SUBCUTANEOUS PRN
Status: DISCONTINUED | OUTPATIENT
Start: 2024-08-20 | End: 2024-08-20 | Stop reason: SURG

## 2024-08-20 RX ORDER — ENOXAPARIN SODIUM 100 MG/ML
40 INJECTION SUBCUTANEOUS DAILY
Status: DISCONTINUED | OUTPATIENT
Start: 2024-08-21 | End: 2024-08-20

## 2024-08-20 RX ORDER — ACETAMINOPHEN 500 MG
1000 TABLET ORAL EVERY 6 HOURS
Status: DISPENSED | OUTPATIENT
Start: 2024-08-20 | End: 2024-08-25

## 2024-08-20 RX ORDER — IPRATROPIUM BROMIDE AND ALBUTEROL SULFATE 2.5; .5 MG/3ML; MG/3ML
3 SOLUTION RESPIRATORY (INHALATION)
Status: DISCONTINUED | OUTPATIENT
Start: 2024-08-20 | End: 2024-08-20 | Stop reason: HOSPADM

## 2024-08-20 RX ORDER — ONDANSETRON 2 MG/ML
INJECTION INTRAMUSCULAR; INTRAVENOUS PRN
Status: DISCONTINUED | OUTPATIENT
Start: 2024-08-20 | End: 2024-08-20 | Stop reason: SURG

## 2024-08-20 RX ORDER — EPHEDRINE SULFATE 50 MG/ML
5 INJECTION, SOLUTION INTRAVENOUS
Status: DISCONTINUED | OUTPATIENT
Start: 2024-08-20 | End: 2024-08-20 | Stop reason: HOSPADM

## 2024-08-20 RX ORDER — AMOXICILLIN 250 MG
1 CAPSULE ORAL 2 TIMES DAILY
Status: DISCONTINUED | OUTPATIENT
Start: 2024-08-20 | End: 2024-09-04 | Stop reason: HOSPADM

## 2024-08-20 RX ORDER — OXYCODONE HCL 5 MG/5 ML
10 SOLUTION, ORAL ORAL
Status: COMPLETED | OUTPATIENT
Start: 2024-08-20 | End: 2024-08-20

## 2024-08-20 RX ORDER — PHENYLEPHRINE HCL IN 0.9% NACL 1 MG/10 ML
SYRINGE (ML) INTRAVENOUS PRN
Status: DISCONTINUED | OUTPATIENT
Start: 2024-08-20 | End: 2024-08-20 | Stop reason: SURG

## 2024-08-20 RX ORDER — CITALOPRAM HYDROBROMIDE 40 MG/1
40 TABLET ORAL EVERY MORNING
Status: DISCONTINUED | OUTPATIENT
Start: 2024-08-20 | End: 2024-09-03

## 2024-08-20 RX ORDER — OXYCODONE HYDROCHLORIDE 10 MG/1
10 TABLET ORAL
Status: DISCONTINUED | OUTPATIENT
Start: 2024-08-20 | End: 2024-09-04 | Stop reason: HOSPADM

## 2024-08-20 RX ORDER — ROCURONIUM BROMIDE 10 MG/ML
INJECTION, SOLUTION INTRAVENOUS PRN
Status: DISCONTINUED | OUTPATIENT
Start: 2024-08-20 | End: 2024-08-20 | Stop reason: SURG

## 2024-08-20 RX ORDER — SCOLOPAMINE TRANSDERMAL SYSTEM 1 MG/1
1 PATCH, EXTENDED RELEASE TRANSDERMAL
Status: DISCONTINUED | OUTPATIENT
Start: 2024-08-20 | End: 2024-09-04 | Stop reason: HOSPADM

## 2024-08-20 RX ORDER — HYDRALAZINE HYDROCHLORIDE 20 MG/ML
5 INJECTION INTRAMUSCULAR; INTRAVENOUS
Status: DISCONTINUED | OUTPATIENT
Start: 2024-08-20 | End: 2024-08-20 | Stop reason: HOSPADM

## 2024-08-20 RX ORDER — METHOCARBAMOL 750 MG/1
750 TABLET, FILM COATED ORAL 4 TIMES DAILY PRN
Status: DISCONTINUED | OUTPATIENT
Start: 2024-08-20 | End: 2024-09-03

## 2024-08-20 RX ORDER — DIPHENHYDRAMINE HYDROCHLORIDE 50 MG/ML
25 INJECTION INTRAMUSCULAR; INTRAVENOUS EVERY 6 HOURS PRN
Status: DISCONTINUED | OUTPATIENT
Start: 2024-08-20 | End: 2024-09-04 | Stop reason: HOSPADM

## 2024-08-20 RX ORDER — DIPHENHYDRAMINE HYDROCHLORIDE 50 MG/ML
12.5 INJECTION INTRAMUSCULAR; INTRAVENOUS
Status: DISCONTINUED | OUTPATIENT
Start: 2024-08-20 | End: 2024-08-20 | Stop reason: HOSPADM

## 2024-08-20 RX ORDER — ONDANSETRON 2 MG/ML
4 INJECTION INTRAMUSCULAR; INTRAVENOUS EVERY 4 HOURS PRN
Status: DISCONTINUED | OUTPATIENT
Start: 2024-08-20 | End: 2024-09-04 | Stop reason: HOSPADM

## 2024-08-20 RX ORDER — DEXAMETHASONE SODIUM PHOSPHATE 4 MG/ML
4 INJECTION, SOLUTION INTRA-ARTICULAR; INTRALESIONAL; INTRAMUSCULAR; INTRAVENOUS; SOFT TISSUE
Status: DISCONTINUED | OUTPATIENT
Start: 2024-08-20 | End: 2024-09-04 | Stop reason: HOSPADM

## 2024-08-20 RX ORDER — PROCHLORPERAZINE EDISYLATE 5 MG/ML
5-10 INJECTION INTRAMUSCULAR; INTRAVENOUS EVERY 4 HOURS PRN
Status: DISCONTINUED | OUTPATIENT
Start: 2024-08-20 | End: 2024-09-04 | Stop reason: HOSPADM

## 2024-08-20 RX ORDER — MEPERIDINE HYDROCHLORIDE 25 MG/ML
12.5 INJECTION INTRAMUSCULAR; INTRAVENOUS; SUBCUTANEOUS
Status: DISCONTINUED | OUTPATIENT
Start: 2024-08-20 | End: 2024-08-20 | Stop reason: HOSPADM

## 2024-08-20 RX ORDER — ONDANSETRON 2 MG/ML
4 INJECTION INTRAMUSCULAR; INTRAVENOUS EVERY 4 HOURS PRN
Status: DISCONTINUED | OUTPATIENT
Start: 2024-08-20 | End: 2024-08-23

## 2024-08-20 RX ORDER — SODIUM CHLORIDE, SODIUM LACTATE, POTASSIUM CHLORIDE, CALCIUM CHLORIDE 600; 310; 30; 20 MG/100ML; MG/100ML; MG/100ML; MG/100ML
INJECTION, SOLUTION INTRAVENOUS CONTINUOUS
Status: DISCONTINUED | OUTPATIENT
Start: 2024-08-20 | End: 2024-08-25

## 2024-08-20 RX ORDER — CALCIUM CARBONATE 500 MG/1
500 TABLET, CHEWABLE ORAL
Status: DISCONTINUED | OUTPATIENT
Start: 2024-08-20 | End: 2024-09-04 | Stop reason: HOSPADM

## 2024-08-20 RX ORDER — FERROUS SULFATE 325(65) MG
325 TABLET ORAL
Status: DISCONTINUED | OUTPATIENT
Start: 2024-08-21 | End: 2024-09-04 | Stop reason: HOSPADM

## 2024-08-20 RX ORDER — GUAIFENESIN 600 MG/1
600 TABLET, EXTENDED RELEASE ORAL EVERY 12 HOURS
Status: DISCONTINUED | OUTPATIENT
Start: 2024-08-20 | End: 2024-09-04 | Stop reason: HOSPADM

## 2024-08-20 RX ORDER — ACETAMINOPHEN 500 MG
1000 TABLET ORAL EVERY 6 HOURS PRN
Status: DISCONTINUED | OUTPATIENT
Start: 2024-08-25 | End: 2024-09-04 | Stop reason: HOSPADM

## 2024-08-20 RX ORDER — METOPROLOL TARTRATE 1 MG/ML
1 INJECTION, SOLUTION INTRAVENOUS
Status: DISCONTINUED | OUTPATIENT
Start: 2024-08-20 | End: 2024-08-20 | Stop reason: HOSPADM

## 2024-08-20 RX ORDER — OXYCODONE HCL 5 MG/5 ML
5 SOLUTION, ORAL ORAL
Status: COMPLETED | OUTPATIENT
Start: 2024-08-20 | End: 2024-08-20

## 2024-08-20 RX ORDER — ONDANSETRON 4 MG/1
4 TABLET, ORALLY DISINTEGRATING ORAL EVERY 4 HOURS PRN
Status: DISCONTINUED | OUTPATIENT
Start: 2024-08-20 | End: 2024-09-04 | Stop reason: HOSPADM

## 2024-08-20 RX ORDER — LACTULOSE 10 G/15ML
30 SOLUTION ORAL 2 TIMES DAILY
Status: DISCONTINUED | OUTPATIENT
Start: 2024-08-20 | End: 2024-08-21

## 2024-08-20 RX ORDER — DEXAMETHASONE SODIUM PHOSPHATE 4 MG/ML
INJECTION, SOLUTION INTRA-ARTICULAR; INTRALESIONAL; INTRAMUSCULAR; INTRAVENOUS; SOFT TISSUE PRN
Status: DISCONTINUED | OUTPATIENT
Start: 2024-08-20 | End: 2024-08-20 | Stop reason: SURG

## 2024-08-20 RX ORDER — HYDROMORPHONE HYDROCHLORIDE 1 MG/ML
0.1 INJECTION, SOLUTION INTRAMUSCULAR; INTRAVENOUS; SUBCUTANEOUS
Status: DISCONTINUED | OUTPATIENT
Start: 2024-08-20 | End: 2024-08-20 | Stop reason: HOSPADM

## 2024-08-20 RX ORDER — CARVEDILOL 12.5 MG/1
12.5 TABLET ORAL 2 TIMES DAILY
Status: DISCONTINUED | OUTPATIENT
Start: 2024-08-20 | End: 2024-09-04 | Stop reason: HOSPADM

## 2024-08-20 RX ORDER — ONDANSETRON 2 MG/ML
4 INJECTION INTRAMUSCULAR; INTRAVENOUS
Status: DISCONTINUED | OUTPATIENT
Start: 2024-08-20 | End: 2024-08-20 | Stop reason: HOSPADM

## 2024-08-20 RX ORDER — SODIUM CHLORIDE 9 MG/ML
INJECTION, SOLUTION INTRAVENOUS CONTINUOUS
Status: DISCONTINUED | OUTPATIENT
Start: 2024-08-20 | End: 2024-08-22

## 2024-08-20 RX ORDER — PROMETHAZINE HYDROCHLORIDE 25 MG/1
12.5-25 SUPPOSITORY RECTAL EVERY 4 HOURS PRN
Status: DISCONTINUED | OUTPATIENT
Start: 2024-08-20 | End: 2024-09-04 | Stop reason: HOSPADM

## 2024-08-20 RX ORDER — HYDRALAZINE HYDROCHLORIDE 20 MG/ML
INJECTION INTRAMUSCULAR; INTRAVENOUS PRN
Status: DISCONTINUED | OUTPATIENT
Start: 2024-08-20 | End: 2024-08-20 | Stop reason: SURG

## 2024-08-20 RX ORDER — HALOPERIDOL 5 MG/ML
1 INJECTION INTRAMUSCULAR
Status: DISCONTINUED | OUTPATIENT
Start: 2024-08-20 | End: 2024-08-20 | Stop reason: HOSPADM

## 2024-08-20 RX ORDER — MIDAZOLAM HYDROCHLORIDE 1 MG/ML
INJECTION INTRAMUSCULAR; INTRAVENOUS PRN
Status: DISCONTINUED | OUTPATIENT
Start: 2024-08-20 | End: 2024-08-20 | Stop reason: SURG

## 2024-08-20 RX ORDER — SODIUM CHLORIDE, SODIUM LACTATE, POTASSIUM CHLORIDE, CALCIUM CHLORIDE 600; 310; 30; 20 MG/100ML; MG/100ML; MG/100ML; MG/100ML
INJECTION, SOLUTION INTRAVENOUS CONTINUOUS
Status: ACTIVE | OUTPATIENT
Start: 2024-08-20 | End: 2024-08-20

## 2024-08-20 RX ORDER — AMLODIPINE BESYLATE 5 MG/1
5 TABLET ORAL 2 TIMES DAILY
Status: DISCONTINUED | OUTPATIENT
Start: 2024-08-20 | End: 2024-09-04 | Stop reason: HOSPADM

## 2024-08-20 RX ORDER — PROMETHAZINE HYDROCHLORIDE 25 MG/1
12.5-25 TABLET ORAL EVERY 4 HOURS PRN
Status: DISCONTINUED | OUTPATIENT
Start: 2024-08-20 | End: 2024-09-04 | Stop reason: HOSPADM

## 2024-08-20 RX ADMIN — Medication 200 MCG: at 10:53

## 2024-08-20 RX ADMIN — ROCURONIUM BROMIDE 20 MG: 50 INJECTION, SOLUTION INTRAVENOUS at 10:15

## 2024-08-20 RX ADMIN — MIDAZOLAM HYDROCHLORIDE 2 MG: 2 INJECTION, SOLUTION INTRAMUSCULAR; INTRAVENOUS at 07:45

## 2024-08-20 RX ADMIN — CITALOPRAM 40 MG: 40 TABLET, FILM COATED ORAL at 17:10

## 2024-08-20 RX ADMIN — NOREPINEPHRINE BITARTRATE 0.1 MCG/KG/MIN: 1 SOLUTION INTRAVENOUS at 17:50

## 2024-08-20 RX ADMIN — TRANEXAMIC ACID 1000 MG: 100 INJECTION, SOLUTION INTRAVENOUS at 17:02

## 2024-08-20 RX ADMIN — PROPOFOL 100 MG: 10 INJECTION, EMULSION INTRAVENOUS at 08:42

## 2024-08-20 RX ADMIN — ACETAMINOPHEN 1000 MG: 500 TABLET ORAL at 17:10

## 2024-08-20 RX ADMIN — METRONIDAZOLE 500 MG: 5 INJECTION, SOLUTION INTRAVENOUS at 08:38

## 2024-08-20 RX ADMIN — METHOCARBAMOL 1000 MG: 100 INJECTION, SOLUTION INTRAMUSCULAR; INTRAVENOUS at 14:04

## 2024-08-20 RX ADMIN — CEFOTETAN DISODIUM 2 G: 2 INJECTION, POWDER, FOR SOLUTION INTRAMUSCULAR; INTRAVENOUS at 08:26

## 2024-08-20 RX ADMIN — ROCURONIUM BROMIDE 50 MG: 50 INJECTION, SOLUTION INTRAVENOUS at 07:50

## 2024-08-20 RX ADMIN — Medication 200 MCG: at 10:25

## 2024-08-20 RX ADMIN — OXYCODONE HYDROCHLORIDE 10 MG: 10 TABLET ORAL at 22:54

## 2024-08-20 RX ADMIN — Medication 200 MCG: at 12:08

## 2024-08-20 RX ADMIN — SODIUM CHLORIDE, POTASSIUM CHLORIDE, SODIUM LACTATE AND CALCIUM CHLORIDE: 600; 310; 30; 20 INJECTION, SOLUTION INTRAVENOUS at 09:15

## 2024-08-20 RX ADMIN — SODIUM CHLORIDE, POTASSIUM CHLORIDE, SODIUM LACTATE AND CALCIUM CHLORIDE: 600; 310; 30; 20 INJECTION, SOLUTION INTRAVENOUS at 10:55

## 2024-08-20 RX ADMIN — Medication 100 MCG: at 08:10

## 2024-08-20 RX ADMIN — PROPOFOL 150 MG: 10 INJECTION, EMULSION INTRAVENOUS at 07:50

## 2024-08-20 RX ADMIN — ONDANSETRON 4 MG: 2 INJECTION INTRAMUSCULAR; INTRAVENOUS at 12:14

## 2024-08-20 RX ADMIN — Medication 300 MCG: at 12:25

## 2024-08-20 RX ADMIN — Medication 5 MG: at 22:54

## 2024-08-20 RX ADMIN — SENNOSIDES AND DOCUSATE SODIUM 1 TABLET: 50; 8.6 TABLET ORAL at 17:10

## 2024-08-20 RX ADMIN — SUGAMMADEX 200 MG: 100 INJECTION, SOLUTION INTRAVENOUS at 12:36

## 2024-08-20 RX ADMIN — HYDROMORPHONE HYDROCHLORIDE 1 MG: 2 INJECTION INTRAMUSCULAR; INTRAVENOUS; SUBCUTANEOUS at 09:04

## 2024-08-20 RX ADMIN — ROCURONIUM BROMIDE 30 MG: 50 INJECTION, SOLUTION INTRAVENOUS at 09:04

## 2024-08-20 RX ADMIN — Medication 100 MCG: at 09:42

## 2024-08-20 RX ADMIN — FENTANYL CITRATE 100 MCG: 50 INJECTION, SOLUTION INTRAMUSCULAR; INTRAVENOUS at 12:39

## 2024-08-20 RX ADMIN — Medication 200 MCG: at 11:40

## 2024-08-20 RX ADMIN — GABAPENTIN 300 MG: 300 CAPSULE ORAL at 17:10

## 2024-08-20 RX ADMIN — Medication 100 MCG: at 10:13

## 2024-08-20 RX ADMIN — GUAIFENESIN 600 MG: 600 TABLET, EXTENDED RELEASE ORAL at 17:10

## 2024-08-20 RX ADMIN — Medication 100 MCG: at 12:11

## 2024-08-20 RX ADMIN — FENTANYL CITRATE 50 MCG: 50 INJECTION, SOLUTION INTRAMUSCULAR; INTRAVENOUS at 14:00

## 2024-08-20 RX ADMIN — Medication 200 MCG: at 09:52

## 2024-08-20 RX ADMIN — LACTULOSE 30 ML: 10 SOLUTION ORAL at 17:10

## 2024-08-20 RX ADMIN — EPHEDRINE SULFATE 5 MG: 50 INJECTION, SOLUTION INTRAVENOUS at 13:16

## 2024-08-20 RX ADMIN — OXYCODONE HYDROCHLORIDE 5 MG: 5 TABLET ORAL at 19:49

## 2024-08-20 RX ADMIN — Medication 200 MCG: at 12:00

## 2024-08-20 RX ADMIN — Medication 200 MCG: at 11:07

## 2024-08-20 RX ADMIN — SODIUM CHLORIDE, POTASSIUM CHLORIDE, SODIUM LACTATE AND CALCIUM CHLORIDE: 600; 310; 30; 20 INJECTION, SOLUTION INTRAVENOUS at 12:17

## 2024-08-20 RX ADMIN — OXYCODONE HYDROCHLORIDE 10 MG: 5 SOLUTION ORAL at 13:27

## 2024-08-20 RX ADMIN — SODIUM CHLORIDE, POTASSIUM CHLORIDE, SODIUM LACTATE AND CALCIUM CHLORIDE: 600; 310; 30; 20 INJECTION, SOLUTION INTRAVENOUS at 07:39

## 2024-08-20 RX ADMIN — SODIUM CHLORIDE, POTASSIUM CHLORIDE, SODIUM LACTATE AND CALCIUM CHLORIDE: 600; 310; 30; 20 INJECTION, SOLUTION INTRAVENOUS at 14:56

## 2024-08-20 RX ADMIN — Medication 100 MCG: at 11:21

## 2024-08-20 RX ADMIN — DEXAMETHASONE SODIUM PHOSPHATE 8 MG: 4 INJECTION INTRA-ARTICULAR; INTRALESIONAL; INTRAMUSCULAR; INTRAVENOUS; SOFT TISSUE at 07:46

## 2024-08-20 RX ADMIN — ROCURONIUM BROMIDE 20 MG: 50 INJECTION, SOLUTION INTRAVENOUS at 12:00

## 2024-08-20 RX ADMIN — Medication 200 MCG: at 11:54

## 2024-08-20 RX ADMIN — Medication 200 MCG: at 09:26

## 2024-08-20 RX ADMIN — Medication 100 MCG: at 12:16

## 2024-08-20 RX ADMIN — HYDRALAZINE HYDROCHLORIDE 10 MG: 20 INJECTION, SOLUTION INTRAMUSCULAR; INTRAVENOUS at 09:11

## 2024-08-20 RX ADMIN — FENTANYL CITRATE 50 MCG: 50 INJECTION, SOLUTION INTRAMUSCULAR; INTRAVENOUS at 07:46

## 2024-08-20 ASSESSMENT — PAIN DESCRIPTION - PAIN TYPE
TYPE: ACUTE PAIN

## 2024-08-20 ASSESSMENT — PAIN SCALES - GENERAL: PAIN_LEVEL: 2

## 2024-08-20 ASSESSMENT — FIBROSIS 4 INDEX
FIB4 SCORE: 2.03
FIB4 SCORE: 1.93

## 2024-08-20 NOTE — ANESTHESIA PROCEDURE NOTES
Arterial Line    Performed by: Piotr Zavaleta M.D.  Authorized by: Piotr Zavaleta M.D.    Start Time:  8/20/2024 8:00 AM  End Time:  8/20/2024 8:22 AM  Localization: surface landmarks    Patient Location:  OR  Indication: continuous blood pressure monitoring, blood sampling needed and unable to use non-invasive cuff        Catheter Size:  20 G  Seldinger Technique?: Yes    Laterality:  Right  Site:  Radial artery  Line Secured:  Antimicrobial disc, tape and transparent dressing  Events: patient tolerated procedure well with no complications and greater than 3 attempts

## 2024-08-20 NOTE — ANESTHESIA TIME REPORT
Anesthesia Start and Stop Event Times       Date Time Event    8/20/2024 0737 Ready for Procedure     0739 Anesthesia Start     1253 Anesthesia Stop          Responsible Staff  08/20/24      Name Role Begin End    Piotr Zavaleta M.D. Anesth 0739 1253          Overtime Reason:  no overtime (within assigned shift)    Comments:

## 2024-08-20 NOTE — ANESTHESIA POSTPROCEDURE EVALUATION
Patient: Patricia A Tietz    Procedure Summary       Date: 08/20/24 Room / Location: [place] [address] / SURGERY [facility]    Anesthesia Start: 0739 Anesthesia Stop: 1253    Procedures:       OPEN INCISIONAL HERNIA REPAIR WITH MESH (Abdomen)      EXCISION, SCAR (Abdomen)      LYSIS, ADHESIONS (Abdomen)      RETROPERITONEAL EXPLORATION (Abdomen) Diagnosis: (INCISIONAL HERNIA, HYDRONEPHROSIS)    Surgeons: Elmer Mercado M.D.; Denis Gusman M.D. Responsible Provider: Piotr Zavaleta M.D.    Anesthesia Type: general ASA Status: 3            Final Anesthesia Type: general  Last vitals  BP   Blood Pressure: 109/58    Temp   37.6 °C (99.7 °F)    Pulse   60   Resp   (!) 22    SpO2   94 %      Anesthesia Post Evaluation    Patient location during evaluation: PACU  Patient participation: complete - patient participated  Level of consciousness: awake and alert  Pain score: 2    Airway patency: patent  Anesthetic complications: no  Cardiovascular status: hemodynamically stable  Respiratory status: acceptable  Hydration status: euvolemic    PONV: none          There were no known notable events for this encounter.     Nurse Pain Score: 0 (NPRS)

## 2024-08-20 NOTE — PROGRESS NOTES
Pulmonary and Critical Care Medicine Progress Note    I had the pleasure of being asked to evaluate this lady in [facility] PACU 5a for admission disposition.  She may be safely admitted to the IMCU.    Claus Lopez MD  Pulmonary and Critical Care Medicine

## 2024-08-20 NOTE — CONSULTS
Hospital Medicine Consultation    Date of Service  8/20/2024    Referring Physician  Elmer Mercado M.D.    Consulting Physician  Brendon Napoles M.D.    Reason for Consultation  hypotension    History of Presenting Illness  59 y.o. female who presented 8/20/2024 with elective surgery by Dr. Mercado and Dr. Gusman.  Ms. 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  Today she underwent surgery by Dr. Mercado general surgery as well as Dr. Gusman urology with exploratory laparotomy repair of 10 cm abdominal wall hernia myocutaneous flap trunk with mesh and placement of catheter into the stoma.  Preoperatively, her hemoglobin was 11.7 with a creatinine 1.15.  In PACU she is hypotensive despite Rudi-Synephrine and 4 L of IV fluids.  She has 3 drains in place 2 of which been emptied with sanguinous fluid.  Given her ongoing hypotension, she will be admitted to the IMCU for initiation of IV pressors which have been ordered.  Stat CBC has been ordered to evaluate for ongoing blood loss as stat cortisol levels pending as well.    Review of Systems  Review of Systems   Unable to perform ROS: Mental acuity       Past Medical History   has a past medical history of Anemia, Chronic anticoagulation, Chronic pain syndrome, Depression, DVT of lower extremity (deep venous thrombosis) (HCC), Dyspnea, GERD (gastroesophageal reflux disease), High cholesterol, Hypertension, Infectious disease, Kidney stones, MRSA (methicillin resistant Staphylococcus aureus), Nephrolithiasis, Pacemaker, Sinus bradycardia, and Sleep apnea.    Surgical History   has a past surgical history that includes appendectomy; other; pr upper gi  endoscopy,diagnosis (N/A, 10/01/2023); pr upper gi endoscopy,biopsy (N/A, 10/01/2023); knee replacement, total (Left, 01/24/2022); knee revision total (Left, 08/2023); other; and other.    Family History  family history includes Coronary artery disease in her father; Diabetes in her mother; Hypertension in her mother; Kidney Disease in her mother.    Social History   reports that she has never smoked. She has never used smokeless tobacco. She reports that she does not currently use alcohol. She reports that she does not use drugs.    Medications  Prior to Admission Medications   Prescriptions Last Dose Informant Patient Reported? Taking?   MAGNESIUM OXIDE PO 8/13/2024 at HOLD MAR from Other Facility Yes No   Sig: Take 1 Tablet by mouth every day.   acetaminophen (TYLENOL) 325 MG Tab 8/18/2024 at PRN MAR from Other Facility Yes No   Sig: Take 650 mg by mouth every four hours as needed for Mild Pain.   acidophilus lactobacillus Cap 8/18/2024 at HOLD MAR from Other Facility Yes No   Sig: Take 1 Capsule by mouth in the morning, at noon, and at bedtime.   amLODIPine (NORVASC) 5 MG Tab 8/19/2024 at 0800 MAR from Other Facility Yes Yes   Sig: Take 5 mg by mouth 2 times a day.   apixaban (ELIQUIS) 5mg Tab 8/12/2024 at 2000 MAR from Other Facility Yes No   Sig: Take 5 mg by mouth 2 times a day.   carvedilol (COREG) 12.5 MG Tab 8/19/2024 at 0800 MAR from Other Facility Yes Yes   Sig: Take 12.5 mg by mouth 2 times a day.   citalopram (CELEXA) 40 MG TABS 8/19/2024 at 0800 MAR from Other Facility Yes Yes   Sig: Take 40 mg by mouth every morning.   ferrous sulfate 325 (65 Fe) MG tablet 8/14/2024 at 0800 MAR from Other Facility No No   Sig: Take 1 Tablet by mouth every morning with breakfast.   gabapentin (NEURONTIN) 300 MG Cap 8/19/2024 at 0800 MAR from Other Facility Yes Yes   Sig: Take 300 mg by mouth every day.   guaiFENesin ER (MUCINEX) 600 MG TABLET SR 12 HR 8/19/2024 at 63791 MAR from Other Facility Yes Yes   Sig: Take 600  mg by mouth every 12 hours. 7 day course started 08/12/2024   hydrALAZINE (APRESOLINE) 25 MG Tab 8/19/2024 at 0800 MAR from Other Facility Yes Yes   Sig: Take 25 mg by mouth 3 times a day. Hold for sbp <110   lactulose 10 GM/15ML Solution 8/18/2024 at 2000 MAR from Other Facility Yes No   Sig: Take 30 mL by mouth 2 times a day.   lisinopril (PRINIVIL) 5 MG Tab 8/18/2024 at HOLD MAR from Other Facility Yes No   Sig: Take 5 mg by mouth every day.   melatonin 5 mg Tab 8/19/2024 at 2000 MAR from Other Facility Yes Yes   Sig: Take 5 mg by mouth every evening.   methocarbamol (ROBAXIN) 750 MG Tab 8/18/2024 MAR from Other Facility Yes No   Sig: Take 750 mg by mouth 4 times a day as needed (MUSCLE SPASMS).   omeprazole (PRILOSEC) 20 MG delayed-release capsule 8/19/2024 at 63691 MAR from Other Facility No Yes   Sig: Take 1 Capsule by mouth 2 times a day.   oxyCODONE-acetaminophen (PERCOCET) 7.5-325 MG per tablet 8/18/2024 at 2051 MAR from Other Facility Yes No   Sig: Take 1 Tablet by mouth every 6 hours as needed for Moderate Pain.   polyethylene glycol-electrolytes (GOLYTELY) 236 GM Recon Soln 8/19/2024 at FINISHED MAR from Other Facility Yes Yes   Sig: Take 4 L by mouth one time. Pre-op one day prep   senna-docusate (PERICOLACE OR SENOKOT S) 8.6-50 MG Tab 8/18/2024 at 2000 MAR from Other Facility Yes No   Sig: Take 1 Tablet by mouth 2 times a day.   sodium bicarbonate (SODIUM BICARBONATE) 650 MG Tab 8/18/2024 at 0700 MAR from Other Facility Yes No   Sig: Take 1,300 Tablets by mouth in the morning, at noon, and at bedtime.   COORDINATE MEDICATION INSTRUCTIONS FROM PRESCRIBING PHYSICIAN FOR SURGERY 8/20/24   traZODone (DESYREL) 50 MG Tab 8/18/2024 at 2000 MAR from Other Facility Yes No   Sig: Take 25 mg by mouth at bedtime as needed for Sleep. 1/2 tablet = 25mg   vitamin D2, Ergocalciferol, (DRISDOL) 1.25 MG (86385 UT) Cap capsule 8/16/2024 at 0800 MAR from Other Facility Yes Yes   Sig: Take 50,000 Units by mouth every day.       Facility-Administered Medications: None       Allergies  No Known Allergies    Physical Exam  Temp:  [35.9 °C (96.7 °F)-37.6 °C (99.7 °F)] 37.6 °C (99.7 °F)  Pulse:  [60] 60  Resp:  [22] 22  BP: ()/(58) 109/58  SpO2:  [94 %-95 %] 94 %    Physical Exam  Vitals and nursing note reviewed.   Constitutional:       Appearance: She is obese. She is ill-appearing.   Cardiovascular:      Rate and Rhythm: Normal rate and regular rhythm.   Abdominal:      Comments: Urostomy, 3 WERNER drains in, midline incision covered without bleeding   Skin:     General: Skin is dry.      Coloration: Skin is pale.   Neurological:      Comments: She is quite somnolent and a nonhistorian         Fluids  Date 08/20/24 0700 - 08/21/24 0659   Shift 5783-5686 5533-3486 8288-3223 24 Hour Total   INTAKE   I.V. 3500   3500   IV Piggyback 100   100   Shift Total 3600   3600   OUTPUT   Urine 1600   1600   Blood 700   700   Shift Total 2300   2300   Weight (kg) 114.9 114.9 114.9 114.9       Laboratory  Recent Labs     08/20/24  0630   WBC 4.9   RBC 3.88*   HEMOGLOBIN 11.7*   HEMATOCRIT 36.1*   MCV 93.0   MCH 30.2   MCHC 32.4   RDW 45.1   PLATELETCT 226   MPV 10.3     Recent Labs     08/20/24  0630   SODIUM 139   POTASSIUM 4.4   CHLORIDE 107   CO2 20   GLUCOSE 115*   BUN 27*   CREATININE 1.15   CALCIUM 9.8                     Imaging  No orders to display       Assessment/Plan  * Shock (HCC)- (present on admission)  Assessment & Plan  Postoperative hypotension for which she has received Rudi-Synephrine and she remains hypotensive after 4 L of IV fluids as she is postop with WERNER drains in with blood will check a stat CBC  And will also check stat cortisol level she may benefit from IV hydrocortisone  IV Levophed drip has been ordered titrate to mean arterial pressure over 65  Hold on her outpatient blood pressure medications  She may need to midline, PICC line or central line for IV access      Atrial fibrillation (HCC)- (present on  admission)  Assessment & Plan  Hx of  Eliquis on hold due to surgery  Continuous tele monitoring    Obesity- (present on admission)  Assessment & Plan  BMI 46    Chronic respiratory failure with hypoxia (HCC)- (present on admission)  Assessment & Plan  On outpatient oxygen    Pacemaker- (present on admission)  Assessment & Plan  Hx of    Presence of urostomy (HCC)- (present on admission)  Assessment & Plan  Chronic since 5 years old now with surgery by Dr. Gusman and Dr. Mercado  She has 3 WERNER drains in  Pain control with narcotics as indicated  SCDs for DVT prophylaxis  She has an NG tube in

## 2024-08-20 NOTE — ASSESSMENT & PLAN NOTE
8/21 Hx of  Has been paced and sinus overnight  Cont Coreg with parameters  Cont Tele  Resume apixaban when OK with Surgical team    8/24 okay to restart Eliquis per surgery, will monitor  Follow-up CBC    8/25 restarted on Eliquis, hemoglobin from 10-7, will monitor for signs of bleeding  Serous discharge from drains  Discussed with surgery, will continue to monitor  May need transfusion if hemoglobin less than 7, monitor closely    8/26 resume Eliquis    8/27 currently rate controlled.  On continuing Eliquis.  Optimize electrolytes.    8/28 patient is going for surgical intervention plan is to hold Eliquis.    8/29 holding Eliquis for now as hemoglobin remains low and she may need blood transfusion.

## 2024-08-20 NOTE — OR NURSING
1247-Pt arrived from OR, resting calmly with even, unlabored breathing, vss, no pain, no nausea, site CDI and soft to palpation, WERNER drains x3 and urostomy in place.    1401-RN contacted pt's Neo and provided updates, all questions answered.    1400-RN called report to KYRIE Fountain    1426-Pt transferred in bed with RN and CCT and all belongings to floor.  Vss, no pain, no nausea, sites CDI and soft to palpation, WERNER drains x3 and urostomy in place.

## 2024-08-20 NOTE — PROGRESS NOTES
Medication history reviewed with MAR from [facility]    Med rec is complete per MAR review    Allergies reviewed with PT at bedside    MAR does not indicate any outpatient antibiotics in the last 30 days.     Patient is taking Eliquis 5mg. Last dose was 08/12/2024 at 2000    Preferred pharmacy for this visit - [facility] in [place] ([phone])

## 2024-08-20 NOTE — CARE PLAN
The patient is Watcher - Medium risk of patient condition declining or worsening         Progress made toward(s) clinical / shift goals:        Problem: Knowledge Deficit - Standard  Goal: Patient and family/care givers will demonstrate understanding of plan of care, disease process/condition, diagnostic tests and medications  Outcome: Progressing     Problem: Pain - Standard  Goal: Alleviation of pain or a reduction in pain to the patient’s comfort goal  Outcome: Progressing       Patient is not progressing towards the following goals:

## 2024-08-20 NOTE — ANESTHESIA PREPROCEDURE EVALUATION
Case: [id number] Date/Time: 08/20/24 0715    Procedures:       ROBOTIC POSSIBLE OPEN INCISIONAL HERNIA REPAIR      REVISION OF URINARY DIVERSION, POSSIBLE NEW ILEAL CONDUIT, POSSIBLE COLON CONDUIT    Pre-op diagnosis: INCISIONAL HERNIA, HYDRONEPHROSIS    Location: [facility] OR 15 / SURGERY [facility]    Surgeons: Elmer Mercado M.D.; Denis Gusman M.D.            Relevant Problems   ANESTHESIA   (positive) Sleep apnea      PULMONARY   (positive) Dyspnea      CARDIAC   (positive) DVT of lower extremity (deep venous thrombosis) ([facility])   (positive) Pacemaker   (positive) Primary hypertension   (positive) Sinus bradycardia         (positive) RAO (acute kidney injury) ([facility])   (positive) Nephrolithiasis       Physical Exam    Airway   Mallampati: III  TM distance: >3 FB  Neck ROM: full       Cardiovascular - normal exam  Rhythm: regular  Rate: normal  (-) murmur     Dental - normal exam      Very poor dentition     Pulmonary - normal exam  Breath sounds clear to auscultation     Abdominal   (+) obese     Neurological - normal exam         Other findings: Patient is wheelchair bound because of knee pain.              Anesthesia Plan    ASA 3   ASA physical status 3 criteria: morbid obesity - BMI greater than or equal to 40 and implanted pacemaker    Plan - general       Airway plan will be ETT    (Consented for TAP block and a-line.)      Induction: intravenous    Postoperative Plan: Postoperative administration of opioids is intended.    Pertinent diagnostic labs and testing reviewed    Informed Consent:    Anesthetic plan and risks discussed with patient.    Use of blood products discussed with: patient whom consented to blood products.

## 2024-08-20 NOTE — PROGRESS NOTES
4 Eyes Skin Assessment Completed by KYRIE Pruitt and KYRIE Mayer.    Head WDL  Ears WDL  Nose WDL  Mouth WDL  Neck WDL  Breast/Chest Redness under breasts  Shoulder Blades WDL  Spine WDL    (R) Arm/Elbow/Hand redness and blanching on elbow  (L) Arm/Elbow/Hand WDL  Abdomen Incision multiple incisions with island dressings, 3 WERNER drains, urostomy site with redness surrounding    Groin Redness and Excoriation under pannus    Scrotum/Coccyx/Buttocks Redness and Blanching    (R) Leg Redness discoloration, dry  (L) Leg Redness discoloration, dry  (R) Heel/Foot/Toe Redness and Blanching          (L) Heel/Foot/Toe Redness and Blanching            Devices In Places ECG, Blood Pressure Cuff, Pulse Ox, Arterial Line, SCD's, OG/NG, and Oxy Mask      Interventions In Place Heel Mepilex, Sacral Mepilex, TAP System, Pillows, Q2 Turns, and Low Air Loss Mattress    Possible Skin Injury Yes    Pictures Uploaded Into Epic Yes  Wound Consult Placed Yes  RN Wound Prevention Protocol Ordered Yes

## 2024-08-20 NOTE — ASSESSMENT & PLAN NOTE
Post operative requiring Rudi synephrine initially and then Levophed in the unit  Vasopressors DC'd early this am  Cortisol>30  No evidence of sepsis  Cont to monitor and resume BP meds as pressures allow    8/23 resolved, monitor

## 2024-08-20 NOTE — OP REPORT
OPERATIVE NOTE    PREOPERATIVE DIAGNOSIS: 1.  Urostomy site hernia 2.  Abdominal adhesions    POSTOPERATIVE DIAGNOSIS: Same    PROCEDURE PERFORMED: 1.  Exploratory laparotomy 2.  Repair of greater than 10 cm abdominal wall hernia 3.  Myocutaneous flap trunk x 3 4.  Mesh implantation 5.  Drain placement 6.  Scar revision/excision 7.  Significant lysis of adhesions    SURGEON: Elmer Mercado M.D. co-surgeon Dr. Denis Gusman    ASSISTANT: Maxine Wilson    An assistant was required for the safe completion of the surgical case.  The assistant provided retraction, exposure, performed wound closure and assisted with instrumentation promoting the efficiency of the surgery performed.  I felt an assistant was necessary in the interest of safety and efficiency.  My request for assistance is based on my training and experience and should be patently obvious to the most casual observer as necessary.     ANESTHESIOLOGIST:  Anesthesiologist: Piotr Zavaleta M.D.     ANESTHESIA: general     FINDINGS: Severe abdominal adhesions causing likely a chronic bowel obstruction 2.  Bowel containing left lower quadrant incisional hernia 3.  Enlarged uterus.     WOUND CLASS: Clean contaminated    SPECIMEN: None    ESTIMATED BLOOD LOSS: 500 mL    Indications: Patient is a 59-year-old female who presents with a symptomatic left lower quadrant abdominal wall hernia.  She is having difficulty with her urostomy appliance.  Due to the potential difficulty of the case Dr. Denis Gusman joined as urologist co-surgeon.  Patient was counseled extensively as of the risk first benefits of surgery and agreed to proceed fully informed.    Description:    The patient was prepped and draped in the standard sterile surgical fashion after induction of general anesthesia.  Appropriate timeout was performed antibiotics delivered.  I began with a midline incision to include an elliptical incision of the old scar.  The prior scar was excised and passed off the  field.  I then entered the abdomen in the upper midline.  There were dense adhesions.  We slowly and tediously exposed and open the midline.  We then began by performing a lysis of adhesions on the left side of the abdomen.  We were able to get to the urostomy which was evident by the Barrios catheter placed within.  I was able to carefully reduce the contents of the hernia, while leaving the urostomy in place.    .  We then excised the adhesions on the right side of the abdomen as well.  At this was a very tedious lysis of adhesions taking approximately 1 hour.  Once that was accomplished we lysed adhesions in the left upper quadrant which had the large loop of bowel.  This appeared to be fairly proximal small intestine.  We lysed some fairly dense scar tissue which we felt may relieve this chronic obstruction.    Once we had freed up the abdominal contents we then began with the component separation.  I took down the left posterior rectus sheath.  This revealed pale musculature with poor response to electricity.  I did preserve the epigastrics to the flap  (flap trunk #1) I then performed a transversus abdominis release from the top to the bottom (flap trunk #2).  Once I reached to the urostomy site I had to stop the transversus abdominis release secondary to the urostomy diving into the retroperitoneum near this point.  I was able to free up the preperitoneal space in the inguinal canal to the urostomy site hernia.  There was a large pelvic uterus posterior to this portion of the dissection.    I then did a retrorectus dissection on the right-hand side freeing up the fairly normal-appearing rectus musculature on the patient's right (flap trunk x 3) while preserving the epigastrics of the flap.  At this point I closed the posterior layers.  I used a running 0 Vicryl sutures.  Due to some posterior element tears during the dissection I used phasic's ST to close the gaps and allow for a tension-free closure.  This  completely hid the viscera from the retrorectus space.  I used 2-0 PDS sutures circumferentially to sew these mesh patches in place.    I then chose a phasic's ST 30 x 30 cm mesh.  I fashion this to fit the retrorectus space and placed this around the urostomy site.  At that point I felt like I would have difficulty reaching the more lateral hernia and elected to perform a counterincision lateral to the urostomy site.  I used interrupted 2-0 Vicryl sutures to suture and the mesh.  I placed a 10 Iraqi flat fluted drain in the retrorectus space.  I used 2 running #1 PDS sutures to reapproximate the fascia in the midline.  I then turned my attention to the lateral ostomy site.  I used a 10 blade scalpel to make an incision.  I then took this down to the hernia sac which I dissected off of the urostomy.  Again we were able to identify the urostomy by the Barrios catheter and the urologist expertise.  I was able to identify the retrorectus mesh and assure this laid over the hernia site.     I closed the fascia the external oblique with a running 0 Vicryl followed by a second layer of 0 PDS suture.  I left adequate space for the urostomy.  I then performed an onlay mesh repair with phasic's mesh.  I used interrupted 2-0 Vicryl sutures to hold this into place.  I placed a second 10 flat fluted drain in this space which was closed with interrupted 3-0 Vicryl sutures.  The skin edges were reapproximated with staples.    We then placed a third drain in the subcutaneous tissues of the midline abdominal incision.  Staples were used to close this incision over the drain.  Appropriate dry sterile dressings were applied.  The sponge and instrument counts were correct.  The patient was then extubated to recovery in satisfactory condition.    See Dr. Gusman's note for retroperitoneal dissection.      ____________________________________     Elmer Mercado M.D.    DT: 8/20/2024  12:25 PM      Cc: Maria Eugenia Grayson M.D.

## 2024-08-20 NOTE — ANESTHESIA PROCEDURE NOTES
Airway    Date/Time: 8/20/2024 7:54 AM    Performed by: Piotr Zavaleta M.D.  Authorized by: Piotr Zavaleta M.D.    Location:  OR  Urgency:  Elective  Difficult Airway: No    Indications for Airway Management:  Anesthesia      Spontaneous Ventilation: absent    Sedation Level:  Deep  Preoxygenated: Yes    Patient Position:  Sniffing  Mask Difficulty Assessment:  1 - vent by mask  Final Airway Type:  Endotracheal airway  Final Endotracheal Airway:  ETT  Cuffed: Yes    Technique Used for Successful ETT Placement:  Video laryngoscopy    Insertion Site:  Oral  Blade Type:  Glide  Laryngoscope Blade/Videolaryngoscope Blade Size:  3  ETT Size (mm):  7.5  Measured from:  Teeth  ETT to Teeth (cm):  19  Placement Verified by: auscultation and capnometry    Cormack-Lehane Classification:  Grade IIa - partial view of glottis  Number of Attempts at Approach:  1

## 2024-08-20 NOTE — OP REPORT
OPERATIVE NOTE     PREOPERATIVE DIAGNOSIS: 1.  Urostomy site hernia 2.  Abdominal adhesions     POSTOPERATIVE DIAGNOSIS: Same     Procedure: Urology portion of the procedure -exploration of the retroperitoneum.  Placement of catheter into the urine stoma.    SURGEON: Dr. Denis Gusman, co-surgeon Elmer Mercado M.D.       ASSISTANT: Arelis TOVAR    ANESTHESIOLOGIST:  Anesthesiologist: Piotr Zavaleta M.D.      ANESTHESIA: general     Findings:     ESTIMATED BLOOD LOSS: 500 mL     Indications: Patient is a 59-year-old female who presents with a symptomatic left lower quadrant abdominal wall hernia around the urostomy.  She is having difficulty with her urostomy appliance. Dr. Mercado performed the abdominal wall reconstruction and parastomal hernia repair. I was involved for the high likelihood of needing to build a new urinary conduit (ileum vs colon conduit).  Patient was counseled extensively as of the risk first benefits of surgery and agreed to proceed fully informed.    Operative detail: Please see Dr. Mercado's operative report for details on the hernia repair.    During lysis of adhesions around the parastomal hernia as we reduce the hernia we dissected out the conduit down to the retroperitoneum.  We did not dissect out the ureters.  By dissecting out the conduit to the retroperitoneum this completed our exploration of the retroperitoneum.  During the procedure we had a 20 Croatian Barrios catheter placed into the urostomy to aid in visualizing and finding the stoma.  There was mostly clear yellow urine for a total of 800 cc during the procedure.  Near the end of the procedure the urine became slightly pink.  During our dissection there was no evidence of injury to the urostomy stoma or blood supply.  The stoma was pink and healthy appearing.  At the conclusion of procedure I placed a 18 Croatian red rubber catheter into the stoma and then sewed this in with a 2-0 nylon.  New urostomy appliance was  applied.    Drains: 3 mo drains, 1 abdominal, 1 lateral counterincision, 1 supra-fascial. 18fr red rubber catheter in the stoma    Disposition: inpatient, will remove red rubber catheter prior to discharge.  Hospitalist and general surgery will be following the patient.

## 2024-08-20 NOTE — H&P
Surgery General History & Physical Note    Date  8/20/2024    Primary Care Physician  Maria Eugenia Grayson M.D.    CC  * No Diagnosis Codes entered *    HPI  This is a 59 y.o. female who presented with a massive parastomal/ventral hernia affecting the ability to place the urostomy bag.    It is affecting her activities of daily living.  She strongly desires repair.    Past Medical History:   Diagnosis Date    Anemia     Chronic anticoagulation     Chronic pain syndrome     Depression     DVT of lower extremity (deep venous thrombosis) (HCC)     Dyspnea     GERD (gastroesophageal reflux disease)     High cholesterol     Hypertension     Infectious disease     sepsis 2023    Kidney stones     MRSA (methicillin resistant Staphylococcus aureus)     In the past, unsure of date.    Nephrolithiasis     urostomy    Pacemaker     Sinus bradycardia     Sleep apnea     Uses a CPAP with oxygen.       Past Surgical History:   Procedure Laterality Date    [place] UPPER GI ENDOSCOPY,DIAGNOSIS N/A 10/01/2023    Procedure: GASTROSCOPY;  Surgeon: Kelly Santos M.D.;  Location: SURGERY [facility];  Service: Gastroenterology    [place] UPPER GI ENDOSCOPY,BIOPSY N/A 10/01/2023    Procedure: GASTROSCOPY, WITH BIOPSY;  Surgeon: Kelly Santos M.D.;  Location: SURGERY [facility];  Service: Gastroenterology    KNEE REVISION TOTAL Left 08/2023    Left knee hardware removed/ spacer placed    KNEE REPLACEMENT, TOTAL Left 01/24/2022    APPENDECTOMY      OTHER      Neck surgery 1978 and 2010    OTHER      Kidney stones remove 1976    OTHER      Urostomy 1969       Current Facility-Administered Medications   Medication Dose Route Frequency Provider Last Rate Last Admin    lidocaine (Xylocaine) 1 % injection 0.5 mL  0.5 mL Intradermal Once PRN Elmer Mercado M.D.        lactated ringers infusion   Intravenous Continuous Elmer Mercado M.D.           Social History     Socioeconomic History    Marital status: Single     Spouse name: Not on file     Number of children: Not on file    Years of education: Not on file    Highest education level: Not on file   Occupational History    Not on file   Tobacco Use    Smoking status: Never    Smokeless tobacco: Never   Vaping Use    Vaping status: Never Used   Substance and Sexual Activity    Alcohol use: Not Currently    Drug use: No    Sexual activity: Not on file   Other Topics Concern    Not on file   Social History Narrative    Not on file     Social Determinants of Health     Financial Resource Strain: Not on file   Food Insecurity: Not on file (5/6/2024)   Transportation Needs: Not on file (5/6/2024)   Physical Activity: Not on file   Stress: Not on file   Social Connections: Not on file   Intimate Partner Violence: Low Risk  (5/6/2024)    [facility]    History of Abuse     Have you ever been afraid of, threatened, neglected, or abused by someone?: No   Housing Stability: Not on file (5/6/2024)       Family History   Problem Relation Age of Onset    Hypertension Mother     Kidney Disease Mother     Diabetes Mother     Coronary artery disease Father        Allergies  Patient has no known allergies.    Review of Systems  Negative    Physical Exam  Complex scarring pattern.   Massive left sided hernia  Vital Signs  Blood Pressure: 99/58   Temperature: 35.9 °C (96.7 °F)   Pulse: 60       Pulse Oximetry: 95 %       Labs:  Recent Labs     08/20/24  0630   WBC 4.9   RBC 3.88*   HEMOGLOBIN 11.7*   HEMATOCRIT 36.1*   MCV 93.0   MCH 30.2   MCHC 32.4   RDW 45.1   PLATELETCT 226   MPV 10.3     Recent Labs     08/20/24  0630   SODIUM 139   POTASSIUM 4.4   CHLORIDE 107   CO2 20   GLUCOSE 115*   BUN 27*   CREATININE 1.15   CALCIUM 9.8               Radiology:  No orders to display         Assessment/Plan:  * No Diagnosis Codes entered *   Open complex hernia repair  Ostomy revision  Pt again counseled for a complex hernia repair and urostomy  revision.    Pt understands the potential complications and has agreed to proceed fully informed.

## 2024-08-20 NOTE — ASSESSMENT & PLAN NOTE
At baseline is on 2 [facility]  Mobilize  O2/RT protocols  Monitor for aspiration  Monitor vol status    8/23 patient is from [facility], to return to [facility] once medically cleared, continue to monitor    8/26 now on 3 to 4 L, taper as tolerated  Encourage I-S use    8/27 currently she is requiring 3 L of oxygen to maintain oxygen saturation.  Continue to titrate down oxygen as tolerated.    8/29 currently she is requiring 3 L of oxygen to maintain oxygen saturation.

## 2024-08-21 PROBLEM — K43.5 PARASTOMAL HERNIA: Status: ACTIVE | Noted: 2024-08-21

## 2024-08-21 LAB
ALBUMIN SERPL BCP-MCNC: 2.9 G/DL (ref 3.2–4.9)
ALBUMIN/GLOB SERPL: 1.1 G/DL
ALP SERPL-CCNC: 46 U/L (ref 30–99)
ALT SERPL-CCNC: 10 U/L (ref 2–50)
ANION GAP SERPL CALC-SCNC: 10 MMOL/L (ref 7–16)
AST SERPL-CCNC: 12 U/L (ref 12–45)
BASOPHILS # BLD AUTO: 0.1 % (ref 0–1.8)
BASOPHILS # BLD: 0.01 K/UL (ref 0–0.12)
BILIRUB SERPL-MCNC: 0.2 MG/DL (ref 0.1–1.5)
BUN SERPL-MCNC: 26 MG/DL (ref 8–22)
CALCIUM ALBUM COR SERPL-MCNC: 9.7 MG/DL (ref 8.5–10.5)
CALCIUM SERPL-MCNC: 8.8 MG/DL (ref 8.5–10.5)
CHLORIDE SERPL-SCNC: 113 MMOL/L (ref 96–112)
CO2 SERPL-SCNC: 19 MMOL/L (ref 20–33)
CREAT SERPL-MCNC: 1.49 MG/DL (ref 0.5–1.4)
EOSINOPHIL # BLD AUTO: 0 K/UL (ref 0–0.51)
EOSINOPHIL NFR BLD: 0 % (ref 0–6.9)
ERYTHROCYTE [DISTWIDTH] IN BLOOD BY AUTOMATED COUNT: 47.9 FL (ref 35.9–50)
ERYTHROCYTE [DISTWIDTH] IN BLOOD BY AUTOMATED COUNT: 49.3 FL (ref 35.9–50)
GFR SERPLBLD CREATININE-BSD FMLA CKD-EPI: 40 ML/MIN/1.73 M 2
GLOBULIN SER CALC-MCNC: 2.6 G/DL (ref 1.9–3.5)
GLUCOSE SERPL-MCNC: 139 MG/DL (ref 65–99)
HCT VFR BLD AUTO: 28.5 % (ref 37–47)
HCT VFR BLD AUTO: 29.6 % (ref 37–47)
HGB BLD-MCNC: 9.1 G/DL (ref 12–16)
HGB BLD-MCNC: 9.4 G/DL (ref 12–16)
IMM GRANULOCYTES # BLD AUTO: 0.06 K/UL (ref 0–0.11)
IMM GRANULOCYTES NFR BLD AUTO: 0.4 % (ref 0–0.9)
LYMPHOCYTES # BLD AUTO: 0.38 K/UL (ref 1–4.8)
LYMPHOCYTES NFR BLD: 2.6 % (ref 22–41)
MCH RBC QN AUTO: 30.4 PG (ref 27–33)
MCH RBC QN AUTO: 30.7 PG (ref 27–33)
MCHC RBC AUTO-ENTMCNC: 31.8 G/DL (ref 32.2–35.5)
MCHC RBC AUTO-ENTMCNC: 31.9 G/DL (ref 32.2–35.5)
MCV RBC AUTO: 95.8 FL (ref 81.4–97.8)
MCV RBC AUTO: 96.3 FL (ref 81.4–97.8)
MONOCYTES # BLD AUTO: 0.74 K/UL (ref 0–0.85)
MONOCYTES NFR BLD AUTO: 5 % (ref 0–13.4)
NEUTROPHILS # BLD AUTO: 13.5 K/UL (ref 1.82–7.42)
NEUTROPHILS NFR BLD: 91.9 % (ref 44–72)
NRBC # BLD AUTO: 0 K/UL
NRBC BLD-RTO: 0 /100 WBC (ref 0–0.2)
PLATELET # BLD AUTO: 200 K/UL (ref 164–446)
PLATELET # BLD AUTO: 212 K/UL (ref 164–446)
PMV BLD AUTO: 10.5 FL (ref 9–12.9)
PMV BLD AUTO: 10.6 FL (ref 9–12.9)
POTASSIUM SERPL-SCNC: 4.6 MMOL/L (ref 3.6–5.5)
PROT SERPL-MCNC: 5.5 G/DL (ref 6–8.2)
RBC # BLD AUTO: 2.96 M/UL (ref 4.2–5.4)
RBC # BLD AUTO: 3.09 M/UL (ref 4.2–5.4)
SODIUM SERPL-SCNC: 142 MMOL/L (ref 135–145)
WBC # BLD AUTO: 13.3 K/UL (ref 4.8–10.8)
WBC # BLD AUTO: 14.7 K/UL (ref 4.8–10.8)

## 2024-08-21 PROCEDURE — 302094 BELT OSTOMY (HOLLISTER): Performed by: HOSPITALIST

## 2024-08-21 PROCEDURE — 85027 COMPLETE CBC AUTOMATED: CPT

## 2024-08-21 PROCEDURE — A9270 NON-COVERED ITEM OR SERVICE: HCPCS | Performed by: SURGERY

## 2024-08-21 PROCEDURE — 80053 COMPREHEN METABOLIC PANEL: CPT

## 2024-08-21 PROCEDURE — 770000 HCHG ROOM/CARE - INTERMEDIATE ICU *

## 2024-08-21 PROCEDURE — 85025 COMPLETE CBC W/AUTO DIFF WBC: CPT

## 2024-08-21 PROCEDURE — 700105 HCHG RX REV CODE 258: Performed by: HOSPITALIST

## 2024-08-21 PROCEDURE — 99232 SBSQ HOSP IP/OBS MODERATE 35: CPT | Performed by: HOSPITALIST

## 2024-08-21 PROCEDURE — 302106 OSTOMY POWDER: Performed by: HOSPITALIST

## 2024-08-21 PROCEDURE — 302103 DOWN DRAIN ADAPTER: Performed by: HOSPITALIST

## 2024-08-21 PROCEDURE — 700102 HCHG RX REV CODE 250 W/ 637 OVERRIDE(OP): Performed by: SURGERY

## 2024-08-21 RX ORDER — LACTULOSE 10 G/15ML
30 SOLUTION ORAL 2 TIMES DAILY
Status: DISCONTINUED | OUTPATIENT
Start: 2024-08-21 | End: 2024-09-04 | Stop reason: HOSPADM

## 2024-08-21 RX ADMIN — OXYCODONE HYDROCHLORIDE 5 MG: 5 TABLET ORAL at 15:57

## 2024-08-21 RX ADMIN — OXYCODONE HYDROCHLORIDE 5 MG: 5 TABLET ORAL at 20:53

## 2024-08-21 RX ADMIN — SODIUM CHLORIDE, POTASSIUM CHLORIDE, SODIUM LACTATE AND CALCIUM CHLORIDE: 600; 310; 30; 20 INJECTION, SOLUTION INTRAVENOUS at 10:37

## 2024-08-21 RX ADMIN — OXYCODONE HYDROCHLORIDE 10 MG: 10 TABLET ORAL at 08:06

## 2024-08-21 RX ADMIN — OXYCODONE HYDROCHLORIDE 5 MG: 5 TABLET ORAL at 12:17

## 2024-08-21 RX ADMIN — ACETAMINOPHEN 1000 MG: 500 TABLET ORAL at 01:27

## 2024-08-21 RX ADMIN — SENNOSIDES AND DOCUSATE SODIUM 1 TABLET: 50; 8.6 TABLET ORAL at 05:28

## 2024-08-21 RX ADMIN — ACETAMINOPHEN 1000 MG: 500 TABLET ORAL at 05:28

## 2024-08-21 RX ADMIN — GUAIFENESIN 600 MG: 600 TABLET, EXTENDED RELEASE ORAL at 05:28

## 2024-08-21 RX ADMIN — GABAPENTIN 300 MG: 300 CAPSULE ORAL at 05:28

## 2024-08-21 RX ADMIN — GUAIFENESIN 600 MG: 600 TABLET, EXTENDED RELEASE ORAL at 17:38

## 2024-08-21 RX ADMIN — CITALOPRAM 40 MG: 40 TABLET, FILM COATED ORAL at 05:30

## 2024-08-21 RX ADMIN — ACETAMINOPHEN 1000 MG: 500 TABLET ORAL at 17:38

## 2024-08-21 RX ADMIN — FERROUS SULFATE TAB 325 MG (65 MG ELEMENTAL FE) 325 MG: 325 (65 FE) TAB at 08:06

## 2024-08-21 RX ADMIN — ACETAMINOPHEN 1000 MG: 500 TABLET ORAL at 12:17

## 2024-08-21 RX ADMIN — LACTULOSE 30 ML: 10 SOLUTION ORAL at 05:28

## 2024-08-21 RX ADMIN — SODIUM CHLORIDE, POTASSIUM CHLORIDE, SODIUM LACTATE AND CALCIUM CHLORIDE: 600; 310; 30; 20 INJECTION, SOLUTION INTRAVENOUS at 18:27

## 2024-08-21 RX ADMIN — SENNOSIDES AND DOCUSATE SODIUM 1 TABLET: 50; 8.6 TABLET ORAL at 17:38

## 2024-08-21 RX ADMIN — METHOCARBAMOL 750 MG: 750 TABLET ORAL at 03:37

## 2024-08-21 RX ADMIN — Medication 5 MG: at 20:52

## 2024-08-21 ASSESSMENT — PAIN DESCRIPTION - PAIN TYPE
TYPE: ACUTE PAIN

## 2024-08-21 ASSESSMENT — FIBROSIS 4 INDEX
FIB4 SCORE: 1.83
FIB4 SCORE: 1.07

## 2024-08-21 ASSESSMENT — PATIENT HEALTH QUESTIONNAIRE - PHQ9
1. LITTLE INTEREST OR PLEASURE IN DOING THINGS: NOT AT ALL
2. FEELING DOWN, DEPRESSED, IRRITABLE, OR HOPELESS: NOT AT ALL
SUM OF ALL RESPONSES TO PHQ9 QUESTIONS 1 AND 2: 0

## 2024-08-21 ASSESSMENT — ENCOUNTER SYMPTOMS
DIZZINESS: 0
SHORTNESS OF BREATH: 0
ABDOMINAL PAIN: 1
VOMITING: 0
HEADACHES: 0
PALPITATIONS: 0
FEVER: 0
CHILLS: 0
COUGH: 0
DIARRHEA: 0
LOSS OF CONSCIOUSNESS: 0
NAUSEA: 1
BACK PAIN: 0

## 2024-08-21 ASSESSMENT — SOCIAL DETERMINANTS OF HEALTH (SDOH)
WITHIN THE LAST YEAR, HAVE YOU BEEN HUMILIATED OR EMOTIONALLY ABUSED IN OTHER WAYS BY YOUR PARTNER OR EX-PARTNER?: NO
WITHIN THE LAST YEAR, HAVE YOU BEEN KICKED, HIT, SLAPPED, OR OTHERWISE PHYSICALLY HURT BY YOUR PARTNER OR EX-PARTNER?: NO
WITHIN THE LAST YEAR, HAVE YOU BEEN AFRAID OF YOUR PARTNER OR EX-PARTNER?: NO
WITHIN THE LAST YEAR, HAVE TO BEEN RAPED OR FORCED TO HAVE ANY KIND OF SEXUAL ACTIVITY BY YOUR PARTNER OR EX-PARTNER?: NO

## 2024-08-21 NOTE — PROGRESS NOTES
General Surgery Progress Note  [facility]      CHIEF COMPLAINT: hernia.     HISTORY OF PRESENT ILLNESS: The patient is a 59 y.o. female, who presents s/p parastomal hernia repair.     INTERVAL UPDATE: In icu due to bp (improving).   No flatus.   Making Urine.  NGT in place.   Pain ok.    PAST MEDICAL HISTORY:  has a past medical history of Anemia, Chronic anticoagulation, Chronic pain syndrome, Depression, DVT of lower extremity (deep venous thrombosis) (HCC), Dyspnea, GERD (gastroesophageal reflux disease), High cholesterol, Hypertension, Infectious disease, Kidney stones, MRSA (methicillin resistant Staphylococcus aureus), Nephrolithiasis, Pacemaker, Sinus bradycardia, and Sleep apnea.     PAST SURGICAL HISTORY:  has a past surgical history that includes appendectomy; other; upper gi endoscopy,diagnosis (N/A, 10/01/2023); upper gi endoscopy,biopsy (N/A, 10/01/2023); knee replacement, total (Left, 01/24/2022); knee revision total (Left, 08/2023); other; and other.     ALLERGIES: No Known Allergies     CURRENT MEDICATIONS:   Home Medications       Reviewed by Tennille Andre R.N. (Registered Nurse) on 08/20/24 at 0719  Med List Status: Complete     Medication Last Dose Status   acetaminophen (TYLENOL) 325 MG Tab 8/18/2024 Active   acidophilus lactobacillus Cap 8/18/2024 Active   amLODIPine (NORVASC) 5 MG Tab 8/19/2024 Active   apixaban (ELIQUIS) 5mg Tab 8/12/2024 Active   carvedilol (COREG) 12.5 MG Tab 8/19/2024 Active   citalopram (CELEXA) 40 MG TABS 8/19/2024 Active   ferrous sulfate 325 (65 Fe) MG tablet 8/14/2024 Active   gabapentin (NEURONTIN) 300 MG Cap 8/19/2024 Active   guaiFENesin ER (MUCINEX) 600 MG TABLET SR 12 HR 8/19/2024 Active   hydrALAZINE (APRESOLINE) 25 MG Tab 8/19/2024 Active   lactated ringers infusion  Active   lactulose 10 GM/15ML Solution 8/18/2024 Active   lidocaine (Xylocaine) 1 % injection 0.5 mL  Active   lisinopril (PRINIVIL) 5 MG Tab 8/18/2024 Active   MAGNESIUM OXIDE PO  "8/13/2024 Active   melatonin 5 mg Tab 8/19/2024 Active   methocarbamol (ROBAXIN) 750 MG Tab 8/18/2024 Active   omeprazole (PRILOSEC) 20 MG delayed-release capsule 8/19/2024 Active   oxyCODONE-acetaminophen (PERCOCET) 7.5-325 MG per tablet 8/18/2024 Active   polyethylene glycol-electrolytes (GOLYTELY) 236 GM Recon Soln 8/19/2024 Active   senna-docusate (PERICOLACE OR SENOKOT S) 8.6-50 MG Tab 8/18/2024 Active   sodium bicarbonate (SODIUM BICARBONATE) 650 MG Tab 8/18/2024 Active   traZODone (DESYREL) 50 MG Tab 8/18/2024 Active   vitamin D2, Ergocalciferol, (DRISDOL) 1.25 MG (16762 UT) Cap capsule 8/16/2024 Active                    FAMILY HISTORY:   Family History   Problem Relation Age of Onset    Hypertension Mother     Kidney Disease Mother     Diabetes Mother     Coronary artery disease Father         SOCIAL HISTORY:   Social History     Tobacco Use    Smoking status: Never    Smokeless tobacco: Never   Vaping Use    Vaping status: Never Used   Substance and Sexual Activity    Alcohol use: Not Currently    Drug use: No    Sexual activity: Not on file       REVIEW OF SYSTEMS: Comprehensive review of systems was not performed    PHYSICAL EXAMINATION:     GENERAL: The patient is in no acute distress.   VITAL SIGNS: BP 94/45   Pulse 63   Temp 36 °C (96.8 °F) (Temporal)   Resp 18   Ht 1.575 m (5' 2\")   Wt 119 kg (263 lb 0.1 oz)   SpO2 94%   HEAD AND NECK: Demonstrates symmetric, reactive pupils. Extraocular muscles   are intact. Nares and oropharynx are clear.   NECK: Supple. No adenopathy.  CHEST:No respiratory distress.    CARDIOVASCULAR: Regular rate. The extremities are well perfused.   ABDOMEN: Dressing in place.   Drains serosanguinous.  360 overnight..   EXTREMITIES: present.  NEUROLOGIC: Alert & oriented x 3  Drain output:   Output by Drain (mL) 08/19/24 0700 - 08/19/24 1859 08/19/24 1900 - 08/20/24 0659 08/20/24 0700 - 08/20/24 1859 08/20/24 1900 - 08/21/24 0659 08/21/24 0700 - 08/21/24 0815   Patient has " no LDAs of requested type attached.        LABORATORY VALUES:   Recent Labs     08/20/24  0630 08/20/24  1600 08/21/24  0349   WBC 4.9 11.4* 14.7*   RBC 3.88* 3.45* 3.09*   HEMOGLOBIN 11.7* 10.4* 9.4*   HEMATOCRIT 36.1* 33.0* 29.6*   MCV 93.0 95.7 95.8   MCH 30.2 30.1 30.4   MCHC 32.4 31.5* 31.8*   RDW 45.1 47.1 47.9   PLATELETCT 226 239 200   MPV 10.3 10.5 10.6     Recent Labs     08/20/24  0630 08/21/24  0349   SODIUM 139 142   POTASSIUM 4.4 4.6   CHLORIDE 107 113*   CO2 20 19*   GLUCOSE 115* 139*   BUN 27* 26*   CREATININE 1.15 1.49*   CALCIUM 9.8 8.8     Recent Labs     08/21/24  0349   ASTSGOT 12   ALTSGPT 10   TBILIRUBIN 0.2   ALKPHOSPHAT 46   GLOBULIN 2.6            IMAGING:   No orders to display       Problem List:   Patient Active Problem List    Diagnosis Date Noted    Chronic anticoagulation 01/23/2012    Dyspnea     Sinus bradycardia     DVT of lower extremity (deep venous thrombosis) ([facility])     Chronic pain syndrome     Nephrolithiasis     Sleep apnea 12/16/2013    Shock ([facility]) 08/20/2024    Chronic respiratory failure with hypoxia ([facility]) 08/20/2024    Atrial fibrillation ([facility]) 08/20/2024    Obesity 08/20/2024    Hypokalemia 10/19/2023    Complicated UTI (urinary tract infection) 10/17/2023    Acute respiratory failure with hypoxia ([facility]) 10/10/2023    Constipation 10/02/2023    Primary hypertension 10/01/2023    Pacemaker 10/01/2023    Acute on chronic anemia 09/30/2023    ROA (acute kidney injury) ([facility]) 09/30/2023    UGIB (upper gastrointestinal bleed) 09/29/2023    Hx MRSA infection 09/29/2023    History of DVT in adulthood 09/29/2023    Class 3 severe obesity due to excess calories with serious comorbidity and body mass index (BMI) of 50.0 to 59.9 in adult ([facility]) 01/12/2021    Presence of urostomy ([facility]) 01/12/2021       IMPRESSION AND PLAN:     1.POD#1.    1.  Appreciate ICU and medical team support.  2.  Keep NGT today for expected ileus.   Will consider removing in am  3.  Awaiting return of bowel  function.  4.  Dressing change tomorrow.  5.  Eliquis tomorrow if hg stable. Trend.  6.  Continue drains.              ___________________________________   Elmer Mercado M.D.  [facility]  [phone]    DD: 8/21/2024 DT: 8:15 AM

## 2024-08-21 NOTE — PROGRESS NOTES
Note to reader: this note follows the APSO format rather than the historical SOAP format. Assessment and plan located at the top of the note for ease of use.    Chief Complaint  59 y.o. year old female here with 50 year old ileal conduit in left lower quadrant, large parastomal hernia with multiple previous bowel obstructions that would also obstruct her conduit    Assessment/Plan  Interval History     Plan:  Dr. Mercado primary, her multiple drain are to be managed by   Dr. Mercado/general surgery.  Urology will round on pt while admitted to ensure good UOP  Strict I&Os   Will plan to remove red rubber prior to d/c   Urology to follow     Case discussed with Dr. Gusman who has directed this plan of care    Patient seen and examined    8/21: POD1 This am pt is Hypotensive. Sleeping during rounds. UOP from spt 675cc/24hrs, “other urine“ 1600cc/24hrs. Clear yellow. WBC 14(11), hbg 9.4(10.4) cr 1.49(1.15).              Review of Systems  Physical Exam   Review of Systems   Unable to perform ROS: Other     Vitals:    08/21/24 0345 08/21/24 0400 08/21/24 0500 08/21/24 0600   BP: 105/57 95/51 93/52 94/45   Pulse: 61 60 60 63   Resp: (!) 27 16 16 18   Temp:  36 °C (96.8 °F)     TempSrc:  Temporal     SpO2:  96% 96% 94%   Weight:       Height:         Physical Exam  Constitutional:       Appearance: Normal appearance.   HENT:      Head: Normocephalic.      Nose:      Comments: NGT in place   Pulmonary:      Effort: Pulmonary effort is normal.   Skin:     General: Skin is warm and dry.          Hematology Chemistry   Lab Results   Component Value Date/Time    WBC 14.7 (H) 08/21/2024 03:49 AM    HEMOGLOBIN 9.4 (L) 08/21/2024 03:49 AM    HEMATOCRIT 29.6 (L) 08/21/2024 03:49 AM    PLATELETCT 200 08/21/2024 03:49 AM     Lab Results   Component Value Date/Time    SODIUM 142 08/21/2024 03:49 AM    POTASSIUM 4.6 08/21/2024 03:49 AM    CHLORIDE 113 (H) 08/21/2024 03:49 AM    CO2 19 (L) 08/21/2024 03:49 AM    GLUCOSE 139 (H)  08/21/2024 03:49 AM    BUN 26 (H) 08/21/2024 03:49 AM    CREATININE 1.49 (H) 08/21/2024 03:49 AM    GLOMRATE 34 (L) 11/04/2023 04:25 AM         Labs not explicitly included in this progress note were reviewed by the author.   Radiology/imaging not explicitly included in this progress note was reviewed by the author.     Radiology images reviewed, Labs reviewed and Medications reviewed                          Sonal Pza, PLoboA.-GARTH.   [id number] [place] [id number]   [phone]

## 2024-08-21 NOTE — PROGRESS NOTES
Hospital Medicine Daily Progress Note    Date of Service  8/21/2024    Chief Complaint  Patricia A Tietz is a 59 y.o. female admitted 8/20/2024 with hernia repair    Hospital Course  60yo PMHx urostomy with ileal conduit as a child, HTN, AFib, on apixaban, chronic hypoxic resp failure on 2LNC at baseline.  She was at [facility] in May with an SBO and UTI (Klebsiella and Pseudomonas).  Admitted on 8/20 for planned Ex lap and reapir of 10cm abd wall hernia with mycocutaneous flap trunk with mesh and replacement of catheter into the stoma..  In PACU pt was hypotensive requiring Rudi-Synephrine and fluids.  She was admitted to [facility]    Interval Problem Update  Patient reports pain is manageable when she lays still, but quite intense when she moves around.    Sinus 60s  SBP   On 2 LNC  UOP 2275ml/24hrs  Drains:  -A: 125ml  -B: 190ml  -C 50ml    I have discussed this patient's plan of care and discharge plan at IDT rounds today with Case Management, Nursing, Nursing leadership, and other members of the IDT team.    Consultants/Specialty  general surgery and urology    Code Status  Full Code    Disposition  The patient is not medically cleared for discharge to home or a post-acute facility.      I have placed the appropriate orders for post-discharge needs.    Review of Systems  Review of Systems   Constitutional:  Negative for chills and fever.   Respiratory:  Negative for cough and shortness of breath.    Cardiovascular:  Negative for chest pain, palpitations and leg swelling.   Gastrointestinal:  Positive for abdominal pain and nausea. Negative for diarrhea and vomiting.   Genitourinary:  Negative for dysuria and urgency.   Musculoskeletal:  Negative for back pain.   Skin:  Negative for rash.   Neurological:  Negative for dizziness, loss of consciousness and headaches.        Physical Exam  Temp:  [35.9 °C (96.6 °F)-37.6 °C (99.7 °F)] 36 °C (96.8 °F)  Pulse:  [60-64] 63  Resp:  [6-30] 18  BP: ()/(42-65) 94/45  SpO2:   [93 %-96 %] 94 %    Physical Exam  Constitutional:       General: She is not in acute distress.     Appearance: Normal appearance. She is well-developed. She is obese. She is not diaphoretic.   HENT:      Head: Normocephalic and atraumatic.   Neck:      Vascular: No JVD.   Cardiovascular:      Rate and Rhythm: Normal rate and regular rhythm.      Heart sounds: No murmur heard.  Pulmonary:      Effort: Pulmonary effort is normal. No respiratory distress.      Breath sounds: No stridor. No wheezing or rales.   Abdominal:      General: There is no distension.      Palpations: Abdomen is soft.      Tenderness: There is abdominal tenderness. There is no guarding or rebound.      Comments: Post op dressings and drains noted  Urostomy noted with catheter in place   Musculoskeletal:      Right lower leg: No edema.      Left lower leg: No edema.   Skin:     General: Skin is warm and dry.      Capillary Refill: Capillary refill takes less than 2 seconds.      Findings: No rash.   Neurological:      Mental Status: She is oriented to person, place, and time.   Psychiatric:         Mood and Affect: Mood normal.         Behavior: Behavior normal.         Thought Content: Thought content normal.         Fluids    Intake/Output Summary (Last 24 hours) at 8/21/2024 0632  Last data filed at 8/21/2024 0600  Gross per 24 hour   Intake 3650 ml   Output 3625 ml   Net 25 ml       Laboratory  Recent Labs     08/20/24  0630 08/20/24  1600 08/21/24  0349   WBC 4.9 11.4* 14.7*   RBC 3.88* 3.45* 3.09*   HEMOGLOBIN 11.7* 10.4* 9.4*   HEMATOCRIT 36.1* 33.0* 29.6*   MCV 93.0 95.7 95.8   MCH 30.2 30.1 30.4   MCHC 32.4 31.5* 31.8*   RDW 45.1 47.1 47.9   PLATELETCT 226 239 200   MPV 10.3 10.5 10.6     Recent Labs     08/20/24  0630 08/21/24  0349   SODIUM 139 142   POTASSIUM 4.4 4.6   CHLORIDE 107 113*   CO2 20 19*   GLUCOSE 115* 139*   BUN 27* 26*   CREATININE 1.15 1.49*   CALCIUM 9.8 8.8                   Imaging  No orders to display         Assessment/Plan  * Shock (HCC)- (present on admission)  Assessment & Plan  Post operative requiring Rudi synephrine initially and then Levophed in the unit  Vasopressors DC'd early this am  Cortisol>30  No evidence of sepsis  Cont to monitor and resume BP meds as pressures allow      Parastomal hernia  Assessment & Plan  Now s/p repair on 8/20  NGT and 3 WERNER drains in place  Srgry and Urology following  Monitor for signs of return of bowel function  Prn pain and N management  Cont IVFs at 125ml given no po and loosing fluid via drains and NGT  Monitor daily BMP for renal function and electrolye abnormalities  Resume po once OK with Srgcl team    Obesity- (present on admission)  Assessment & Plan  BMI 46    Atrial fibrillation (HCC)- (present on admission)  Assessment & Plan  Hx of  Has been paced and sinus overnight  Cont Coreg with parameters  Cont Tele  Resume apixaban when OK with Surgical team    Chronic respiratory failure with hypoxia (HCC)- (present on admission)  Assessment & Plan  At baseline is on 2 LNC  Mobilize  O2/RT protocols  Monitor for aspiration  Monitor vol status    Pacemaker- (present on admission)  Assessment & Plan  On Tele pt has been intermitently paced/sinus  Cont Tele    Presence of urostomy (HCC)- (present on admission)  Assessment & Plan  Chronic since 5 years old now with surgery by Dr. Gusman and Dr. Mercado  She has 3 WERNER drains in  Pain control with narcotics as indicated  SCDs for DVT prophylaxis  She has an NG tube in  Urology following  Good UOP: cont to monitor         VTE prophylaxis: VTE Selection    I have performed a physical exam and reviewed and updated ROS and Plan today (8/21/2024). In review of yesterday's note (8/20/2024), there are no changes except as documented above.

## 2024-08-21 NOTE — DIETARY
NUTRITION SERVICES: BMI - Pt with BMI >40 (=Body mass index is 48.1 kg/m².), Class III obesity. Weight loss counseling not appropriate in acute care setting. RECOMMEND - If appropriate at DC please refer to outpatient nutrition services for weight management.

## 2024-08-21 NOTE — ASSESSMENT & PLAN NOTE
Now s/p repair on 8/20  NGT and 3 WERNER drains in place  Srgry and Urology following    8/22  Belly still quiet, no flatus or BM  Monitor for signs of return of bowel function  Prn pain and N management  Monitor daily BMP for renal function and electrolye abnormalities  Resume po once OK with Srgcl team  Cont supportive care with IVFs, electrolyte replacement, antiemetics and pain medication  Daily BMP to monitor for RAO, hypo K, acidosis    8/23 surgery following, okay for NG tube removal  Diet per surgery  SLP evaluation  Drains in place    8/24 following, drains per surgery, greater than 162 mL output/24 hours  Positive BM, diet per surgery, consider initiation of clear liquid diet  Pain control follow-up labs  Patient is from [facility], to return when medically cleared    8/25 discussed with surgery, Dr. Mercado  150 mL output over 24 hours, plan for drain removal per surgery, plan to remove by Tuesday if decreased output  Likely return to skilled if clinically stable  Diet advance per surgery    8/26 tolerating oral intake, generalized weakness  1 of 3 WERNER drains out, removal per surgery  Transfer to skilled facility, [facility] in 2 to 3 days    8/27 surgery Dr. Mercado and urology is following her.  Continue antibiotics.  Continue to provide her pain control.    8/28 I patient discussed case with surgery Dr. Mercado who recommended that patient will need to go to the OR for another wound VAC placement.  I made her n.p.o. and started on IV fluid for hydration.    8/29 she underwent Postoperative wound infection washout and negative pressure dressing placemen on August 29, 2024.  Continue to provider pain control.  I started her on Ancef for next 3 days.  She received oral Augmentin.

## 2024-08-21 NOTE — CARE PLAN
The patient is Watcher - Medium risk of patient condition declining or worsening    Shift Goals  Clinical Goals: Titrate off levo, MAP>65, monitor drain output  Patient Goals: rest, pain control  Family Goals: MELISSA    Progress made toward(s) clinical / shift goals:      Problem: Knowledge Deficit - Standard  Goal: Patient and family/care givers will demonstrate understanding of plan of care, disease process/condition, diagnostic tests and medications  Outcome: Progressing     Problem: Pain - Standard  Goal: Alleviation of pain or a reduction in pain to the patient’s comfort goal  Outcome: Progressing     Problem: Skin Integrity  Goal: Skin integrity is maintained or improved  Outcome: Progressing     Problem: Hemodynamics  Goal: Patient's hemodynamics, fluid balance and neurologic status will be stable or improve  Outcome: Progressing     Problem: Infection - Standard  Goal: Patient will remain free from infection  Outcome: Progressing

## 2024-08-21 NOTE — CARE PLAN
The patient is Watcher - Medium risk of patient condition declining or worsening    Shift Goals  Clinical Goals: MAP>65, monitor drain output  Patient Goals: pain control, to get belongings  Family Goals: MELISSA    Progress made toward(s) clinical / shift goals:      Problem: Knowledge Deficit - Standard  Goal: Patient and family/care givers will demonstrate understanding of plan of care, disease process/condition, diagnostic tests and medications  Outcome: Progressing     Problem: Pain - Standard  Goal: Alleviation of pain or a reduction in pain to the patient’s comfort goal  Outcome: Progressing     Problem: Skin Integrity  Goal: Skin integrity is maintained or improved  Outcome: Progressing     Problem: Fall Risk  Goal: Patient will remain free from falls  Outcome: Progressing     Problem: Psychosocial  Goal: Patient's level of anxiety will decrease  Outcome: Progressing  Goal: Patient's ability to verbalize feelings about condition will improve  Outcome: Progressing     Problem: Hemodynamics  Goal: Patient's hemodynamics, fluid balance and neurologic status will be stable or improve  Outcome: Progressing     Problem: Infection - Standard  Goal: Patient will remain free from infection  Outcome: Progressing     Problem: Wound/ / Incision Healing  Goal: Patient's wound/surgical incision will decrease in size and heals properly  Outcome: Progressing       Patient is not progressing towards the following goals:

## 2024-08-21 NOTE — WOUND TEAM
Wound Team consulted for established urostomy. No ostomy education needs. Appliance intact. Additional supplies ordered and delivered to bedside. Orders placed for nursing to assist patient with urostomy appliance changes PRN. Consult complete.

## 2024-08-22 LAB
ANION GAP SERPL CALC-SCNC: 8 MMOL/L (ref 7–16)
BASOPHILS # BLD AUTO: 0.1 % (ref 0–1.8)
BASOPHILS # BLD: 0.01 K/UL (ref 0–0.12)
BUN SERPL-MCNC: 24 MG/DL (ref 8–22)
CALCIUM SERPL-MCNC: 9.5 MG/DL (ref 8.5–10.5)
CHLORIDE SERPL-SCNC: 109 MMOL/L (ref 96–112)
CO2 SERPL-SCNC: 22 MMOL/L (ref 20–33)
CREAT SERPL-MCNC: 1.11 MG/DL (ref 0.5–1.4)
EOSINOPHIL # BLD AUTO: 0 K/UL (ref 0–0.51)
EOSINOPHIL NFR BLD: 0 % (ref 0–6.9)
ERYTHROCYTE [DISTWIDTH] IN BLOOD BY AUTOMATED COUNT: 49.1 FL (ref 35.9–50)
GFR SERPLBLD CREATININE-BSD FMLA CKD-EPI: 57 ML/MIN/1.73 M 2
GLUCOSE SERPL-MCNC: 84 MG/DL (ref 65–99)
HCT VFR BLD AUTO: 26.8 % (ref 37–47)
HGB BLD-MCNC: 8.4 G/DL (ref 12–16)
IMM GRANULOCYTES # BLD AUTO: 0.09 K/UL (ref 0–0.11)
IMM GRANULOCYTES NFR BLD AUTO: 0.7 % (ref 0–0.9)
LYMPHOCYTES # BLD AUTO: 0.49 K/UL (ref 1–4.8)
LYMPHOCYTES NFR BLD: 4 % (ref 22–41)
MCH RBC QN AUTO: 29.9 PG (ref 27–33)
MCHC RBC AUTO-ENTMCNC: 31.3 G/DL (ref 32.2–35.5)
MCV RBC AUTO: 95.4 FL (ref 81.4–97.8)
MONOCYTES # BLD AUTO: 0.71 K/UL (ref 0–0.85)
MONOCYTES NFR BLD AUTO: 5.8 % (ref 0–13.4)
NEUTROPHILS # BLD AUTO: 10.98 K/UL (ref 1.82–7.42)
NEUTROPHILS NFR BLD: 89.4 % (ref 44–72)
NRBC # BLD AUTO: 0 K/UL
NRBC BLD-RTO: 0 /100 WBC (ref 0–0.2)
PLATELET # BLD AUTO: 184 K/UL (ref 164–446)
PMV BLD AUTO: 10.6 FL (ref 9–12.9)
POTASSIUM SERPL-SCNC: 4.4 MMOL/L (ref 3.6–5.5)
RBC # BLD AUTO: 2.81 M/UL (ref 4.2–5.4)
SODIUM SERPL-SCNC: 139 MMOL/L (ref 135–145)
WBC # BLD AUTO: 12.3 K/UL (ref 4.8–10.8)

## 2024-08-22 PROCEDURE — 97602 WOUND(S) CARE NON-SELECTIVE: CPT

## 2024-08-22 PROCEDURE — 80048 BASIC METABOLIC PNL TOTAL CA: CPT

## 2024-08-22 PROCEDURE — 700102 HCHG RX REV CODE 250 W/ 637 OVERRIDE(OP): Performed by: SURGERY

## 2024-08-22 PROCEDURE — 700105 HCHG RX REV CODE 258: Performed by: HOSPITALIST

## 2024-08-22 PROCEDURE — 700111 HCHG RX REV CODE 636 W/ 250 OVERRIDE (IP): Mod: JZ | Performed by: HOSPITALIST

## 2024-08-22 PROCEDURE — 99232 SBSQ HOSP IP/OBS MODERATE 35: CPT | Performed by: HOSPITALIST

## 2024-08-22 PROCEDURE — A9270 NON-COVERED ITEM OR SERVICE: HCPCS | Performed by: SURGERY

## 2024-08-22 PROCEDURE — 85025 COMPLETE CBC W/AUTO DIFF WBC: CPT

## 2024-08-22 PROCEDURE — 770006 HCHG ROOM/CARE - MED/SURG/GYN SEMI*

## 2024-08-22 RX ORDER — ENOXAPARIN SODIUM 100 MG/ML
40 INJECTION SUBCUTANEOUS EVERY 12 HOURS
Status: DISCONTINUED | OUTPATIENT
Start: 2024-08-22 | End: 2024-08-23

## 2024-08-22 RX ADMIN — FERROUS SULFATE TAB 325 MG (65 MG ELEMENTAL FE) 325 MG: 325 (65 FE) TAB at 08:14

## 2024-08-22 RX ADMIN — OXYCODONE HYDROCHLORIDE 10 MG: 10 TABLET ORAL at 16:45

## 2024-08-22 RX ADMIN — GABAPENTIN 300 MG: 300 CAPSULE ORAL at 05:54

## 2024-08-22 RX ADMIN — OXYCODONE HYDROCHLORIDE 5 MG: 5 TABLET ORAL at 05:53

## 2024-08-22 RX ADMIN — GUAIFENESIN 600 MG: 600 TABLET, EXTENDED RELEASE ORAL at 17:59

## 2024-08-22 RX ADMIN — OXYCODONE HYDROCHLORIDE 10 MG: 10 TABLET ORAL at 10:09

## 2024-08-22 RX ADMIN — AMLODIPINE BESYLATE 5 MG: 5 TABLET ORAL at 06:01

## 2024-08-22 RX ADMIN — ACETAMINOPHEN 1000 MG: 500 TABLET ORAL at 05:54

## 2024-08-22 RX ADMIN — GUAIFENESIN 600 MG: 600 TABLET, EXTENDED RELEASE ORAL at 05:53

## 2024-08-22 RX ADMIN — Medication 5 MG: at 20:46

## 2024-08-22 RX ADMIN — OXYCODONE HYDROCHLORIDE 10 MG: 10 TABLET ORAL at 21:47

## 2024-08-22 RX ADMIN — ACETAMINOPHEN 1000 MG: 500 TABLET ORAL at 02:06

## 2024-08-22 RX ADMIN — ACETAMINOPHEN 1000 MG: 500 TABLET ORAL at 12:58

## 2024-08-22 RX ADMIN — ACETAMINOPHEN 1000 MG: 500 TABLET ORAL at 17:59

## 2024-08-22 RX ADMIN — CARVEDILOL 12.5 MG: 12.5 TABLET, FILM COATED ORAL at 17:59

## 2024-08-22 RX ADMIN — ENOXAPARIN SODIUM 40 MG: 100 INJECTION SUBCUTANEOUS at 17:59

## 2024-08-22 RX ADMIN — SENNOSIDES AND DOCUSATE SODIUM 1 TABLET: 50; 8.6 TABLET ORAL at 05:53

## 2024-08-22 RX ADMIN — SODIUM CHLORIDE, POTASSIUM CHLORIDE, SODIUM LACTATE AND CALCIUM CHLORIDE: 600; 310; 30; 20 INJECTION, SOLUTION INTRAVENOUS at 11:13

## 2024-08-22 RX ADMIN — CITALOPRAM 40 MG: 40 TABLET, FILM COATED ORAL at 06:01

## 2024-08-22 RX ADMIN — SENNOSIDES AND DOCUSATE SODIUM 1 TABLET: 50; 8.6 TABLET ORAL at 17:59

## 2024-08-22 RX ADMIN — AMLODIPINE BESYLATE 5 MG: 5 TABLET ORAL at 17:59

## 2024-08-22 RX ADMIN — METHOCARBAMOL 750 MG: 750 TABLET ORAL at 02:06

## 2024-08-22 ASSESSMENT — ENCOUNTER SYMPTOMS
DIZZINESS: 0
LOSS OF CONSCIOUSNESS: 0
VOMITING: 0
CHILLS: 0
DIARRHEA: 0
PALPITATIONS: 0
ABDOMINAL PAIN: 1
NAUSEA: 1
BACK PAIN: 0
COUGH: 0
HEADACHES: 0
SHORTNESS OF BREATH: 0
FEVER: 0

## 2024-08-22 ASSESSMENT — PAIN DESCRIPTION - PAIN TYPE
TYPE: ACUTE PAIN

## 2024-08-22 NOTE — ASSESSMENT & PLAN NOTE
Good UOP  At risk for dehydration given output from NGT and 3 JPs (close to 600ml/24hrs)  Renal function has been improving.  Current BUN is 28 and creatinine 1.09  Avoid nephrotoxins  Ordered lab work for tomorrow

## 2024-08-22 NOTE — PROGRESS NOTES
Due to Urostomy bag leakage, RN changed abdominal surgical dressing as it was saturated with urine. Surgical dressing now clean, dry, and intact.

## 2024-08-22 NOTE — PROGRESS NOTES
Report called to Alesia RN. Patient transferred to S620-02 via bed by transport. Patient transferred with all belongings including phone, , purse and home CPAP.

## 2024-08-22 NOTE — CARE PLAN
The patient is Stable - Low risk of patient condition declining or worsening    Shift Goals  Clinical Goals: Monitor drain output, mobilize as tolerated  Patient Goals: Rest, pain control  Family Goals: MELISSA    Progress made toward(s) clinical / shift goals:    Problem: Knowledge Deficit - Standard  Goal: Patient and family/care givers will demonstrate understanding of plan of care, disease process/condition, diagnostic tests and medications  Outcome: Progressing     Problem: Pain - Standard  Goal: Alleviation of pain or a reduction in pain to the patient’s comfort goal  Outcome: Progressing     Problem: Skin Integrity  Goal: Skin integrity is maintained or improved  Outcome: Progressing     Problem: Fall Risk  Goal: Patient will remain free from falls  Outcome: Progressing     Problem: Psychosocial  Goal: Patient's level of anxiety will decrease  Outcome: Progressing     Problem: Urinary Elimination  Goal: Establish and maintain regular urinary output  Outcome: Progressing     Problem: Self Care  Goal: Patient will have the ability to perform ADLs independently or with assistance (bathe, groom, dress, toilet and feed)  Outcome: Progressing     Problem: Infection - Standard  Goal: Patient will remain free from infection  Outcome: Progressing     Problem: Wound/ / Incision Healing  Goal: Patient's wound/surgical incision will decrease in size and heals properly  Outcome: Progressing

## 2024-08-22 NOTE — PROGRESS NOTES
Surgery  POD#2  HG 8.4.  Drains slowing and thinning out  NGT output slowing  NO flatus or bm yet  A/p   Continue NGT  Ok to start eliquis tomorrow if hg stable  Awaiting return of bowel function  Mobilize to chair when able  PT/OT  Appreciate medicine support.

## 2024-08-22 NOTE — CARE PLAN
The patient is Stable - Low risk of patient condition declining or worsening    Shift Goals  Clinical Goals: Hemodynamic stability, monitor drain output, Pain control  Patient Goals: rest, pain control  Family Goals: MELISSA    Progress made toward(s) clinical / shift goals:     Problem: Knowledge Deficit - Standard  Goal: Patient and family/care givers will demonstrate understanding of plan of care, disease process/condition, diagnostic tests and medications  Outcome: Progressing     Problem: Pain - Standard  Goal: Alleviation of pain or a reduction in pain to the patient’s comfort goal  Outcome: Progressing     Problem: Skin Integrity  Goal: Skin integrity is maintained or improved  Outcome: Progressing     Problem: Infection - Standard  Goal: Patient will remain free from infection  Outcome: Progressing

## 2024-08-22 NOTE — PROGRESS NOTES
"Hospital Medicine Daily Progress Note    Date of Service  8/22/2024    Chief Complaint  Patricia A Tietz is a 59 y.o. female admitted 8/20/2024 with hernia repair    Hospital Course  58yo PMHx urostomy with ileal conduit as a child, HTN, AFib, on apixaban, chronic hypoxic resp failure on 2LNC at baseline.  She was at [facility] in May with an SBO and UTI (Klebsiella and Pseudomonas).  Admitted on 8/20 for planned Ex lap and reapir of 10cm abd wall hernia with mycocutaneous flap trunk with mesh and replacement of catheter into the stoma..  In PACU pt was hypotensive requiring Rudi-Synephrine and fluids.  She was admitted to [facility]    Interval Problem Update  Pt had a quiet night.  \"Sore all over\", refering to her belly.  No flatus or BM    AFebrile  Sinus 60s  -110  On 2 LNC  UOP 1950ml/24hrs  Drains:  -A: 230ml  -B: 310ml  -C 43ml    I have discussed this patient's plan of care and discharge plan at IDT rounds today with Case Management, Nursing, Nursing leadership, and other members of the IDT team.    Consultants/Specialty  general surgery and urology    Code Status  Full Code    Disposition  The patient is not medically cleared for discharge to home or a post-acute facility.      I have placed the appropriate orders for post-discharge needs.    Review of Systems  Review of Systems   Constitutional:  Negative for chills and fever.   Respiratory:  Negative for cough and shortness of breath.    Cardiovascular:  Negative for chest pain, palpitations and leg swelling.   Gastrointestinal:  Positive for abdominal pain and nausea. Negative for diarrhea and vomiting.   Musculoskeletal:  Negative for back pain.   Skin:  Negative for rash.   Neurological:  Negative for dizziness, loss of consciousness and headaches.        Physical Exam  Temp:  [35.9 °C (96.7 °F)-36.1 °C (97 °F)] 35.9 °C (96.7 °F)  Pulse:  [60-69] 69  Resp:  [16-32] 28  BP: ()/(51-65) 116/59  SpO2:  [92 %-100 %] 97 %    Physical Exam  Constitutional:     "   General: She is not in acute distress.     Appearance: Normal appearance. She is well-developed. She is obese. She is not diaphoretic.   HENT:      Head: Normocephalic and atraumatic.   Neck:      Vascular: No JVD.   Cardiovascular:      Rate and Rhythm: Normal rate and regular rhythm.      Heart sounds: No murmur heard.  Pulmonary:      Effort: Pulmonary effort is normal. No respiratory distress.      Breath sounds: No stridor. No wheezing or rales.   Abdominal:      General: Bowel sounds are absent. There is no distension.      Palpations: Abdomen is soft.      Tenderness: There is abdominal tenderness. There is no guarding or rebound.      Comments: Post op dressings and drains noted  Urostomy noted with catheter in place   Musculoskeletal:      Right lower leg: No edema.      Left lower leg: No edema.   Skin:     General: Skin is warm and dry.      Capillary Refill: Capillary refill takes less than 2 seconds.      Findings: No rash.   Neurological:      Mental Status: She is oriented to person, place, and time.   Psychiatric:         Mood and Affect: Mood normal.         Behavior: Behavior normal.         Thought Content: Thought content normal.         Fluids    Intake/Output Summary (Last 24 hours) at 8/22/2024 0625  Last data filed at 8/22/2024 0600  Gross per 24 hour   Intake 365 ml   Output 2533 ml   Net -2168 ml       Laboratory  Recent Labs     08/20/24  1600 08/21/24  0349 08/21/24  1649   WBC 11.4* 14.7* 13.3*   RBC 3.45* 3.09* 2.96*   HEMOGLOBIN 10.4* 9.4* 9.1*   HEMATOCRIT 33.0* 29.6* 28.5*   MCV 95.7 95.8 96.3   MCH 30.1 30.4 30.7   MCHC 31.5* 31.8* 31.9*   RDW 47.1 47.9 49.3   PLATELETCT 239 200 212   MPV 10.5 10.6 10.5     Recent Labs     08/20/24  0630 08/21/24  0349   SODIUM 139 142   POTASSIUM 4.4 4.6   CHLORIDE 107 113*   CO2 20 19*   GLUCOSE 115* 139*   BUN 27* 26*   CREATININE 1.15 1.49*   CALCIUM 9.8 8.8                   Imaging  No orders to display        Assessment/Plan  * Shock (HCC)-  (present on admission)  Assessment & Plan  Post operative requiring Rudi synephrine initially and then Levophed in the unit  Vasopressors DC'd early this am  Cortisol>30  No evidence of sepsis  Cont to monitor and resume BP meds as pressures allow      Parastomal hernia  Assessment & Plan  Now s/p repair on 8/20  NGT and 3 WERNER drains in place  Srgry and Urology following    8/22  Belly still quiet, no flatus or BM  Monitor for signs of return of bowel function  Prn pain and N management  Monitor daily BMP for renal function and electrolye abnormalities  Resume po once OK with Srgcl team  Cont supportive care with IVFs, electrolyte replacement, antiemetics and pain medication  Daily BMP to monitor for RAO, hypo K, acidosis    Obesity- (present on admission)  Assessment & Plan  BMI 46    Atrial fibrillation (HCC)- (present on admission)  Assessment & Plan  Hx of  Has been paced and sinus overnight  Cont Coreg with parameters  Cont Tele  Resume apixaban when OK with Surgical team    Chronic respiratory failure with hypoxia (HCC)- (present on admission)  Assessment & Plan  At baseline is on 2 LNC  Mobilize  O2/RT protocols  Monitor for aspiration  Monitor vol status    Pacemaker- (present on admission)  Assessment & Plan  On Tele pt has been intermitently paced/sinus  Cont Tele    RAO (acute kidney injury) (HCC)- (present on admission)  Assessment & Plan  Renal function improved today  Good UOP  At risk for dehydration given output from NGT and 3 JPs (close to 600ml/24hrs)  Daily BMP    Presence of urostomy (HCC)- (present on admission)  Assessment & Plan  Chronic since 5 years old now with surgery by Dr. Gusman and Dr. Mercado  She has 3 WERNER drains in  Pain control with narcotics as indicated  SCDs for DVT prophylaxis  She has an NG tube in  Urology following  Good UOP: cont to monitor         VTE prophylaxis: VTE Selection    I have performed a physical exam and reviewed and updated ROS and Plan today (8/22/2024). In  review of yesterday's note (8/21/2024), there are no changes except as documented above.

## 2024-08-22 NOTE — WOUND TEAM
"  [facility]     Inpatient Services     Established Ostomy Management/ troubleshooting      Plan: Bedside RNs to assist pt with appliance changes. Ostomy RN to remain available PRN for any needs.    HPI: Reviewed  PMH: Reviewed   SH: Reviewed     Reason for Ostomy nurse consult:  Established urostomy    Ostomy History:          Urostomy 01/07/21 Ileal conduit LLQ (Active)   Wound Image   08/21/24 2000   Stomal Appliance Assessment Changed 08/22/24 1346   Stoma Assessment Red 08/22/24 1346   Stoma Shape Flush 08/22/24 1346   Peristomal Assessment Denuded;Red 08/22/24 1346   Mucocutaneous Junction Intact 08/22/24 1346   Treatment Appliance Changed;Cleansed with water/washcloth;Site care 08/22/24 1346   Peristomal Protectant No Sting Skin Prep;Paste Ring;Stoma Powder 08/22/24 1346   Stomal Appliance Paste Ring, 2\";2 3/4\" (70mm) CTF;Urostomy Pouch 08/22/24 1346   Output (mL) 200 mL 08/22/24 0600   Output Color Yellow 08/22/24 1346   WOUND RN ONLY - Stomal Appliance  2 Piece;Paste Ring, 2\";Urostomy Pouch 08/22/24 1346   Appliance Brand Callum 08/22/24 1346   Secure Start completed No 08/22/24 1346   WOUND NURSE ONLY - Time Spent with Patient (mins) 30 08/22/24 1346            Interventions and Education (if needed): Previous appliance removed using push/pull method. Peristomal skin and stoma cleansed with warm washcloths then dried. Stoma powder sprinkled to peristomal skin, excess powder wiped, then no sting skin prep applied to form \"crust.\" Crusting performed twice. Using plastic backing from stencil, stoma traced. Stencil utilized to cut barrier, then barrier measured against stoma to ensure accurate size. Paste ring then applied to back of barrier. Pouch attached to barrier and connected to down drain bag.      Evaluation: Stoma flush against patient's skin. May require convexity if she leaks again.       Anticipated discharge needs: NA  "

## 2024-08-23 LAB
ANION GAP SERPL CALC-SCNC: 10 MMOL/L (ref 7–16)
BUN SERPL-MCNC: 19 MG/DL (ref 8–22)
CALCIUM SERPL-MCNC: 10.2 MG/DL (ref 8.5–10.5)
CHLORIDE SERPL-SCNC: 109 MMOL/L (ref 96–112)
CO2 SERPL-SCNC: 21 MMOL/L (ref 20–33)
CREAT SERPL-MCNC: 0.87 MG/DL (ref 0.5–1.4)
ERYTHROCYTE [DISTWIDTH] IN BLOOD BY AUTOMATED COUNT: 49.8 FL (ref 35.9–50)
GFR SERPLBLD CREATININE-BSD FMLA CKD-EPI: 76 ML/MIN/1.73 M 2
GLUCOSE SERPL-MCNC: 66 MG/DL (ref 65–99)
HCT VFR BLD AUTO: 32.5 % (ref 37–47)
HGB BLD-MCNC: 10.1 G/DL (ref 12–16)
MCH RBC QN AUTO: 30.4 PG (ref 27–33)
MCHC RBC AUTO-ENTMCNC: 31.1 G/DL (ref 32.2–35.5)
MCV RBC AUTO: 97.9 FL (ref 81.4–97.8)
PLATELET # BLD AUTO: 189 K/UL (ref 164–446)
PMV BLD AUTO: 11.3 FL (ref 9–12.9)
POTASSIUM SERPL-SCNC: 4.7 MMOL/L (ref 3.6–5.5)
RBC # BLD AUTO: 3.32 M/UL (ref 4.2–5.4)
SODIUM SERPL-SCNC: 140 MMOL/L (ref 135–145)
WBC # BLD AUTO: 13.3 K/UL (ref 4.8–10.8)

## 2024-08-23 PROCEDURE — 80048 BASIC METABOLIC PNL TOTAL CA: CPT

## 2024-08-23 PROCEDURE — 36415 COLL VENOUS BLD VENIPUNCTURE: CPT

## 2024-08-23 PROCEDURE — A9270 NON-COVERED ITEM OR SERVICE: HCPCS | Performed by: SURGERY

## 2024-08-23 PROCEDURE — 770006 HCHG ROOM/CARE - MED/SURG/GYN SEMI*

## 2024-08-23 PROCEDURE — 85027 COMPLETE CBC AUTOMATED: CPT

## 2024-08-23 PROCEDURE — 700102 HCHG RX REV CODE 250 W/ 637 OVERRIDE(OP): Performed by: INTERNAL MEDICINE

## 2024-08-23 PROCEDURE — 700102 HCHG RX REV CODE 250 W/ 637 OVERRIDE(OP): Performed by: SURGERY

## 2024-08-23 PROCEDURE — A9270 NON-COVERED ITEM OR SERVICE: HCPCS | Performed by: INTERNAL MEDICINE

## 2024-08-23 PROCEDURE — 700111 HCHG RX REV CODE 636 W/ 250 OVERRIDE (IP): Mod: JZ | Performed by: HOSPITALIST

## 2024-08-23 PROCEDURE — 99233 SBSQ HOSP IP/OBS HIGH 50: CPT | Performed by: INTERNAL MEDICINE

## 2024-08-23 RX ADMIN — ACETAMINOPHEN 1000 MG: 500 TABLET ORAL at 04:45

## 2024-08-23 RX ADMIN — FERROUS SULFATE TAB 325 MG (65 MG ELEMENTAL FE) 325 MG: 325 (65 FE) TAB at 08:40

## 2024-08-23 RX ADMIN — AMLODIPINE BESYLATE 5 MG: 5 TABLET ORAL at 04:45

## 2024-08-23 RX ADMIN — AMLODIPINE BESYLATE 5 MG: 5 TABLET ORAL at 17:10

## 2024-08-23 RX ADMIN — APIXABAN 5 MG: 5 TABLET, FILM COATED ORAL at 17:10

## 2024-08-23 RX ADMIN — ACETAMINOPHEN 1000 MG: 500 TABLET ORAL at 17:09

## 2024-08-23 RX ADMIN — OXYCODONE HYDROCHLORIDE 10 MG: 10 TABLET ORAL at 17:09

## 2024-08-23 RX ADMIN — ENOXAPARIN SODIUM 40 MG: 100 INJECTION SUBCUTANEOUS at 04:46

## 2024-08-23 RX ADMIN — METHOCARBAMOL 750 MG: 750 TABLET ORAL at 17:09

## 2024-08-23 RX ADMIN — CARVEDILOL 12.5 MG: 12.5 TABLET, FILM COATED ORAL at 04:45

## 2024-08-23 RX ADMIN — OXYCODONE HYDROCHLORIDE 10 MG: 10 TABLET ORAL at 09:31

## 2024-08-23 RX ADMIN — SENNOSIDES AND DOCUSATE SODIUM 1 TABLET: 50; 8.6 TABLET ORAL at 17:10

## 2024-08-23 RX ADMIN — METHOCARBAMOL 750 MG: 750 TABLET ORAL at 01:52

## 2024-08-23 RX ADMIN — ACETAMINOPHEN 1000 MG: 500 TABLET ORAL at 13:29

## 2024-08-23 RX ADMIN — CITALOPRAM 40 MG: 40 TABLET, FILM COATED ORAL at 04:46

## 2024-08-23 RX ADMIN — GUAIFENESIN 600 MG: 600 TABLET, EXTENDED RELEASE ORAL at 17:10

## 2024-08-23 RX ADMIN — GUAIFENESIN 600 MG: 600 TABLET, EXTENDED RELEASE ORAL at 04:45

## 2024-08-23 RX ADMIN — OXYCODONE HYDROCHLORIDE 10 MG: 10 TABLET ORAL at 20:24

## 2024-08-23 RX ADMIN — Medication 5 MG: at 20:24

## 2024-08-23 RX ADMIN — SENNOSIDES AND DOCUSATE SODIUM 1 TABLET: 50; 8.6 TABLET ORAL at 04:45

## 2024-08-23 RX ADMIN — GABAPENTIN 300 MG: 300 CAPSULE ORAL at 04:46

## 2024-08-23 RX ADMIN — CARVEDILOL 12.5 MG: 12.5 TABLET, FILM COATED ORAL at 17:10

## 2024-08-23 RX ADMIN — LISINOPRIL 5 MG: 5 TABLET ORAL at 04:46

## 2024-08-23 ASSESSMENT — ENCOUNTER SYMPTOMS
DEPRESSION: 0
FEVER: 0
PALPITATIONS: 0
SHORTNESS OF BREATH: 0
MYALGIAS: 1
HEARTBURN: 0
ABDOMINAL PAIN: 1
VOMITING: 0
MEMORY LOSS: 0
NAUSEA: 1
LOSS OF CONSCIOUSNESS: 0
HEADACHES: 0
NERVOUS/ANXIOUS: 0
BACK PAIN: 0
CHILLS: 0

## 2024-08-23 ASSESSMENT — CHA2DS2 SCORE
HYPERTENSION: YES
AGE 65 TO 74: NO
PRIOR STROKE OR TIA OR THROMBOEMBOLISM: YES
AGE 75 OR GREATER: NO
CHA2DS2 VASC SCORE: 4
DIABETES: NO
VASCULAR DISEASE: NO
CHF OR LEFT VENTRICULAR DYSFUNCTION: NO
SEX: FEMALE

## 2024-08-23 ASSESSMENT — PAIN DESCRIPTION - PAIN TYPE
TYPE: ACUTE PAIN
TYPE: ACUTE PAIN

## 2024-08-23 NOTE — CARE PLAN
The patient is Stable - Low risk of patient condition declining or worsening    Shift Goals  Clinical Goals: Pt drain output to be monitored and pt to be able to get some rest tonight.  Patient Goals: Pt to have pain control and sleep.  Family Goals: ag    Progress made toward(s) clinical / shift goals:    Problem: Knowledge Deficit - Standard  Goal: Patient and family/care givers will demonstrate understanding of plan of care, disease process/condition, diagnostic tests and medications  8/23/2024 0320 by Sho Justice R.N.  Outcome: Progressing     Problem: Skin Integrity  Goal: Skin integrity is maintained or improved  Outcome: Progressing     Problem: Wound/ / Incision Healing  Goal: Patient's wound/surgical incision will decrease in size and heals properly  Outcome: Progressing     Pt A/O x4. Pt is aware of her current care and current plan of care. Pt incisions dressing clean dry and intact. Drains drained throughout the night to maintain proper suction.     Patient is not progressing towards the following goals:

## 2024-08-23 NOTE — DISCHARGE PLANNING
Case Management Discharge Planning    Admission Date: 8/20/2024  GMLOS: 8.9  ALOS: 3    6-Clicks ADL Score:    6-Clicks Mobility Score:    PT and/or OT Eval ordered: Yes  Post-acute Referrals Ordered: Yes  Post-acute Choice Obtained: Yes  Has referral(s) been sent to post-acute provider:  Yes      Anticipated Discharge Dispo: Discharge Disposition: D/T to SNF with Medicare cert in anticipation of skilled care (03)    DME Needed: No    Action(s) Taken: SW requested DPA to send referral to [facility], as pt is long term resident there.    Escalations Completed: None    Medically Clear: No

## 2024-08-23 NOTE — CARE PLAN
The patient is Stable - Low risk of patient condition declining or worsening    Shift Goals  Clinical Goals: Monitor drain output  Patient Goals: Pain COntrol, Comfort, rest  Family Goals: ag    Progress made toward(s) clinical / shift goals:    Problem: Knowledge Deficit - Standard  Goal: Patient and family/care givers will demonstrate understanding of plan of care, disease process/condition, diagnostic tests and medications  Outcome: Progressing     Problem: Pain - Standard  Goal: Alleviation of pain or a reduction in pain to the patient’s comfort goal  Outcome: Progressing     Problem: Skin Integrity  Goal: Skin integrity is maintained or improved  Outcome: Progressing       Patient is not progressing towards the following goals:

## 2024-08-23 NOTE — PROGRESS NOTES
"Hospital Medicine Daily Progress Note    Date of Service  8/23/2024    Chief Complaint  Patricia A Tietz is a 59 y.o. female admitted 8/20/2024 with hernia repair    Hospital Course  58yo PMHx urostomy with ileal conduit as a child, HTN, AFib, on apixaban, chronic hypoxic resp failure on 2LNC at baseline.  She was at [facility] in May with an SBO and UTI (Klebsiella and Pseudomonas).  Admitted on 8/20 for planned Ex lap and reapir of 10cm abd wall hernia with mycocutaneous flap trunk with mesh and replacement of catheter into the stoma..  In PACU pt was hypotensive requiring Rudi-Synephrine and fluids.  She was admitted to [facility]    Interval Problem Update  8/22 Pt had a quiet night.  \"Sore all over\", refering to her belly.  No flatus or BM    AFebrile  Sinus 60s  -110  On 2 LNC  UOP 1950ml/24hrs  Drains:  -A: 230ml  -B: 310ml  -C 43ml    8/23 patient is ANO x 3, reports feeling better, generalized weakness  Hypoactive bowel sounds, abdomen soft nontender  NG tube in place  Minimal output  Monitor for return of bowel function, diet per surgery    I have discussed this patient's plan of care and discharge plan at IDT rounds today with Case Management, Nursing, Nursing leadership, and other members of the IDT team.    Consultants/Specialty  general surgery and urology    Code Status  Full Code    Disposition  The patient is not medically cleared for discharge to home or a post-acute facility.      I have placed the appropriate orders for post-discharge needs.    Review of Systems  Review of Systems   Constitutional:  Negative for chills, fever and malaise/fatigue.   Respiratory:  Negative for shortness of breath.    Cardiovascular:  Negative for chest pain, palpitations and leg swelling.   Gastrointestinal:  Positive for abdominal pain and nausea. Negative for heartburn and vomiting.   Musculoskeletal:  Positive for myalgias. Negative for back pain.   Skin:  Negative for rash.   Neurological:  Negative for loss of " consciousness and headaches.   Psychiatric/Behavioral:  Negative for depression and memory loss. The patient is not nervous/anxious.         Physical Exam  Temp:  [35.9 °C (96.7 °F)-36.7 °C (98.1 °F)] 35.9 °C (96.7 °F)  Pulse:  [60-73] 61  Resp:  [17-27] 17  BP: (117-137)/(61-83) 125/68  SpO2:  [92 %-98 %] 94 %    Physical Exam  Constitutional:       General: She is not in acute distress.     Appearance: Normal appearance. She is well-developed. She is obese. She is ill-appearing. She is not toxic-appearing.   HENT:      Head: Normocephalic and atraumatic.      Nose:      Comments: Ngt in place  Neck:      Vascular: No JVD.   Cardiovascular:      Rate and Rhythm: Normal rate and regular rhythm.      Heart sounds: No murmur heard.  Pulmonary:      Effort: Pulmonary effort is normal. No respiratory distress.      Breath sounds: No stridor. No wheezing or rales.   Abdominal:      General: Bowel sounds are absent. There is no distension.      Palpations: Abdomen is soft.      Tenderness: There is abdominal tenderness. There is no right CVA tenderness, left CVA tenderness, guarding or rebound.      Comments: Post op dressings and drains noted  Urostomy noted with catheter in place   Musculoskeletal:      Right lower leg: No edema.      Left lower leg: No edema.   Skin:     General: Skin is warm and dry.      Capillary Refill: Capillary refill takes less than 2 seconds.      Findings: No rash.   Neurological:      Mental Status: She is oriented to person, place, and time.      Cranial Nerves: No cranial nerve deficit.      Sensory: No sensory deficit.      Motor: Weakness present.   Psychiatric:         Mood and Affect: Mood normal.         Behavior: Behavior normal.         Thought Content: Thought content normal.         Fluids    Intake/Output Summary (Last 24 hours) at 8/23/2024 1154  Last data filed at 8/23/2024 0931  Gross per 24 hour   Intake --   Output 1900 ml   Net -1900 ml       Laboratory  Recent Labs      08/21/24  1649 08/22/24  0818 08/23/24  0515   WBC 13.3* 12.3* 13.3*   RBC 2.96* 2.81* 3.32*   HEMOGLOBIN 9.1* 8.4* 10.1*   HEMATOCRIT 28.5* 26.8* 32.5*   MCV 96.3 95.4 97.9*   MCH 30.7 29.9 30.4   MCHC 31.9* 31.3* 31.1*   RDW 49.3 49.1 49.8   PLATELETCT 212 184 189   MPV 10.5 10.6 11.3     Recent Labs     08/21/24  0349 08/22/24  0818 08/23/24  0515   SODIUM 142 139 140   POTASSIUM 4.6 4.4 4.7   CHLORIDE 113* 109 109   CO2 19* 22 21   GLUCOSE 139* 84 66   BUN 26* 24* 19   CREATININE 1.49* 1.11 0.87   CALCIUM 8.8 9.5 10.2                   Imaging  No orders to display        Assessment/Plan  * Shock (HCC)- (present on admission)  Assessment & Plan  Post operative requiring Rudi synephrine initially and then Levophed in the unit  Vasopressors DC'd early this am  Cortisol>30  No evidence of sepsis  Cont to monitor and resume BP meds as pressures allow    8/23 resolved, monitor      Parastomal hernia  Assessment & Plan  Now s/p repair on 8/20  NGT and 3 WERNER drains in place  Srgry and Urology following    8/22  Belly still quiet, no flatus or BM  Monitor for signs of return of bowel function  Prn pain and N management  Monitor daily BMP for renal function and electrolye abnormalities  Resume po once OK with Srgcl team  Cont supportive care with IVFs, electrolyte replacement, antiemetics and pain medication  Daily BMP to monitor for RAO, hypo K, acidosis    8/23 surgery following, okay for NG tube removal  Diet per surgery  SLP evaluation  Drains in place    Obesity- (present on admission)  Assessment & Plan  BMI 46    Atrial fibrillation (HCC)- (present on admission)  Assessment & Plan  Hx of  Has been paced and sinus overnight  Cont Coreg with parameters  Cont Tele  Resume apixaban when OK with Surgical team    Chronic respiratory failure with hypoxia (HCC)- (present on admission)  Assessment & Plan  At baseline is on 2 LNC  Mobilize  O2/RT protocols  Monitor for aspiration  Monitor vol status    8/23 patient is from  [facility] skilled facility, to return to [facility] once medically cleared, continue to monitor    Pacemaker- (present on admission)  Assessment & Plan  On Tele pt has been intermitently paced/sinus  Cont Tele    RAO (acute kidney injury) (HCC)- (present on admission)  Assessment & Plan  Renal function improved today  Good UOP  At risk for dehydration given output from NGT and 3 JPs (close to 600ml/24hrs)  Daily BMP    Presence of urostomy (HCC)- (present on admission)  Assessment & Plan  8/22 Chronic since 5 years old now with surgery by Dr. Gusman and Dr. Mercado  She has 3 WERNER drains in  Pain control with narcotics as indicated  SCDs for DVT prophylaxis  She has an NG tube in  Urology following  Good UOP: cont to monitor    8/23 okay for NG tube removal per surgery         VTE prophylaxis:   SCDs/TEDs      I have performed a physical exam and reviewed and updated ROS and Plan today (8/23/2024). In review of yesterday's note (8/22/2024), there are no changes except as documented above.

## 2024-08-23 NOTE — PROGRESS NOTES
4 Eyes Skin Assessment Completed by KYRIE Perez and Alesia RN.    Head WDL  Ears WDL  Nose WDL; NGT tube no breakdown  Mouth WDL  Neck WDL  Breast/Chest WDL  Shoulder Blades WDL  Spine WDL  (R) Arm/Elbow/Hand WDL  (L) Arm/Elbow/Hand WDL  Abdomen Incision; 3 WERNER drains, urostomy tube; excoriation under pannus  Groin Excoriation; moisture associated perineum  Scrotum/Coccyx/Buttocks Redness and Blanching  (R) Leg Swelling; flaky; dusky; scabbed  (L) Leg Swelling; flaky; dusky; scabbed  (R) Heel/Foot/Toe Blanching  (L) Heel/Foot/Toe Blanching          Devices In Places Pulse Ox and Nasal Cannula; NGT; float boots      Interventions In Place Gray Ear Foams, NC W/Ear Foams, Heel Float Boots, TAP System, Pillows, Q2 Turns, Barrier Cream, Dri-Elvis Pads, and Pressure Redistribution Mattress    Possible Skin Injury No    Pictures Uploaded Into Epic N/A  Wound Consult Placed Yes  RN Wound Prevention Protocol Ordered Yes

## 2024-08-23 NOTE — PROGRESS NOTES
"      Note to reader: this note follows the APSO format rather than the historical SOAP format. Assessment and plan located at the top of the note for ease of use.    Chief Complaint  59 y.o. year old female here with 50 year old ileal conduit in left lower quadrant, large parastomal hernia with multiple previous bowel obstructions that would also obstruct her conduit    Assessment/Plan  Interval History     Plan:  Dr. Mercado primary, her multiple drain are to be managed by   Dr. Mercado/general surgery.  Urology will round on pt while admitted to ensure good UOP  Strict I&Os   Will plan to remove red rubber prior to d/c   Urology to follow     Case discussed with Dr. Gusman who has directed this plan of care    Patient seen and examined    8/22: POD2 Pt more stable today. Pt feels overall \"pretty\" well. Urostomy leaking from bottom. Ostomy nurses consulted but pt reports this is chronic for her and typically has to change the bandage q 2-5 days. Urine clear yellow.      8/21: POD1 This am pt is Hypotensive. Sleeping during rounds. UOP from spt 675cc/24hrs, “other urine“ 1600cc/24hrs. Clear yellow. WBC 14(11), hbg 9.4(10.4) cr 1.49(1.15).              Review of Systems  Physical Exam   Review of Systems   Unable to perform ROS: Other   Constitutional:  Negative for chills and fever.   Cardiovascular:  Positive for chest pain.   Gastrointestinal:  Positive for abdominal pain.   Genitourinary: Negative.    Skin:  Negative for itching and rash.   All other systems reviewed and are negative.    Vitals:    08/22/24 0800 08/22/24 1000 08/22/24 1200 08/22/24 1400   BP: 120/58 128/62 123/83 129/61   Pulse: 72 68 73 66   Resp: (!) 24 (!) 36 (!) 27 (!) 27   Temp:  36.2 °C (97.1 °F) 36.3 °C (97.4 °F) 36.2 °C (97.2 °F)   TempSrc:  Temporal Temporal Temporal   SpO2: 98% 99% 97% 98%   Weight:       Height:         Physical Exam  Constitutional:       Appearance: Normal appearance.   HENT:      Head: Normocephalic.      Nose: "      Comments: NGT in place   Pulmonary:      Effort: Pulmonary effort is normal.   Genitourinary:     Comments: Urostomy pink and moist, red robbin sewn in place   Skin:     General: Skin is warm and dry.          Hematology Chemistry   Lab Results   Component Value Date/Time    WBC 12.3 (H) 08/22/2024 08:18 AM    HEMOGLOBIN 8.4 (L) 08/22/2024 08:18 AM    HEMATOCRIT 26.8 (L) 08/22/2024 08:18 AM    PLATELETCT 184 08/22/2024 08:18 AM     Lab Results   Component Value Date/Time    SODIUM 139 08/22/2024 08:18 AM    POTASSIUM 4.4 08/22/2024 08:18 AM    CHLORIDE 109 08/22/2024 08:18 AM    CO2 22 08/22/2024 08:18 AM    GLUCOSE 84 08/22/2024 08:18 AM    BUN 24 (H) 08/22/2024 08:18 AM    CREATININE 1.11 08/22/2024 08:18 AM    GLOMRATE 34 (L) 11/04/2023 04:25 AM         Labs not explicitly included in this progress note were reviewed by the author.   Radiology/imaging not explicitly included in this progress note was reviewed by the author.     Medications reviewed, Radiology images reviewed and Labs reviewed                          Sonal Paz, PLoboALobo-DEVYN   [address] [place] [address]   [phone]

## 2024-08-23 NOTE — PROGRESS NOTES
Surgery  POD#3  Out of icu, on medical floor  No flatus or bm reported  WERNER output significantly more serous.   NGT output low  Dressing intact  A/p   D/c ngt  Ok to start eliquis if hg greater than 8.   CBC ordered  Pt/ot  Awaiting return of bowel function  Appreciate medicine support.

## 2024-08-23 NOTE — PROGRESS NOTES
"      Note to reader: this note follows the APSO format rather than the historical SOAP format. Assessment and plan located at the top of the note for ease of use.    Chief Complaint  59 y.o. year old female here with 50 year old ileal conduit in left lower quadrant, large parastomal hernia with multiple previous bowel obstructions that would also obstruct her conduit    Assessment/Plan  Interval History     Plan:  Dr. Mercado primary, her multiple drain are to be managed by   Dr. Mercado/general surgery.  Urology will round on pt while admitted to ensure good UOP  Strict I&Os   Will plan to remove red rubber prior to d/c   Urology to follow     Case discussed with Dr. Gusman who has directed this plan of care    Patient seen and examined    8/23- POD3. Pt with abd pain. Confusing documentation about  UOP, notes state they have straight cathed the pt. Confirmed Uop >2L, from ostomy. Ostomy nurses on board. WBC 12(14), hbg 8.4(9.4) cr 1.1(1.4)       8/22: POD2 Pt more stable today. Pt feels overall \"pretty\" well. Urostomy leaking from bottom. Ostomy nurses consulted but pt reports this is chronic for her and typically has to change the bandage q 2-5 days. Urine clear yellow.      8/21: POD1 This am pt is Hypotensive. Sleeping during rounds. UOP from spt 675cc/24hrs, “other urine“ 1600cc/24hrs. Clear yellow. WBC 14(11), hbg 9.4(10.4) cr 1.49(1.15).              Review of Systems  Physical Exam   Review of Systems   Unable to perform ROS: Other   Constitutional:  Negative for chills and fever.   Cardiovascular:  Positive for chest pain.   Gastrointestinal:  Positive for abdominal pain.   Genitourinary: Negative.    Skin:  Negative for itching and rash.   All other systems reviewed and are negative.    Vitals:    08/22/24 2147 08/22/24 2247 08/22/24 2347 08/23/24 0430   BP:    137/70   Pulse:    62   Resp: 18 18 18 18   Temp:    35.9 °C (96.7 °F)   TempSrc:    Temporal   SpO2:    92%   Weight:       Height:     "     Physical Exam  Constitutional:       Appearance: Normal appearance.   HENT:      Head: Normocephalic.      Nose:      Comments: NGT in place   Pulmonary:      Effort: Pulmonary effort is normal.   Genitourinary:     Comments: Urostomy pink and moist, red robbin sewn in place   Skin:     General: Skin is warm and dry.          Hematology Chemistry   Lab Results   Component Value Date/Time    WBC 12.3 (H) 08/22/2024 08:18 AM    HEMOGLOBIN 8.4 (L) 08/22/2024 08:18 AM    HEMATOCRIT 26.8 (L) 08/22/2024 08:18 AM    PLATELETCT 184 08/22/2024 08:18 AM     Lab Results   Component Value Date/Time    SODIUM 140 08/23/2024 05:15 AM    POTASSIUM 4.7 08/23/2024 05:15 AM    CHLORIDE 109 08/23/2024 05:15 AM    CO2 21 08/23/2024 05:15 AM    GLUCOSE 66 08/23/2024 05:15 AM    BUN 19 08/23/2024 05:15 AM    CREATININE 0.87 08/23/2024 05:15 AM    GLOMRATE 34 (L) 11/04/2023 04:25 AM         Labs not explicitly included in this progress note were reviewed by the author.   Radiology/imaging not explicitly included in this progress note was reviewed by the author.     Core Measures        Sonal Paz, PLoboALobo-GARTH.   [address] [place] [address]   [phone]

## 2024-08-23 NOTE — DOCUMENTATION QUERY
"                                                                         [facility]                                                                       Query Response Note      PATIENT:               TIETZ, PATRICIA  ACCT #:                  [id number]  MRN:                     [id number]  :                      1965  ADMIT DATE:       2024 4:46 AM  DISCH DATE:          RESPONDING  PROVIDER #:        [id number]           QUERY TEXT:    Shock is documented in the Medical Record.      Can the etiology of the shock be further specified?    The patient's Clinical Indicators include:   OP Report: \"ESTIMATED BLOOD LOSS: 500 mL\"      Consult: \"Shock ... Postoperative hypotension for which she has received Rudi-Synephrine and she remains hypotensive after 4 L of IV fluids as she is postop with WERNER drains in with blood will check a stat CBC\"      PN: \"Cortisol>30 No evidence of sepsis ... Cont IVFs at 125ml given no po and loosing fluid via drains and NGT\"    Hemoglobin: 11.7 > 10.4 > 9.4 > 9.1    Risk Factors: S/p exploratory laparotomy repair of 10 cm abdominal wall hernia myocutaneous flap trunk with mesh, 3 WERNER drains and NG tube.  Treatment: Rudi-Synephrine > Levophed gtt, 4L IV fluids.    Thank you,  Yamilet Meeks RN  Clinical    Connect via Pristones or [email or web address]  Options provided:   -- Hypovolemic shock   -- Other shock   -- Other explanation, (please specify other explanation)   -- Unable to determine      Query created by: Yamilet Meeks on 2024 8:39 AM    RESPONSE TEXT:    Hypovolemic shock          Electronically signed by:  GERA VILLALOBOS DO 2024 6:36 AM              "

## 2024-08-24 LAB
ANION GAP SERPL CALC-SCNC: 10 MMOL/L (ref 7–16)
BUN SERPL-MCNC: 18 MG/DL (ref 8–22)
CALCIUM SERPL-MCNC: 10.4 MG/DL (ref 8.5–10.5)
CHLORIDE SERPL-SCNC: 109 MMOL/L (ref 96–112)
CO2 SERPL-SCNC: 22 MMOL/L (ref 20–33)
CREAT SERPL-MCNC: 0.86 MG/DL (ref 0.5–1.4)
GFR SERPLBLD CREATININE-BSD FMLA CKD-EPI: 77 ML/MIN/1.73 M 2
GLUCOSE SERPL-MCNC: 79 MG/DL (ref 65–99)
POTASSIUM SERPL-SCNC: 4.3 MMOL/L (ref 3.6–5.5)
SODIUM SERPL-SCNC: 141 MMOL/L (ref 135–145)

## 2024-08-24 PROCEDURE — 99232 SBSQ HOSP IP/OBS MODERATE 35: CPT | Performed by: INTERNAL MEDICINE

## 2024-08-24 PROCEDURE — A9270 NON-COVERED ITEM OR SERVICE: HCPCS | Performed by: SURGERY

## 2024-08-24 PROCEDURE — 700102 HCHG RX REV CODE 250 W/ 637 OVERRIDE(OP): Performed by: SURGERY

## 2024-08-24 PROCEDURE — A9270 NON-COVERED ITEM OR SERVICE: HCPCS | Performed by: INTERNAL MEDICINE

## 2024-08-24 PROCEDURE — 36415 COLL VENOUS BLD VENIPUNCTURE: CPT

## 2024-08-24 PROCEDURE — 770006 HCHG ROOM/CARE - MED/SURG/GYN SEMI*

## 2024-08-24 PROCEDURE — 80048 BASIC METABOLIC PNL TOTAL CA: CPT

## 2024-08-24 PROCEDURE — 700102 HCHG RX REV CODE 250 W/ 637 OVERRIDE(OP): Performed by: INTERNAL MEDICINE

## 2024-08-24 RX ADMIN — Medication 5 MG: at 20:34

## 2024-08-24 RX ADMIN — APIXABAN 5 MG: 5 TABLET, FILM COATED ORAL at 16:28

## 2024-08-24 RX ADMIN — ACETAMINOPHEN 1000 MG: 500 TABLET ORAL at 04:19

## 2024-08-24 RX ADMIN — SENNOSIDES AND DOCUSATE SODIUM 1 TABLET: 50; 8.6 TABLET ORAL at 04:19

## 2024-08-24 RX ADMIN — APIXABAN 5 MG: 5 TABLET, FILM COATED ORAL at 04:19

## 2024-08-24 RX ADMIN — SENNOSIDES AND DOCUSATE SODIUM 1 TABLET: 50; 8.6 TABLET ORAL at 16:29

## 2024-08-24 RX ADMIN — OXYCODONE HYDROCHLORIDE 10 MG: 10 TABLET ORAL at 02:49

## 2024-08-24 RX ADMIN — AMLODIPINE BESYLATE 5 MG: 5 TABLET ORAL at 04:19

## 2024-08-24 RX ADMIN — GABAPENTIN 300 MG: 300 CAPSULE ORAL at 04:20

## 2024-08-24 RX ADMIN — ACETAMINOPHEN 1000 MG: 500 TABLET ORAL at 16:29

## 2024-08-24 RX ADMIN — LISINOPRIL 5 MG: 5 TABLET ORAL at 04:19

## 2024-08-24 RX ADMIN — OXYCODONE HYDROCHLORIDE 5 MG: 5 TABLET ORAL at 13:54

## 2024-08-24 RX ADMIN — CARVEDILOL 12.5 MG: 12.5 TABLET, FILM COATED ORAL at 04:19

## 2024-08-24 RX ADMIN — OXYCODONE HYDROCHLORIDE 10 MG: 10 TABLET ORAL at 20:33

## 2024-08-24 RX ADMIN — GUAIFENESIN 600 MG: 600 TABLET, EXTENDED RELEASE ORAL at 04:19

## 2024-08-24 RX ADMIN — CITALOPRAM 40 MG: 40 TABLET, FILM COATED ORAL at 04:19

## 2024-08-24 RX ADMIN — FERROUS SULFATE TAB 325 MG (65 MG ELEMENTAL FE) 325 MG: 325 (65 FE) TAB at 08:00

## 2024-08-24 RX ADMIN — ACETAMINOPHEN 1000 MG: 500 TABLET ORAL at 12:16

## 2024-08-24 RX ADMIN — GUAIFENESIN 600 MG: 600 TABLET, EXTENDED RELEASE ORAL at 16:28

## 2024-08-24 ASSESSMENT — ENCOUNTER SYMPTOMS
DIAPHORESIS: 0
CHILLS: 0
NERVOUS/ANXIOUS: 0
ABDOMINAL PAIN: 1
MYALGIAS: 1
DEPRESSION: 0
BACK PAIN: 0
VOMITING: 0
LOSS OF CONSCIOUSNESS: 0
MEMORY LOSS: 0
DIZZINESS: 0
NAUSEA: 1
SHORTNESS OF BREATH: 0
PALPITATIONS: 0
BLURRED VISION: 0
HEADACHES: 0
FEVER: 0
COUGH: 0

## 2024-08-24 ASSESSMENT — PATIENT HEALTH QUESTIONNAIRE - PHQ9
2. FEELING DOWN, DEPRESSED, IRRITABLE, OR HOPELESS: NOT AT ALL
1. LITTLE INTEREST OR PLEASURE IN DOING THINGS: NOT AT ALL
SUM OF ALL RESPONSES TO PHQ9 QUESTIONS 1 AND 2: 0

## 2024-08-24 ASSESSMENT — PAIN DESCRIPTION - PAIN TYPE
TYPE: ACUTE PAIN

## 2024-08-24 NOTE — CARE PLAN
The patient is Watcher - Medium risk of patient condition declining or worsening    Shift Goals  Clinical Goals: monitor GI motility, BMs  Patient Goals: rest, comfort  Family Goals: ag    Progress made toward(s) clinical / shift goals:  pt is a&o x4. O2 sats maintained on 3 L nasal cannula. IV patent and infusing. Pain medication given once per MAR. Bed alarm on. Urostomy patent and draining.    Patient is not progressing towards the following goals:       Problem: Bowel Elimination  Goal: Establish and maintain regular bowel function  Outcome: Not Progressing  No BM's yet this shift. Bowel sounds hypoactive. Pt does not report passing any gas yet.

## 2024-08-24 NOTE — PROGRESS NOTES
"      Note to reader: this note follows the APSO format rather than the historical SOAP format. Assessment and plan located at the top of the note for ease of use.    Chief Complaint  59 y.o. year old female here with 50 year old ileal conduit in left lower quadrant, large parastomal hernia with multiple previous bowel obstructions that would also obstruct her conduit    Assessment/Plan  Interval History     Plan:  Dr. Mercado primary, her multiple drain are to be managed by   Dr. Mercado/general surgery.  Urology will round on pt while admitted to ensure good UOP  Strict I&Os   Will plan to remove red rubber prior to d/c, please contact if moving forard with D/d   Urology to follow     Case discussed with Dr. Gusman who has directed this plan of care    Patient seen and examined    8/24- POD4 Doing well, passing gas, and a bm. Plans to advance diet. Kidney function at baseline. UOP +2L.      8/23- POD3. Pt with abd pain. Confusing documentation about  UOP, notes state they have straight cathed the pt. Confirmed Uop >2L, from ostomy. Ostomy nurses on board. WBC 12(14), hbg 8.4(9.4) cr 1.1(1.4)       8/22: POD2 Pt more stable today. Pt feels overall \"pretty\" well. Urostomy leaking from bottom. Ostomy nurses consulted but pt reports this is chronic for her and typically has to change the bandage q 2-5 days. Urine clear yellow.      8/21: POD1 This am pt is Hypotensive. Sleeping during rounds. UOP from spt 675cc/24hrs, “other urine“ 1600cc/24hrs. Clear yellow. WBC 14(11), hbg 9.4(10.4) cr 1.49(1.15).              Review of Systems  Physical Exam   Review of Systems   Unable to perform ROS: Other   Constitutional:  Negative for chills and fever.   Cardiovascular:  Negative for chest pain.   Gastrointestinal:  Positive for abdominal pain.   Genitourinary: Negative.    Skin:  Negative for itching and rash.   All other systems reviewed and are negative.    Vitals:    08/24/24 0845 08/24/24 1454 08/24/24 1542 08/24/24 " 1554   BP: 104/55  107/62    Pulse: 60  66    Resp: 16 18 17 18   Temp: 36.2 °C (97.2 °F)  36.7 °C (98 °F)    TempSrc: Temporal  Temporal    SpO2: 93%  91%    Weight:       Height:         Physical Exam  Constitutional:       Appearance: Normal appearance.   HENT:      Head: Normocephalic.      Nose:      Comments: NGT in place   Pulmonary:      Effort: Pulmonary effort is normal.   Genitourinary:     Comments: Urostomy pink and moist, red robbin sewn in place   Skin:     General: Skin is warm and dry.          Hematology Chemistry   Lab Results   Component Value Date/Time    WBC 13.3 (H) 08/23/2024 05:15 AM    HEMOGLOBIN 10.1 (L) 08/23/2024 05:15 AM    HEMATOCRIT 32.5 (L) 08/23/2024 05:15 AM    PLATELETCT 189 08/23/2024 05:15 AM     Lab Results   Component Value Date/Time    SODIUM 141 08/24/2024 07:05 AM    POTASSIUM 4.3 08/24/2024 07:05 AM    CHLORIDE 109 08/24/2024 07:05 AM    CO2 22 08/24/2024 07:05 AM    GLUCOSE 79 08/24/2024 07:05 AM    BUN 18 08/24/2024 07:05 AM    CREATININE 0.86 08/24/2024 07:05 AM    GLOMRATE 34 (L) 11/04/2023 04:25 AM         Labs not explicitly included in this progress note were reviewed by the author.   Radiology/imaging not explicitly included in this progress note was reviewed by the author.     Core Measures        Sonal Paz, PLoboA.-C.   [address] [facility] [address] [place] [address]   [phone]

## 2024-08-24 NOTE — PROGRESS NOTES
"Hospital Medicine Daily Progress Note    Date of Service  8/24/2024    Chief Complaint  Patricia A Tietz is a 59 y.o. female admitted 8/20/2024 with hernia repair    Hospital Course  58yo PMHx urostomy with ileal conduit as a child, HTN, AFib, on apixaban, chronic hypoxic resp failure on 2LNC at baseline.  She was at [facility] in May with an SBO and UTI (Klebsiella and Pseudomonas).  Admitted on 8/20 for planned Ex lap and reapir of 10cm abd wall hernia with mycocutaneous flap trunk with mesh and replacement of catheter into the stoma..  In PACU pt was hypotensive requiring Rudi-Synephrine and fluids.  She was admitted to [facility]    Interval Problem Update  8/22 Pt had a quiet night.  \"Sore all over\", refering to her belly.  No flatus or BM    AFebrile  Sinus 60s  -110  On 2 LNC  UOP 1950ml/24hrs  Drains:  -A: 230ml  -B: 310ml  -C 43ml    8/23 patient is ANO x 3, reports feeling better, generalized weakness  Hypoactive bowel sounds, abdomen soft nontender  NG tube in place  Minimal output  Monitor for return of bowel function, diet per surgery    8/24 ANO x 3, reports feeling better  Positive bowel movement and gas, per surgery, okay to initiate liquid diet    I have discussed this patient's plan of care and discharge plan at IDT rounds today with Case Management, Nursing, Nursing leadership, and other members of the IDT team.    Consultants/Specialty  general surgery and urology    Code Status  Full Code    Disposition  The patient is not medically cleared for discharge to home or a post-acute facility.      I have placed the appropriate orders for post-discharge needs.    Review of Systems  Review of Systems   Constitutional:  Negative for chills, diaphoresis, fever and malaise/fatigue.   Eyes:  Negative for blurred vision.   Respiratory:  Negative for cough and shortness of breath.    Cardiovascular:  Negative for chest pain, palpitations and leg swelling.   Gastrointestinal:  Positive for abdominal pain and nausea. " Negative for vomiting.   Musculoskeletal:  Positive for myalgias. Negative for back pain.   Skin:  Negative for rash.   Neurological:  Negative for dizziness, loss of consciousness and headaches.   Psychiatric/Behavioral:  Negative for depression and memory loss. The patient is not nervous/anxious.         Physical Exam  Temp:  [36.2 °C (97.2 °F)-36.6 °C (97.9 °F)] 36.2 °C (97.2 °F)  Pulse:  [60-64] 60  Resp:  [16-17] 16  BP: (104-136)/(55-77) 104/55  SpO2:  [93 %-97 %] 93 %    Physical Exam  Constitutional:       General: She is not in acute distress.     Appearance: Normal appearance. She is well-developed. She is obese. She is ill-appearing. She is not toxic-appearing.   HENT:      Head: Normocephalic and atraumatic.      Nose:      Comments: Ngt in place  Neck:      Vascular: No JVD.   Cardiovascular:      Rate and Rhythm: Normal rate and regular rhythm.      Heart sounds: No murmur heard.  Pulmonary:      Effort: Pulmonary effort is normal. No respiratory distress.      Breath sounds: No stridor. No wheezing or rales.   Abdominal:      General: Bowel sounds are normal. There is no distension.      Palpations: Abdomen is soft.      Tenderness: There is abdominal tenderness. There is no right CVA tenderness, left CVA tenderness, guarding or rebound.      Comments: Post op dressings and drains noted  Urostomy noted with catheter in place   Musculoskeletal:      Right lower leg: No edema.      Left lower leg: No edema.   Skin:     General: Skin is warm and dry.      Capillary Refill: Capillary refill takes less than 2 seconds.   Neurological:      Mental Status: She is oriented to person, place, and time.      Cranial Nerves: No cranial nerve deficit.      Sensory: No sensory deficit.      Motor: Weakness present.   Psychiatric:         Mood and Affect: Mood normal.         Behavior: Behavior normal.         Thought Content: Thought content normal.         Judgment: Judgment normal.         Fluids    Intake/Output  Summary (Last 24 hours) at 8/24/2024 1254  Last data filed at 8/24/2024 0618  Gross per 24 hour   Intake --   Output 2562.5 ml   Net -2562.5 ml       Laboratory  Recent Labs     08/21/24  1649 08/22/24  0818 08/23/24  0515   WBC 13.3* 12.3* 13.3*   RBC 2.96* 2.81* 3.32*   HEMOGLOBIN 9.1* 8.4* 10.1*   HEMATOCRIT 28.5* 26.8* 32.5*   MCV 96.3 95.4 97.9*   MCH 30.7 29.9 30.4   MCHC 31.9* 31.3* 31.1*   RDW 49.3 49.1 49.8   PLATELETCT 212 184 189   MPV 10.5 10.6 11.3     Recent Labs     08/22/24  0818 08/23/24  0515 08/24/24  0705   SODIUM 139 140 141   POTASSIUM 4.4 4.7 4.3   CHLORIDE 109 109 109   CO2 22 21 22   GLUCOSE 84 66 79   BUN 24* 19 18   CREATININE 1.11 0.87 0.86   CALCIUM 9.5 10.2 10.4                   Imaging  No orders to display        Assessment/Plan  * Shock (HCC)- (present on admission)  Assessment & Plan  Post operative requiring Rudi synephrine initially and then Levophed in the unit  Vasopressors DC'd early this am  Cortisol>30  No evidence of sepsis  Cont to monitor and resume BP meds as pressures allow    8/23 resolved, monitor      Parastomal hernia  Assessment & Plan  Now s/p repair on 8/20  NGT and 3 WERNER drains in place  Srgry and Urology following    8/22  Belly still quiet, no flatus or BM  Monitor for signs of return of bowel function  Prn pain and N management  Monitor daily BMP for renal function and electrolye abnormalities  Resume po once OK with Srgcl team  Cont supportive care with IVFs, electrolyte replacement, antiemetics and pain medication  Daily BMP to monitor for RAO, hypo K, acidosis    8/23 surgery following, okay for NG tube removal  Diet per surgery  SLP evaluation  Drains in place    8/24 following, drains per surgery, greater than 162 mL output/24 hours  Positive BM, diet per surgery, consider initiation of clear liquid diet  Pain control follow-up labs  Patient is from [facility] facility, to return when medically cleared      Obesity- (present on admission)  Assessment &  Plan  BMI 46    Atrial fibrillation (HCC)- (present on admission)  Assessment & Plan  8/21 Hx of  Has been paced and sinus overnight  Cont Coreg with parameters  Cont Tele  Resume apixaban when OK with Surgical team    8/24 okay to restart Eliquis per surgery, will monitor  Follow-up CBC    Chronic respiratory failure with hypoxia (HCC)- (present on admission)  Assessment & Plan  At baseline is on 2 LNC  Mobilize  O2/RT protocols  Monitor for aspiration  Monitor vol status    8/23 patient is from [facility], to return to [facility] once medically cleared, continue to monitor    Pacemaker- (present on admission)  Assessment & Plan  On Tele pt has been intermitently paced/sinus  Cont Tele    RAO (acute kidney injury) (HCC)- (present on admission)  Assessment & Plan  Renal function improved today  Good UOP  At risk for dehydration given output from NGT and 3 JPs (close to 600ml/24hrs)  Daily BMP    Presence of urostomy (HCC)- (present on admission)  Assessment & Plan  8/22 Chronic since 5 years old now with surgery by Dr. Gusman and Dr. Mercado  She has 3 WERNER drains in  Pain control with narcotics as indicated  SCDs for DVT prophylaxis  She has an NG tube in  Urology following  Good UOP: cont to monitor    8/23 okay for NG tube removal per surgery         VTE prophylaxis:    heparin ppx      I have performed a physical exam and reviewed and updated ROS and Plan today (8/24/2024). In review of yesterday's note (8/23/2024), there are no changes except as documented above.

## 2024-08-24 NOTE — CARE PLAN
Problem: Pain - Standard  Goal: Alleviation of pain or a reduction in pain to the patient’s comfort goal  Description: Target End Date:  Prior to discharge or change in level of care    Document on Vitals flowsheet    1.  Document pain using the appropriate pain scale per order or unit policy  2.  Educate and implement non-pharmacologic comfort measures (i.e. relaxation, distraction, massage, cold/heat therapy, etc.)  3.  Pain management medications as ordered  4.  Reassess pain after pain med administration per policy  5.  If opiods administered assess patient's response to pain medication is appropriate per POSS sedation scale  6.  Follow pain management plan developed in collaboration with patient and interdisciplinary team (including palliative care or pain specialists if applicable)  Outcome: Progressing  Flowsheets (Taken 8/24/2024 9453)  OB Pain Intervention:   [place]   Relaxation technique   Rest   The patient is Stable - Low risk of patient condition declining or worsening    Shift Goals  Clinical Goals: monitor drains  Patient Goals: rest  Family Goals: ag    Progress made toward(s) clinical / shift goals:      Patient is not progressing towards the following goals:

## 2024-08-24 NOTE — PROGRESS NOTES
Surgery  POD#4  Doing well, passing gas, had a bm  Dressing intact  WERNER drain more serous than sanguinous  A/P  Full liquid diet  Continue drain  Eliquis ok

## 2024-08-25 LAB
ANION GAP SERPL CALC-SCNC: 8 MMOL/L (ref 7–16)
BASOPHILS # BLD AUTO: 0.1 % (ref 0–1.8)
BASOPHILS # BLD AUTO: 0.1 % (ref 0–1.8)
BASOPHILS # BLD: 0.01 K/UL (ref 0–0.12)
BASOPHILS # BLD: 0.01 K/UL (ref 0–0.12)
BUN SERPL-MCNC: 20 MG/DL (ref 8–22)
CALCIUM SERPL-MCNC: 10 MG/DL (ref 8.5–10.5)
CHLORIDE SERPL-SCNC: 109 MMOL/L (ref 96–112)
CO2 SERPL-SCNC: 21 MMOL/L (ref 20–33)
CREAT SERPL-MCNC: 0.96 MG/DL (ref 0.5–1.4)
EOSINOPHIL # BLD AUTO: 0.13 K/UL (ref 0–0.51)
EOSINOPHIL # BLD AUTO: 0.16 K/UL (ref 0–0.51)
EOSINOPHIL NFR BLD: 1.5 % (ref 0–6.9)
EOSINOPHIL NFR BLD: 1.7 % (ref 0–6.9)
ERYTHROCYTE [DISTWIDTH] IN BLOOD BY AUTOMATED COUNT: 48.7 FL (ref 35.9–50)
ERYTHROCYTE [DISTWIDTH] IN BLOOD BY AUTOMATED COUNT: 49.1 FL (ref 35.9–50)
GFR SERPLBLD CREATININE-BSD FMLA CKD-EPI: 68 ML/MIN/1.73 M 2
GLUCOSE SERPL-MCNC: 110 MG/DL (ref 65–99)
HCT VFR BLD AUTO: 24.6 % (ref 37–47)
HCT VFR BLD AUTO: 29.6 % (ref 37–47)
HEMOCCULT STL QL: NEGATIVE
HGB BLD-MCNC: 7.6 G/DL (ref 12–16)
HGB BLD-MCNC: 9.3 G/DL (ref 12–16)
IMM GRANULOCYTES # BLD AUTO: 0.08 K/UL (ref 0–0.11)
IMM GRANULOCYTES # BLD AUTO: 0.09 K/UL (ref 0–0.11)
IMM GRANULOCYTES NFR BLD AUTO: 0.8 % (ref 0–0.9)
IMM GRANULOCYTES NFR BLD AUTO: 1 % (ref 0–0.9)
LYMPHOCYTES # BLD AUTO: 0.43 K/UL (ref 1–4.8)
LYMPHOCYTES # BLD AUTO: 0.49 K/UL (ref 1–4.8)
LYMPHOCYTES NFR BLD: 4.5 % (ref 22–41)
LYMPHOCYTES NFR BLD: 5.5 % (ref 22–41)
MCH RBC QN AUTO: 29.8 PG (ref 27–33)
MCH RBC QN AUTO: 30.2 PG (ref 27–33)
MCHC RBC AUTO-ENTMCNC: 30.9 G/DL (ref 32.2–35.5)
MCHC RBC AUTO-ENTMCNC: 31.4 G/DL (ref 32.2–35.5)
MCV RBC AUTO: 96.1 FL (ref 81.4–97.8)
MCV RBC AUTO: 96.5 FL (ref 81.4–97.8)
MONOCYTES # BLD AUTO: 0.67 K/UL (ref 0–0.85)
MONOCYTES # BLD AUTO: 0.71 K/UL (ref 0–0.85)
MONOCYTES NFR BLD AUTO: 7 % (ref 0–13.4)
MONOCYTES NFR BLD AUTO: 8 % (ref 0–13.4)
NEUTROPHILS # BLD AUTO: 7.41 K/UL (ref 1.82–7.42)
NEUTROPHILS # BLD AUTO: 8.23 K/UL (ref 1.82–7.42)
NEUTROPHILS NFR BLD: 83.9 % (ref 44–72)
NEUTROPHILS NFR BLD: 85.9 % (ref 44–72)
NRBC # BLD AUTO: 0 K/UL
NRBC # BLD AUTO: 0.02 K/UL
NRBC BLD-RTO: 0 /100 WBC (ref 0–0.2)
NRBC BLD-RTO: 0.2 /100 WBC (ref 0–0.2)
PLATELET # BLD AUTO: 184 K/UL (ref 164–446)
PLATELET # BLD AUTO: 204 K/UL (ref 164–446)
PMV BLD AUTO: 10.4 FL (ref 9–12.9)
PMV BLD AUTO: 11.2 FL (ref 9–12.9)
POTASSIUM SERPL-SCNC: 4.2 MMOL/L (ref 3.6–5.5)
RBC # BLD AUTO: 2.55 M/UL (ref 4.2–5.4)
RBC # BLD AUTO: 3.08 M/UL (ref 4.2–5.4)
SODIUM SERPL-SCNC: 138 MMOL/L (ref 135–145)
WBC # BLD AUTO: 8.8 K/UL (ref 4.8–10.8)
WBC # BLD AUTO: 9.6 K/UL (ref 4.8–10.8)

## 2024-08-25 PROCEDURE — 700102 HCHG RX REV CODE 250 W/ 637 OVERRIDE(OP): Performed by: INTERNAL MEDICINE

## 2024-08-25 PROCEDURE — A9270 NON-COVERED ITEM OR SERVICE: HCPCS | Performed by: SURGERY

## 2024-08-25 PROCEDURE — 36415 COLL VENOUS BLD VENIPUNCTURE: CPT

## 2024-08-25 PROCEDURE — 99233 SBSQ HOSP IP/OBS HIGH 50: CPT | Performed by: INTERNAL MEDICINE

## 2024-08-25 PROCEDURE — 85025 COMPLETE CBC W/AUTO DIFF WBC: CPT

## 2024-08-25 PROCEDURE — 700102 HCHG RX REV CODE 250 W/ 637 OVERRIDE(OP): Performed by: UROLOGY

## 2024-08-25 PROCEDURE — 306591 TRAY SUTURE REMOVAL DISP: Performed by: INTERNAL MEDICINE

## 2024-08-25 PROCEDURE — A9270 NON-COVERED ITEM OR SERVICE: HCPCS | Performed by: UROLOGY

## 2024-08-25 PROCEDURE — 82272 OCCULT BLD FECES 1-3 TESTS: CPT

## 2024-08-25 PROCEDURE — 80048 BASIC METABOLIC PNL TOTAL CA: CPT

## 2024-08-25 PROCEDURE — A9270 NON-COVERED ITEM OR SERVICE: HCPCS | Performed by: INTERNAL MEDICINE

## 2024-08-25 PROCEDURE — 700102 HCHG RX REV CODE 250 W/ 637 OVERRIDE(OP): Performed by: SURGERY

## 2024-08-25 PROCEDURE — 770006 HCHG ROOM/CARE - MED/SURG/GYN SEMI*

## 2024-08-25 RX ORDER — AMOXICILLIN 500 MG/1
500 CAPSULE ORAL EVERY 8 HOURS
Status: DISCONTINUED | OUTPATIENT
Start: 2024-08-25 | End: 2024-08-27

## 2024-08-25 RX ADMIN — OXYCODONE HYDROCHLORIDE 10 MG: 10 TABLET ORAL at 12:36

## 2024-08-25 RX ADMIN — FERROUS SULFATE TAB 325 MG (65 MG ELEMENTAL FE) 325 MG: 325 (65 FE) TAB at 08:47

## 2024-08-25 RX ADMIN — GUAIFENESIN 600 MG: 600 TABLET, EXTENDED RELEASE ORAL at 16:26

## 2024-08-25 RX ADMIN — AMOXICILLIN 500 MG: 500 CAPSULE ORAL at 14:07

## 2024-08-25 RX ADMIN — AMLODIPINE BESYLATE 5 MG: 5 TABLET ORAL at 04:55

## 2024-08-25 RX ADMIN — Medication 5 MG: at 21:50

## 2024-08-25 RX ADMIN — GUAIFENESIN 600 MG: 600 TABLET, EXTENDED RELEASE ORAL at 04:55

## 2024-08-25 RX ADMIN — SENNOSIDES AND DOCUSATE SODIUM 1 TABLET: 50; 8.6 TABLET ORAL at 16:27

## 2024-08-25 RX ADMIN — OXYCODONE HYDROCHLORIDE 5 MG: 5 TABLET ORAL at 05:02

## 2024-08-25 RX ADMIN — AMOXICILLIN 500 MG: 500 CAPSULE ORAL at 21:50

## 2024-08-25 RX ADMIN — AMLODIPINE BESYLATE 5 MG: 5 TABLET ORAL at 16:26

## 2024-08-25 RX ADMIN — CARVEDILOL 12.5 MG: 12.5 TABLET, FILM COATED ORAL at 16:27

## 2024-08-25 RX ADMIN — LISINOPRIL 5 MG: 5 TABLET ORAL at 04:55

## 2024-08-25 RX ADMIN — CITALOPRAM 40 MG: 40 TABLET, FILM COATED ORAL at 04:55

## 2024-08-25 RX ADMIN — GABAPENTIN 300 MG: 300 CAPSULE ORAL at 04:55

## 2024-08-25 RX ADMIN — ACETAMINOPHEN 1000 MG: 500 TABLET ORAL at 04:55

## 2024-08-25 RX ADMIN — ACETAMINOPHEN 1000 MG: 500 TABLET ORAL at 11:40

## 2024-08-25 RX ADMIN — APIXABAN 5 MG: 5 TABLET, FILM COATED ORAL at 04:55

## 2024-08-25 RX ADMIN — CARVEDILOL 12.5 MG: 12.5 TABLET, FILM COATED ORAL at 04:55

## 2024-08-25 ASSESSMENT — ENCOUNTER SYMPTOMS
HEARTBURN: 0
DEPRESSION: 0
COUGH: 0
SHORTNESS OF BREATH: 0
ABDOMINAL PAIN: 1
FEVER: 0
VOMITING: 0
DIZZINESS: 0
NAUSEA: 0
MEMORY LOSS: 0
MYALGIAS: 1
NERVOUS/ANXIOUS: 0
CHILLS: 0
FOCAL WEAKNESS: 0
BACK PAIN: 0
HEADACHES: 0
WEAKNESS: 0
PALPITATIONS: 0
DIAPHORESIS: 0
ABDOMINAL PAIN: 0
LOSS OF CONSCIOUSNESS: 0

## 2024-08-25 ASSESSMENT — PAIN DESCRIPTION - PAIN TYPE
TYPE: ACUTE PAIN

## 2024-08-25 NOTE — CARE PLAN
Problem: Knowledge Deficit - Standard  Goal: Patient and family/care givers will demonstrate understanding of plan of care, disease process/condition, diagnostic tests and medications  Description: Target End Date:  1-3 days or as soon as patient condition allows    Document in Patient Education    1.  Patient and family/caregiver oriented to unit, equipment, visitation policy and means for communicating concern  2.  Complete/review Learning Assessment  3.  Assess knowledge level of disease process/condition, treatment plan, diagnostic tests and medications  4.  Explain disease process/condition, treatment plan, diagnostic tests and medications  Outcome: Progressing     Problem: Skin Integrity  Goal: Skin integrity is maintained or improved  Description: Target End Date:  Prior to discharge or change in level of care    Document interventions on Skin Risk/Jaquan flowsheet groups and corresponding LDA    1.  Assess and monitor skin integrity, appearance and/or temperature  2.  Assess risk factors for impaired skin integrity and/or pressures ulcers  3.  Implement precautions to protect skin integrity in collaboration with interdisciplinary team  4.  Implement pressure ulcer prevention protocol if at risk for skin breakdown  5.  Confirm wound care consult if at risk for skin breakdown  6.  Ensure patient use of pressure relieving devices  (Low air loss bed, waffle overlay, heel protectors, ROHO cushion, etc)  Outcome: Progressing   The patient is Stable - Low risk of patient condition declining or worsening    Shift Goals  Clinical Goals: monitor drain output, obtain occult stool sample, monitor Hgb  Patient Goals: rest, comfort  Family Goals: MELISSA    Progress made toward(s) clinical / shift goals:  oxycodone given 1x for pain, turns q 2, heel floater boots in place, Occult blood BM sample collected & sent    Patient is not progressing towards the following goals:

## 2024-08-25 NOTE — CARE PLAN
Problem: Fall Risk  Goal: Patient will remain free from falls  Description: Target End Date:  Prior to discharge or change in level of care    Document interventions on the Francois Danny Fall Risk Assessment    1.  Assess for fall risk factors  2.  Implement fall precautions  Outcome: Progressing  Note: Education provided regarding fall safety including use of call bell, bed frame alarm on on BERNARDA bed. Angie appropriately   The patient is Stable - Low risk of patient condition declining or worsening    Shift Goals  Clinical Goals: monitor drain output, obtain occult stool sample, monitor Hgb  Patient Goals: rest, comfort  Family Goals: MELISSA    Progress made toward(s) clinical / shift goals:      Patient is not progressing towards the following goals:

## 2024-08-25 NOTE — CARE PLAN
The patient is Stable - Low risk of patient condition declining or worsening    Shift Goals  Clinical Goals: monitor drain output  Patient Goals: rest, comfort  Family Goals: ag    Progress made toward(s) clinical / shift goals:  pt is a&o x4. O2 sats maintained on 3 L nasal cannula. IV patent and infusing. Pain medication given once per MAR. Bed alarm on. Urostomy patent and draining. BM this shift, passing flatus. No reports of nausea.      Patient is not progressing towards the following goals:

## 2024-08-25 NOTE — PROGRESS NOTES
"      Note to reader: this note follows the APSO format rather than the historical SOAP format. Assessment and plan located at the top of the note for ease of use.    Chief Complaint  59 y.o. year old female here with 49 year old ileal conduit in left lower quadrant, large parastomal hernia with multiple previous bowel obstructions that would also obstruct her conduit    Assessment/Plan  Interval History     Plan:  Dr. Mercado primary, her multiple drain are to be managed by   Dr. Mercado/general surgery.  Urology will round on pt while admitted to ensure good UOP  Strict I&Os   Will plan to remove red rubber prior to d/c, please contact if moving forard with D/d   Urology to follow     Case discussed with Dr. Gusman who has directed this plan of care    Patient seen and examined    8/25- POD5 Dr. Gusman added amoxicillin, UOP 1000cc/24hrs but IVF held. Pt more comfortable today.     8/24- POD4 Doing well, passing gas, and a bm. Plans to advance diet. Kidney function at baseline. UOP +2L.      8/23- POD3. Pt with abd pain. Confusing documentation about  UOP, notes state they have straight cathed the pt. Confirmed Uop >2L, from ostomy. Ostomy nurses on board. WBC 12(14), hbg 8.4(9.4) cr 1.1(1.4)       8/22: POD2 Pt more stable today. Pt feels overall \"pretty\" well. Urostomy leaking from bottom. Ostomy nurses consulted but pt reports this is chronic for her and typically has to change the bandage q 2-5 days. Urine clear yellow.      8/21: POD1 This am pt is Hypotensive. Sleeping during rounds. UOP from spt 675cc/24hrs, “other urine“ 1600cc/24hrs. Clear yellow. WBC 14(11), hbg 9.4(10.4) cr 1.49(1.15).              Review of Systems  Physical Exam   Review of Systems   Unable to perform ROS: Other   Constitutional:  Negative for chills and fever.   Cardiovascular:  Negative for chest pain.   Gastrointestinal:  Negative for abdominal pain.   Genitourinary: Negative.    Skin:  Negative for itching and rash.   All " other systems reviewed and are negative.    Vitals:    08/25/24 0401 08/25/24 0502 08/25/24 0617 08/25/24 0731   BP: (!) 141/74   104/59   Pulse: 60   61   Resp: 16 16 16 16   Temp: 36.9 °C (98.5 °F)   36.3 °C (97.4 °F)   TempSrc: Temporal   Temporal   SpO2: 93%   95%   Weight:       Height:         Physical Exam  Constitutional:       Appearance: Normal appearance.   HENT:      Head: Normocephalic.      Nose:      Comments: NGT in place   Pulmonary:      Effort: Pulmonary effort is normal.   Genitourinary:     Comments: Urostomy pink and moist, red robbin sewn in place   Skin:     General: Skin is warm and dry.          Hematology Chemistry   Lab Results   Component Value Date/Time    WBC 9.6 08/25/2024 06:40 AM    HEMOGLOBIN 7.6 (L) 08/25/2024 06:40 AM    HEMATOCRIT 24.6 (L) 08/25/2024 06:40 AM    PLATELETCT 184 08/25/2024 06:40 AM     Lab Results   Component Value Date/Time    SODIUM 138 08/25/2024 06:40 AM    POTASSIUM 4.2 08/25/2024 06:40 AM    CHLORIDE 109 08/25/2024 06:40 AM    CO2 21 08/25/2024 06:40 AM    GLUCOSE 110 (H) 08/25/2024 06:40 AM    BUN 20 08/25/2024 06:40 AM    CREATININE 0.96 08/25/2024 06:40 AM    GLOMRATE 34 (L) 11/04/2023 04:25 AM         Labs not explicitly included in this progress note were reviewed by the author.   Radiology/imaging not explicitly included in this progress note was reviewed by the author.     Core Measures        Sonal Paz P.A.-C.   [address] HospitalSOF[address] [place] [address]   [phone]

## 2024-08-25 NOTE — ASSESSMENT & PLAN NOTE
8/25 hemoglobin of 10-6, after starting Eliquis  May be dilutional  BP stable  CBC every 12, transfuse for hemoglobin less than 7  May be secondary to recent surgery    8/26 improving hemoglobin now at 9, no signs of active bleed, will restart Eliquis  Follow-up a.m. labs    8/27 hemoglobin remained stable.  No signs of active bleeding.  Close monitoring on Eliquis    8/28 no signs of active bleeding.  I placed hold on Eliquis as now she is going for surgical intervention.    8/29 I spoke to general surgery yesterday as patient found to hemoglobin of 6.8 and repeat hemoglobin back above 7 so I held blood transfusion.  Continue to monitor closely and transfuse if her blood COVID below 7.  Holding Eliquis

## 2024-08-25 NOTE — PROGRESS NOTES
Surgery  POD#5  Doing well, still with bowel function  Appropriately sore  Wound looks good  Drains serosanguinous, becoming more serous and slowing  D/c lowest output drain  Soft diet  Abx choice per urology  Trending towards discharge

## 2024-08-25 NOTE — PROGRESS NOTES
Postoperative day #4 from complex parastomal hernia repair with Dr. Mercado.    She reports her pain is slowly improving, the stoma has been draining well with minimal leakage, she reports flatus starting yesterday and bowel movements today.    On exam she is obese abdomen with vertical midline incision covered with bandage that is dry, stoma is pink with red rubber catheter in place draining a slightly cloudy yellow urine.  WERNER drains x 3 all minimal serosanguineous.  Abdomen is soft without peritoneal signs.  Alert and oriented and answers questions appropriately.    I discussed our surgical decision making and the reason why Dr. Mercado and I ultimately decided not to create a new urostomy.  Specifically due to her preoperative fairly normal creatinine and our thought was her kidney function and obstruction of the stoma likely worsens when she had acute obstruction of the small bowel that was herniated into her parastomal hernia.  Preoperative urinary creatinine was near normal.     Urology recommendations:   -If white blood cell count continues to increase or if she starts to develop fevers or tachycardia, I would start empiric antibiotics for possible urinary tract infection  -Plan for wound care team to remove the red rubber catheter that is sewn to the stoma on Monday.  Continue to follow her urine output for 24 hours after stoma is removed.  -Other care and surgical management, drain management, per Dr. Mercado

## 2024-08-25 NOTE — PROGRESS NOTES
"Hospital Medicine Daily Progress Note    Date of Service  8/25/2024    Chief Complaint  Patricia A Tietz is a 59 y.o. female admitted 8/20/2024 with hernia repair    Hospital Course  58yo PMHx urostomy with ileal conduit as a child, HTN, AFib, on apixaban, chronic hypoxic resp failure on 2LNC at baseline.  She was at [facility] in May with an SBO and UTI (Klebsiella and Pseudomonas).  Admitted on 8/20 for planned Ex lap and reapir of 10cm abd wall hernia with mycocutaneous flap trunk with mesh and replacement of catheter into the stoma..  In PACU pt was hypotensive requiring Rudi-Synephrine and fluids.  She was admitted to [facility]    Interval Problem Update  8/22 Pt had a quiet night.  \"Sore all over\", refering to her belly.  No flatus or BM    AFebrile  Sinus 60s  -110  On 2 LNC  UOP 1950ml/24hrs  Drains:  -A: 230ml  -B: 310ml  -C 43ml    8/23 patient is ANO x 3, reports feeling better, generalized weakness  Hypoactive bowel sounds, abdomen soft nontender  NG tube in place  Minimal output  Monitor for return of bowel function, diet per surgery    8/24 ANO x 3, reports feeling better  Positive bowel movement and gas, per surgery, okay to initiate liquid diet    8/25 patient is ANO x 3, reports feeling tired, hemoglobin of 7 today  Started on Eliquis will monitor closely  Abdominal soreness, serous discharge from WERNER drain  Diet advance per surgery  Transfuse if hemoglobin less than 7    I have discussed this patient's plan of care and discharge plan at IDT rounds today with Case Management, Nursing, Nursing leadership, and other members of the IDT team.    Consultants/Specialty  general surgery and urology    Code Status  Full Code    Disposition  The patient is not medically cleared for discharge to home or a post-acute facility.      I have placed the appropriate orders for post-discharge needs.    Review of Systems  Review of Systems   Constitutional:  Negative for chills, diaphoresis and fever.   Respiratory:  " Negative for cough and shortness of breath.    Cardiovascular:  Negative for chest pain, palpitations and leg swelling.   Gastrointestinal:  Positive for abdominal pain. Negative for heartburn, nausea and vomiting.   Musculoskeletal:  Positive for myalgias. Negative for back pain.   Skin:  Negative for rash.   Neurological:  Negative for dizziness, focal weakness, loss of consciousness, weakness and headaches.   Psychiatric/Behavioral:  Negative for depression and memory loss. The patient is not nervous/anxious.         Physical Exam  Temp:  [36.3 °C (97.4 °F)-36.9 °C (98.5 °F)] 36.3 °C (97.4 °F)  Pulse:  [60-66] 61  Resp:  [16-18] 18  BP: (104-141)/(59-74) 104/59  SpO2:  [91 %-95 %] 95 %    Physical Exam  Constitutional:       General: She is not in acute distress.     Appearance: Normal appearance. She is well-developed. She is obese. She is ill-appearing. She is not diaphoretic.   HENT:      Head: Normocephalic and atraumatic.      Nose:      Comments: Ngt in place     Mouth/Throat:      Mouth: Mucous membranes are moist.   Eyes:      Extraocular Movements: Extraocular movements intact.      Pupils: Pupils are equal, round, and reactive to light.   Neck:      Vascular: No JVD.   Cardiovascular:      Rate and Rhythm: Normal rate and regular rhythm.      Heart sounds: No murmur heard.  Pulmonary:      Effort: Pulmonary effort is normal. No respiratory distress.      Breath sounds: No stridor. No wheezing or rales.   Abdominal:      General: Bowel sounds are normal. There is no distension.      Palpations: Abdomen is soft.      Tenderness: There is abdominal tenderness. There is no guarding or rebound.      Comments: Post op dressings and drains noted  Urostomy noted with catheter in place   Musculoskeletal:      Right lower leg: No edema.      Left lower leg: No edema.   Skin:     General: Skin is warm and dry.      Capillary Refill: Capillary refill takes less than 2 seconds.   Neurological:      Mental Status:  She is oriented to person, place, and time.      Cranial Nerves: No cranial nerve deficit.      Sensory: No sensory deficit.      Motor: Weakness present.      Coordination: Coordination normal.   Psychiatric:         Mood and Affect: Mood normal.         Behavior: Behavior normal.         Thought Content: Thought content normal.         Judgment: Judgment normal.         Fluids    Intake/Output Summary (Last 24 hours) at 8/25/2024 1526  Last data filed at 8/25/2024 1420  Gross per 24 hour   Intake 830 ml   Output 1167 ml   Net -337 ml       Laboratory  Recent Labs     08/23/24  0515 08/25/24  0640   WBC 13.3* 9.6   RBC 3.32* 2.55*   HEMOGLOBIN 10.1* 7.6*   HEMATOCRIT 32.5* 24.6*   MCV 97.9* 96.5   MCH 30.4 29.8   MCHC 31.1* 30.9*   RDW 49.8 49.1   PLATELETCT 189 184   MPV 11.3 10.4     Recent Labs     08/23/24  0515 08/24/24  0705 08/25/24  0640   SODIUM 140 141 138   POTASSIUM 4.7 4.3 4.2   CHLORIDE 109 109 109   CO2 21 22 21   GLUCOSE 66 79 110*   BUN 19 18 20   CREATININE 0.87 0.86 0.96   CALCIUM 10.2 10.4 10.0                   Imaging  No orders to display        Assessment/Plan  * Shock (HCC)- (present on admission)  Assessment & Plan  Post operative requiring Rudi synephrine initially and then Levophed in the unit  Vasopressors DC'd early this am  Cortisol>30  No evidence of sepsis  Cont to monitor and resume BP meds as pressures allow    8/23 resolved, monitor      Parastomal hernia  Assessment & Plan  Now s/p repair on 8/20  NGT and 3 WERNER drains in place  Srgry and Urology following    8/22  Belly still quiet, no flatus or BM  Monitor for signs of return of bowel function  Prn pain and N management  Monitor daily BMP for renal function and electrolye abnormalities  Resume po once OK with Srgcl team  Cont supportive care with IVFs, electrolyte replacement, antiemetics and pain medication  Daily BMP to monitor for RAO, hypo K, acidosis    8/23 surgery following, okay for NG tube removal  Diet per surgery  SLP  evaluation  Drains in place    8/24 following, drains per surgery, greater than 162 mL output/24 hours  Positive BM, diet per surgery, consider initiation of clear liquid diet  Pain control follow-up labs  Patient is from [facility], to return when medically cleared    8/25 discussed with surgery, Dr. Mercado  150 mL output over 24 hours, plan for drain removal per surgery, plan to remove by Tuesday if decreased output  Likely return to skilled if clinically stable  Diet advance per surgery      Obesity- (present on admission)  Assessment & Plan  BMI 46    Atrial fibrillation (HCC)- (present on admission)  Assessment & Plan  8/21 Hx of  Has been paced and sinus overnight  Cont Coreg with parameters  Cont Tele  Resume apixaban when OK with Surgical team    8/24 okay to restart Eliquis per surgery, will monitor  Follow-up CBC    8/25 restarted on Eliquis, hemoglobin from 10-7, will monitor for signs of bleeding  Serous discharge from drains  Discussed with surgery, will continue to monitor  May need transfusion if hemoglobin less than 7, monitor closely    Chronic respiratory failure with hypoxia (HCC)- (present on admission)  Assessment & Plan  At baseline is on 2 [facility]  Mobilize  O2/RT protocols  Monitor for aspiration  Monitor vol status    8/23 patient is from [facility], to return to [facility] once medically cleared, continue to monitor    Pacemaker- (present on admission)  Assessment & Plan  On Tele pt has been intermitently paced/sinus  Cont Tele    RAO (acute kidney injury) (HCC)- (present on admission)  Assessment & Plan  Renal function improved today  Good UOP  At risk for dehydration given output from NGT and 3 JPs (close to 600ml/24hrs)  Daily BMP    Anemia- (present on admission)  Assessment & Plan  8/25 hemoglobin of 10-6, after starting Eliquis  May be dilutional  BP stable  CBC every 12, transfuse for hemoglobin less than 7  May be secondary to recent surgery      Presence of  urostomy (HCC)- (present on admission)  Assessment & Plan  8/22 Chronic since 5 years old now with surgery by Dr. Gusmna and Dr. Mercado  She has 3 WERNER drains in  Pain control with narcotics as indicated  SCDs for DVT prophylaxis  She has an NG tube in  Urology following  Good UOP: cont to monitor    8/23 okay for NG tube removal per surgery    8/25 seen by urology, will monitor         VTE prophylaxis:   SCDs/TEDs      I have performed a physical exam and reviewed and updated ROS and Plan today (8/25/2024). In review of yesterday's note (8/24/2024), there are no changes except as documented above.

## 2024-08-26 LAB
ANION GAP SERPL CALC-SCNC: 7 MMOL/L (ref 7–16)
BASOPHILS # BLD AUTO: 0 % (ref 0–1.8)
BASOPHILS # BLD AUTO: 0.2 % (ref 0–1.8)
BASOPHILS # BLD: 0 K/UL (ref 0–0.12)
BASOPHILS # BLD: 0.02 K/UL (ref 0–0.12)
BUN SERPL-MCNC: 26 MG/DL (ref 8–22)
CALCIUM SERPL-MCNC: 10.2 MG/DL (ref 8.5–10.5)
CHLORIDE SERPL-SCNC: 107 MMOL/L (ref 96–112)
CO2 SERPL-SCNC: 23 MMOL/L (ref 20–33)
CREAT SERPL-MCNC: 1.29 MG/DL (ref 0.5–1.4)
EOSINOPHIL # BLD AUTO: 0.08 K/UL (ref 0–0.51)
EOSINOPHIL # BLD AUTO: 0.15 K/UL (ref 0–0.51)
EOSINOPHIL NFR BLD: 0.8 % (ref 0–6.9)
EOSINOPHIL NFR BLD: 1.2 % (ref 0–6.9)
ERYTHROCYTE [DISTWIDTH] IN BLOOD BY AUTOMATED COUNT: 48 FL (ref 35.9–50)
ERYTHROCYTE [DISTWIDTH] IN BLOOD BY AUTOMATED COUNT: 49.1 FL (ref 35.9–50)
GFR SERPLBLD CREATININE-BSD FMLA CKD-EPI: 47 ML/MIN/1.73 M 2
GLUCOSE SERPL-MCNC: 116 MG/DL (ref 65–99)
HCT VFR BLD AUTO: 25.2 % (ref 37–47)
HCT VFR BLD AUTO: 26.2 % (ref 37–47)
HGB BLD-MCNC: 8 G/DL (ref 12–16)
HGB BLD-MCNC: 8.3 G/DL (ref 12–16)
IMM GRANULOCYTES # BLD AUTO: 0.19 K/UL (ref 0–0.11)
IMM GRANULOCYTES NFR BLD AUTO: 1.5 % (ref 0–0.9)
LYMPHOCYTES # BLD AUTO: 0.45 K/UL (ref 1–4.8)
LYMPHOCYTES # BLD AUTO: 0.63 K/UL (ref 1–4.8)
LYMPHOCYTES NFR BLD: 4.3 % (ref 22–41)
LYMPHOCYTES NFR BLD: 4.8 % (ref 22–41)
MANUAL DIFF BLD: NORMAL
MCH RBC QN AUTO: 30.1 PG (ref 27–33)
MCH RBC QN AUTO: 30.4 PG (ref 27–33)
MCHC RBC AUTO-ENTMCNC: 31.7 G/DL (ref 32.2–35.5)
MCHC RBC AUTO-ENTMCNC: 31.7 G/DL (ref 32.2–35.5)
MCV RBC AUTO: 94.7 FL (ref 81.4–97.8)
MCV RBC AUTO: 96 FL (ref 81.4–97.8)
MONOCYTES # BLD AUTO: 0.27 K/UL (ref 0–0.85)
MONOCYTES # BLD AUTO: 1.03 K/UL (ref 0–0.85)
MONOCYTES NFR BLD AUTO: 2.6 % (ref 0–13.4)
MONOCYTES NFR BLD AUTO: 7.9 % (ref 0–13.4)
MORPHOLOGY BLD-IMP: NORMAL
NEUTROPHILS # BLD AUTO: 11.01 K/UL (ref 1.82–7.42)
NEUTROPHILS # BLD AUTO: 9.69 K/UL (ref 1.82–7.42)
NEUTROPHILS NFR BLD: 84.4 % (ref 44–72)
NEUTROPHILS NFR BLD: 91.4 % (ref 44–72)
NEUTS BAND NFR BLD MANUAL: 0.9 % (ref 0–10)
NRBC # BLD AUTO: 0 K/UL
NRBC # BLD AUTO: 0.02 K/UL
NRBC BLD-RTO: 0 /100 WBC (ref 0–0.2)
NRBC BLD-RTO: 0.2 /100 WBC (ref 0–0.2)
PLATELET # BLD AUTO: 186 K/UL (ref 164–446)
PLATELET # BLD AUTO: 236 K/UL (ref 164–446)
PLATELET BLD QL SMEAR: NORMAL
PMV BLD AUTO: 10.4 FL (ref 9–12.9)
PMV BLD AUTO: 10.7 FL (ref 9–12.9)
POTASSIUM SERPL-SCNC: 4.3 MMOL/L (ref 3.6–5.5)
RBC # BLD AUTO: 2.66 M/UL (ref 4.2–5.4)
RBC # BLD AUTO: 2.73 M/UL (ref 4.2–5.4)
RBC BLD AUTO: NORMAL
SODIUM SERPL-SCNC: 137 MMOL/L (ref 135–145)
WBC # BLD AUTO: 10.5 K/UL (ref 4.8–10.8)
WBC # BLD AUTO: 13 K/UL (ref 4.8–10.8)

## 2024-08-26 PROCEDURE — A9270 NON-COVERED ITEM OR SERVICE: HCPCS | Performed by: SURGERY

## 2024-08-26 PROCEDURE — 99233 SBSQ HOSP IP/OBS HIGH 50: CPT | Performed by: INTERNAL MEDICINE

## 2024-08-26 PROCEDURE — 80048 BASIC METABOLIC PNL TOTAL CA: CPT

## 2024-08-26 PROCEDURE — 36415 COLL VENOUS BLD VENIPUNCTURE: CPT

## 2024-08-26 PROCEDURE — 700102 HCHG RX REV CODE 250 W/ 637 OVERRIDE(OP): Performed by: SURGERY

## 2024-08-26 PROCEDURE — 85007 BL SMEAR W/DIFF WBC COUNT: CPT

## 2024-08-26 PROCEDURE — 770006 HCHG ROOM/CARE - MED/SURG/GYN SEMI*

## 2024-08-26 PROCEDURE — 700111 HCHG RX REV CODE 636 W/ 250 OVERRIDE (IP): Mod: JZ | Performed by: HOSPITALIST

## 2024-08-26 PROCEDURE — 85027 COMPLETE CBC AUTOMATED: CPT

## 2024-08-26 PROCEDURE — A9270 NON-COVERED ITEM OR SERVICE: HCPCS | Performed by: INTERNAL MEDICINE

## 2024-08-26 PROCEDURE — 700102 HCHG RX REV CODE 250 W/ 637 OVERRIDE(OP): Performed by: UROLOGY

## 2024-08-26 PROCEDURE — 85025 COMPLETE CBC W/AUTO DIFF WBC: CPT

## 2024-08-26 PROCEDURE — 700102 HCHG RX REV CODE 250 W/ 637 OVERRIDE(OP): Performed by: INTERNAL MEDICINE

## 2024-08-26 PROCEDURE — A9270 NON-COVERED ITEM OR SERVICE: HCPCS | Performed by: UROLOGY

## 2024-08-26 RX ADMIN — AMLODIPINE BESYLATE 5 MG: 5 TABLET ORAL at 04:15

## 2024-08-26 RX ADMIN — OXYCODONE HYDROCHLORIDE 5 MG: 5 TABLET ORAL at 21:05

## 2024-08-26 RX ADMIN — OXYCODONE HYDROCHLORIDE 5 MG: 5 TABLET ORAL at 04:17

## 2024-08-26 RX ADMIN — SENNOSIDES AND DOCUSATE SODIUM 1 TABLET: 50; 8.6 TABLET ORAL at 04:15

## 2024-08-26 RX ADMIN — GUAIFENESIN 600 MG: 600 TABLET, EXTENDED RELEASE ORAL at 04:15

## 2024-08-26 RX ADMIN — CARVEDILOL 12.5 MG: 12.5 TABLET, FILM COATED ORAL at 04:15

## 2024-08-26 RX ADMIN — LISINOPRIL 5 MG: 5 TABLET ORAL at 04:15

## 2024-08-26 RX ADMIN — SENNOSIDES AND DOCUSATE SODIUM 1 TABLET: 50; 8.6 TABLET ORAL at 16:31

## 2024-08-26 RX ADMIN — Medication 5 MG: at 21:05

## 2024-08-26 RX ADMIN — ONDANSETRON 4 MG: 2 INJECTION INTRAMUSCULAR; INTRAVENOUS at 22:06

## 2024-08-26 RX ADMIN — FERROUS SULFATE TAB 325 MG (65 MG ELEMENTAL FE) 325 MG: 325 (65 FE) TAB at 08:36

## 2024-08-26 RX ADMIN — GUAIFENESIN 600 MG: 600 TABLET, EXTENDED RELEASE ORAL at 16:31

## 2024-08-26 RX ADMIN — GABAPENTIN 300 MG: 300 CAPSULE ORAL at 04:15

## 2024-08-26 RX ADMIN — AMLODIPINE BESYLATE 5 MG: 5 TABLET ORAL at 16:31

## 2024-08-26 RX ADMIN — AMOXICILLIN 500 MG: 500 CAPSULE ORAL at 14:23

## 2024-08-26 RX ADMIN — OXYCODONE HYDROCHLORIDE 5 MG: 5 TABLET ORAL at 14:39

## 2024-08-26 RX ADMIN — CITALOPRAM 40 MG: 40 TABLET, FILM COATED ORAL at 04:15

## 2024-08-26 RX ADMIN — APIXABAN 5 MG: 5 TABLET, FILM COATED ORAL at 16:31

## 2024-08-26 RX ADMIN — AMOXICILLIN 500 MG: 500 CAPSULE ORAL at 04:15

## 2024-08-26 RX ADMIN — AMOXICILLIN 500 MG: 500 CAPSULE ORAL at 22:50

## 2024-08-26 ASSESSMENT — ENCOUNTER SYMPTOMS
DOUBLE VISION: 0
NERVOUS/ANXIOUS: 0
LOSS OF CONSCIOUSNESS: 0
BACK PAIN: 0
ABDOMINAL PAIN: 1
BLURRED VISION: 0
HEADACHES: 0
SHORTNESS OF BREATH: 0
VOMITING: 0
DIZZINESS: 0
DEPRESSION: 0
FOCAL WEAKNESS: 0
MYALGIAS: 1
MEMORY LOSS: 0
FEVER: 0
WEAKNESS: 1
CHILLS: 0
PALPITATIONS: 0

## 2024-08-26 ASSESSMENT — PAIN DESCRIPTION - PAIN TYPE
TYPE: ACUTE PAIN

## 2024-08-26 NOTE — PROGRESS NOTES
"   Urology Progress Note    Post op Day # 6 from complex parastomal hernia repair performed by Dr. Mercado.     Overnight Events: None    S: Patient is somnolent during today's exam. She denies fevers, chills, nausea, vomiting or increased pain. Patient endorsed that cloudy urine. WBC today is 10.5 and Creatinine is 1.29.     O:   /51   Pulse 60   Temp 36.8 °C (98.2 °F) (Temporal)   Resp 20   Ht 1.575 m (5' 2\")   Wt 114 kg (251 lb 12.3 oz)   SpO2 95%   Recent Labs     08/24/24  0705 08/25/24  0640 08/26/24  0935   SODIUM 141 138 137   POTASSIUM 4.3 4.2 4.3   CHLORIDE 109 109 107   CO2 22 21 23   GLUCOSE 79 110* 116*   BUN 18 20 26*   CREATININE 0.86 0.96 1.29   CALCIUM 10.4 10.0 10.2     Recent Labs     08/25/24  0640 08/25/24  2116 08/26/24  0935   WBC 9.6 8.8 10.5   RBC 2.55* 3.08* 2.73*   HEMOGLOBIN 7.6* 9.3* 8.3*   HEMATOCRIT 24.6* 29.6* 26.2*   MCV 96.5 96.1 96.0   MCH 29.8 30.2 30.4   MCHC 30.9* 31.4* 31.7*   RDW 49.1 48.7 49.1   PLATELETCT 184 204 186   MPV 10.4 11.2 10.7         Intake/Output Summary (Last 24 hours) at 8/26/2024 1258  Last data filed at 8/26/2024 0900  Gross per 24 hour   Intake 720 ml   Output 1280 ml   Net -560 ml       Exam:    -Abdomen soft, tender to palpation.  Incisions clean, dry, intact. WERNER drain in place.   -Urine: ileal conduit is draining purulent cloudy urine. 18f red rubber catheter is protruding from the stoma.      A/P:    Active Hospital Problems    Diagnosis     Parastomal hernia [K43.5]     Shock (HCC) [R57.9]     Chronic respiratory failure with hypoxia (HCC) [J96.11]     Atrial fibrillation (HCC) [I48.91]     Obesity [E66.9]     Pacemaker [Z95.0]     RAO (acute kidney injury) ([facility]) [N17.9]     Anemia [D64.9]     Presence of urostomy (HCC) [Z93.6]      -Patient to continue on Amoxicillin.   -The red rubber catheter that is sewn into the stoma, was removed by me without difficulty.   -Monitor urine output for 24 hrs.  -Continue drain management per Dr. Mercado. "      Lisa Washington, PLoboA.-C.   [address] [place] [address]   [phone]

## 2024-08-26 NOTE — PROGRESS NOTES
Pt. Received from Mer RN, pt. Awake orientedx4. Denies any complaint of pain. Pt. Has 1 mo in LUQ and urostomy on LLQ of abdomen. SCD in place, BERNARDA mattress in place. Q2 turns in place, bed alarm on. Needs attended.

## 2024-08-26 NOTE — DISCHARGE PLANNING
Case Management Discharge Planning    Admission Date: 8/20/2024  GMLOS: 8.9  ALOS: 6    6-Clicks ADL Score:    6-Clicks Mobility Score:    PT and/or OT Eval ordered: No  Post-acute Referrals Ordered: Yes  Post-acute Choice Obtained: Yes  Has referral(s) been sent to post-acute provider:  Yes      Anticipated Discharge Dispo: Discharge Disposition: Disch to a long term care facility (63)  Discharge Address: [address] [place] [address]. [place] [address]  Discharge Contact Phone Number: [phone] (Neela (friend) : Neo (brother) lives in [place] and is an Emergency Contact)      DME Needed: No    Action(s) Taken: Updated Provider/Nurse on Discharge Plan  RNCM attended IDT rounds and reviewed chart. Met with pt at bedside.. Pt states that she is a long term resident at [facility]. Pt stated that a local friend of [facility] is supportive and helps her. Neo (brother) is her primary emergency contact, however, he lives in [place]. Pt requires total care with x2 person assist for her ADLs. Her [facility] facility has adequate DME for her needs ie: Bed, Power chair, and lauren lift. Pt states that she has CPAP at her current bedside. Preferred pharmacy is at [facility]. Income is $950/month. Pt denies any mental health issues and no substance abuse.   @[id number] RNCM contacted [facility] to request available bed in 1-2 days once pt is medically cleared. Spoke with [facility] who confirmed pt would have a bed.     Escalations Completed: None    Medically Clear: No    Next Steps: Follow-up with medical team to discuss DC needs and barriers.    Barriers to Discharge: Medical clearance, Pending Placement, Transportation, and Pending Procedures    Care Transition Team Assessment    Information Source  Orientation Level: Oriented X4  Information Given By: Patient  Who is responsible for making decisions for patient? : Patient    Readmission Evaluation  Is this a readmission?: No    Elopement Risk  Legal Hold: No  Ambulatory or Self  Mobile in Wheelchair: No-Not an Elopement Risk  Disoriented: No  Psychiatric Symptoms: None  History of Wandering: No  Elopement this Admit: No  Vocalizing Wanting to Leave: No  Displays Behaviors, Body Language Wanting to Leave: No-Not at Risk for Elopement  Elopement Risk: Not at Risk for Elopement    Interdisciplinary Discharge Planning  Lives with - Patient's Self Care Capacity: Other (Comments) (SNF)  Patient or legal guardian wants to designate a caregiver: No  Support Systems: Friends / Neighbors, Family Member(s)  Housing / Facility: Skilled Nursing Facility  [facility]    Discharge Preparedness  What is your plan after discharge?: Skilled nursing facility  What are your discharge supports?: Sibling, Other (comment) (Carmita (friend) is a Local [place] support person [phone])  Prior Functional Level: Needs Assist with Activities of Daily Living, Needs Assist with Medication Management, Total Assist  Difficulity with ADLs: Bathing, Dressing, Toileting, Walking  Difficulty with ADLs Comment: pt is immobile  Difficulity with IADLs: Cooking, Driving, Keeping track of finances, Laundry, Shopping    Functional Assesment  Prior Functional Level: Needs Assist with Activities of Daily Living, Needs Assist with Medication Management, Total Assist    Finances  Financial Barriers to Discharge: Yes  Average Monthly Income: 950 $  Source of Income: Social Security Disability  Prescription Coverage: Yes    Vision / Hearing Impairment  Vision Impairment : Yes  Right Eye Vision: Impaired, Wears Glasses  Left Eye Vision: Impaired, Wears Glasses    Advance Directive  Advance Directive?: None    Domestic Abuse  Physical Abuse or Sexual Abuse: No  Verbal Abuse or Emotional Abuse: No  Possible Abuse/Neglect Reported to:: Not Applicable    Psychological Assessment  History of Substance Abuse: None  History of Psychiatric Problems: No  Non-compliant with Treatment: No  Newly Diagnosed Illness:  No    Discharge Risks or Barriers  Discharge risks or barriers?: Complex medical needs, Post-acute placement / services  Patient risk factors: Complex medical needs    Anticipated Discharge Information  Discharge Disposition: Disch to a long term care facility (63)  Discharge Address: [address] [place] [address]  Discharge Contact Phone Number: [phone] (Neela (friend) : Neo (brother) lives in [place] and is an Emergency Contact)

## 2024-08-26 NOTE — CARE PLAN
The patient is Stable - Low risk of patient condition declining or worsening    Shift Goals  Clinical Goals: trend HGb, monitor drains  Patient Goals: comfort  Family Goals: MELISSA    Progress made toward(s) clinical / shift goals:  hgb stable per lab results, pt AOX4 no pain complaints, drains insitu, dressing iCDI, q2 turns done, needs attended    Patient is not progressing towards the following goals:

## 2024-08-26 NOTE — PROGRESS NOTES
Assumed care of patient. Bedside report received from night shift RN. Patient updated on plan of care and instructed to call for assistance before getting out of bed. Patient verbalized understanding. Call light is within reach and fall precautions are in place. All needs met at this time. Hourly rounding in place.

## 2024-08-26 NOTE — CARE PLAN
The patient is Stable - Low risk of patient condition declining or worsening    Shift Goals  Clinical Goals: Monitor drain output, monitor for bleeding      Progress made toward(s) clinical / shift goals:  informed urologist of pt. Output on urostomy appears milky, WERNER drain intact and draining. Pt. On BERNARDA mattress, taps system. Fall prec in place. Due meds given and tolerated. Needs attended.       Problem: Knowledge Deficit - Standard  Goal: Patient and family/care givers will demonstrate understanding of plan of care, disease process/condition, diagnostic tests and medications  Outcome: Progressing     Problem: Pain - Standard  Goal: Alleviation of pain or a reduction in pain to the patient’s comfort goal  Outcome: Progressing     Problem: Skin Integrity  Goal: Skin integrity is maintained or improved  Outcome: Progressing     Problem: Fall Risk  Goal: Patient will remain free from falls  Outcome: Progressing     Problem: Mobility  Goal: Patient's capacity to carry out activities will improve  Outcome: Progressing     Problem: Self Care  Goal: Patient will have the ability to perform ADLs independently or with assistance (bathe, groom, dress, toilet and feed)  Outcome: Progressing     Problem: Infection - Standard  Goal: Patient will remain free from infection  Outcome: Progressing       Patient is not progressing towards the following goals:

## 2024-08-26 NOTE — PROGRESS NOTES
"Hospital Medicine Daily Progress Note    Date of Service  8/26/2024    Chief Complaint  Patricia A Tietz is a 59 y.o. female admitted 8/20/2024 with hernia repair    Hospital Course  58yo PMHx urostomy with ileal conduit as a child, HTN, AFib, on apixaban, chronic hypoxic resp failure on 2LNC at baseline.  She was at [facility] in May with an SBO and UTI (Klebsiella and Pseudomonas).  Admitted on 8/20 for planned Ex lap and reapir of 10cm abd wall hernia with mycocutaneous flap trunk with mesh and replacement of catheter into the stoma..  In PACU pt was hypotensive requiring Rudi-Synephrine and fluids.  She was admitted to [facility]    Interval Problem Update  8/22 Pt had a quiet night.  \"Sore all over\", refering to her belly.  No flatus or BM    AFebrile  Sinus 60s  -110  On 2 LNC  UOP 1950ml/24hrs  Drains:  -A: 230ml  -B: 310ml  -C 43ml    8/23 patient is ANO x 3, reports feeling better, generalized weakness  Hypoactive bowel sounds, abdomen soft nontender  NG tube in place  Minimal output  Monitor for return of bowel function, diet per surgery    8/24 ANO x 3, reports feeling better  Positive bowel movement and gas, per surgery, okay to initiate liquid diet    8/25 patient is ANO x 3, reports feeling tired, hemoglobin of 7 today  Started on Eliquis will monitor closely  Abdominal soreness, serous discharge from WERNER drain  Diet advance per surgery  Transfuse if hemoglobin less than 7    8/26 generalized weakness, feels a little better  Minimal abdominal soreness only with movement  1 of 3 drains removed  Ongoing output noted, 90 mL over 24 hours  Tolerating oral diet, positive BM  Encourage I-S use    I have discussed this patient's plan of care and discharge plan at IDT rounds today with Case Management, Nursing, Nursing leadership, and other members of the IDT team.    Consultants/Specialty  general surgery and urology    Code Status  Full Code    Disposition  The patient is not medically cleared for discharge to home " or a post-acute facility.      I have placed the appropriate orders for post-discharge needs.    Review of Systems  Review of Systems   Constitutional:  Negative for chills, fever and malaise/fatigue.   Eyes:  Negative for blurred vision and double vision.   Respiratory:  Negative for shortness of breath.    Cardiovascular:  Negative for chest pain, palpitations and leg swelling.   Gastrointestinal:  Positive for abdominal pain. Negative for vomiting.   Genitourinary:  Negative for dysuria.   Musculoskeletal:  Positive for myalgias. Negative for back pain.   Neurological:  Positive for weakness. Negative for dizziness, focal weakness, loss of consciousness and headaches.   Psychiatric/Behavioral:  Negative for depression and memory loss. The patient is not nervous/anxious.         Physical Exam  Temp:  [36.4 °C (97.6 °F)-36.9 °C (98.5 °F)] 36.8 °C (98.2 °F)  Pulse:  [60-62] 60  Resp:  [16-20] 20  BP: (103-126)/(51-71) 103/51  SpO2:  [95 %-97 %] 95 %    Physical Exam  Constitutional:       General: She is not in acute distress.     Appearance: Normal appearance. She is well-developed. She is obese. She is ill-appearing. She is not toxic-appearing.   HENT:      Head: Normocephalic and atraumatic.      Nose:      Comments: Ngt in place     Mouth/Throat:      Mouth: Mucous membranes are moist.   Eyes:      Extraocular Movements: Extraocular movements intact.      Pupils: Pupils are equal, round, and reactive to light.   Neck:      Vascular: No JVD.   Cardiovascular:      Rate and Rhythm: Normal rate and regular rhythm.      Heart sounds: No murmur heard.  Pulmonary:      Effort: Pulmonary effort is normal. No respiratory distress.      Breath sounds: No stridor. No wheezing or rales.   Abdominal:      General: Bowel sounds are normal. There is no distension.      Palpations: Abdomen is soft.      Tenderness: There is abdominal tenderness.      Comments: Post op dressings and drains noted  Urostomy noted with catheter in  place   Musculoskeletal:         General: No swelling or tenderness.      Right lower leg: No edema.      Left lower leg: No edema.   Skin:     General: Skin is warm and dry.      Capillary Refill: Capillary refill takes less than 2 seconds.   Neurological:      Mental Status: She is oriented to person, place, and time.      Cranial Nerves: No cranial nerve deficit.      Motor: Weakness present.      Coordination: Coordination normal.   Psychiatric:         Mood and Affect: Mood normal.         Behavior: Behavior normal.         Thought Content: Thought content normal.         Judgment: Judgment normal.         Fluids    Intake/Output Summary (Last 24 hours) at 8/26/2024 1004  Last data filed at 8/26/2024 0900  Gross per 24 hour   Intake 720 ml   Output 1290 ml   Net -570 ml       Laboratory  Recent Labs     08/25/24  0640 08/25/24  2116   WBC 9.6 8.8   RBC 2.55* 3.08*   HEMOGLOBIN 7.6* 9.3*   HEMATOCRIT 24.6* 29.6*   MCV 96.5 96.1   MCH 29.8 30.2   MCHC 30.9* 31.4*   RDW 49.1 48.7   PLATELETCT 184 204   MPV 10.4 11.2     Recent Labs     08/24/24  0705 08/25/24  0640   SODIUM 141 138   POTASSIUM 4.3 4.2   CHLORIDE 109 109   CO2 22 21   GLUCOSE 79 110*   BUN 18 20   CREATININE 0.86 0.96   CALCIUM 10.4 10.0                   Imaging  No orders to display        Assessment/Plan  * Shock (HCC)- (present on admission)  Assessment & Plan  Post operative requiring Rudi synephrine initially and then Levophed in the unit  Vasopressors DC'd early this am  Cortisol>30  No evidence of sepsis  Cont to monitor and resume BP meds as pressures allow    8/23 resolved, monitor      Parastomal hernia  Assessment & Plan  Now s/p repair on 8/20  NGT and 3 WERNER drains in place  Srgry and Urology following    8/22  Belly still quiet, no flatus or BM  Monitor for signs of return of bowel function  Prn pain and N management  Monitor daily BMP for renal function and electrolye abnormalities  Resume po once OK with Srgcl team  Cont supportive  care with IVFs, electrolyte replacement, antiemetics and pain medication  Daily BMP to monitor for RAO, hypo K, acidosis    8/23 surgery following, okay for NG tube removal  Diet per surgery  SLP evaluation  Drains in place    8/24 following, drains per surgery, greater than 162 mL output/24 hours  Positive BM, diet per surgery, consider initiation of clear liquid diet  Pain control follow-up labs  Patient is from [facility], to return when medically cleared    8/25 discussed with surgery, Dr. Mercado  150 mL output over 24 hours, plan for drain removal per surgery, plan to remove by Tuesday if decreased output  Likely return to skilled if clinically stable  Diet advance per surgery    8/26 tolerating oral intake, generalized weakness  1 of 3 WERNER drains out, removal per surgery  Transfer to skilled facility, [facility] in 2 to 3 days      Obesity- (present on admission)  Assessment & Plan  BMI 46    Atrial fibrillation (HCC)- (present on admission)  Assessment & Plan  8/21 Hx of  Has been paced and sinus overnight  Cont Coreg with parameters  Cont Tele  Resume apixaban when OK with Surgical team    8/24 okay to restart Eliquis per surgery, will monitor  Follow-up CBC    8/25 restarted on Eliquis, hemoglobin from 10-7, will monitor for signs of bleeding  Serous discharge from drains  Discussed with surgery, will continue to monitor  May need transfusion if hemoglobin less than 7, monitor closely    8/26 resume Eliquis    Chronic respiratory failure with hypoxia (HCC)- (present on admission)  Assessment & Plan  At baseline is on 2 LNC  Mobilize  O2/RT protocols  Monitor for aspiration  Monitor vol status    8/23 patient is from [facility], to return to [facility] once medically cleared, continue to monitor    8/26 now on 3 to 4 L, taper as tolerated  Encourage I-S use    Pacemaker- (present on admission)  Assessment & Plan  On Tele pt has been intermitently paced/sinus  Cont Tele    RAO (acute kidney  injury) (HCC)- (present on admission)  Assessment & Plan  Renal function improved today  Good UOP  At risk for dehydration given output from NGT and 3 JPs (close to 600ml/24hrs)  Daily BMP    Anemia- (present on admission)  Assessment & Plan  8/25 hemoglobin of 10-6, after starting Eliquis  May be dilutional  BP stable  CBC every 12, transfuse for hemoglobin less than 7  May be secondary to recent surgery    8/26 improving hemoglobin now at 9, no signs of active bleed, will restart Eliquis  Follow-up a.m. labs      Presence of urostomy (HCC)- (present on admission)  Assessment & Plan  8/22 Chronic since 5 years old now with surgery by Dr. Gusman and Dr. Mercado  She has 3 WERNER drains in  Pain control with narcotics as indicated  SCDs for DVT prophylaxis  She has an NG tube in  Urology following  Good UOP: cont to monitor    8/23 okay for NG tube removal per surgery    8/25 seen by urology, will monitor         VTE prophylaxis:    therapeutic anticoagulation with eliquis 5 mg BID      I have performed a physical exam and reviewed and updated ROS and Plan today (8/26/2024). In review of yesterday's note (8/25/2024), there are no changes except as documented above.

## 2024-08-26 NOTE — PROGRESS NOTES
Surgery  POD#6  Doing well, c/o pain.  Having bowel function, tolerating soft diet  Drains serous with old blood.  Drain b output low.   Drain A 50 cc  Wounds ok  Urine cloudy  D/c drain B  Diet as tolerated  Possible d/c in am to old facility

## 2024-08-27 ENCOUNTER — APPOINTMENT (OUTPATIENT)
Dept: RADIOLOGY | Facility: MEDICAL CENTER | Age: 59
DRG: 335 | End: 2024-08-27
Attending: SURGERY
Payer: MEDICARE

## 2024-08-27 PROBLEM — D72.829 LEUKOCYTOSIS: Status: ACTIVE | Noted: 2024-08-27

## 2024-08-27 LAB
ANION GAP SERPL CALC-SCNC: 9 MMOL/L (ref 7–16)
APPEARANCE UR: ABNORMAL
BACTERIA #/AREA URNS HPF: ABNORMAL /HPF
BASOPHILS # BLD AUTO: 0 % (ref 0–1.8)
BASOPHILS # BLD: 0 K/UL (ref 0–0.12)
BILIRUB UR QL STRIP.AUTO: NEGATIVE
BUN SERPL-MCNC: 32 MG/DL (ref 8–22)
CALCIUM SERPL-MCNC: 10.3 MG/DL (ref 8.5–10.5)
CHLORIDE SERPL-SCNC: 103 MMOL/L (ref 96–112)
CO2 SERPL-SCNC: 23 MMOL/L (ref 20–33)
COLOR UR: YELLOW
CREAT SERPL-MCNC: 1.23 MG/DL (ref 0.5–1.4)
EOSINOPHIL # BLD AUTO: 0.28 K/UL (ref 0–0.51)
EOSINOPHIL NFR BLD: 1.8 % (ref 0–6.9)
EPI CELLS #/AREA URNS HPF: NEGATIVE /HPF
ERYTHROCYTE [DISTWIDTH] IN BLOOD BY AUTOMATED COUNT: 50.6 FL (ref 35.9–50)
GFR SERPLBLD CREATININE-BSD FMLA CKD-EPI: 50 ML/MIN/1.73 M 2
GLUCOSE SERPL-MCNC: 169 MG/DL (ref 65–99)
GLUCOSE UR STRIP.AUTO-MCNC: NEGATIVE MG/DL
HCT VFR BLD AUTO: 25.9 % (ref 37–47)
HGB BLD-MCNC: 8.1 G/DL (ref 12–16)
HYALINE CASTS #/AREA URNS LPF: ABNORMAL /LPF
KETONES UR STRIP.AUTO-MCNC: NEGATIVE MG/DL
LEUKOCYTE ESTERASE UR QL STRIP.AUTO: ABNORMAL
LYMPHOCYTES # BLD AUTO: 0.26 K/UL (ref 1–4.8)
LYMPHOCYTES NFR BLD: 1.7 % (ref 22–41)
MANUAL DIFF BLD: NORMAL
MCH RBC QN AUTO: 30.8 PG (ref 27–33)
MCHC RBC AUTO-ENTMCNC: 31.3 G/DL (ref 32.2–35.5)
MCV RBC AUTO: 98.5 FL (ref 81.4–97.8)
MICRO URNS: ABNORMAL
MONOCYTES # BLD AUTO: 0.26 K/UL (ref 0–0.85)
MONOCYTES NFR BLD AUTO: 1.7 % (ref 0–13.4)
MORPHOLOGY BLD-IMP: NORMAL
NEUTROPHILS # BLD AUTO: 14.6 K/UL (ref 1.82–7.42)
NEUTROPHILS NFR BLD: 93.9 % (ref 44–72)
NEUTS BAND NFR BLD MANUAL: 0.9 % (ref 0–10)
NITRITE UR QL STRIP.AUTO: NEGATIVE
NRBC # BLD AUTO: 0.02 K/UL
NRBC BLD-RTO: 0.1 /100 WBC (ref 0–0.2)
PH UR STRIP.AUTO: 6.5 [PH] (ref 5–8)
PLATELET # BLD AUTO: 253 K/UL (ref 164–446)
PLATELET BLD QL SMEAR: NORMAL
PMV BLD AUTO: 11.1 FL (ref 9–12.9)
POTASSIUM SERPL-SCNC: 4.6 MMOL/L (ref 3.6–5.5)
PROT UR QL STRIP: 100 MG/DL
RBC # BLD AUTO: 2.63 M/UL (ref 4.2–5.4)
RBC # URNS HPF: ABNORMAL /HPF
RBC BLD AUTO: NORMAL
RBC UR QL AUTO: ABNORMAL
SODIUM SERPL-SCNC: 135 MMOL/L (ref 135–145)
SP GR UR STRIP.AUTO: 1.01
UROBILINOGEN UR STRIP.AUTO-MCNC: 0.2 MG/DL
WBC # BLD AUTO: 15.4 K/UL (ref 4.8–10.8)
WBC #/AREA URNS HPF: ABNORMAL /HPF

## 2024-08-27 PROCEDURE — 700102 HCHG RX REV CODE 250 W/ 637 OVERRIDE(OP): Performed by: UROLOGY

## 2024-08-27 PROCEDURE — 36415 COLL VENOUS BLD VENIPUNCTURE: CPT

## 2024-08-27 PROCEDURE — A9270 NON-COVERED ITEM OR SERVICE: HCPCS | Performed by: INTERNAL MEDICINE

## 2024-08-27 PROCEDURE — 99232 SBSQ HOSP IP/OBS MODERATE 35: CPT | Performed by: INTERNAL MEDICINE

## 2024-08-27 PROCEDURE — 700102 HCHG RX REV CODE 250 W/ 637 OVERRIDE(OP): Performed by: INTERNAL MEDICINE

## 2024-08-27 PROCEDURE — 700111 HCHG RX REV CODE 636 W/ 250 OVERRIDE (IP): Mod: JZ | Performed by: HOSPITALIST

## 2024-08-27 PROCEDURE — A9270 NON-COVERED ITEM OR SERVICE: HCPCS | Performed by: SURGERY

## 2024-08-27 PROCEDURE — A9270 NON-COVERED ITEM OR SERVICE: HCPCS | Performed by: UROLOGY

## 2024-08-27 PROCEDURE — 770006 HCHG ROOM/CARE - MED/SURG/GYN SEMI*

## 2024-08-27 PROCEDURE — 81001 URINALYSIS AUTO W/SCOPE: CPT

## 2024-08-27 PROCEDURE — 71045 X-RAY EXAM CHEST 1 VIEW: CPT

## 2024-08-27 PROCEDURE — 85007 BL SMEAR W/DIFF WBC COUNT: CPT

## 2024-08-27 PROCEDURE — 700102 HCHG RX REV CODE 250 W/ 637 OVERRIDE(OP): Performed by: SURGERY

## 2024-08-27 PROCEDURE — 80048 BASIC METABOLIC PNL TOTAL CA: CPT

## 2024-08-27 PROCEDURE — 85027 COMPLETE CBC AUTOMATED: CPT

## 2024-08-27 RX ADMIN — GUAIFENESIN 600 MG: 600 TABLET, EXTENDED RELEASE ORAL at 04:30

## 2024-08-27 RX ADMIN — APIXABAN 5 MG: 5 TABLET, FILM COATED ORAL at 17:54

## 2024-08-27 RX ADMIN — SENNOSIDES AND DOCUSATE SODIUM 1 TABLET: 50; 8.6 TABLET ORAL at 17:54

## 2024-08-27 RX ADMIN — APIXABAN 5 MG: 5 TABLET, FILM COATED ORAL at 04:30

## 2024-08-27 RX ADMIN — OXYCODONE HYDROCHLORIDE 10 MG: 10 TABLET ORAL at 08:59

## 2024-08-27 RX ADMIN — AMOXICILLIN 500 MG: 500 CAPSULE ORAL at 04:44

## 2024-08-27 RX ADMIN — GUAIFENESIN 600 MG: 600 TABLET, EXTENDED RELEASE ORAL at 17:54

## 2024-08-27 RX ADMIN — GABAPENTIN 300 MG: 300 CAPSULE ORAL at 04:30

## 2024-08-27 RX ADMIN — ONDANSETRON 4 MG: 2 INJECTION INTRAMUSCULAR; INTRAVENOUS at 18:05

## 2024-08-27 RX ADMIN — CITALOPRAM 40 MG: 40 TABLET, FILM COATED ORAL at 04:29

## 2024-08-27 RX ADMIN — LISINOPRIL 5 MG: 5 TABLET ORAL at 04:30

## 2024-08-27 RX ADMIN — AMOXICILLIN AND CLAVULANATE POTASSIUM 1 TABLET: 875; 125 TABLET, FILM COATED ORAL at 15:21

## 2024-08-27 RX ADMIN — FERROUS SULFATE TAB 325 MG (65 MG ELEMENTAL FE) 325 MG: 325 (65 FE) TAB at 08:59

## 2024-08-27 RX ADMIN — CARVEDILOL 12.5 MG: 12.5 TABLET, FILM COATED ORAL at 04:30

## 2024-08-27 RX ADMIN — OXYCODONE HYDROCHLORIDE 10 MG: 10 TABLET ORAL at 15:21

## 2024-08-27 RX ADMIN — Medication 5 MG: at 21:51

## 2024-08-27 RX ADMIN — AMLODIPINE BESYLATE 5 MG: 5 TABLET ORAL at 17:55

## 2024-08-27 RX ADMIN — AMLODIPINE BESYLATE 5 MG: 5 TABLET ORAL at 04:30

## 2024-08-27 ASSESSMENT — PAIN DESCRIPTION - PAIN TYPE
TYPE: ACUTE PAIN

## 2024-08-27 ASSESSMENT — ENCOUNTER SYMPTOMS
FOCAL WEAKNESS: 0
WEAKNESS: 1
SHORTNESS OF BREATH: 0
SPUTUM PRODUCTION: 0
COUGH: 0
BLURRED VISION: 0
ORTHOPNEA: 0
NAUSEA: 0
HALLUCINATIONS: 0
BACK PAIN: 0
DOUBLE VISION: 0
FEVER: 0
DIZZINESS: 0
TREMORS: 0
CONSTIPATION: 0
EYE PAIN: 0
DIARRHEA: 0
WEIGHT LOSS: 0
HEADACHES: 0
MYALGIAS: 0
TINGLING: 0
NECK PAIN: 0
PALPITATIONS: 0
VOMITING: 0
CHILLS: 0
PHOTOPHOBIA: 0
SENSORY CHANGE: 0
ABDOMINAL PAIN: 1
SPEECH CHANGE: 0

## 2024-08-27 ASSESSMENT — LIFESTYLE VARIABLES: SUBSTANCE_ABUSE: 0

## 2024-08-27 NOTE — PROGRESS NOTES
Received bedside report from previous RN, patient is alert and oriented x4. Denies pain at this time. On o2 at 2L via NC with o2 sat of 93% at this time. With abdominal incision, LUQ WERNER drain and urostomy CDI. On SCD and BERNARDA mattress in place. Fall precautions continued and call light  within reach. All other needs met at this time.

## 2024-08-27 NOTE — PROGRESS NOTES
"Hospital Medicine Daily Progress Note    Date of Service  8/27/2024    Chief Complaint  Patricia A Tietz is a 59 y.o. female admitted 8/20/2024 with hernia repair    Hospital Course    As per Dr. Stuart note    \"60yo PMHx urostomy with ileal conduit as a child, HTN, AFib, on apixaban, chronic hypoxic resp failure on 2LNC at baseline.  She was at [facility] in May with an SBO and UTI (Klebsiella and Pseudomonas).  Admitted on 8/20 for planned Ex lap and reapir of 10cm abd wall hernia with mycocutaneous flap trunk with mesh and replacement of catheter into the stoma..  In PACU pt was hypotensive requiring Rudi-Synephrine and fluids.  She was admitted to IMCU    8/22 Pt had a quiet night.  \"Sore all over\", refering to her belly.  No flatus or BM    AFebrile  Sinus 60s  -110  On 2 LNC  UOP 1950ml/24hrs  Drains:  -A: 230ml  -B: 310ml  -C 43ml    8/23 patient is ANO x 3, reports feeling better, generalized weakness  Hypoactive bowel sounds, abdomen soft nontender  NG tube in place  Minimal output  Monitor for return of bowel function, diet per surgery    8/24 ANO x 3, reports feeling better  Positive bowel movement and gas, per surgery, okay to initiate liquid diet    8/25 patient is ANO x 3, reports feeling tired, hemoglobin of 7 today  Started on Eliquis will monitor closely  Abdominal soreness, serous discharge from WERNER drain  Diet advance per surgery  Transfuse if hemoglobin less than 7    8/26 generalized weakness, feels a little better  Minimal abdominal soreness only with movement  1 of 3 drains removed  Ongoing output noted, 90 mL over 24 hours  Tolerating oral diet, positive BM  Encourage I-S use\"    Interval Problem Update    I have ordered and examined at the bedside  She reported abdominal pain has been improving.  I examined her along with bedside RN  She has 1 WERNER drain.  I reviewed urology and surgery note.      I have discussed this patient's plan of care and discharge plan at IDT rounds today with Case " Management, Nursing, Nursing leadership, and other members of the IDT team.    Consultants/Specialty  general surgery and urology    Code Status  Full Code    Disposition  The patient is not medically cleared for discharge to home or a post-acute facility.      I have placed the appropriate orders for post-discharge needs.    Review of Systems  Review of Systems   Constitutional:  Positive for malaise/fatigue. Negative for chills, fever and weight loss.   HENT:  Negative for hearing loss and tinnitus.    Eyes:  Negative for blurred vision, double vision, photophobia and pain.   Respiratory:  Negative for cough, sputum production and shortness of breath.    Cardiovascular:  Negative for chest pain, palpitations, orthopnea and leg swelling.   Gastrointestinal:  Positive for abdominal pain. Negative for constipation, diarrhea, nausea and vomiting.   Genitourinary:  Negative for dysuria, frequency and urgency.   Musculoskeletal:  Negative for back pain, joint pain, myalgias and neck pain.   Skin:  Negative for rash.   Neurological:  Positive for weakness. Negative for dizziness, tingling, tremors, sensory change, speech change, focal weakness and headaches.   Psychiatric/Behavioral:  Negative for hallucinations and substance abuse.    All other systems reviewed and are negative.       Physical Exam  Temp:  [36.4 °C (97.6 °F)-37.2 °C (98.9 °F)] 37.2 °C (98.9 °F)  Pulse:  [60-70] 60  Resp:  [16-18] 18  BP: (109-140)/(49-67) 113/67  SpO2:  [92 %-96 %] 96 %    Physical Exam  Vitals reviewed.   Constitutional:       General: She is not in acute distress.     Appearance: Normal appearance. She is obese. She is ill-appearing.   HENT:      Head: Normocephalic and atraumatic.      Nose: No congestion.   Eyes:      General:         Right eye: No discharge.         Left eye: No discharge.      Pupils: Pupils are equal, round, and reactive to light.   Cardiovascular:      Rate and Rhythm: Normal rate and regular rhythm.       Pulses: Normal pulses.      Heart sounds: Normal heart sounds. No murmur heard.  Pulmonary:      Effort: Pulmonary effort is normal. No respiratory distress.      Breath sounds: Normal breath sounds. No stridor.   Abdominal:      General: Bowel sounds are normal. There is no distension.      Palpations: Abdomen is soft.      Tenderness: There is abdominal tenderness (Mild).      Comments: Dressing present  WERNER drain   Musculoskeletal:         General: No swelling or tenderness. Normal range of motion.      Cervical back: Normal range of motion. No rigidity.   Skin:     General: Skin is warm.      Capillary Refill: Capillary refill takes less than 2 seconds.      Coloration: Skin is not jaundiced or pale.      Findings: No bruising.   Neurological:      General: No focal deficit present.      Mental Status: She is alert and oriented to person, place, and time.      Cranial Nerves: No cranial nerve deficit.      Comments: Decreased range of motion bilateral lower extremities   Psychiatric:         Mood and Affect: Mood normal.         Behavior: Behavior normal.         Fluids    Intake/Output Summary (Last 24 hours) at 8/27/2024 1438  Last data filed at 8/27/2024 1000  Gross per 24 hour   Intake 270 ml   Output 1360 ml   Net -1090 ml       Laboratory  Recent Labs     08/26/24  0935 08/26/24  2119 08/27/24  0327   WBC 10.5 13.0* 15.4*   RBC 2.73* 2.66* 2.63*   HEMOGLOBIN 8.3* 8.0* 8.1*   HEMATOCRIT 26.2* 25.2* 25.9*   MCV 96.0 94.7 98.5*   MCH 30.4 30.1 30.8   MCHC 31.7* 31.7* 31.3*   RDW 49.1 48.0 50.6*   PLATELETCT 186 236 253   MPV 10.7 10.4 11.1     Recent Labs     08/25/24  0640 08/26/24  0935 08/27/24  0327   SODIUM 138 137 135   POTASSIUM 4.2 4.3 4.6   CHLORIDE 109 107 103   CO2 21 23 23   GLUCOSE 110* 116* 169*   BUN 20 26* 32*   CREATININE 0.96 1.29 1.23   CALCIUM 10.0 10.2 10.3                   Imaging  DX-CHEST-PORTABLE (1 VIEW)   Final Result      1. New linear parenchymal opacities in the right midlung and  right lung base, compatible with atelectasis or infiltrates.   2. The remainder of the lungs is clear.   3. Interval removal of the right upper upper extremity PICC catheter.           Assessment/Plan  * Shock (HCC)- (present on admission)  Assessment & Plan  Post operative requiring Rudi synephrine initially and then Levophed in the unit  Vasopressors DC'd early this am  Cortisol>30  No evidence of sepsis  Cont to monitor and resume BP meds as pressures allow    8/23 resolved, monitor      Leukocytosis  Assessment & Plan  Continue antibiotics  Ordered CBC for tomorrow.    Parastomal hernia  Assessment & Plan  Now s/p repair on 8/20  NGT and 3 WERNER drains in place  Srgry and Urology following    8/22  Belly still quiet, no flatus or BM  Monitor for signs of return of bowel function  Prn pain and N management  Monitor daily BMP for renal function and electrolye abnormalities  Resume po once OK with Srgcl team  Cont supportive care with IVFs, electrolyte replacement, antiemetics and pain medication  Daily BMP to monitor for RAO, hypo K, acidosis    8/23 surgery following, okay for NG tube removal  Diet per surgery  SLP evaluation  Drains in place    8/24 following, drains per surgery, greater than 162 mL output/24 hours  Positive BM, diet per surgery, consider initiation of clear liquid diet  Pain control follow-up labs  Patient is from [facility] facility, to return when medically cleared    8/25 discussed with surgery, Dr. Mercado  150 mL output over 24 hours, plan for drain removal per surgery, plan to remove by Tuesday if decreased output  Likely return to skilled if clinically stable  Diet advance per surgery    8/26 tolerating oral intake, generalized weakness  1 of 3 WERNER drains out, removal per surgery  Transfer to skilled facility, [facility] in 2 to 3 days    8/27 surgery Dr. Mercado and urology is following her.  Continue antibiotics.  Continue to provide her pain control.      Obesity- (present on  admission)  Assessment & Plan  BMI 46  Lifestyle modifications  Outpatient follow-up    Atrial fibrillation (HCC)- (present on admission)  Assessment & Plan  8/21 Hx of  Has been paced and sinus overnight  Cont Coreg with parameters  Cont Tele  Resume apixaban when OK with Surgical team    8/24 okay to restart Eliquis per surgery, will monitor  Follow-up CBC    8/25 restarted on Eliquis, hemoglobin from 10-7, will monitor for signs of bleeding  Serous discharge from drains  Discussed with surgery, will continue to monitor  May need transfusion if hemoglobin less than 7, monitor closely    8/26 resume Eliquis    8/27 currently rate controlled.  On continuing Eliquis.  Optimize electrolytes.    Chronic respiratory failure with hypoxia (HCC)- (present on admission)  Assessment & Plan  At baseline is on 2 [facility]  Mobilize  O2/RT protocols  Monitor for aspiration  Monitor vol status    8/23 patient is from [facility], to return to [facility] once medically cleared, continue to monitor    8/26 now on 3 to 4 L, taper as tolerated  Encourage I-S use    8/27 currently she is requiring 3 L of oxygen to maintain oxygen saturation.  Continue to titrate down oxygen as tolerated.    Pacemaker- (present on admission)  Assessment & Plan  On Tele pt has been intermitently paced/sinus  Now she transferred to medical floor.    RAO (acute kidney injury) (HCC)- (present on admission)  Assessment & Plan    Good UOP  At risk for dehydration given output from NGT and 3 JPs (close to 600ml/24hrs)  Renal function has been improving.  Current BUN is 32 and creatinine 1.23  Avoid nephrotoxins  I ordered lab work for tomorrow.      Anemia- (present on admission)  Assessment & Plan  8/25 hemoglobin of 10-6, after starting Eliquis  May be dilutional  BP stable  CBC every 12, transfuse for hemoglobin less than 7  May be secondary to recent surgery    8/26 improving hemoglobin now at 9, no signs of active bleed, will restart  Eliquis  Follow-up a.m. labs    8/27 hemoglobin remained stable.  No signs of active bleeding.  Close monitoring on Eliquis      Presence of urostomy (HCC)- (present on admission)  Assessment & Plan  8/22 Chronic since 5 years old now with surgery by Dr. Gusman and Dr. Mercado  She has 3 WERNER drains in  Pain control with narcotics as indicated  SCDs for DVT prophylaxis  She has an NG tube in  Urology following  Good UOP: cont to monitor    8/23 okay for NG tube removal per surgery    8/25 seen by urology, will monitor    8/26 urology has been following her.  Continue to monitor       I discussed plan of care during multidisciplinary rounds regarding patient's current medical condition and plan of care.    I reviewed general surgery note Dr. Mercado.    I reviewed urology note.    VTE prophylaxis:    therapeutic anticoagulation with eliquis 5 mg BID      I have performed a physical exam and reviewed and updated ROS and Plan today (8/27/2024). In review of yesterday's note (8/26/2024), there are no changes except as documented above.

## 2024-08-27 NOTE — PROGRESS NOTES
Hourly rounds performed. Patient is resting in bed. Respirations even and unlabored. Fall precautions continued and call light within reach.

## 2024-08-27 NOTE — ASSESSMENT & PLAN NOTE
White blood cell count is trending down current white blood cell count is 14.3.  Continue antibiotics  Ordered CBC for tomorrow.

## 2024-08-27 NOTE — PROGRESS NOTES
"     Urology Progress Note    Post op Day # 7 from complex parastomal hernia repair performed by Dr. Mercado.     Overnight Events: None    S: Patient is somnolent during today's exam. She denies fevers, chills or increased pain. Notable over night events, pt vomited 250cc. She states that her urostomy was leaking into her surgical site. Nursing changed dressing. WBC today has increased from 13 to 15.4 and Creatinine is 1.23.     O:   /67   Pulse 60   Temp 37.2 °C (98.9 °F) (Temporal)   Resp 18   Ht 1.575 m (5' 2\")   Wt 114 kg (251 lb 12.3 oz)   SpO2 96%   Recent Labs     08/25/24  0640 08/26/24  0935 08/27/24  0327   SODIUM 138 137 135   POTASSIUM 4.2 4.3 4.6   CHLORIDE 109 107 103   CO2 21 23 23   GLUCOSE 110* 116* 169*   BUN 20 26* 32*   CREATININE 0.96 1.29 1.23   CALCIUM 10.0 10.2 10.3     Recent Labs     08/26/24  0935 08/26/24  2119 08/27/24  0327   WBC 10.5 13.0* 15.4*   RBC 2.73* 2.66* 2.63*   HEMOGLOBIN 8.3* 8.0* 8.1*   HEMATOCRIT 26.2* 25.2* 25.9*   MCV 96.0 94.7 98.5*   MCH 30.4 30.1 30.8   MCHC 31.7* 31.7* 31.3*   RDW 49.1 48.0 50.6*   PLATELETCT 186 236 253   MPV 10.7 10.4 11.1         Intake/Output Summary (Last 24 hours) at 8/26/2024 1258  Last data filed at 8/26/2024 0900  Gross per 24 hour   Intake 720 ml   Output 1280 ml   Net -560 ml       Exam:    -Abdomen soft, tender to palpation.  Incisions clean, dry, intact. WERNER drain in place.   -Urine: ileal conduit is draining purulent cloudy urine.     A/P:    Active Hospital Problems    Diagnosis     Parastomal hernia [K43.5]     Shock (HCC) [R57.9]     Chronic respiratory failure with hypoxia (HCC) [J96.11]     Atrial fibrillation (HCC) [I48.91]     Obesity [E66.9]     Pacemaker [Z95.0]     RAO (acute kidney injury) (HCC) [N17.9]     Anemia [D64.9]     Presence of urostomy (HCC) [Z93.6]      -Patient to continue on Amoxicillin.   -Continue to monitor urine output.  -Continue drain management per Dr. Mercado.      Lisa Washington P.A.-C. "   [address]  [place] [id number]   [phone]

## 2024-08-27 NOTE — PROGRESS NOTES
Surgery  Pt with increasing WBC, vomited 250 cc  In good spirits  WERNER serosanginous  Midline wound ok  Urosotmy leaking into lateral wound.  Dresssing changed  A/p   CXR  2.  Abx to augmentin  3.  Diet to full liquids, still having bowel function  4.  Change urostomy and change dressings daily (ordered)  5.  Continue observation in hospital

## 2024-08-27 NOTE — CARE PLAN
The patient is Watcher - Medium risk of patient condition declining or worsening    Shift Goals  Clinical Goals: monitor drain output and wound care  Patient Goals: rest  Family Goals: ag    Progress made toward(s) clinical / shift goals:  updated pt on POC and verbalized understanding. Medicated per MAR and tolerated well. Dressing changed. Q2 turns. SCDs and BERNARDA mattress in place. Needs attended.    Patient is not progressing towards the following goals:    Problem: Knowledge Deficit - Standard  Goal: Patient and family/care givers will demonstrate understanding of plan of care, disease process/condition, diagnostic tests and medications  Description: Target End Date:  1-3 days or as soon as patient condition allows    Document in Patient Education    1.  Patient and family/caregiver oriented to unit, equipment, visitation policy and means for communicating concern  2.  Complete/review Learning Assessment  3.  Assess knowledge level of disease process/condition, treatment plan, diagnostic tests and medications  4.  Explain disease process/condition, treatment plan, diagnostic tests and medications  Outcome: Not Progressing     Problem: Skin Integrity  Goal: Skin integrity is maintained or improved  Description: Target End Date:  Prior to discharge or change in level of care    Document interventions on Skin Risk/Jaquan flowsheet groups and corresponding LDA    1.  Assess and monitor skin integrity, appearance and/or temperature  2.  Assess risk factors for impaired skin integrity and/or pressures ulcers  3.  Implement precautions to protect skin integrity in collaboration with interdisciplinary team  4.  Implement pressure ulcer prevention protocol if at risk for skin breakdown  5.  Confirm wound care consult if at risk for skin breakdown  6.  Ensure patient use of pressure relieving devices  (Low air loss bed, waffle overlay, heel protectors, ROHO cushion, etc)  Outcome: Not Progressing

## 2024-08-28 ENCOUNTER — ANESTHESIA EVENT (OUTPATIENT)
Dept: SURGERY | Facility: MEDICAL CENTER | Age: 59
End: 2024-08-28
Payer: MEDICARE

## 2024-08-28 ENCOUNTER — ANESTHESIA (OUTPATIENT)
Dept: SURGERY | Facility: MEDICAL CENTER | Age: 59
End: 2024-08-28
Payer: MEDICARE

## 2024-08-28 LAB
ABO GROUP BLD: NORMAL
ALBUMIN SERPL BCP-MCNC: 2.3 G/DL (ref 3.2–4.9)
ALBUMIN/GLOB SERPL: 0.7 G/DL
ALP SERPL-CCNC: 69 U/L (ref 30–99)
ALT SERPL-CCNC: 21 U/L (ref 2–50)
ANION GAP SERPL CALC-SCNC: 8 MMOL/L (ref 7–16)
AST SERPL-CCNC: 42 U/L (ref 12–45)
BILIRUB SERPL-MCNC: 0.2 MG/DL (ref 0.1–1.5)
BLD GP AB SCN SERPL QL: NORMAL
BUN SERPL-MCNC: 34 MG/DL (ref 8–22)
CALCIUM ALBUM COR SERPL-MCNC: 11.8 MG/DL (ref 8.5–10.5)
CALCIUM SERPL-MCNC: 10.4 MG/DL (ref 8.5–10.5)
CHLORIDE SERPL-SCNC: 107 MMOL/L (ref 96–112)
CO2 SERPL-SCNC: 23 MMOL/L (ref 20–33)
CREAT SERPL-MCNC: 1.28 MG/DL (ref 0.5–1.4)
ERYTHROCYTE [DISTWIDTH] IN BLOOD BY AUTOMATED COUNT: 49.3 FL (ref 35.9–50)
ERYTHROCYTE [DISTWIDTH] IN BLOOD BY AUTOMATED COUNT: 49.7 FL (ref 35.9–50)
GFR SERPLBLD CREATININE-BSD FMLA CKD-EPI: 48 ML/MIN/1.73 M 2
GLOBULIN SER CALC-MCNC: 3.5 G/DL (ref 1.9–3.5)
GLUCOSE SERPL-MCNC: 85 MG/DL (ref 65–99)
GRAM STN SPEC: NORMAL
HCT VFR BLD AUTO: 22.7 % (ref 37–47)
HCT VFR BLD AUTO: 23.8 % (ref 37–47)
HGB BLD-MCNC: 6.8 G/DL (ref 12–16)
HGB BLD-MCNC: 7.4 G/DL (ref 12–16)
MAGNESIUM SERPL-MCNC: 1.8 MG/DL (ref 1.5–2.5)
MCH RBC QN AUTO: 28.9 PG (ref 27–33)
MCH RBC QN AUTO: 30.1 PG (ref 27–33)
MCHC RBC AUTO-ENTMCNC: 30 G/DL (ref 32.2–35.5)
MCHC RBC AUTO-ENTMCNC: 31.1 G/DL (ref 32.2–35.5)
MCV RBC AUTO: 96.6 FL (ref 81.4–97.8)
MCV RBC AUTO: 96.7 FL (ref 81.4–97.8)
PHOSPHATE SERPL-MCNC: 2.9 MG/DL (ref 2.5–4.5)
PLATELET # BLD AUTO: 199 K/UL (ref 164–446)
PLATELET # BLD AUTO: 221 K/UL (ref 164–446)
PMV BLD AUTO: 10.1 FL (ref 9–12.9)
PMV BLD AUTO: 10.4 FL (ref 9–12.9)
POTASSIUM SERPL-SCNC: 5.3 MMOL/L (ref 3.6–5.5)
PROT SERPL-MCNC: 5.8 G/DL (ref 6–8.2)
RBC # BLD AUTO: 2.35 M/UL (ref 4.2–5.4)
RBC # BLD AUTO: 2.46 M/UL (ref 4.2–5.4)
RH BLD: NORMAL
SIGNIFICANT IND 70042: NORMAL
SITE SITE: NORMAL
SODIUM SERPL-SCNC: 138 MMOL/L (ref 135–145)
SOURCE SOURCE: NORMAL
WBC # BLD AUTO: 12.6 K/UL (ref 4.8–10.8)
WBC # BLD AUTO: 14.3 K/UL (ref 4.8–10.8)

## 2024-08-28 PROCEDURE — 88305 TISSUE EXAM BY PATHOLOGIST: CPT

## 2024-08-28 PROCEDURE — 160035 HCHG PACU - 1ST 60 MINS PHASE I: Performed by: SURGERY

## 2024-08-28 PROCEDURE — 99232 SBSQ HOSP IP/OBS MODERATE 35: CPT | Performed by: INTERNAL MEDICINE

## 2024-08-28 PROCEDURE — 160036 HCHG PACU - EA ADDL 30 MINS PHASE I: Performed by: SURGERY

## 2024-08-28 PROCEDURE — A9270 NON-COVERED ITEM OR SERVICE: HCPCS | Performed by: SURGERY

## 2024-08-28 PROCEDURE — 83735 ASSAY OF MAGNESIUM: CPT

## 2024-08-28 PROCEDURE — 160048 HCHG OR STATISTICAL LEVEL 1-5: Performed by: SURGERY

## 2024-08-28 PROCEDURE — 36415 COLL VENOUS BLD VENIPUNCTURE: CPT

## 2024-08-28 PROCEDURE — 87070 CULTURE OTHR SPECIMN AEROBIC: CPT

## 2024-08-28 PROCEDURE — 84100 ASSAY OF PHOSPHORUS: CPT

## 2024-08-28 PROCEDURE — 88112 CYTOPATH CELL ENHANCE TECH: CPT

## 2024-08-28 PROCEDURE — 87077 CULTURE AEROBIC IDENTIFY: CPT | Mod: 91

## 2024-08-28 PROCEDURE — 87186 SC STD MICRODIL/AGAR DIL: CPT | Mod: 91

## 2024-08-28 PROCEDURE — A9270 NON-COVERED ITEM OR SERVICE: HCPCS | Performed by: INTERNAL MEDICINE

## 2024-08-28 PROCEDURE — 700111 HCHG RX REV CODE 636 W/ 250 OVERRIDE (IP): Performed by: ANESTHESIOLOGY

## 2024-08-28 PROCEDURE — 770006 HCHG ROOM/CARE - MED/SURG/GYN SEMI*

## 2024-08-28 PROCEDURE — 700102 HCHG RX REV CODE 250 W/ 637 OVERRIDE(OP): Performed by: ANESTHESIOLOGY

## 2024-08-28 PROCEDURE — 160042 HCHG SURGERY MINUTES - EA ADDL 1 MIN LEVEL 5: Performed by: SURGERY

## 2024-08-28 PROCEDURE — 700105 HCHG RX REV CODE 258: Performed by: SURGERY

## 2024-08-28 PROCEDURE — 2W13X6Z COMPRESSION OF ABDOMINAL WALL USING PRESSURE DRESSING: ICD-10-PCS | Performed by: SURGERY

## 2024-08-28 PROCEDURE — 85027 COMPLETE CBC AUTOMATED: CPT

## 2024-08-28 PROCEDURE — 87075 CULTR BACTERIA EXCEPT BLOOD: CPT

## 2024-08-28 PROCEDURE — A9270 NON-COVERED ITEM OR SERVICE: HCPCS | Performed by: ANESTHESIOLOGY

## 2024-08-28 PROCEDURE — 86901 BLOOD TYPING SEROLOGIC RH(D): CPT

## 2024-08-28 PROCEDURE — 160002 HCHG RECOVERY MINUTES (STAT): Performed by: SURGERY

## 2024-08-28 PROCEDURE — 86900 BLOOD TYPING SEROLOGIC ABO: CPT

## 2024-08-28 PROCEDURE — 80053 COMPREHEN METABOLIC PANEL: CPT

## 2024-08-28 PROCEDURE — 97602 WOUND(S) CARE NON-SELECTIVE: CPT

## 2024-08-28 PROCEDURE — 87205 SMEAR GRAM STAIN: CPT

## 2024-08-28 PROCEDURE — 87076 CULTURE ANAEROBE IDENT EACH: CPT

## 2024-08-28 PROCEDURE — 160009 HCHG ANES TIME/MIN: Performed by: SURGERY

## 2024-08-28 PROCEDURE — 700101 HCHG RX REV CODE 250: Performed by: SURGERY

## 2024-08-28 PROCEDURE — 700111 HCHG RX REV CODE 636 W/ 250 OVERRIDE (IP): Mod: JZ | Performed by: SURGERY

## 2024-08-28 PROCEDURE — 700102 HCHG RX REV CODE 250 W/ 637 OVERRIDE(OP): Performed by: INTERNAL MEDICINE

## 2024-08-28 PROCEDURE — 700102 HCHG RX REV CODE 250 W/ 637 OVERRIDE(OP): Performed by: SURGERY

## 2024-08-28 PROCEDURE — 700101 HCHG RX REV CODE 250: Performed by: ANESTHESIOLOGY

## 2024-08-28 PROCEDURE — 160031 HCHG SURGERY MINUTES - 1ST 30 MINS LEVEL 5: Performed by: SURGERY

## 2024-08-28 PROCEDURE — 700105 HCHG RX REV CODE 258: Performed by: INTERNAL MEDICINE

## 2024-08-28 PROCEDURE — 86850 RBC ANTIBODY SCREEN: CPT

## 2024-08-28 PROCEDURE — 700105 HCHG RX REV CODE 258: Performed by: ANESTHESIOLOGY

## 2024-08-28 RX ORDER — MAGNESIUM HYDROXIDE 1200 MG/15ML
LIQUID ORAL
Status: COMPLETED | OUTPATIENT
Start: 2024-08-28 | End: 2024-08-28

## 2024-08-28 RX ORDER — LIDOCAINE HYDROCHLORIDE 20 MG/ML
INJECTION, SOLUTION EPIDURAL; INFILTRATION; INTRACAUDAL; PERINEURAL PRN
Status: DISCONTINUED | OUTPATIENT
Start: 2024-08-28 | End: 2024-08-29 | Stop reason: SURG

## 2024-08-28 RX ORDER — DEXAMETHASONE SODIUM PHOSPHATE 4 MG/ML
INJECTION, SOLUTION INTRA-ARTICULAR; INTRALESIONAL; INTRAMUSCULAR; INTRAVENOUS; SOFT TISSUE PRN
Status: DISCONTINUED | OUTPATIENT
Start: 2024-08-28 | End: 2024-08-29 | Stop reason: SURG

## 2024-08-28 RX ORDER — HYDROMORPHONE HYDROCHLORIDE 1 MG/ML
0.4 INJECTION, SOLUTION INTRAMUSCULAR; INTRAVENOUS; SUBCUTANEOUS
Status: DISCONTINUED | OUTPATIENT
Start: 2024-08-28 | End: 2024-08-28 | Stop reason: HOSPADM

## 2024-08-28 RX ORDER — DIPHENHYDRAMINE HYDROCHLORIDE 50 MG/ML
12.5 INJECTION INTRAMUSCULAR; INTRAVENOUS
Status: DISCONTINUED | OUTPATIENT
Start: 2024-08-28 | End: 2024-08-28 | Stop reason: HOSPADM

## 2024-08-28 RX ORDER — CEFOTETAN DISODIUM 2 G/20ML
INJECTION, POWDER, FOR SOLUTION INTRAMUSCULAR; INTRAVENOUS PRN
Status: DISCONTINUED | OUTPATIENT
Start: 2024-08-28 | End: 2024-08-29 | Stop reason: SURG

## 2024-08-28 RX ORDER — HALOPERIDOL 5 MG/ML
1 INJECTION INTRAMUSCULAR
Status: DISCONTINUED | OUTPATIENT
Start: 2024-08-28 | End: 2024-08-28 | Stop reason: HOSPADM

## 2024-08-28 RX ORDER — IPRATROPIUM BROMIDE AND ALBUTEROL SULFATE 2.5; .5 MG/3ML; MG/3ML
3 SOLUTION RESPIRATORY (INHALATION)
Status: DISCONTINUED | OUTPATIENT
Start: 2024-08-28 | End: 2024-08-28 | Stop reason: HOSPADM

## 2024-08-28 RX ORDER — ONDANSETRON 2 MG/ML
INJECTION INTRAMUSCULAR; INTRAVENOUS PRN
Status: DISCONTINUED | OUTPATIENT
Start: 2024-08-28 | End: 2024-08-29 | Stop reason: SURG

## 2024-08-28 RX ORDER — SODIUM CHLORIDE, SODIUM LACTATE, POTASSIUM CHLORIDE, CALCIUM CHLORIDE 600; 310; 30; 20 MG/100ML; MG/100ML; MG/100ML; MG/100ML
INJECTION, SOLUTION INTRAVENOUS CONTINUOUS
Status: DISCONTINUED | OUTPATIENT
Start: 2024-08-28 | End: 2024-09-02

## 2024-08-28 RX ORDER — ONDANSETRON 2 MG/ML
4 INJECTION INTRAMUSCULAR; INTRAVENOUS
Status: DISCONTINUED | OUTPATIENT
Start: 2024-08-28 | End: 2024-08-28 | Stop reason: HOSPADM

## 2024-08-28 RX ORDER — SODIUM CHLORIDE, SODIUM GLUCONATE, SODIUM ACETATE, POTASSIUM CHLORIDE AND MAGNESIUM CHLORIDE 526; 502; 368; 37; 30 MG/100ML; MG/100ML; MG/100ML; MG/100ML; MG/100ML
INJECTION, SOLUTION INTRAVENOUS
Status: DISCONTINUED | OUTPATIENT
Start: 2024-08-28 | End: 2024-08-29 | Stop reason: SURG

## 2024-08-28 RX ORDER — LIDOCAINE HYDROCHLORIDE 40 MG/ML
SOLUTION TOPICAL PRN
Status: DISCONTINUED | OUTPATIENT
Start: 2024-08-28 | End: 2024-08-29 | Stop reason: SURG

## 2024-08-28 RX ORDER — MEPERIDINE HYDROCHLORIDE 25 MG/ML
12.5 INJECTION INTRAMUSCULAR; INTRAVENOUS; SUBCUTANEOUS
Status: DISCONTINUED | OUTPATIENT
Start: 2024-08-28 | End: 2024-08-28 | Stop reason: HOSPADM

## 2024-08-28 RX ORDER — SODIUM CHLORIDE, SODIUM GLUCONATE, SODIUM ACETATE, POTASSIUM CHLORIDE AND MAGNESIUM CHLORIDE 526; 502; 368; 37; 30 MG/100ML; MG/100ML; MG/100ML; MG/100ML; MG/100ML
500 INJECTION, SOLUTION INTRAVENOUS CONTINUOUS
Status: DISCONTINUED | OUTPATIENT
Start: 2024-08-28 | End: 2024-08-28 | Stop reason: HOSPADM

## 2024-08-28 RX ORDER — HYDROMORPHONE HYDROCHLORIDE 1 MG/ML
0.2 INJECTION, SOLUTION INTRAMUSCULAR; INTRAVENOUS; SUBCUTANEOUS
Status: DISCONTINUED | OUTPATIENT
Start: 2024-08-28 | End: 2024-08-28 | Stop reason: HOSPADM

## 2024-08-28 RX ORDER — OXYCODONE HCL 5 MG/5 ML
10 SOLUTION, ORAL ORAL
Status: COMPLETED | OUTPATIENT
Start: 2024-08-28 | End: 2024-08-28

## 2024-08-28 RX ORDER — HYDROMORPHONE HYDROCHLORIDE 1 MG/ML
1 INJECTION, SOLUTION INTRAMUSCULAR; INTRAVENOUS; SUBCUTANEOUS
Status: DISCONTINUED | OUTPATIENT
Start: 2024-08-28 | End: 2024-08-28 | Stop reason: HOSPADM

## 2024-08-28 RX ORDER — OXYCODONE HCL 5 MG/5 ML
5 SOLUTION, ORAL ORAL
Status: COMPLETED | OUTPATIENT
Start: 2024-08-28 | End: 2024-08-28

## 2024-08-28 RX ORDER — MIDAZOLAM HYDROCHLORIDE 1 MG/ML
1 INJECTION INTRAMUSCULAR; INTRAVENOUS
Status: DISCONTINUED | OUTPATIENT
Start: 2024-08-28 | End: 2024-08-28 | Stop reason: HOSPADM

## 2024-08-28 RX ADMIN — OXYCODONE HYDROCHLORIDE 5 MG: 5 TABLET ORAL at 21:56

## 2024-08-28 RX ADMIN — SUGAMMADEX 400 MG: 100 INJECTION, SOLUTION INTRAVENOUS at 17:11

## 2024-08-28 RX ADMIN — CITALOPRAM 40 MG: 40 TABLET, FILM COATED ORAL at 04:50

## 2024-08-28 RX ADMIN — GABAPENTIN 300 MG: 300 CAPSULE ORAL at 04:50

## 2024-08-28 RX ADMIN — FERROUS SULFATE TAB 325 MG (65 MG ELEMENTAL FE) 325 MG: 325 (65 FE) TAB at 08:48

## 2024-08-28 RX ADMIN — SODIUM CHLORIDE, SODIUM GLUCONATE, SODIUM ACETATE, POTASSIUM CHLORIDE AND MAGNESIUM CHLORIDE: 526; 502; 368; 37; 30 INJECTION, SOLUTION INTRAVENOUS at 17:04

## 2024-08-28 RX ADMIN — LIDOCAINE HYDROCHLORIDE 50 MG: 20 INJECTION, SOLUTION EPIDURAL; INFILTRATION; INTRACAUDAL at 16:42

## 2024-08-28 RX ADMIN — SODIUM CHLORIDE, POTASSIUM CHLORIDE, SODIUM LACTATE AND CALCIUM CHLORIDE: 600; 310; 30; 20 INJECTION, SOLUTION INTRAVENOUS at 22:29

## 2024-08-28 RX ADMIN — PROPOFOL 120 MG: 10 INJECTION, EMULSION INTRAVENOUS at 16:42

## 2024-08-28 RX ADMIN — ONDANSETRON 4 MG: 2 INJECTION INTRAMUSCULAR; INTRAVENOUS at 17:11

## 2024-08-28 RX ADMIN — ROCURONIUM BROMIDE 100 MG: 10 INJECTION, SOLUTION INTRAVENOUS at 16:42

## 2024-08-28 RX ADMIN — APIXABAN 5 MG: 5 TABLET, FILM COATED ORAL at 04:50

## 2024-08-28 RX ADMIN — GUAIFENESIN 600 MG: 600 TABLET, EXTENDED RELEASE ORAL at 04:51

## 2024-08-28 RX ADMIN — SODIUM CHLORIDE, POTASSIUM CHLORIDE, SODIUM LACTATE AND CALCIUM CHLORIDE: 600; 310; 30; 20 INJECTION, SOLUTION INTRAVENOUS at 09:15

## 2024-08-28 RX ADMIN — AMOXICILLIN AND CLAVULANATE POTASSIUM 1 TABLET: 875; 125 TABLET, FILM COATED ORAL at 04:51

## 2024-08-28 RX ADMIN — AMPICILLIN AND SULBACTAM 3 G: 1; 2 INJECTION, POWDER, FOR SOLUTION INTRAMUSCULAR; INTRAVENOUS at 22:03

## 2024-08-28 RX ADMIN — LIDOCAINE HYDROCHLORIDE 4 ML: 40 SOLUTION TOPICAL at 16:42

## 2024-08-28 RX ADMIN — OXYCODONE HYDROCHLORIDE 5 MG: 5 SOLUTION ORAL at 18:27

## 2024-08-28 RX ADMIN — CEFOTETAN DISODIUM 2 G: 2 INJECTION, POWDER, FOR SOLUTION INTRAMUSCULAR; INTRAVENOUS at 16:38

## 2024-08-28 RX ADMIN — OXYCODONE HYDROCHLORIDE 10 MG: 10 TABLET ORAL at 08:48

## 2024-08-28 RX ADMIN — DEXAMETHASONE SODIUM PHOSPHATE 8 MG: 4 INJECTION INTRA-ARTICULAR; INTRALESIONAL; INTRAMUSCULAR; INTRAVENOUS; SOFT TISSUE at 16:45

## 2024-08-28 RX ADMIN — Medication 5 MG: at 21:56

## 2024-08-28 ASSESSMENT — ENCOUNTER SYMPTOMS
NECK PAIN: 0
DIARRHEA: 0
SENSORY CHANGE: 0
WEIGHT LOSS: 0
WEAKNESS: 1
VOMITING: 0
DOUBLE VISION: 0
TINGLING: 0
BLURRED VISION: 0
COUGH: 0
MYALGIAS: 0
HALLUCINATIONS: 0
SPEECH CHANGE: 0
FOCAL WEAKNESS: 0
PHOTOPHOBIA: 0
FEVER: 0
TREMORS: 0
CONSTIPATION: 0
BACK PAIN: 0
ABDOMINAL PAIN: 1
DIZZINESS: 0
EYE PAIN: 0
ORTHOPNEA: 0
SPUTUM PRODUCTION: 0
NAUSEA: 0
PALPITATIONS: 0
SHORTNESS OF BREATH: 0
HEADACHES: 0
CHILLS: 0

## 2024-08-28 ASSESSMENT — PATIENT HEALTH QUESTIONNAIRE - PHQ9
SUM OF ALL RESPONSES TO PHQ9 QUESTIONS 1 AND 2: 0
1. LITTLE INTEREST OR PLEASURE IN DOING THINGS: NOT AT ALL
2. FEELING DOWN, DEPRESSED, IRRITABLE, OR HOPELESS: NOT AT ALL

## 2024-08-28 ASSESSMENT — PAIN DESCRIPTION - PAIN TYPE
TYPE: SURGICAL PAIN
TYPE: ACUTE PAIN;SURGICAL PAIN
TYPE: ACUTE PAIN
TYPE: SURGICAL PAIN
TYPE: ACUTE PAIN;SURGICAL PAIN
TYPE: ACUTE PAIN

## 2024-08-28 ASSESSMENT — LIFESTYLE VARIABLES: SUBSTANCE_ABUSE: 0

## 2024-08-28 NOTE — WOUND TEAM
"  [facility]     Inpatient Services     Established Ostomy Management/ troubleshooting      Plan: Bedside RNs to assist pt with appliance changes. Ostomy RN to remain available PRN for any needs.    HPI: Reviewed  PMH: Reviewed   SH: Reviewed     Reason for Ostomy nurse consult:  Established urostomy    Ostomy History:     Patient was able to manage her ostomy until recently when she had emergent surgery.  Per patient she has been leaking every day and she is fearful that the leaking compromised the healing of her abdominal incisions.            Urostomy 01/07/21 Ileal conduit LLQ (Active)   Wound Image   08/28/24 1308   Stomal Appliance Assessment Leaking;Changed 08/28/24 1308   Stoma Assessment Red 08/28/24 1308   Stoma Shape Flush;Budded Greater Than One Inch 08/28/24 1308   Stoma Size (in) 1.5 08/28/24 1308   Peristomal Assessment Maceration;Red;Denuded 08/28/24 1308   Mucocutaneous Junction Intact 08/28/24 1308   Treatment Appliance Changed;Bag Change;Cleansed with water/washcloth;Crusted with stoma powder;Site care 08/28/24 1308   Peristomal Protectant Stoma Powder;No Sting Skin Prep;Paste Ring 08/28/24 1308   Stomal Appliance Down Drain Bag;Paste Ring, 2\";2 3/4\" (70mm) [facility] 08/28/24 1308   Output (mL) 400 mL 08/28/24 0531   Output Color Yellow 08/28/24 1308   WOUND RN ONLY - Stomal Appliance  2 Piece;Down Drain Bag;Paste Ring, 2\";Convex;2 3/4\" (70mm) [facility] 08/28/24 1308   Appliance Brand [place] 08/22/24 1346   Secure Start completed No 08/28/24 1308   WOUND NURSE ONLY - Time Spent with Patient (mins) 60 08/28/24 1308       Urostomy Ureterostomy left LLQ (Active)           Interventions and Education (if needed): Patient able to verbalize the steps she would take if she was able to care for her own stoma.       Evaluation: Stoma is flush with skin, moises-stoma is macerated with denuded skin on abdominal folds and around incisions. Patient would benefit from convex barrier, cavilion advance and " continued follow up while in patient.  Patient is going back to surgery today.  Wound to assess patient tomorrow.         Anticipated discharge needs: more down drain bags.

## 2024-08-28 NOTE — PROGRESS NOTES
"Hospital Medicine Daily Progress Note    Date of Service  8/28/2024    Chief Complaint  Patricia A Tietz is a 59 y.o. female admitted 8/20/2024 with hernia repair    Hospital Course    As per Dr. Stuart note    \"60yo PMHx urostomy with ileal conduit as a child, HTN, AFib, on apixaban, chronic hypoxic resp failure on 2LNC at baseline.  She was at [facility] in May with an SBO and UTI (Klebsiella and Pseudomonas).  Admitted on 8/20 for planned Ex lap and reapir of 10cm abd wall hernia with mycocutaneous flap trunk with mesh and replacement of catheter into the stoma..  In PACU pt was hypotensive requiring Rudi-Synephrine and fluids.  She was admitted to IMCU    8/22 Pt had a quiet night.  \"Sore all over\", refering to her belly.  No flatus or BM    AFebrile  Sinus 60s  -110  On 2 LNC  UOP 1950ml/24hrs  Drains:  -A: 230ml  -B: 310ml  -C 43ml    8/23 patient is ANO x 3, reports feeling better, generalized weakness  Hypoactive bowel sounds, abdomen soft nontender  NG tube in place  Minimal output  Monitor for return of bowel function, diet per surgery    8/24 ANO x 3, reports feeling better  Positive bowel movement and gas, per surgery, okay to initiate liquid diet    8/25 patient is ANO x 3, reports feeling tired, hemoglobin of 7 today  Started on Eliquis will monitor closely  Abdominal soreness, serous discharge from WERNER drain  Diet advance per surgery  Transfuse if hemoglobin less than 7    8/26 generalized weakness, feels a little better  Minimal abdominal soreness only with movement  1 of 3 drains removed  Ongoing output noted, 90 mL over 24 hours  Tolerating oral diet, positive BM  Encourage I-S use\"    Interval Problem Update    08/28/24    I evaluated and examined her at the bedside.  She reported mild abdominal pain.  I personally discussed case with neurosurgery Dr. Mercado and he recommended that patient will go to the OR for wound VAC placement.  I made patient NPO.  I started on IV fluid for hydration.  I " discussed plan of care with her and answered all her questions.    I have discussed this patient's plan of care and discharge plan at IDT rounds today with Case Management, Nursing, Nursing leadership, and other members of the IDT team.    Consultants/Specialty  general surgery and urology    Code Status  Full Code    Disposition  The patient is not medically cleared for discharge to home or a post-acute facility.      I have placed the appropriate orders for post-discharge needs.    Review of Systems  Review of Systems   Constitutional:  Positive for malaise/fatigue. Negative for chills, fever and weight loss.   HENT:  Negative for hearing loss and tinnitus.    Eyes:  Negative for blurred vision, double vision, photophobia and pain.   Respiratory:  Negative for cough, sputum production and shortness of breath.    Cardiovascular:  Negative for chest pain, palpitations, orthopnea and leg swelling.   Gastrointestinal:  Positive for abdominal pain. Negative for constipation, diarrhea, nausea and vomiting.   Genitourinary:  Negative for dysuria, frequency and urgency.   Musculoskeletal:  Negative for back pain, joint pain, myalgias and neck pain.   Skin:  Negative for rash.   Neurological:  Positive for weakness. Negative for dizziness, tingling, tremors, sensory change, speech change, focal weakness and headaches.   Psychiatric/Behavioral:  Negative for hallucinations and substance abuse.    All other systems reviewed and are negative.       Physical Exam  Temp:  [36.7 °C (98.1 °F)-37.8 °C (100 °F)] 36.7 °C (98.1 °F)  Pulse:  [60-66] 60  Resp:  [16-18] 16  BP: (106-116)/(64-71) 106/64  SpO2:  [94 %-96 %] 95 %    Physical Exam  Vitals reviewed.   Constitutional:       General: She is not in acute distress.     Appearance: Normal appearance. She is obese. She is ill-appearing.   HENT:      Head: Normocephalic and atraumatic.      Nose: No congestion.   Eyes:      General:         Right eye: No discharge.         Left  eye: No discharge.      Pupils: Pupils are equal, round, and reactive to light.   Cardiovascular:      Rate and Rhythm: Normal rate and regular rhythm.      Pulses: Normal pulses.      Heart sounds: Normal heart sounds. No murmur heard.  Pulmonary:      Effort: Pulmonary effort is normal. No respiratory distress.      Breath sounds: Normal breath sounds. No stridor.   Abdominal:      General: Bowel sounds are normal. There is no distension.      Palpations: Abdomen is soft.      Tenderness: There is abdominal tenderness (Mild).      Comments: Dressing present  WERNER drain  Drainage present at the site of dressing   Musculoskeletal:         General: No swelling or tenderness. Normal range of motion.      Cervical back: Normal range of motion. No rigidity.   Skin:     General: Skin is warm.      Capillary Refill: Capillary refill takes less than 2 seconds.      Coloration: Skin is not jaundiced or pale.      Findings: No bruising.   Neurological:      General: No focal deficit present.      Mental Status: She is alert and oriented to person, place, and time.      Cranial Nerves: No cranial nerve deficit.      Comments: Decreased range of motion bilateral lower extremities   Psychiatric:         Mood and Affect: Mood normal.         Behavior: Behavior normal.         Fluids    Intake/Output Summary (Last 24 hours) at 8/28/2024 1440  Last data filed at 8/28/2024 0531  Gross per 24 hour   Intake --   Output 450 ml   Net -450 ml       Laboratory  Recent Labs     08/26/24  2119 08/27/24  0327 08/28/24  1216   WBC 13.0* 15.4* 14.3*   RBC 2.66* 2.63* 2.35*   HEMOGLOBIN 8.0* 8.1* 6.8*   HEMATOCRIT 25.2* 25.9* 22.7*   MCV 94.7 98.5* 96.6   MCH 30.1 30.8 28.9   MCHC 31.7* 31.3* 30.0*   RDW 48.0 50.6* 49.7   PLATELETCT 236 253 221   MPV 10.4 11.1 10.4     Recent Labs     08/26/24  0935 08/27/24  0327 08/28/24  1216   SODIUM 137 135 138   POTASSIUM 4.3 4.6 5.3   CHLORIDE 107 103 107   CO2 23 23 23   GLUCOSE 116* 169* 85   BUN 26*  32* 34*   CREATININE 1.29 1.23 1.28   CALCIUM 10.2 10.3 10.4                   Imaging  DX-CHEST-PORTABLE (1 VIEW)   Final Result      1. New linear parenchymal opacities in the right midlung and right lung base, compatible with atelectasis or infiltrates.   2. The remainder of the lungs is clear.   3. Interval removal of the right upper upper extremity PICC catheter.           Assessment/Plan  * Shock (HCC)- (present on admission)  Assessment & Plan  Post operative requiring Rudi synephrine initially and then Levophed in the unit  Vasopressors DC'd early this am  Cortisol>30  No evidence of sepsis  Cont to monitor and resume BP meds as pressures allow    8/23 resolved, monitor      Leukocytosis  Assessment & Plan  White blood cell count is trending down current white blood cell count is 14.3.  Continue antibiotics  Ordered CBC for tomorrow.    Parastomal hernia  Assessment & Plan  Now s/p repair on 8/20  NGT and 3 WERNER drains in place  Srgry and Urology following    8/22  Belly still quiet, no flatus or BM  Monitor for signs of return of bowel function  Prn pain and N management  Monitor daily BMP for renal function and electrolye abnormalities  Resume po once OK with Srgcl team  Cont supportive care with IVFs, electrolyte replacement, antiemetics and pain medication  Daily BMP to monitor for RAO, hypo K, acidosis    8/23 surgery following, okay for NG tube removal  Diet per surgery  SLP evaluation  Drains in place    8/24 following, drains per surgery, greater than 162 mL output/24 hours  Positive BM, diet per surgery, consider initiation of clear liquid diet  Pain control follow-up labs  Patient is from [facility], to return when medically cleared    8/25 discussed with surgery, Dr. Mercado  150 mL output over 24 hours, plan for drain removal per surgery, plan to remove by Tuesday if decreased output  Likely return to skilled if clinically stable  Diet advance per surgery    8/26 tolerating oral intake,  generalized weakness  1 of 3 WERNER drains out, removal per surgery  Transfer to skilled facility, [facility] in 2 to 3 days    8/27 surgery Dr. Mercado and urology is following her.  Continue antibiotics.  Continue to provide her pain control.    8/28 I patient discussed case with surgery Dr. Mercado who recommended that patient will need to go to the OR for another wound VAC placement.  I made her n.p.o. and started on IV fluid for hydration.        Obesity- (present on admission)  Assessment & Plan  BMI 46  Lifestyle modifications  Outpatient follow-up    Atrial fibrillation (HCC)- (present on admission)  Assessment & Plan  8/21 Hx of  Has been paced and sinus overnight  Cont Coreg with parameters  Cont Tele  Resume apixaban when OK with Surgical team    8/24 okay to restart Eliquis per surgery, will monitor  Follow-up CBC    8/25 restarted on Eliquis, hemoglobin from 10-7, will monitor for signs of bleeding  Serous discharge from drains  Discussed with surgery, will continue to monitor  May need transfusion if hemoglobin less than 7, monitor closely    8/26 resume Eliquis    8/27 currently rate controlled.  On continuing Eliquis.  Optimize electrolytes.    8/28 patient is going for surgical intervention plan is to hold Eliquis.    Chronic respiratory failure with hypoxia (HCC)- (present on admission)  Assessment & Plan  At baseline is on 2 LNC  Mobilize  O2/RT protocols  Monitor for aspiration  Monitor vol status    8/23 patient is from [facility] skilled facility, to return to [facility] once medically cleared, continue to monitor    8/26 now on 3 to 4 L, taper as tolerated  Encourage I-S use    8/27 currently she is requiring 3 L of oxygen to maintain oxygen saturation.  Continue to titrate down oxygen as tolerated.    Pacemaker- (present on admission)  Assessment & Plan  On Tele pt has been intermitently paced/sinus  Now she transferred to medical floor.    RAO (acute kidney injury) (HCC)- (present on admission)  Assessment &  Plan    Good UOP  At risk for dehydration given output from NGT and 3 JPs (close to 600ml/24hrs)  Renal function has been improving.  Current BUN is 34 and creatinine 1.28  Avoid nephrotoxins  I ordered lab work for tomorrow.      Anemia- (present on admission)  Assessment & Plan  8/25 hemoglobin of 10-6, after starting Eliquis  May be dilutional  BP stable  CBC every 12, transfuse for hemoglobin less than 7  May be secondary to recent surgery    8/26 improving hemoglobin now at 9, no signs of active bleed, will restart Eliquis  Follow-up a.m. labs    8/27 hemoglobin remained stable.  No signs of active bleeding.  Close monitoring on Eliquis    8/28 no signs of active bleeding.  I placed hold on Eliquis as now she is going for surgical intervention.      Presence of urostomy (HCC)- (present on admission)  Assessment & Plan  8/22 Chronic since 5 years old now with surgery by Dr. Gusman and Dr. Mercado  She has 3 WERNER drains in  Pain control with narcotics as indicated  SCDs for DVT prophylaxis  She has an NG tube in  Urology following  Good UOP: cont to monitor    8/23 okay for NG tube removal per surgery    Urology evaluated her and recommendations.    8/25 seen by urology, will monitor    8/26 urology has been following her.  Continue to monitor    8/28 urology evaluated her and recommended outpatient follow-up.       I discussed plan of care during multidisciplinary rounds regarding patient's current medical condition and plan of care.      I personally discussed case with surgery Dr. Mercado.    I reviewed neurology notes today    VTE prophylaxis:   SCDs/TEDs      I have performed a physical exam and reviewed and updated ROS and Plan today (8/28/2024). In review of yesterday's note (8/27/2024), there are no changes except as documented above.

## 2024-08-28 NOTE — PROGRESS NOTES
"     Urology Progress Note    Post op Day # 8 from complex parastomal hernia repair performed by Dr. Mercado.     Overnight Events: None    S: Patient is doing well today and denies fevers, chills or increased pain. Vital signs are stable. WBC has improved from 15.4 to 14.3. Creatinine remains stable at 1.28. Urostomy is draining clear yellow urine today.   O:   /64   Pulse 60   Temp 36.7 °C (98.1 °F) (Temporal)   Resp 16   Ht 1.575 m (5' 2\")   Wt 114 kg (251 lb 12.3 oz)   SpO2 95%   Recent Labs     08/26/24  0935 08/27/24  0327 08/28/24  1216   SODIUM 137 135 138   POTASSIUM 4.3 4.6 5.3   CHLORIDE 107 103 107   CO2 23 23 23   GLUCOSE 116* 169* 85   BUN 26* 32* 34*   CREATININE 1.29 1.23 1.28   CALCIUM 10.2 10.3 10.4     Recent Labs     08/26/24  2119 08/27/24  0327 08/28/24  1216   WBC 13.0* 15.4* 14.3*   RBC 2.66* 2.63* 2.35*   HEMOGLOBIN 8.0* 8.1* 6.8*   HEMATOCRIT 25.2* 25.9* 22.7*   MCV 94.7 98.5* 96.6   MCH 30.1 30.8 28.9   MCHC 31.7* 31.3* 30.0*   RDW 48.0 50.6* 49.7   PLATELETCT 236 253 221   MPV 10.4 11.1 10.4         Intake/Output Summary (Last 24 hours) at 8/26/2024 1258  Last data filed at 8/26/2024 0900  Gross per 24 hour   Intake 720 ml   Output 1280 ml   Net -560 ml       Exam:    -Abdomen soft, tender to palpation.  Incisions clean, dry, intact. WERNER drain in place.   -Urine: ileal conduit is draining clear yellow urine.     A/P:    Active Hospital Problems    Diagnosis     Leukocytosis [D72.829]     Parastomal hernia [K43.5]     Shock (HCC) [R57.9]     Chronic respiratory failure with hypoxia (HCC) [J96.11]     Atrial fibrillation (HCC) [I48.91]     Obesity [E66.9]     Pacemaker [Z95.0]     RAO (acute kidney injury) (HCC) [N17.9]     Anemia [D64.9]     Presence of urostomy (HCC) [Z93.6]      -Patient to continue on Amoxicillin.   -Continue to monitor urine output.  -Continue drain management per Dr. Mercado.  -No further urologic intervention is needed at this time. Urology signing off. "   -Urology [place] office will contact the patient to for a follow up in 4-6 weeks with a renal bladder ultrasound and BMP.       DARIAN Peraza.-C.   [address]  [place] [address]   [phone]

## 2024-08-28 NOTE — PROGRESS NOTES
Received bedside report from previous RN, patient is alert and oriented x4. Denies pain at this time. On o2 at 3L via NC with o2 sat of 94% at this time. With abdominal incision, LUQ WERNER drain and urostomy CDI. On SCD and BERNARDA mattress in place. Fall precautions in place and call light within reach.

## 2024-08-28 NOTE — CARE PLAN
The patient is Watcher - Medium risk of patient condition declining or worsening    Shift Goals  Clinical Goals: Pts pain will be maintained to a 3/10 or lower throughout shift; Pt will get incision wounds cleaned in the OR  Patient Goals: Rest  Family Goals: ag    Progress made toward(s) clinical / shift goals:  Pts pain has been maintained throughout shift due to wound care, turning, repositioning, communication, and moises care. Pt is pending surgery to clean incision wounds with possible wound vac placement.     Patient is not progressing towards the following goals: NA

## 2024-08-28 NOTE — DISCHARGE PLANNING
Case Management Discharge Planning    Admission Date: 8/20/2024  GMLOS: 8.9  ALOS: 8    6-Clicks ADL Score:    6-Clicks Mobility Score:    PT and/or OT Eval ordered: No  Post-acute Referrals Ordered: Yes  Post-acute Choice Obtained: Yes  Has referral(s) been sent to post-acute provider:  Yes      Anticipated Discharge Dispo: Discharge Disposition: Disch to a long term care facility (63)  Discharge Address: [address] [place] [address]  Discharge Contact Phone Number: [phone] (Neela (friend) : Neo (brother) lives in [place] and is an Emergency Contact)    DME Needed: No    Action(s) Taken: Updated Provider/Nurse on Discharge Plan  RNCM attended IDT rounds and reviewed chart. CM contacted [facility] to inform pt will not be discharged today. Per MD pt is in surgery for procedure.  No CM needs noted at this time.    Escalations Completed: Provider and PT    Medically Clear: No    Next Steps: Follow-up with medical team to discuss DC needs and barriers.    Barriers to Discharge: Medical clearance, Pending PT Evaluation, and Pending Procedures

## 2024-08-28 NOTE — PROGRESS NOTES
Surgery  Patient with purulent fluid from the base of the incision.  Left lower quadrant incision appears macerated.    Plan for abdominal wall washout and wound VAC placement.  Will obtain cultures.  No plans for intra-abdominal examination unless secondary signs of intestinal injury.  Patient counseled today

## 2024-08-28 NOTE — CARE PLAN
The patient is Stable - Low risk of patient condition declining or worsening    Shift Goals  Clinical Goals: wound care and monitor WERNER drain and urostomy  Patient Goals: rest  Family Goals: ag    Progress made toward(s) clinical / shift goals: updated pt on POC and verbalized understanding. Medicated per MAR and tolerated well. Dressing changed. Q2 turns. Needs attended.       Patient is not progressing towards the following goals: urostomy is leaking     Problem: Skin Integrity  Goal: Skin integrity is maintained or improved  Description: Target End Date:  Prior to discharge or change in level of care    Document interventions on Skin Risk/Jaquan flowsheet groups and corresponding LDA    1.  Assess and monitor skin integrity, appearance and/or temperature  2.  Assess risk factors for impaired skin integrity and/or pressures ulcers  3.  Implement precautions to protect skin integrity in collaboration with interdisciplinary team  4.  Implement pressure ulcer prevention protocol if at risk for skin breakdown  5.  Confirm wound care consult if at risk for skin breakdown  6.  Ensure patient use of pressure relieving devices  (Low air loss bed, waffle overlay, heel protectors, ROHO cushion, etc)  Outcome: Not Progressing

## 2024-08-29 PROBLEM — E87.5 HYPERKALEMIA: Status: ACTIVE | Noted: 2024-08-29

## 2024-08-29 LAB
ALBUMIN SERPL BCP-MCNC: 2.4 G/DL (ref 3.2–4.9)
ALBUMIN/GLOB SERPL: 0.7 G/DL
ALP SERPL-CCNC: 84 U/L (ref 30–99)
ALT SERPL-CCNC: 23 U/L (ref 2–50)
ANION GAP SERPL CALC-SCNC: 11 MMOL/L (ref 7–16)
ANION GAP SERPL CALC-SCNC: 8 MMOL/L (ref 7–16)
AST SERPL-CCNC: 42 U/L (ref 12–45)
BACTERIA SPEC ANAEROBE CULT: NORMAL
BILIRUB SERPL-MCNC: <0.2 MG/DL (ref 0.1–1.5)
BUN SERPL-MCNC: 28 MG/DL (ref 8–22)
BUN SERPL-MCNC: 31 MG/DL (ref 8–22)
CALCIUM ALBUM COR SERPL-MCNC: 11.5 MG/DL (ref 8.5–10.5)
CALCIUM SERPL-MCNC: 10.2 MG/DL (ref 8.5–10.5)
CALCIUM SERPL-MCNC: 10.3 MG/DL (ref 8.5–10.5)
CHLORIDE SERPL-SCNC: 103 MMOL/L (ref 96–112)
CHLORIDE SERPL-SCNC: 104 MMOL/L (ref 96–112)
CO2 SERPL-SCNC: 21 MMOL/L (ref 20–33)
CO2 SERPL-SCNC: 23 MMOL/L (ref 20–33)
CREAT SERPL-MCNC: 1.09 MG/DL (ref 0.5–1.4)
CREAT SERPL-MCNC: 1.13 MG/DL (ref 0.5–1.4)
CYTOLOGY REG CYTOL: NORMAL
ERYTHROCYTE [DISTWIDTH] IN BLOOD BY AUTOMATED COUNT: 48.6 FL (ref 35.9–50)
GFR SERPLBLD CREATININE-BSD FMLA CKD-EPI: 56 ML/MIN/1.73 M 2
GFR SERPLBLD CREATININE-BSD FMLA CKD-EPI: 58 ML/MIN/1.73 M 2
GLOBULIN SER CALC-MCNC: 3.4 G/DL (ref 1.9–3.5)
GLUCOSE SERPL-MCNC: 167 MG/DL (ref 65–99)
GLUCOSE SERPL-MCNC: 197 MG/DL (ref 65–99)
HCT VFR BLD AUTO: 23.6 % (ref 37–47)
HCT VFR BLD AUTO: 25.8 % (ref 37–47)
HGB BLD-MCNC: 7.4 G/DL (ref 12–16)
HGB BLD-MCNC: 8.1 G/DL (ref 12–16)
MAGNESIUM SERPL-MCNC: 1.9 MG/DL (ref 1.5–2.5)
MCH RBC QN AUTO: 29.8 PG (ref 27–33)
MCHC RBC AUTO-ENTMCNC: 31.4 G/DL (ref 32.2–35.5)
MCV RBC AUTO: 95.2 FL (ref 81.4–97.8)
PHOSPHATE SERPL-MCNC: 3.1 MG/DL (ref 2.5–4.5)
PLATELET # BLD AUTO: 239 K/UL (ref 164–446)
PMV BLD AUTO: 10.2 FL (ref 9–12.9)
POTASSIUM SERPL-SCNC: 5.5 MMOL/L (ref 3.6–5.5)
POTASSIUM SERPL-SCNC: 5.6 MMOL/L (ref 3.6–5.5)
POTASSIUM SERPL-SCNC: 5.7 MMOL/L (ref 3.6–5.5)
PROT SERPL-MCNC: 5.8 G/DL (ref 6–8.2)
RBC # BLD AUTO: 2.48 M/UL (ref 4.2–5.4)
SIGNIFICANT IND 70042: NORMAL
SITE SITE: NORMAL
SODIUM SERPL-SCNC: 134 MMOL/L (ref 135–145)
SODIUM SERPL-SCNC: 136 MMOL/L (ref 135–145)
SOURCE SOURCE: NORMAL
WBC # BLD AUTO: 13.8 K/UL (ref 4.8–10.8)

## 2024-08-29 PROCEDURE — 84100 ASSAY OF PHOSPHORUS: CPT

## 2024-08-29 PROCEDURE — 83735 ASSAY OF MAGNESIUM: CPT

## 2024-08-29 PROCEDURE — 700105 HCHG RX REV CODE 258: Performed by: SURGERY

## 2024-08-29 PROCEDURE — 36415 COLL VENOUS BLD VENIPUNCTURE: CPT

## 2024-08-29 PROCEDURE — 85018 HEMOGLOBIN: CPT

## 2024-08-29 PROCEDURE — 99232 SBSQ HOSP IP/OBS MODERATE 35: CPT | Performed by: INTERNAL MEDICINE

## 2024-08-29 PROCEDURE — 84132 ASSAY OF SERUM POTASSIUM: CPT

## 2024-08-29 PROCEDURE — 80053 COMPREHEN METABOLIC PANEL: CPT

## 2024-08-29 PROCEDURE — 770006 HCHG ROOM/CARE - MED/SURG/GYN SEMI*

## 2024-08-29 PROCEDURE — 700102 HCHG RX REV CODE 250 W/ 637 OVERRIDE(OP): Performed by: SURGERY

## 2024-08-29 PROCEDURE — A9270 NON-COVERED ITEM OR SERVICE: HCPCS | Performed by: SURGERY

## 2024-08-29 PROCEDURE — 700111 HCHG RX REV CODE 636 W/ 250 OVERRIDE (IP): Mod: JZ | Performed by: SURGERY

## 2024-08-29 PROCEDURE — 85014 HEMATOCRIT: CPT

## 2024-08-29 PROCEDURE — 80048 BASIC METABOLIC PNL TOTAL CA: CPT

## 2024-08-29 PROCEDURE — 700111 HCHG RX REV CODE 636 W/ 250 OVERRIDE (IP): Mod: JZ | Performed by: HOSPITALIST

## 2024-08-29 PROCEDURE — 85027 COMPLETE CBC AUTOMATED: CPT

## 2024-08-29 RX ADMIN — OXYCODONE HYDROCHLORIDE 5 MG: 5 TABLET ORAL at 10:43

## 2024-08-29 RX ADMIN — ONDANSETRON 4 MG: 2 INJECTION INTRAMUSCULAR; INTRAVENOUS at 21:57

## 2024-08-29 RX ADMIN — GUAIFENESIN 600 MG: 600 TABLET, EXTENDED RELEASE ORAL at 05:19

## 2024-08-29 RX ADMIN — GABAPENTIN 300 MG: 300 CAPSULE ORAL at 05:20

## 2024-08-29 RX ADMIN — Medication 5 MG: at 22:01

## 2024-08-29 RX ADMIN — OXYCODONE HYDROCHLORIDE 5 MG: 5 TABLET ORAL at 02:53

## 2024-08-29 RX ADMIN — AMPICILLIN AND SULBACTAM 3 G: 1; 2 INJECTION, POWDER, FOR SOLUTION INTRAMUSCULAR; INTRAVENOUS at 11:09

## 2024-08-29 RX ADMIN — AMLODIPINE BESYLATE 5 MG: 5 TABLET ORAL at 05:20

## 2024-08-29 RX ADMIN — SENNOSIDES AND DOCUSATE SODIUM 1 TABLET: 50; 8.6 TABLET ORAL at 05:20

## 2024-08-29 RX ADMIN — OXYCODONE HYDROCHLORIDE 10 MG: 10 TABLET ORAL at 14:29

## 2024-08-29 RX ADMIN — AMPICILLIN AND SULBACTAM 3 G: 1; 2 INJECTION, POWDER, FOR SOLUTION INTRAMUSCULAR; INTRAVENOUS at 17:08

## 2024-08-29 RX ADMIN — GUAIFENESIN 600 MG: 600 TABLET, EXTENDED RELEASE ORAL at 17:05

## 2024-08-29 RX ADMIN — CITALOPRAM 40 MG: 40 TABLET, FILM COATED ORAL at 05:19

## 2024-08-29 RX ADMIN — AMLODIPINE BESYLATE 5 MG: 5 TABLET ORAL at 17:05

## 2024-08-29 RX ADMIN — AMPICILLIN AND SULBACTAM 3 G: 1; 2 INJECTION, POWDER, FOR SOLUTION INTRAMUSCULAR; INTRAVENOUS at 05:19

## 2024-08-29 RX ADMIN — CARVEDILOL 12.5 MG: 12.5 TABLET, FILM COATED ORAL at 17:05

## 2024-08-29 RX ADMIN — ONDANSETRON 4 MG: 2 INJECTION INTRAMUSCULAR; INTRAVENOUS at 13:59

## 2024-08-29 RX ADMIN — OXYCODONE HYDROCHLORIDE 10 MG: 10 TABLET ORAL at 22:02

## 2024-08-29 ASSESSMENT — ENCOUNTER SYMPTOMS
CHILLS: 0
SPEECH CHANGE: 0
VOMITING: 0
NECK PAIN: 0
HEADACHES: 0
BLURRED VISION: 0
WEIGHT LOSS: 0
FOCAL WEAKNESS: 0
NAUSEA: 0
SENSORY CHANGE: 0
FEVER: 0
BACK PAIN: 0
ORTHOPNEA: 0
PHOTOPHOBIA: 0
PALPITATIONS: 0
SHORTNESS OF BREATH: 0
SPUTUM PRODUCTION: 0
DIARRHEA: 0
CONSTIPATION: 0
DOUBLE VISION: 0
TREMORS: 0
DIZZINESS: 0
EYE PAIN: 0
WEAKNESS: 1
HALLUCINATIONS: 0
MYALGIAS: 0
ABDOMINAL PAIN: 1
TINGLING: 0
COUGH: 0

## 2024-08-29 ASSESSMENT — PAIN SCALES - GENERAL: PAIN_LEVEL: 1

## 2024-08-29 ASSESSMENT — PAIN DESCRIPTION - PAIN TYPE
TYPE: ACUTE PAIN

## 2024-08-29 ASSESSMENT — LIFESTYLE VARIABLES: SUBSTANCE_ABUSE: 0

## 2024-08-29 NOTE — OR NURSING
1721: Pt arrived via bed with anesthesia and RN. Report received. See flowsheet for VS. Dressings CDI. Wound vac in place. Orders reviewed and initiated.   1821: Dr. Santillan updated on CBC results. Orders to hold on transfusion. Recheck after 6 hrs. COD sent.   1845: Family updated.   1937: Family updated.   2005: Report called to KYRIE Carney. All questions answered. VSS. Dressing CDI. Wound vac in place. No patient distress. Alert and oriented x4. Tolerating clear liquids. Pt transported to room in stable condition on 4L NC with no personal belongings.

## 2024-08-29 NOTE — CARE PLAN
The patient is Watcher - Medium risk of patient condition declining or worsening    Shift Goals  Clinical Goals: Wound care,  pain control  Patient Goals: Rest, pain control  Family Goals: MELISSA    Progress made toward(s) clinical / shift goals:        Problem: Pain - Standard  Goal: Alleviation of pain or a reduction in pain to the patient’s comfort goal  Outcome: Progressing  Flowsheets  Taken 8/29/2024 1529  OB Pain Intervention:   Rest   Distraction   Education   Medication - See MAR  Pain Rating Scale (NPRS): 6  Taken 8/29/2024 0800  Non Verbal Scale:   Sleeping   Unlabored Breathing  Note:   Document pain using the appropriate pain scale per order or unit policy 2.  Educate and implement non-pharmacologic comfort measures (i.e. relaxation, distraction, massage, cold/heat therapy, etc.) 3.  Pain management medications as ordered 4.  Reassess pain after pain med administration per policy 5.  If opiods administered assess patient's response to pain medication is appropriate per POSS sedation scale 6.  Follow pain management plan developed in collaboration with patient and interdisciplinary team (including palliative care or pain specialists if applicable)  Expected end:        Problem: Skin Integrity  Goal: Skin integrity is maintained or improved  Outcome: Progressing       Patient is not progressing towards the following goals:  NA

## 2024-08-29 NOTE — ANESTHESIA PROCEDURE NOTES
Peripheral IV    Date/Time: 8/28/2024 4:50 PM    Performed by: Emanuel Burks III, M.D.  Authorized by: Emanuel Burks III, M.D.    Size:  18 G (long Jelco)  Laterality:  Left  Peripheral IV Location:  Hand  Local Anesthetic:  None  Site Prep:  Alcohol  Technique:  Direct puncture  Attempts:  2

## 2024-08-29 NOTE — PROGRESS NOTES
"Hospital Medicine Daily Progress Note    Date of Service  8/29/2024    Chief Complaint  Patricia A Tietz is a 59 y.o. female admitted 8/20/2024 with hernia repair    Hospital Course    As per Dr. Stuart note    \"58yo PMHx urostomy with ileal conduit as a child, HTN, AFib, on apixaban, chronic hypoxic resp failure on 2LNC at baseline.  She was at [facility] in May with an SBO and UTI (Klebsiella and Pseudomonas).  Admitted on 8/20 for planned Ex lap and reapir of 10cm abd wall hernia with mycocutaneous flap trunk with mesh and replacement of catheter into the stoma..  In PACU pt was hypotensive requiring Rudi-Synephrine and fluids.  She was admitted to IMCU    8/22 Pt had a quiet night.  \"Sore all over\", refering to her belly.  No flatus or BM    AFebrile  Sinus 60s  -110  On 2 LNC  UOP 1950ml/24hrs  Drains:  -A: 230ml  -B: 310ml  -C 43ml    8/23 patient is ANO x 3, reports feeling better, generalized weakness  Hypoactive bowel sounds, abdomen soft nontender  NG tube in place  Minimal output  Monitor for return of bowel function, diet per surgery    8/24 ANO x 3, reports feeling better  Positive bowel movement and gas, per surgery, okay to initiate liquid diet    8/25 patient is ANO x 3, reports feeling tired, hemoglobin of 7 today  Started on Eliquis will monitor closely  Abdominal soreness, serous discharge from WERNER drain  Diet advance per surgery  Transfuse if hemoglobin less than 7    8/26 generalized weakness, feels a little better  Minimal abdominal soreness only with movement  1 of 3 drains removed  Ongoing output noted, 90 mL over 24 hours  Tolerating oral diet, positive BM  Encourage I-S use\"    Interval Problem Update    08/28/24    I evaluated and examined her at the bedside.  She reported mild abdominal pain.  I personally discussed case with neurosurgery Dr. Mercado and he recommended that patient will go to the OR for wound VAC placement.  I made patient NPO.  I started on IV fluid for hydration.  I " discussed plan of care with her and answered all her questions.    08/29/24    I evaluated and examined her at the bedside  She reported she is having abdominal pain but is not severe.  She found to hyperkalemia I ordered repeat BMP that showed potassium of 5.6.  I did not give her insulin and dextrose as it only 0.1 elevated from range.  I discontinued lisinopril as it can cause hyperkalemia.  Close monitoring her hemoglobin and if her hemoglobin goes below 7 she will need blood transfusion.  I discussed plan of care with the and answered all her questions been  Reviewed general surgery note.    I have discussed this patient's plan of care and discharge plan at IDT rounds today with Case Management, Nursing, Nursing leadership, and other members of the IDT team.    Consultants/Specialty  general surgery and urology    Code Status  Full Code    Disposition  The patient is not medically cleared for discharge to home or a post-acute facility.      I have placed the appropriate orders for post-discharge needs.    Review of Systems  Review of Systems   Constitutional:  Positive for malaise/fatigue. Negative for chills, fever and weight loss.        Decreased appetite   HENT:  Negative for hearing loss and tinnitus.    Eyes:  Negative for blurred vision, double vision, photophobia and pain.   Respiratory:  Negative for cough, sputum production and shortness of breath.    Cardiovascular:  Negative for chest pain, palpitations, orthopnea and leg swelling.   Gastrointestinal:  Positive for abdominal pain. Negative for constipation, diarrhea, nausea and vomiting.   Genitourinary:  Negative for dysuria, frequency and urgency.   Musculoskeletal:  Negative for back pain, joint pain, myalgias and neck pain.   Skin:  Negative for rash.   Neurological:  Positive for weakness. Negative for dizziness, tingling, tremors, sensory change, speech change, focal weakness and headaches.   Psychiatric/Behavioral:  Negative for  hallucinations and substance abuse.    All other systems reviewed and are negative.       Physical Exam  Temp:  [36.1 °C (97 °F)-36.8 °C (98.2 °F)] 36.1 °C (97 °F)  Pulse:  [60-63] 60  Resp:  [15-19] 18  BP: ()/(51-71) 116/65  SpO2:  [92 %-98 %] 98 %    Physical Exam  Vitals reviewed.   Constitutional:       General: She is not in acute distress.     Appearance: Normal appearance. She is obese. She is ill-appearing.   HENT:      Head: Normocephalic and atraumatic.      Nose: No congestion.   Eyes:      General:         Right eye: No discharge.         Left eye: No discharge.      Pupils: Pupils are equal, round, and reactive to light.   Cardiovascular:      Rate and Rhythm: Normal rate and regular rhythm.      Pulses: Normal pulses.      Heart sounds: Normal heart sounds. No murmur heard.  Pulmonary:      Effort: Pulmonary effort is normal. No respiratory distress.      Breath sounds: Normal breath sounds. No stridor.   Abdominal:      General: Bowel sounds are normal. There is no distension.      Palpations: Abdomen is soft.      Tenderness: There is abdominal tenderness (Mild).      Comments: Dressing present  WERNER drain  Wound VAC in place   Musculoskeletal:         General: No swelling or tenderness. Normal range of motion.      Cervical back: Normal range of motion. No rigidity.   Skin:     General: Skin is warm.      Capillary Refill: Capillary refill takes less than 2 seconds.      Coloration: Skin is not jaundiced or pale.      Findings: No bruising.   Neurological:      General: No focal deficit present.      Mental Status: She is alert and oriented to person, place, and time.      Cranial Nerves: No cranial nerve deficit.      Comments: Decreased range of motion bilateral lower extremities   Psychiatric:         Mood and Affect: Mood normal.         Behavior: Behavior normal.         Fluids    Intake/Output Summary (Last 24 hours) at 8/29/2024 1055  Last data filed at 8/29/2024 0924  Gross per 24 hour    Intake 940 ml   Output 2405 ml   Net -1465 ml       Laboratory  Recent Labs     08/28/24  1216 08/28/24  1803 08/29/24  0240   WBC 14.3* 12.6* 13.8*   RBC 2.35* 2.46* 2.48*   HEMOGLOBIN 6.8* 7.4* 7.4*   HEMATOCRIT 22.7* 23.8* 23.6*   MCV 96.6 96.7 95.2   MCH 28.9 30.1 29.8   MCHC 30.0* 31.1* 31.4*   RDW 49.7 49.3 48.6   PLATELETCT 221 199 239   MPV 10.4 10.1 10.2     Recent Labs     08/28/24  1216 08/29/24  0240 08/29/24  0754   SODIUM 138 136 134*   POTASSIUM 5.3 5.7* 5.6*   CHLORIDE 107 104 103   CO2 23 21 23   GLUCOSE 85 197* 167*   BUN 34* 31* 28*   CREATININE 1.28 1.13 1.09   CALCIUM 10.4 10.2 10.3                   Imaging  DX-CHEST-PORTABLE (1 VIEW)   Final Result      1. New linear parenchymal opacities in the right midlung and right lung base, compatible with atelectasis or infiltrates.   2. The remainder of the lungs is clear.   3. Interval removal of the right upper upper extremity PICC catheter.           Assessment/Plan  * Shock (HCC)- (present on admission)  Assessment & Plan  Post operative requiring Rudi synephrine initially and then Levophed in the unit  Vasopressors DC'd early this am  Cortisol>30  No evidence of sepsis  Cont to monitor and resume BP meds as pressures allow    8/23 resolved, monitor      Hyperkalemia  Assessment & Plan  She found to hyperkalemia although renal function has been improving.  She was on lisinopril I discontinued her lisinopril.  Ordered repeat potassium level that showed improvement from 5.7-5.6.  I do repeat potassium level at 3 PM and H&H.    Leukocytosis  Assessment & Plan  White blood cell count is trending down current white blood cell count is 14.3.  Continue antibiotics  Ordered CBC for tomorrow.    Parastomal hernia  Assessment & Plan  Now s/p repair on 8/20  NGT and 3 WERNER drains in place  Srgry and Urology following    8/22  Belly still quiet, no flatus or BM  Monitor for signs of return of bowel function  Prn pain and N management  Monitor daily BMP for  renal function and electrolye abnormalities  Resume po once OK with Srgcl team  Cont supportive care with IVFs, electrolyte replacement, antiemetics and pain medication  Daily BMP to monitor for RAO, hypo K, acidosis    8/23 surgery following, okay for NG tube removal  Diet per surgery  SLP evaluation  Drains in place    8/24 following, drains per surgery, greater than 162 mL output/24 hours  Positive BM, diet per surgery, consider initiation of clear liquid diet  Pain control follow-up labs  Patient is from [facility], to return when medically cleared    8/25 discussed with surgery, Dr. Mercado  150 mL output over 24 hours, plan for drain removal per surgery, plan to remove by Tuesday if decreased output  Likely return to skilled if clinically stable  Diet advance per surgery    8/26 tolerating oral intake, generalized weakness  1 of 3 WERNER drains out, removal per surgery  Transfer to skilled facility, [facility] in 2 to 3 days    8/27 surgery Dr. Mercado and urology is following her.  Continue antibiotics.  Continue to provide her pain control.    8/28 I patient discussed case with surgery Dr. Mercado who recommended that patient will need to go to the OR for another wound VAC placement.  I made her n.p.o. and started on IV fluid for hydration.    8/29 she underwent Postoperative wound infection washout and negative pressure dressing placemen on August 29, 2024.  Continue to provider pain control.  I started her on Ancef for next 3 days.  She received oral Augmentin.        Obesity- (present on admission)  Assessment & Plan  BMI 46  Lifestyle modifications  Outpatient follow-up    Atrial fibrillation (HCC)- (present on admission)  Assessment & Plan  8/21 Hx of  Has been paced and sinus overnight  Cont Coreg with parameters  Cont Tele  Resume apixaban when OK with Surgical team    8/24 okay to restart Eliquis per surgery, will monitor  Follow-up CBC    8/25 restarted on Eliquis, hemoglobin from 10-7, will  monitor for signs of bleeding  Serous discharge from drains  Discussed with surgery, will continue to monitor  May need transfusion if hemoglobin less than 7, monitor closely    8/26 resume Eliquis    8/27 currently rate controlled.  On continuing Eliquis.  Optimize electrolytes.    8/28 patient is going for surgical intervention plan is to hold Eliquis.    8/29 holding Eliquis for now as hemoglobin remains low and she may need blood transfusion.    Chronic respiratory failure with hypoxia (HCC)- (present on admission)  Assessment & Plan  At baseline is on 2 LNC  Mobilize  O2/RT protocols  Monitor for aspiration  Monitor vol status    8/23 patient is from [facility], to return to [facility] once medically cleared, continue to monitor    8/26 now on 3 to 4 L, taper as tolerated  Encourage I-S use    8/27 currently she is requiring 3 L of oxygen to maintain oxygen saturation.  Continue to titrate down oxygen as tolerated.    8/29 currently she is requiring 3 L of oxygen to maintain oxygen saturation.    Pacemaker- (present on admission)  Assessment & Plan  On Tele pt has been intermitently paced/sinus  Now she transferred to medical floor.    RAO (acute kidney injury) (HCC)- (present on admission)  Assessment & Plan    Good UOP  At risk for dehydration given output from NGT and 3 JPs (close to 600ml/24hrs)  Renal function has been improving.  Current BUN is 28 and creatinine 1.09  Avoid nephrotoxins  Ordered lab work for tomorrow    Anemia- (present on admission)  Assessment & Plan  8/25 hemoglobin of 10-6, after starting Eliquis  May be dilutional  BP stable  CBC every 12, transfuse for hemoglobin less than 7  May be secondary to recent surgery    8/26 improving hemoglobin now at 9, no signs of active bleed, will restart Eliquis  Follow-up a.m. labs    8/27 hemoglobin remained stable.  No signs of active bleeding.  Close monitoring on Eliquis    8/28 no signs of active bleeding.  I placed hold on Eliquis as  now she is going for surgical intervention.    8/29 I spoke to general surgery yesterday as patient found to hemoglobin of 6.8 and repeat hemoglobin back above 7 so I held blood transfusion.  Continue to monitor closely and transfuse if her blood COVID below 7.  Holding Eliquis      Presence of urostomy (HCC)- (present on admission)  Assessment & Plan  8/22 Chronic since 5 years old now with surgery by Dr. Gusman and Dr. Mercado  She has 3 WERNER drains in  Pain control with narcotics as indicated  SCDs for DVT prophylaxis  She has an NG tube in  Urology following  Good UOP: cont to monitor    8/23 okay for NG tube removal per surgery    Urology evaluated her and recommendations.    8/25 seen by urology, will monitor    8/26 urology has been following her.  Continue to monitor    8/28 urology evaluated her and recommended outpatient follow-up.         I discussed plan of care during multidisciplinary rounds regarding patient's current medical condition and plan of care.    I reviewed general surgery note    VTE prophylaxis:   SCDs/TEDs      I have performed a physical exam and reviewed and updated ROS and Plan today (8/29/2024). In review of yesterday's note (8/28/2024), there are no changes except as documented above.

## 2024-08-29 NOTE — DISCHARGE PLANNING
Case Management Discharge Planning    Admission Date: 8/20/2024  GMLOS: 8.9  ALOS: 9    6-Clicks ADL Score:    6-Clicks Mobility Score:    PT and/or OT Eval ordered: Yes  Post-acute Referrals Ordered: Yes  Post-acute Choice Obtained: Yes  Has referral(s) been sent to post-acute provider:  Yes      Anticipated Discharge Dispo: Discharge Disposition: Disch to a long term care facility (62)  Discharge Address: [address] [place] [address]  Discharge Contact Phone Number: [phone] (Neela (friend) : Neo (brother) lives in [place] and is an Emergency Contact)    DME Needed: No    Action(s) Taken: Updated Provider/Nurse on Discharge Plan  RNCM attended IDT rounds and reviewed chart. Discussed discharge plan with Dr Rodgers. Pt is not medically cleared. Discharge plan is pending procedures and PT/OT re evaluations. Will continue to assist with discharge planning.     Escalations Completed: None    Medically Clear: No    Next Steps: Follow-up with medical team to discuss DC needs and barriers.    Barriers to Discharge: Medical clearance, Pending PT Evaluation, and Pending Procedures    Is the patient up for discharge tomorrow: No

## 2024-08-29 NOTE — WOUND TEAM
Wound team consulted for VAC and urostomy. Plan for first VAC change tomorrow 8/30. Consult completed.

## 2024-08-29 NOTE — PROGRESS NOTES
Surgery  POD#1  Having pain, feels better  Tolerating clears, passing gas  Had discussion with family about ongoing care on the phone  Vac discharge observed  Wound care for vac changes, will need urostomy changes at same time  Full liquids  Trend HG and wbc  Follow cultures.

## 2024-08-29 NOTE — ASSESSMENT & PLAN NOTE
She found to hyperkalemia although renal function has been improving.  She was on lisinopril I discontinued her lisinopril.  Ordered repeat potassium level that showed improvement from 5.7-5.6.  I do repeat potassium level at 3 PM and H&H.

## 2024-08-29 NOTE — CARE PLAN
The patient is Stable - Low risk of patient condition declining or worsening    Shift Goals  Clinical Goals: Maintain pain level of <5 throughout shift, wound vac and urostomy mngmt, maintain skin integrity, rest  Patient Goals: Rest, comfort  Family Goals: ag    Progress made toward(s) clinical / shift goals:     Assumed care of pt. All medications taken and tolerated, medicated for pain per MAR. Wound vac on and in place throughout shift, urostomy bag draining clear yellow urine. BERNARDA, float boots, Q2 turns in place. Pt resting in bed w/ call light and belongings in reach, bed alarm on, hourly rounding in place. No additional needs at this time.    Problem: Knowledge Deficit - Standard  Goal: Patient and family/care givers will demonstrate understanding of plan of care, disease process/condition, diagnostic tests and medications  Outcome: Progressing     Problem: Pain - Standard  Goal: Alleviation of pain or a reduction in pain to the patient’s comfort goal  Outcome: Progressing     Problem: Skin Integrity  Goal: Skin integrity is maintained or improved  Outcome: Progressing     Problem: Fall Risk  Goal: Patient will remain free from falls  Outcome: Progressing     Problem: Infection - Standard  Goal: Patient will remain free from infection  Outcome: Progressing     Problem: Wound/ / Incision Healing  Goal: Patient's wound/surgical incision will decrease in size and heals properly  Outcome: Progressing

## 2024-08-29 NOTE — ANESTHESIA TIME REPORT
Anesthesia Start and Stop Event Times       Date Time Event    8/28/2024 1614 Ready for Procedure     1630 Anesthesia Start     1724 Anesthesia Stop          Responsible Staff  08/28/24      Name Role Begin End    Emanuel Burks III, M.D. Anesth 1630 1724          Overtime Reason:  overtime with call-back    Comments:

## 2024-08-29 NOTE — PROGRESS NOTES
4 Eyes Skin Assessment Completed by KYRIE aCrney and KYRIE Pimentel.    Head WDL  Ears WDL  Nose WDL  Mouth WDL  Neck WDL  Breast/Chest WDL  Shoulder Blades WDL  Spine WDL  (R) Arm/Elbow/Hand Bruising and Scab  (L) Arm/Elbow/Hand Bruising and Scab  Abdomen Redness, Scar, and Incision Urosotomy, wound vacs in place  Groin WDL  Scrotum/Coccyx/Buttocks WDL  (R) Leg Swelling and Edema Discoloration  (L) Leg Swelling and Edema Discoloration  (R) Heel/Foot/Toe Redness and Blanching  (L) Heel/Foot/Toe Redness and Blanching                              Devices In Places SCD's and Nasal Cannula      Interventions In Place Gray Ear Foams, NC W/Ear Foams, Heel Float Boots, TAP System, Pillows, Q2 Turns, Low Air Loss Mattress, and Barrier Cream    Possible Skin Injury Yes    Pictures Uploaded Into Epic Yes  Wound Consult Placed N/A Wound care following  RN Wound Prevention Protocol Ordered Yes

## 2024-08-29 NOTE — ANESTHESIA POSTPROCEDURE EVALUATION
Patient: Patricia A Tietz    Procedure Summary       Date: 08/28/24 Room / Location: [facility] OR 09 / SURGERY [facility]    Anesthesia Start: 1630 Anesthesia Stop: 1724    Procedures:       ABDOMINAL WASHOUT WITH NEGATIVE PRESSURE DRESSING PLACEMENT (Bilateral: Abdomen)      APPLICATION, WOUND VAC (Abdomen) Diagnosis: (Abdominal Wound Infection)    Surgeons: Elmer Mercdao M.D. Responsible Provider: Emanuel Burks III, M.D.    Anesthesia Type: general ASA Status: 3 - Emergent            Final Anesthesia Type: general  Last vitals  BP   Blood Pressure: 116/65    Temp   36.1 °C (97 °F)    Pulse   60   Resp   18    SpO2   98 %      Anesthesia Post Evaluation    Patient location during evaluation: PACU  Patient participation: complete - patient participated  Level of consciousness: awake and alert  Pain score: 1    Airway patency: patent  Anesthetic complications: no  Cardiovascular status: hemodynamically stable  Respiratory status: acceptable  Hydration status: euvolemic    PONV: none          No notable events documented.     Nurse Pain Score: 1 (NPRS)

## 2024-08-29 NOTE — OP REPORT
OPERATIVE NOTE    PREOPERATIVE DIAGNOSIS: Wound infection     POSTOPERATIVE DIAGNOSIS: same    PROCEDURE PERFORMED: Postoperative wound infection washout and negative pressure dressing placement     SURGEON: Elmer Mercado M.D.    ASSISTANT: none    ANESTHESIOLOGIST:  Anesthesiologist: Emanuel Burks III, M.D.     ANESTHESIA: general     FINDINGS:  purulence with in midline wound, fascia intact.   Well incorporated onlay mesh at the urostomy site.     WOUND CLASS: Infected    SPECIMEN: Cultures    ESTIMATED BLOOD LOSS: 5 mL    Indications: Patient is known to me status post open parastomal hernia repair and lysis of adhesions 1 week ago.  She developed purulence from the base of her wound.  Patient was counseled extensively as of the risk first benefits of surgery and agreed to proceed fully informed.    Description:    The patient was prepped and draped in the standard sterile surgical fashion after induction of general anesthesia.  Appropriate timeout was formed antibiotics delivered.  I began by removing the staples on the wound lateral to the urostomy due to the poor appearance of the wound.  This however did not appear to be grossly infected.  The mesh was well incorporated at this spot.  I remove the drain.    I then turned my attention to the midline incision.  I opened the staples at the base of the wound.  She had poor wound healing and purulence within the wound cavity.  There was no evidence of enteric contents in the fascial suture line was intact.  This was copiously washed out as was the lateral wound.    I then placed a black sponge within both wounds.  I applied the wound VAC device.  This held excellent suction.  I then placed the urostomy device.    Hemostasis was meticulously achieved as the patient is on Eliquis.  The patient was then extubated to recovery in satisfactory condition.        ____________________________________     Elmer Mercado M.D.    DT: 8/28/2024  5:10 PM      Cc: Maria Eugenia  Raj Grayson M.D.

## 2024-08-29 NOTE — ANESTHESIA PREPROCEDURE EVALUATION
Case: [id number] Anesthesia Start Date/Time: 08/28/24 1630    Procedures:       ABDOMINAL WASHOUT      APPLICATION OR REPLACEMENT, WOUND VAC    Anesthesia type: General    Pre-op diagnosis: Abdominal Infection    Location: [facility] OR 09 / SURGERY [facility]    Surgeons: Elmer Mercado M.D.            Relevant Problems   ANESTHESIA   (positive) Sleep apnea      PULMONARY   (positive) Dyspnea      CARDIAC   (positive) Atrial fibrillation (HCC)   (positive) DVT of lower extremity (deep venous thrombosis) ([facility])   (positive) Pacemaker   (positive) Primary hypertension   (positive) Sinus bradycardia         (positive) RAO (acute kidney injury) ([facility])   (positive) Nephrolithiasis       Physical Exam    Airway   Mallampati: IV  TM distance: >3 FB  Neck ROM: limited       Cardiovascular - normal exam  Rhythm: regular  Rate: normal  (-) murmur     Dental - normal exam           Pulmonary - normal exam  Breath sounds clear to auscultation     Abdominal   (+) obese     Neurological - normal exam                   Anesthesia Plan    ASA 3- EMERGENT       Plan - general       Airway plan will be ETT          Induction: intravenous    Postoperative Plan: Postoperative administration of opioids is intended.    Pertinent diagnostic labs and testing reviewed    Informed Consent:    Anesthetic plan and risks discussed with patient.    Use of blood products discussed with: patient whom consented to blood products.

## 2024-08-29 NOTE — ANESTHESIA PROCEDURE NOTES
Airway    Date/Time: 8/28/2024 4:42 PM    Performed by: Emanuel Burks III, M.D.  Authorized by: Emanuel Burks III, M.D.    Location:  OR  Urgency:  Elective  Difficult Airway: No    Indications for Airway Management:  Anesthesia      Spontaneous Ventilation: absent    Sedation Level:  Deep  Preoxygenated: Yes    Patient Position:  Sniffing  Final Airway Type:  Endotracheal airway  Final Endotracheal Airway:  ETT  Cuffed: Yes    Technique Used for Successful ETT Placement:  Direct laryngoscopy    Insertion Site:  Oral  Blade Type:  Jane  Laryngoscope Blade/Videolaryngoscope Blade Size:  2  ETT Size (mm):  8.0  Measured from:  Lips  ETT to Lips (cm):  22  Placement Verified by: auscultation and capnometry    Cormack-Lehane Classification:  Grade IV - neither glottis nor epiglottis seen  Number of Attempts at Approach:  1

## 2024-08-30 LAB
ALBUMIN SERPL BCP-MCNC: 2.5 G/DL (ref 3.2–4.9)
ALBUMIN/GLOB SERPL: 0.7 G/DL
ALP SERPL-CCNC: 62 U/L (ref 30–99)
ALT SERPL-CCNC: 34 U/L (ref 2–50)
ANION GAP SERPL CALC-SCNC: 9 MMOL/L (ref 7–16)
AST SERPL-CCNC: 40 U/L (ref 12–45)
BILIRUB SERPL-MCNC: <0.2 MG/DL (ref 0.1–1.5)
BUN SERPL-MCNC: 27 MG/DL (ref 8–22)
CALCIUM ALBUM COR SERPL-MCNC: 11.3 MG/DL (ref 8.5–10.5)
CALCIUM SERPL-MCNC: 10.1 MG/DL (ref 8.5–10.5)
CHLORIDE SERPL-SCNC: 101 MMOL/L (ref 96–112)
CO2 SERPL-SCNC: 24 MMOL/L (ref 20–33)
CREAT SERPL-MCNC: 0.89 MG/DL (ref 0.5–1.4)
ERYTHROCYTE [DISTWIDTH] IN BLOOD BY AUTOMATED COUNT: 46.8 FL (ref 35.9–50)
GFR SERPLBLD CREATININE-BSD FMLA CKD-EPI: 74 ML/MIN/1.73 M 2
GLOBULIN SER CALC-MCNC: 3.4 G/DL (ref 1.9–3.5)
GLUCOSE SERPL-MCNC: 151 MG/DL (ref 65–99)
HCT VFR BLD AUTO: 23.4 % (ref 37–47)
HGB BLD-MCNC: 7.2 G/DL (ref 12–16)
MAGNESIUM SERPL-MCNC: 1.8 MG/DL (ref 1.5–2.5)
MCH RBC QN AUTO: 29.3 PG (ref 27–33)
MCHC RBC AUTO-ENTMCNC: 30.8 G/DL (ref 32.2–35.5)
MCV RBC AUTO: 95.1 FL (ref 81.4–97.8)
PHOSPHATE SERPL-MCNC: 2.4 MG/DL (ref 2.5–4.5)
PLATELET # BLD AUTO: 261 K/UL (ref 164–446)
PMV BLD AUTO: 10.6 FL (ref 9–12.9)
POTASSIUM SERPL-SCNC: 5.6 MMOL/L (ref 3.6–5.5)
PROT SERPL-MCNC: 5.9 G/DL (ref 6–8.2)
RBC # BLD AUTO: 2.46 M/UL (ref 4.2–5.4)
SODIUM SERPL-SCNC: 134 MMOL/L (ref 135–145)
WBC # BLD AUTO: 9.2 K/UL (ref 4.8–10.8)

## 2024-08-30 PROCEDURE — 83735 ASSAY OF MAGNESIUM: CPT

## 2024-08-30 PROCEDURE — 85027 COMPLETE CBC AUTOMATED: CPT

## 2024-08-30 PROCEDURE — 700105 HCHG RX REV CODE 258: Performed by: SURGERY

## 2024-08-30 PROCEDURE — 700111 HCHG RX REV CODE 636 W/ 250 OVERRIDE (IP): Mod: JZ | Performed by: STUDENT IN AN ORGANIZED HEALTH CARE EDUCATION/TRAINING PROGRAM

## 2024-08-30 PROCEDURE — 700102 HCHG RX REV CODE 250 W/ 637 OVERRIDE(OP): Performed by: SURGERY

## 2024-08-30 PROCEDURE — 700105 HCHG RX REV CODE 258: Performed by: STUDENT IN AN ORGANIZED HEALTH CARE EDUCATION/TRAINING PROGRAM

## 2024-08-30 PROCEDURE — 36415 COLL VENOUS BLD VENIPUNCTURE: CPT

## 2024-08-30 PROCEDURE — A9270 NON-COVERED ITEM OR SERVICE: HCPCS | Performed by: SURGERY

## 2024-08-30 PROCEDURE — 770006 HCHG ROOM/CARE - MED/SURG/GYN SEMI*

## 2024-08-30 PROCEDURE — 80053 COMPREHEN METABOLIC PANEL: CPT

## 2024-08-30 PROCEDURE — 97602 WOUND(S) CARE NON-SELECTIVE: CPT

## 2024-08-30 PROCEDURE — 97605 NEG PRS WND THER DME<=50SQCM: CPT

## 2024-08-30 PROCEDURE — 84100 ASSAY OF PHOSPHORUS: CPT

## 2024-08-30 PROCEDURE — 99233 SBSQ HOSP IP/OBS HIGH 50: CPT | Performed by: STUDENT IN AN ORGANIZED HEALTH CARE EDUCATION/TRAINING PROGRAM

## 2024-08-30 PROCEDURE — 700111 HCHG RX REV CODE 636 W/ 250 OVERRIDE (IP): Performed by: SURGERY

## 2024-08-30 PROCEDURE — 700105 HCHG RX REV CODE 258: Performed by: INTERNAL MEDICINE

## 2024-08-30 RX ORDER — LIDOCAINE HYDROCHLORIDE 20 MG/ML
20 INJECTION, SOLUTION INFILTRATION; PERINEURAL
Status: DISCONTINUED | OUTPATIENT
Start: 2024-08-30 | End: 2024-09-04 | Stop reason: HOSPADM

## 2024-08-30 RX ORDER — LIDOCAINE HYDROCHLORIDE 40 MG/ML
SOLUTION TOPICAL
Status: DISCONTINUED | OUTPATIENT
Start: 2024-08-30 | End: 2024-09-04 | Stop reason: HOSPADM

## 2024-08-30 RX ADMIN — SENNOSIDES AND DOCUSATE SODIUM 1 TABLET: 50; 8.6 TABLET ORAL at 05:42

## 2024-08-30 RX ADMIN — OXYCODONE HYDROCHLORIDE 5 MG: 5 TABLET ORAL at 05:42

## 2024-08-30 RX ADMIN — HYDROMORPHONE HYDROCHLORIDE 0.5 MG: 1 INJECTION, SOLUTION INTRAMUSCULAR; INTRAVENOUS; SUBCUTANEOUS at 13:11

## 2024-08-30 RX ADMIN — PIPERACILLIN AND TAZOBACTAM 4.5 G: 4; .5 INJECTION, POWDER, FOR SOLUTION INTRAVENOUS at 10:08

## 2024-08-30 RX ADMIN — FERROUS SULFATE TAB 325 MG (65 MG ELEMENTAL FE) 325 MG: 325 (65 FE) TAB at 08:47

## 2024-08-30 RX ADMIN — GABAPENTIN 300 MG: 300 CAPSULE ORAL at 05:41

## 2024-08-30 RX ADMIN — PIPERACILLIN AND TAZOBACTAM 4.5 G: 4; .5 INJECTION, POWDER, FOR SOLUTION INTRAVENOUS at 14:36

## 2024-08-30 RX ADMIN — AMPICILLIN AND SULBACTAM 3 G: 1; 2 INJECTION, POWDER, FOR SOLUTION INTRAMUSCULAR; INTRAVENOUS at 00:59

## 2024-08-30 RX ADMIN — CITALOPRAM 40 MG: 40 TABLET, FILM COATED ORAL at 05:41

## 2024-08-30 RX ADMIN — OXYCODONE HYDROCHLORIDE 5 MG: 5 TABLET ORAL at 21:20

## 2024-08-30 RX ADMIN — PIPERACILLIN AND TAZOBACTAM 4.5 G: 4; .5 INJECTION, POWDER, FOR SOLUTION INTRAVENOUS at 21:17

## 2024-08-30 RX ADMIN — SODIUM CHLORIDE, POTASSIUM CHLORIDE, SODIUM LACTATE AND CALCIUM CHLORIDE: 600; 310; 30; 20 INJECTION, SOLUTION INTRAVENOUS at 02:46

## 2024-08-30 RX ADMIN — OXYCODONE HYDROCHLORIDE 5 MG: 5 TABLET ORAL at 08:47

## 2024-08-30 RX ADMIN — AMPICILLIN AND SULBACTAM 3 G: 1; 2 INJECTION, POWDER, FOR SOLUTION INTRAMUSCULAR; INTRAVENOUS at 05:38

## 2024-08-30 RX ADMIN — GUAIFENESIN 600 MG: 600 TABLET, EXTENDED RELEASE ORAL at 05:41

## 2024-08-30 RX ADMIN — CARVEDILOL 12.5 MG: 12.5 TABLET, FILM COATED ORAL at 16:36

## 2024-08-30 RX ADMIN — AMLODIPINE BESYLATE 5 MG: 5 TABLET ORAL at 16:36

## 2024-08-30 RX ADMIN — OXYCODONE HYDROCHLORIDE 10 MG: 10 TABLET ORAL at 12:03

## 2024-08-30 RX ADMIN — GUAIFENESIN 600 MG: 600 TABLET, EXTENDED RELEASE ORAL at 16:36

## 2024-08-30 RX ADMIN — Medication 5 MG: at 21:14

## 2024-08-30 ASSESSMENT — ENCOUNTER SYMPTOMS
HEADACHES: 0
PALPITATIONS: 0
ORTHOPNEA: 0
CONSTIPATION: 0
NECK PAIN: 0
FEVER: 0
SPEECH CHANGE: 0
MYALGIAS: 0
BLURRED VISION: 0
ABDOMINAL PAIN: 1
DOUBLE VISION: 0
TINGLING: 0
VOMITING: 0
EYE PAIN: 0
WEIGHT LOSS: 0
BACK PAIN: 0
SPUTUM PRODUCTION: 0
WEAKNESS: 1
FOCAL WEAKNESS: 0
DIARRHEA: 0
SHORTNESS OF BREATH: 0
TREMORS: 0
CHILLS: 0
COUGH: 0
PHOTOPHOBIA: 0
HALLUCINATIONS: 0
NAUSEA: 0
DIZZINESS: 0
SENSORY CHANGE: 0

## 2024-08-30 ASSESSMENT — PAIN DESCRIPTION - PAIN TYPE
TYPE: ACUTE PAIN

## 2024-08-30 ASSESSMENT — LIFESTYLE VARIABLES: SUBSTANCE_ABUSE: 0

## 2024-08-30 NOTE — PROGRESS NOTES
Hospital Medicine Daily Progress Note    Date of Service  8/30/2024    Chief Complaint  Patricia A Tietz is a 59 y.o. female admitted 8/20/2024 with hernia repair    Hospital Course  60yo PMHx urostomy with ileal conduit as a child, HTN, AFib, on apixaban, chronic hypoxic resp failure on 2LNC at baseline.  She was at [facility] in May with an SBO and UTI (Klebsiella and Pseudomonas).  Admitted on 8/20 for planned Ex lap and reapir of 10cm abd wall hernia with mycocutaneous flap trunk with mesh and replacement of catheter into the stoma..  In PACU pt was hypotensive requiring Rudi-Synephrine and fluids.  She was admitted to IMCU    Interval update:  Evaluated patient at bedside on 8/30  Discussed with surgeon  Will see how she does and consider discharge in the next day or 2  Cultures growing Pseudomonas, I have escalated to Zosyn    I have discussed this patient's plan of care and discharge plan at IDT rounds today with Case Management, Nursing, Nursing leadership, and other members of the IDT team.    Consultants/Specialty  general surgery and urology    Code Status  Full Code    Disposition  Medically Cleared  I have placed the appropriate orders for post-discharge needs.    Review of Systems  Review of Systems   Constitutional:  Positive for malaise/fatigue. Negative for chills, fever and weight loss.        Decreased appetite   HENT:  Negative for hearing loss and tinnitus.    Eyes:  Negative for blurred vision, double vision, photophobia and pain.   Respiratory:  Negative for cough, sputum production and shortness of breath.    Cardiovascular:  Negative for chest pain, palpitations, orthopnea and leg swelling.   Gastrointestinal:  Positive for abdominal pain. Negative for constipation, diarrhea, nausea and vomiting.   Genitourinary:  Negative for dysuria, frequency and urgency.   Musculoskeletal:  Negative for back pain, joint pain, myalgias and neck pain.   Skin:  Negative for rash.   Neurological:  Positive for  weakness. Negative for dizziness, tingling, tremors, sensory change, speech change, focal weakness and headaches.   Psychiatric/Behavioral:  Negative for hallucinations and substance abuse.    All other systems reviewed and are negative.       Physical Exam  Temp:  [36.3 °C (97.3 °F)-37 °C (98.6 °F)] 36.6 °C (97.8 °F)  Pulse:  [60-64] 60  Resp:  [16-18] 18  BP: ()/(1-72) 124/72  SpO2:  [93 %-97 %] 96 %    Physical Exam  Vitals reviewed.   Constitutional:       General: She is not in acute distress.     Appearance: Normal appearance. She is obese. She is ill-appearing.   HENT:      Head: Normocephalic and atraumatic.      Nose: No congestion.   Eyes:      General:         Right eye: No discharge.         Left eye: No discharge.      Pupils: Pupils are equal, round, and reactive to light.   Cardiovascular:      Rate and Rhythm: Normal rate and regular rhythm.      Pulses: Normal pulses.      Heart sounds: Normal heart sounds. No murmur heard.  Pulmonary:      Effort: Pulmonary effort is normal. No respiratory distress.      Breath sounds: Normal breath sounds. No stridor.   Abdominal:      General: Bowel sounds are normal. There is no distension.      Palpations: Abdomen is soft.      Tenderness: There is abdominal tenderness (Mild).      Comments: Dressing present  WERNER drain  Wound VAC in place   Musculoskeletal:         General: No swelling or tenderness. Normal range of motion.      Cervical back: Normal range of motion. No rigidity.   Skin:     General: Skin is warm.      Capillary Refill: Capillary refill takes less than 2 seconds.      Coloration: Skin is not jaundiced or pale.      Findings: No bruising.   Neurological:      General: No focal deficit present.      Mental Status: She is alert and oriented to person, place, and time.      Cranial Nerves: No cranial nerve deficit.      Comments: Decreased range of motion bilateral lower extremities   Psychiatric:         Mood and Affect: Mood normal.          Behavior: Behavior normal.       Fluids    Intake/Output Summary (Last 24 hours) at 8/30/2024 1631  Last data filed at 8/30/2024 0500  Gross per 24 hour   Intake --   Output 2700 ml   Net -2700 ml       Laboratory  Recent Labs     08/28/24  1803 08/29/24  0240 08/29/24  1515 08/30/24  0743   WBC 12.6* 13.8*  --  9.2   RBC 2.46* 2.48*  --  2.46*   HEMOGLOBIN 7.4* 7.4* 8.1* 7.2*   HEMATOCRIT 23.8* 23.6* 25.8* 23.4*   MCV 96.7 95.2  --  95.1   MCH 30.1 29.8  --  29.3   MCHC 31.1* 31.4*  --  30.8*   RDW 49.3 48.6  --  46.8   PLATELETCT 199 239  --  261   MPV 10.1 10.2  --  10.6     Recent Labs     08/29/24  0240 08/29/24  0754 08/29/24  1515 08/30/24  0743   SODIUM 136 134*  --  134*   POTASSIUM 5.7* 5.6* 5.5 5.6*   CHLORIDE 104 103  --  101   CO2 21 23  --  24   GLUCOSE 197* 167*  --  151*   BUN 31* 28*  --  27*   CREATININE 1.13 1.09  --  0.89   CALCIUM 10.2 10.3  --  10.1                   Imaging  DX-CHEST-PORTABLE (1 VIEW)   Final Result      1. New linear parenchymal opacities in the right midlung and right lung base, compatible with atelectasis or infiltrates.   2. The remainder of the lungs is clear.   3. Interval removal of the right upper upper extremity PICC catheter.           Assessment/Plan  * Shock (HCC)- (present on admission)  Assessment & Plan  Post operative requiring Rudi synephrine initially and then Levophed in the unit  Vasopressors DC'd early this am  Cortisol>30  No evidence of sepsis  Cont to monitor and resume BP meds as pressures allow    8/23 resolved, monitor      Hyperkalemia  Assessment & Plan  She found to hyperkalemia although renal function has been improving.  She was on lisinopril I discontinued her lisinopril.  Ordered repeat potassium level that showed improvement from 5.7-5.6.  I do repeat potassium level at 3 PM and H&H.    Leukocytosis  Assessment & Plan  White blood cell count is trending down current white blood cell count is 14.3.  Continue antibiotics  Ordered CBC for  tomorrow.    Parastomal hernia  Assessment & Plan  Now s/p repair on 8/20  NGT and 3 WERNER drains in place  Srgry and Urology following    8/22  Belly still quiet, no flatus or BM  Monitor for signs of return of bowel function  Prn pain and N management  Monitor daily BMP for renal function and electrolye abnormalities  Resume po once OK with Srgcl team  Cont supportive care with IVFs, electrolyte replacement, antiemetics and pain medication  Daily BMP to monitor for RAO, hypo K, acidosis    8/23 surgery following, okay for NG tube removal  Diet per surgery  SLP evaluation  Drains in place    8/24 following, drains per surgery, greater than 162 mL output/24 hours  Positive BM, diet per surgery, consider initiation of clear liquid diet  Pain control follow-up labs  Patient is from [facility], to return when medically cleared    8/25 discussed with surgery, Dr. Mercado  150 mL output over 24 hours, plan for drain removal per surgery, plan to remove by Tuesday if decreased output  Likely return to skilled if clinically stable  Diet advance per surgery    8/26 tolerating oral intake, generalized weakness  1 of 3 WERNER drains out, removal per surgery  Transfer to skilled facility, [facility] in 2 to 3 days    8/27 surgery Dr. Mercado and urology is following her.  Continue antibiotics.  Continue to provide her pain control.    8/28 I patient discussed case with surgery Dr. Mercado who recommended that patient will need to go to the OR for another wound VAC placement.  I made her n.p.o. and started on IV fluid for hydration.    8/29 she underwent Postoperative wound infection washout and negative pressure dressing placemen on August 29, 2024.  Continue to provider pain control.  I started her on Ancef for next 3 days.  She received oral Augmentin.        Obesity- (present on admission)  Assessment & Plan  BMI 46  Lifestyle modifications  Outpatient follow-up    Atrial fibrillation (HCC)- (present on admission)  Assessment  & Plan  8/21 Hx of  Has been paced and sinus overnight  Cont Coreg with parameters  Cont Tele  Resume apixaban when OK with Surgical team    8/24 okay to restart Eliquis per surgery, will monitor  Follow-up CBC    8/25 restarted on Eliquis, hemoglobin from 10-7, will monitor for signs of bleeding  Serous discharge from drains  Discussed with surgery, will continue to monitor  May need transfusion if hemoglobin less than 7, monitor closely    8/26 resume Eliquis    8/27 currently rate controlled.  On continuing Eliquis.  Optimize electrolytes.    8/28 patient is going for surgical intervention plan is to hold Eliquis.    8/29 holding Eliquis for now as hemoglobin remains low and she may need blood transfusion.    Chronic respiratory failure with hypoxia (HCC)- (present on admission)  Assessment & Plan  At baseline is on 2 LNC  Mobilize  O2/RT protocols  Monitor for aspiration  Monitor vol status    8/23 patient is from [facility], to return to [facility] once medically cleared, continue to monitor    8/26 now on 3 to 4 L, taper as tolerated  Encourage I-S use    8/27 currently she is requiring 3 L of oxygen to maintain oxygen saturation.  Continue to titrate down oxygen as tolerated.    8/29 currently she is requiring 3 L of oxygen to maintain oxygen saturation.    Pacemaker- (present on admission)  Assessment & Plan  On Tele pt has been intermitently paced/sinus  Now she transferred to medical floor.    RAO (acute kidney injury) (HCC)- (present on admission)  Assessment & Plan    Good UOP  At risk for dehydration given output from NGT and 3 JPs (close to 600ml/24hrs)  Renal function has been improving.  Current BUN is 28 and creatinine 1.09  Avoid nephrotoxins  Ordered lab work for tomorrow    Anemia- (present on admission)  Assessment & Plan  8/25 hemoglobin of 10-6, after starting Eliquis  May be dilutional  BP stable  CBC every 12, transfuse for hemoglobin less than 7  May be secondary to recent  surgery    8/26 improving hemoglobin now at 9, no signs of active bleed, will restart Eliquis  Follow-up a.m. labs    8/27 hemoglobin remained stable.  No signs of active bleeding.  Close monitoring on Eliquis    8/28 no signs of active bleeding.  I placed hold on Eliquis as now she is going for surgical intervention.    8/29 I spoke to general surgery yesterday as patient found to hemoglobin of 6.8 and repeat hemoglobin back above 7 so I held blood transfusion.  Continue to monitor closely and transfuse if her blood COVID below 7.  Holding Eliquis      Presence of urostomy (HCC)- (present on admission)  Assessment & Plan  8/22 Chronic since 5 years old now with surgery by Dr. Gusman and Dr. Mercado  She has 3 WERNER drains in  Pain control with narcotics as indicated  SCDs for DVT prophylaxis  She has an NG tube in  Urology following  Good UOP: cont to monitor    8/23 okay for NG tube removal per surgery    Urology evaluated her and recommendations.    8/25 seen by urology, will monitor    8/26 urology has been following her.  Continue to monitor    8/28 urology evaluated her and recommended outpatient follow-up.         I discussed plan of care during multidisciplinary rounds regarding patient's current medical condition and plan of care.    I reviewed general surgery note    VTE prophylaxis: VTE Selection    I have performed a physical exam and reviewed and updated ROS and Plan today (8/30/2024). In review of yesterday's note (8/29/2024), there are no changes except as documented above.

## 2024-08-30 NOTE — CARE PLAN
The patient is Watcher - Medium risk of patient condition declining or worsening    Shift Goals  Clinical Goals: Maintain skin integrity, wound vac/urostomy maintinence, pain mgnmt, rest  Patient Goals: pain relief, rest  Family Goals: MELISSA    Progress made toward(s) clinical / shift goals:     Assumed care of pt. All medications taken and tolerated, medicated for pain per MAR. Wound vac in place and functioning throughout night, urostomy bag draining, checked often. Pt resting in bed w/ call light and belongings in reach, bed alarm on, Q2 turns and hourly rounding in place. No additional needs at this time.    Problem: Knowledge Deficit - Standard  Goal: Patient and family/care givers will demonstrate understanding of plan of care, disease process/condition, diagnostic tests and medications  Outcome: Progressing     Problem: Pain - Standard  Goal: Alleviation of pain or a reduction in pain to the patient’s comfort goal  Outcome: Progressing     Problem: Skin Integrity  Goal: Skin integrity is maintained or improved  Outcome: Progressing     Problem: Fall Risk  Goal: Patient will remain free from falls  Outcome: Progressing     Problem: Psychosocial  Goal: Patient's level of anxiety will decrease  Outcome: Progressing     Problem: Urinary Elimination  Goal: Establish and maintain regular urinary output  Outcome: Progressing     Problem: Infection - Standard  Goal: Patient will remain free from infection  Outcome: Progressing     Problem: Wound/ / Incision Healing  Goal: Patient's wound/surgical incision will decrease in size and heals properly  Outcome: Progressing

## 2024-08-30 NOTE — DISCHARGE PLANNING
Case Management Discharge Planning    Admission Date: 8/20/2024  GMLOS: 8.9  ALOS: 10    6-Clicks ADL Score:    6-Clicks Mobility Score:    PT and/or OT Eval ordered: Yes  Post-acute Referrals Ordered: Yes  Post-acute Choice Obtained: NA  Has referral(s) been sent to post-acute provider:  Yes      Anticipated Discharge Dispo: Discharge Disposition: D/T to Medicaid only Nursing home (63)  Discharge Address: [address] [place] [address]. [place] [address]  Discharge Contact Phone Number: [phone] (Neela (friend) : Neo (brother) lives in [place] and is an Emergency Contact)    DME Needed: Yes    DME Ordered: No    Action(s) Taken: Updated Provider/Nurse on Discharge Plan  RNCM attended IDT rounds and reviewed chart. MD stated during IDT rounds that surgeon recommended pt remain in hospital another day for further evaluation. Plan discussed for initiation of IV antibiotics today possibly Zosyn. Pt has a Regular wound vac and WERNER drain x1. Pt is not medically cleared but plan was discussed for pt to return to [place].     Escalations Completed: Provider    Medically Clear: No    Next Steps: Follow-up with medical team to discuss DC needs and barriers.    Barriers to Discharge: Medical clearance and DME

## 2024-08-30 NOTE — PROGRESS NOTES
Surgery  Pt feeling better, passing lots of gas  Wound vac with purulent output this am  HG low, but above transfusion threshold  A/p   PT/OT   Wound care to change wound vac today   WBC normal   Will begin to advance diet and add boost

## 2024-08-30 NOTE — WOUND TEAM
[facility]  Inpatient Services  Initial Wound and Skin Care Evaluation    Admission Date: 8/20/2024     Last order of IP CONSULT TO WOUND CARE was found on 8/29/2024 from Hospital Encounter on 7/2/2024     HPI, PMH, SH: Reviewed    Past Surgical History:   Procedure Laterality Date    MN EXPLORATORY OF ABDOMEN Bilateral 8/28/2024    Procedure: ABDOMINAL WASHOUT WITH NEGATIVE PRESSURE DRESSING PLACEMENT;  Surgeon: Elmer Mercado M.D.;  Location: SURGERY [facility];  Service: General    APPLICATION OR REPLACEMENT, WOUND VAC  8/28/2024    Procedure: APPLICATION, WOUND VAC;  Surgeon: Elmer Mercado M.D.;  Location: SURGERY [facility];  Service: General    VENTRAL HERNIA REPAIR ROBOTIC XI N/A 8/20/2024    Procedure: OPEN INCISIONAL HERNIA REPAIR WITH MESH;  Surgeon: Elmer Mercado M.D.;  Location: SURGERY [facility];  Service: Gen Robotic    ILEO LOOP DIVERSION  8/20/2024    Procedure: RETROPERITONEAL EXPLORATION;  Surgeon: Denis Gusman M.D.;  Location: SURGERY [facility];  Service: Urology    SCAR EXCISION N/A 8/20/2024    Procedure: EXCISION, SCAR;  Surgeon: Elmer Mercado M.D.;  Location: SURGERY [facility];  Service: Gen Robotic    LYSIS ADHESIONS GENERAL N/A 8/20/2024    Procedure: LYSIS, ADHESIONS;  Surgeon: Elmer Mercado M.D.;  Location: [facility];  Service: Gen Robotic    MN UPPER GI ENDOSCOPY,DIAGNOSIS N/A 10/01/2023    Procedure: GASTROSCOPY;  Surgeon: Kelly Santos M.D.;  Location: [facility];  Service: Gastroenterology    MN UPPER GI ENDOSCOPY,BIOPSY N/A 10/01/2023    Procedure: GASTROSCOPY, WITH BIOPSY;  Surgeon: Kelly Santos M.D.;  Location: [facility];  Service: Gastroenterology    KNEE REVISION TOTAL Left 08/2023    Left knee hardware removed/ spacer placed    KNEE REPLACEMENT, TOTAL Left 01/24/2022    APPENDECTOMY      OTHER      Neck surgery 1978 and 2010    OTHER      Kidney stones remove 1976    OTHER      Urostomy 1969     Social  "History     Tobacco Use    Smoking status: Never    Smokeless tobacco: Never   Substance Use Topics    Alcohol use: Not Currently     No chief complaint on file.    Diagnosis: Unspecified hydronephrosis [N13.30]    Unit where seen by Wound Team: S620/02     WOUND CONSULT RELATED TO:  VAC change abdomen     WOUND TEAM PLAN OF CARE - Frequency of Follow-up:   Nursing to follow dressing orders written for wound care. Contact wound team if area fails to progress, deteriorates or with any questions/concerns if something comes up before next scheduled follow up (See below as to whether wound is following and frequency of wound follow up)     NPWT change 3 times weekly - Abdomen     WOUND HISTORY:     Per H&P \"60yo PMHx urostomy with ileal conduit as a child, HTN, AFib, on apixaban, chronic hypoxic resp failure on 2LNC at baseline. She was at [facility] [place] in May with an SBO and UTI (Klebsiella and Pseudomonas). Admitted on 8/20 for planned Ex lap and reapir of 10cm abd wall hernia with mycocutaneous flap trunk with mesh and replacement of catheter into the stoma.\"     OR with Dr. Mercado on 8/28/24       WOUND ASSESSMENT/LDA  Wound 08/28/24 Incision Abdomen Bilateral WOUND VAC DRESSING (Active)   Date First Assessed/Time First Assessed: 08/28/24 1743   Primary Wound Type: Incision  Location: Abdomen  Laterality: Bilateral  Wound Description (Comments): WOUND VAC DRESSING      Assessments 8/30/2024  2:49 PM   Wound Image        Site Assessment Red;Fragile;Slough   Periwound Assessment Intact   Margins Defined edges;Unattached edges   Closure Secondary intention   Drainage Amount Small   Drainage Description Serosanguineous   Treatments Cleansed;Site care   Wound Cleansing Approved Wound Cleanser   Periwound Protectant Skin Protectant Wipes to Periwound;Paste Ring   Dressing Status Clean;Dry;Intact   Dressing Changed Changed   Dressing Cleansing/Solutions Not Applicable   Dressing Options Wound Vac   Dressing Change/Treatment " Frequency Monday, Wednesday, Friday, and As Needed   NEXT Dressing Change/Treatment Date 09/02/24   NEXT Weekly Photo (Inpatient Only) 09/06/24   Wound Team Following 3x Weekly   Non-staged Wound Description Full thickness   Shape x2 wounds oval   Wound Odor None   Exposed Structures Adipose;Sutures   WOUND NURSE ONLY - Time Spent with Patient (mins) 75       Negative Pressure Wound Therapy 08/28/24 Abdomen (Active)   Placement Date: 08/28/24   Location: Abdomen      Assessments 8/30/2024  2:49 PM   NPWT Pump Mode / Pressure Setting Ulta;Continuous;125 mmHg   Dressing Type Medium;Black Foam (Regular);White Foam   Number of Foam Pieces Used 7   Canister Changed No   NEXT Dressing Change/Treatment Date 09/02/24   WOUND NURSE ONLY - Time Spent with Patient (mins) 75        Vascular:    TIEN:   No results found.    Lab Values:    Lab Results   Component Value Date/Time    WBC 9.2 08/30/2024 07:43 AM    RBC 2.46 (L) 08/30/2024 07:43 AM    HEMOGLOBIN 7.2 (L) 08/30/2024 07:43 AM    HEMATOCRIT 23.4 (L) 08/30/2024 07:43 AM    HBA1C 6.2 (H) 05/08/2023 08:41 PM         Culture Results show:  Recent Results (from the past 720 hour(s))   CULTURE WOUND W/ GRAM STAIN    Collection Time: 08/28/24  4:55 PM    Specimen: Wound   Result Value Ref Range    Significant Indicator POS (POS)     Source WND     Site Abdominal wound     Culture Result - (A)     Gram Stain Result       Many WBCs.  Few Gram positive cocci.  Rare Gram negative rods.      Culture Result Pseudomonas aeruginosa  Moderate growth   (A)     Culture Result Enterococcus faecalis  Moderate growth   (A)     Culture Result (A)      Streptococcus mitis/oralis group  Moderate growth      Culture Result Corynebacterium striatum group  Light growth   (A)        Susceptibility    Enterococcus faecalis - JACK     Ampicillin <=2 Sensitive mcg/mL     Daptomycin 2 Sensitive mcg/mL     Gent Synergy <=500 Sensitive mcg/mL     Tetracycline >8 Resistant mcg/mL     Vancomycin 1 Sensitive  mcg/mL    Pseudomonas aeruginosa - JACK     Ceftazidime <=1 Sensitive mcg/mL     Ciprofloxacin 0.5 Sensitive mcg/mL     Cefepime <=2 Sensitive mcg/mL     Levofloxacin 2 Intermediate mcg/mL     Meropenem <=1 Sensitive mcg/mL     Pip/Tazobactam <=8 Sensitive mcg/mL       Pain Level/Medicated:  4% Lidocaine allowed to dwell on wound bed 15 mins prior, PO pain medications administered by bedside RN 60 mins prior, and IV pain medications administered by bedside RN   prior (Pt educated that IV medication will not be available on outpatient basis)       INTERVENTIONS BY WOUND TEAM:  Chart and images reviewed. Discussed with bedside RN. All areas of concern (based on picture review, LDA review and discussion with bedside RN) have been thoroughly assessed. Documentation of areas based on significant findings. This RN in to assess patient. Performed standard wound care which includes appropriate positioning, dressing removal and non-selective debridement. Pictures and measurements obtained weekly if/when required.    Wound:  Midline and LQ abdominal wounds   Preparation for Dressing removal: Dressing soaked with adhesive remover spray and Dressing soaked with Lidocaine  Cleansed/Non-selectively Debrided with:  Wound cleanser and Gauze  Akila wound: Cleansed with Wound cleanser and Gauze, Prepped with No Sting, Cavilon Advanced, Paste Rings, Drape, and Mepitel One  Primary Dressing:  white foam to wound base, spiraled regular black foam tucked onto tracts, undermining, and used to fill in wound space. Secured with drape.   Secondary (Outer) Dressing: TRAC pads to midline and LQ abdominal wounds, y-connected     Advanced Wound Care Discharge Planning  Number of Clinicians necessary to complete wound care: 2  Is patient requiring IV pain medications for dressing changes:  Yes  Length of time for dressing change 60 min. (This does not include chart review, pre-medication time, set up, clean up or time spent  charting.)    Interdisciplinary consultation: Patient, Bedside RN, Venus Wound RN.  Pressure injury and staging reviewed with N/A.    EVALUATION / RATIONALE FOR TREATMENT:     Date:  08/30/24  Wound Status:  Initial evaluation    Midline abdominal wound bed clean, red, but with exposed sutures along the inferior portion of the wound. Wound also with tunneling at 12 and 6 o'clock. LQ abdominal wound with necrotic adipose, fragile appearing tissue, and a deep tunnel running medially at 8 o'clock. White foam utilized to fragile wound base, however if no issues at next assessment, can forego white foam. MD Mercado noted purulent drainage on the VAC cannister however this was not seen during time of VAC placement. NPWT re-applied to assist with wound closure by secondary intention, management of bio-burden and exudate through mechanical debridement, and increase oxygenation and granulation tissue production to wound bed.      MEASUREMENTS:   MIDLINE WOUND - 11.8 x 3 x 3.5 cm, tunnel @ 12 (4.5 cm) and 6 (4.4 cm)   LQ WOUND - 8.5 x 3 x 4 cm, UM @ 10-12 (4.5 cm), tunnel @ 8 (10.2 cm)            Goals: Steady decrease in wound area and depth weekly.    NURSING PLAN OF CARE ORDERS:  Dressing changes: See Dressing Care orders    NUTRITION RECOMMENDATIONS   Wound Team Recommendations:  N/A    DIET ORDERS (From admission to next 24h)       Start     Ordered    08/30/24 0904  Diet Order Diet: Full Liquid (add boost or ensure with meals please); Additive: (add boost or ensure)  ALL MEALS        Question Answer Comment   Diet: Full Liquid add boost or ensure with meals please   Additive:  add boost or ensure       08/30/24 0905                    PREVENTATIVE INTERVENTIONS:    Q shift Jaquan - performed per nursing policy  Q shift pressure point assessments - performed per nursing policy    Surface/Positioning  Low Airloss - Currently in Place  Reposition q 2 hours - Currently in Place  TAPs Turning system - Currently in  Place    Offloading/Redistribution  Heel offloading dressing (Silicone dressing) - Currently in Place      Containment/Moisture Prevention    Dri-mckayla pad - Currently in Place    Anticipated discharge plans:  TBD        Vac Discharge Needs:  Vac Discharge plan is purely a recommendation from wound team and not a requirement for discharge unless otherwise stated by physician.  Regular Vac in use and continued at discharge

## 2024-08-30 NOTE — CARE PLAN
The patient is Stable - Low risk of patient condition declining or worsening    Shift Goals  Clinical Goals: pt will remain free from falls and injuries throughout shift  Patient Goals: pain relief  Family Goals: ag    Progress made toward(s) clinical / shift goals:  Pt alert and able to make needs known. Q 2 turns in place with BERNARDA mattress. Bed locked in lowest position with bed frame alarm on. No drainage noted from ileostomy. Wound vac has serosanguineous drainage, changed with wound team. Pt given prn pain medications as ordered. Pt belongings and call light in reach and pt updated on plan of care.       Problem: Knowledge Deficit - Standard  Goal: Patient and family/care givers will demonstrate understanding of plan of care, disease process/condition, diagnostic tests and medications  Outcome: Progressing     Problem: Pain - Standard  Goal: Alleviation of pain or a reduction in pain to the patient’s comfort goal  Outcome: Progressing     Problem: Skin Integrity  Goal: Skin integrity is maintained or improved  Outcome: Progressing     Problem: Fall Risk  Goal: Patient will remain free from falls  Outcome: Progressing     Problem: Psychosocial  Goal: Patient's level of anxiety will decrease  Outcome: Progressing  Goal: Patient's ability to verbalize feelings about condition will improve  Outcome: Progressing  Goal: Patient's ability to re-evaluate and adapt role responsibilities will improve  Outcome: Progressing  Goal: Patient and family will demonstrate ability to cope with life altering diagnosis and/or procedure  Outcome: Progressing  Goal: Spiritual and cultural needs incorporated into hospitalization  Outcome: Progressing     Problem: Communication  Goal: The ability to communicate needs accurately and effectively will improve  Outcome: Progressing     Problem: Discharge Barriers/Planning  Goal: Patient's continuum of care needs are met  Outcome: Progressing     Problem: Hemodynamics  Goal: Patient's  hemodynamics, fluid balance and neurologic status will be stable or improve  Outcome: Progressing     Problem: Respiratory  Goal: Patient will achieve/maintain optimum respiratory ventilation and gas exchange  Outcome: Progressing     Problem: Chest Tube Management  Goal: Complications related to chest tube will be avoided or minimized  Outcome: Progressing     Problem: Fluid Volume  Goal: Fluid volume balance will be maintained  Outcome: Progressing     Problem: Mechanical Ventilation  Goal: Safe management of artificial airway and ventilation  Outcome: Progressing  Goal: Successful weaning off mechanical ventilator, spontaneously maintains adequate gas exchange  Outcome: Progressing  Goal: Patient will be able to express needs and understand communication  Outcome: Progressing     Problem: Dysphagia  Goal: Dysphagia will improve  Outcome: Progressing     Problem: Risk for Aspiration  Goal: Patient's risk for aspiration will be absent or decrease  Outcome: Progressing     Problem: Nutrition  Goal: Patient's nutritional and fluid intake will be adequate or improve  Outcome: Progressing  Goal: Enteral nutrition will be maintained or improve  Outcome: Progressing  Goal: Enteral nutrition will be maintained or improve  Outcome: Progressing     Problem: Urinary Elimination  Goal: Establish and maintain regular urinary output  Outcome: Progressing     Problem: Bowel Elimination  Goal: Establish and maintain regular bowel function  Outcome: Progressing     Problem: Gastrointestinal Irritability  Goal: Nausea and vomiting will be absent or improve  Outcome: Progressing  Goal: Diarrhea will be absent or improved  Outcome: Progressing     Problem: Rectal Tube  Goal: Fecal output will be contained and skin will remain free from irritation  Outcome: Progressing     Problem: Mobility  Goal: Patient's capacity to carry out activities will improve  Outcome: Progressing     Problem: Self Care  Goal: Patient will have the ability  to perform ADLs independently or with assistance (bathe, groom, dress, toilet and feed)  Outcome: Progressing     Problem: Infection - Standard  Goal: Patient will remain free from infection  Outcome: Progressing     Problem: Wound/ / Incision Healing  Goal: Patient's wound/surgical incision will decrease in size and heals properly  Outcome: Progressing       Patient is not progressing towards the following goals:

## 2024-08-31 VITALS
HEIGHT: 62 IN | DIASTOLIC BLOOD PRESSURE: 58 MMHG | BODY MASS INDEX: 51.85 KG/M2 | SYSTOLIC BLOOD PRESSURE: 117 MMHG | TEMPERATURE: 97.8 F | RESPIRATION RATE: 17 BRPM | HEART RATE: 60 BPM | OXYGEN SATURATION: 98 % | WEIGHT: 281.75 LBS

## 2024-08-31 LAB
ANION GAP SERPL CALC-SCNC: 9 MMOL/L (ref 7–16)
BACTERIA WND AEROBE CULT: ABNORMAL
BASOPHILS # BLD AUTO: 0 % (ref 0–1.8)
BASOPHILS # BLD: 0 K/UL (ref 0–0.12)
BUN SERPL-MCNC: 27 MG/DL (ref 8–22)
CALCIUM SERPL-MCNC: 10 MG/DL (ref 8.5–10.5)
CHLORIDE SERPL-SCNC: 101 MMOL/L (ref 96–112)
CO2 SERPL-SCNC: 25 MMOL/L (ref 20–33)
CREAT SERPL-MCNC: 0.99 MG/DL (ref 0.5–1.4)
EOSINOPHIL # BLD AUTO: 0.01 K/UL (ref 0–0.51)
EOSINOPHIL NFR BLD: 0.1 % (ref 0–6.9)
ERYTHROCYTE [DISTWIDTH] IN BLOOD BY AUTOMATED COUNT: 46.8 FL (ref 35.9–50)
GFR SERPLBLD CREATININE-BSD FMLA CKD-EPI: 65 ML/MIN/1.73 M 2
GLUCOSE SERPL-MCNC: 125 MG/DL (ref 65–99)
GRAM STN SPEC: ABNORMAL
HCT VFR BLD AUTO: 24.5 % (ref 37–47)
HGB BLD-MCNC: 7.6 G/DL (ref 12–16)
IMM GRANULOCYTES # BLD AUTO: 0.25 K/UL (ref 0–0.11)
IMM GRANULOCYTES NFR BLD AUTO: 2.8 % (ref 0–0.9)
LYMPHOCYTES # BLD AUTO: 0.49 K/UL (ref 1–4.8)
LYMPHOCYTES NFR BLD: 5.6 % (ref 22–41)
MCH RBC QN AUTO: 29.6 PG (ref 27–33)
MCHC RBC AUTO-ENTMCNC: 31 G/DL (ref 32.2–35.5)
MCV RBC AUTO: 95.3 FL (ref 81.4–97.8)
MONOCYTES # BLD AUTO: 0.87 K/UL (ref 0–0.85)
MONOCYTES NFR BLD AUTO: 9.9 % (ref 0–13.4)
NEUTROPHILS # BLD AUTO: 7.16 K/UL (ref 1.82–7.42)
NEUTROPHILS NFR BLD: 81.6 % (ref 44–72)
NRBC # BLD AUTO: 0 K/UL
NRBC BLD-RTO: 0 /100 WBC (ref 0–0.2)
PLATELET # BLD AUTO: 262 K/UL (ref 164–446)
PMV BLD AUTO: 10.5 FL (ref 9–12.9)
POTASSIUM SERPL-SCNC: 5.1 MMOL/L (ref 3.6–5.5)
RBC # BLD AUTO: 2.57 M/UL (ref 4.2–5.4)
SIGNIFICANT IND 70042: ABNORMAL
SITE SITE: ABNORMAL
SODIUM SERPL-SCNC: 135 MMOL/L (ref 135–145)
SOURCE SOURCE: ABNORMAL
WBC # BLD AUTO: 8.8 K/UL (ref 4.8–10.8)

## 2024-08-31 PROCEDURE — 700102 HCHG RX REV CODE 250 W/ 637 OVERRIDE(OP): Performed by: SURGERY

## 2024-08-31 PROCEDURE — 85025 COMPLETE CBC W/AUTO DIFF WBC: CPT

## 2024-08-31 PROCEDURE — A9270 NON-COVERED ITEM OR SERVICE: HCPCS | Performed by: SURGERY

## 2024-08-31 PROCEDURE — 700105 HCHG RX REV CODE 258: Performed by: STUDENT IN AN ORGANIZED HEALTH CARE EDUCATION/TRAINING PROGRAM

## 2024-08-31 PROCEDURE — 99233 SBSQ HOSP IP/OBS HIGH 50: CPT | Performed by: STUDENT IN AN ORGANIZED HEALTH CARE EDUCATION/TRAINING PROGRAM

## 2024-08-31 PROCEDURE — 700105 HCHG RX REV CODE 258: Performed by: SURGERY

## 2024-08-31 PROCEDURE — 770006 HCHG ROOM/CARE - MED/SURG/GYN SEMI*

## 2024-08-31 PROCEDURE — 36415 COLL VENOUS BLD VENIPUNCTURE: CPT

## 2024-08-31 PROCEDURE — 700111 HCHG RX REV CODE 636 W/ 250 OVERRIDE (IP): Mod: JZ | Performed by: HOSPITALIST

## 2024-08-31 PROCEDURE — 80048 BASIC METABOLIC PNL TOTAL CA: CPT

## 2024-08-31 PROCEDURE — 700111 HCHG RX REV CODE 636 W/ 250 OVERRIDE (IP): Performed by: SURGERY

## 2024-08-31 PROCEDURE — 700111 HCHG RX REV CODE 636 W/ 250 OVERRIDE (IP): Mod: JZ | Performed by: STUDENT IN AN ORGANIZED HEALTH CARE EDUCATION/TRAINING PROGRAM

## 2024-08-31 RX ADMIN — SODIUM CHLORIDE, POTASSIUM CHLORIDE, SODIUM LACTATE AND CALCIUM CHLORIDE: 600; 310; 30; 20 INJECTION, SOLUTION INTRAVENOUS at 02:27

## 2024-08-31 RX ADMIN — ONDANSETRON 4 MG: 2 INJECTION INTRAMUSCULAR; INTRAVENOUS at 15:01

## 2024-08-31 RX ADMIN — OXYCODONE HYDROCHLORIDE 10 MG: 10 TABLET ORAL at 15:20

## 2024-08-31 RX ADMIN — ONDANSETRON 4 MG: 2 INJECTION INTRAMUSCULAR; INTRAVENOUS at 09:08

## 2024-08-31 RX ADMIN — PIPERACILLIN AND TAZOBACTAM 4.5 G: 4; .5 INJECTION, POWDER, FOR SOLUTION INTRAVENOUS at 05:19

## 2024-08-31 RX ADMIN — CARVEDILOL 12.5 MG: 12.5 TABLET, FILM COATED ORAL at 18:05

## 2024-08-31 RX ADMIN — GUAIFENESIN 600 MG: 600 TABLET, EXTENDED RELEASE ORAL at 18:04

## 2024-08-31 RX ADMIN — PIPERACILLIN AND TAZOBACTAM 4.5 G: 4; .5 INJECTION, POWDER, FOR SOLUTION INTRAVENOUS at 20:54

## 2024-08-31 RX ADMIN — ONDANSETRON 4 MG: 2 INJECTION INTRAMUSCULAR; INTRAVENOUS at 01:03

## 2024-08-31 RX ADMIN — PIPERACILLIN AND TAZOBACTAM 4.5 G: 4; .5 INJECTION, POWDER, FOR SOLUTION INTRAVENOUS at 13:23

## 2024-08-31 RX ADMIN — PROCHLORPERAZINE EDISYLATE 10 MG: 5 INJECTION INTRAMUSCULAR; INTRAVENOUS at 17:21

## 2024-08-31 RX ADMIN — AMLODIPINE BESYLATE 5 MG: 5 TABLET ORAL at 18:04

## 2024-08-31 RX ADMIN — OXYCODONE HYDROCHLORIDE 5 MG: 5 TABLET ORAL at 05:22

## 2024-08-31 RX ADMIN — GABAPENTIN 300 MG: 300 CAPSULE ORAL at 05:15

## 2024-08-31 RX ADMIN — FERROUS SULFATE TAB 325 MG (65 MG ELEMENTAL FE) 325 MG: 325 (65 FE) TAB at 08:59

## 2024-08-31 RX ADMIN — HYDROMORPHONE HYDROCHLORIDE 0.5 MG: 1 INJECTION, SOLUTION INTRAMUSCULAR; INTRAVENOUS; SUBCUTANEOUS at 17:19

## 2024-08-31 RX ADMIN — GUAIFENESIN 600 MG: 600 TABLET, EXTENDED RELEASE ORAL at 05:15

## 2024-08-31 RX ADMIN — CITALOPRAM 40 MG: 40 TABLET, FILM COATED ORAL at 05:15

## 2024-08-31 RX ADMIN — Medication 5 MG: at 20:51

## 2024-08-31 ASSESSMENT — ENCOUNTER SYMPTOMS
HALLUCINATIONS: 0
CHILLS: 0
WEIGHT LOSS: 0
FOCAL WEAKNESS: 0
BACK PAIN: 0
CONSTIPATION: 0
EYE PAIN: 0
NAUSEA: 0
SENSORY CHANGE: 0
VOMITING: 0
NECK PAIN: 0
HEADACHES: 0
SPUTUM PRODUCTION: 0
PHOTOPHOBIA: 0
DIZZINESS: 0
SHORTNESS OF BREATH: 0
ORTHOPNEA: 0
TREMORS: 0
BLURRED VISION: 0
ABDOMINAL PAIN: 1
TINGLING: 0
SPEECH CHANGE: 0
FEVER: 0
DOUBLE VISION: 0
COUGH: 0
PALPITATIONS: 0
WEAKNESS: 1
MYALGIAS: 0
DIARRHEA: 0

## 2024-08-31 ASSESSMENT — PAIN DESCRIPTION - PAIN TYPE
TYPE: ACUTE PAIN
TYPE: ACUTE PAIN

## 2024-08-31 ASSESSMENT — LIFESTYLE VARIABLES: SUBSTANCE_ABUSE: 0

## 2024-08-31 ASSESSMENT — FIBROSIS 4 INDEX: FIB4 SCORE: 1.57

## 2024-08-31 NOTE — CARE PLAN
The patient is Stable - Low risk of patient condition declining or worsening    Shift Goals  Clinical Goals: maintain vac drains and urostomy, rate pain <5 by end of shift  Patient Goals: comfort, pain management  Family Goals: ag    Progress made toward(s) clinical / shift goals:  Pt alert an oriented, makes needs known. Treated pain with PRN medication, other medication administered per MAR. Repositioned throughout shift, tolerated well. Wound vac dressings are clean, dry, and intact. Free of falls this shift. Fall precautions in place    Problem: Knowledge Deficit - Standard  Goal: Patient and family/care givers will demonstrate understanding of plan of care, disease process/condition, diagnostic tests and medications  Outcome: Progressing     Problem: Pain - Standard  Goal: Alleviation of pain or a reduction in pain to the patient’s comfort goal  Outcome: Progressing     Problem: Skin Integrity  Goal: Skin integrity is maintained or improved  Outcome: Progressing     Problem: Fall Risk  Goal: Patient will remain free from falls  Outcome: Progressing     Patient is not progressing towards the following goals:

## 2024-08-31 NOTE — PROGRESS NOTES
Surgery  Wound care notes reviewed  Tolerating diet, nausea with abx.   Having bm's  Dressings intact  WBC nl  HG stable  Trending towards discharge  Soft diet, advance as tolerated  PT/OT  Appreciate medicine assistance.

## 2024-08-31 NOTE — CARE PLAN
The patient is Stable - Low risk of patient condition declining or worsening    Shift Goals  Clinical Goals: maintain vac drains and urostomy, rate pain <5 by end of shift  Patient Goals: comfort, pain management  Family Goals: ag    Progress made toward(s) clinical / shift goals:  Pt alert and able to make needs known. Call light and personal belongings in reach. Bed locked in lowest position with bed alarm on. Q 2 turns in place, BERNARDA mattress in use. No signs of leakage from wound vac or urostomy. Pt continuing to have loose stools, stool softeners held and MD notified. Pt given medications for nausea and vomiting. All needs met at this time.     Problem: Knowledge Deficit - Standard  Goal: Patient and family/care givers will demonstrate understanding of plan of care, disease process/condition, diagnostic tests and medications  Outcome: Progressing     Problem: Pain - Standard  Goal: Alleviation of pain or a reduction in pain to the patient’s comfort goal  Outcome: Progressing     Problem: Skin Integrity  Goal: Skin integrity is maintained or improved  Outcome: Progressing       Patient is not progressing towards the following goals:

## 2024-08-31 NOTE — WOUND TEAM
"  [facility]     Inpatient Services     Established Ostomy Management/ troubleshooting      Plan: Bedside RNs to assist pt with appliance changes. Ostomy RN to remain available PRN for any needs.    HPI: Reviewed  PMH: Reviewed   SH: Reviewed     Reason for Ostomy nurse consult:  Established urostomy    Ostomy History:     Patient was able to manage her ostomy until recently when she had emergent surgery. Per patient she has been leaking every day and she is fearful that the leaking compromised the healing of her abdominal incisions.      Urostomy 01/07/21 Ileal conduit LLQ (Active)   Wound Image   08/30/24 1449   Stomal Appliance Assessment Changed 08/30/24 1449   Stoma Assessment Red 08/30/24 1449   Stoma Shape Oval;Flush 08/30/24 1449   Stoma Size (in) 1.5 08/29/24 0900   Peristomal Assessment Red 08/30/24 1449   Mucocutaneous Junction Intact 08/30/24 1449   Treatment Appliance Changed;Cleansed with water/washcloth;Site care 08/30/24 1449   Peristomal Protectant Paste Ring 08/30/24 1449   Stomal Appliance Paste Ring, 2\";Urostomy Pouch;2 1/4\" (57mm) CTF 08/30/24 1449   Output (mL) 1000 mL 08/30/24 0500   Output Color Yellow 08/30/24 1449   WOUND RN ONLY - Stomal Appliance  2 Piece;Paste Ring, 2\";2 1/4\" (57mm) CTF 08/30/24 1449   Appliance Brand Hinckley 08/30/24 1449   Secure Start completed No 08/30/24 1449   WOUND NURSE ONLY - Time Spent with Patient (mins) 30 08/30/24 1449       Urostomy Ureterostomy left LLQ (Active)           Interventions and Education (if needed): Previous appliance removed using push/pull method. Peristomal skin and stoma cleansed with warm washcloths then dried. Stoma powder sprinkled to peristomal skin, excess powder wiped, then no sting skin prep applied to form \"crust.\" Crusting performed twice. Using plastic backing from stencil, stoma traced. Stencil utilized to cut barrier, then barrier measured against stoma to ensure accurate size. Paste ring then applied to back " of barrier. Pouch attached to barrier and connected to down drain bag.      Evaluation: Patient previously cared for urostomy independently. Stoma unfortunately close to LQ abdominal wound and wound vac and may need to be changed during VAC changes.       Anticipated discharge needs: TBD

## 2024-08-31 NOTE — WOUND TEAM
Assisted Marisa KELLY (Wound RN), with wound care, non-selective debridement performed using wound cleanser/NS and gauze. Please see Marisa KELLY (Wound RN) wound note for further wound care details.

## 2024-08-31 NOTE — PROGRESS NOTES
Hospital Medicine Daily Progress Note    Date of Service  8/31/2024    Chief Complaint  Patricia A Tietz is a 59 y.o. female admitted 8/20/2024 with hernia repair    Hospital Course  60yo PMHx urostomy with ileal conduit as a child, HTN, AFib, on apixaban, chronic hypoxic resp failure on 2LNC at baseline.  She was at [facility] in May with an SBO and UTI (Klebsiella and Pseudomonas).  Admitted on 8/20 for planned Ex lap and reapir of 10cm abd wall hernia with mycocutaneous flap trunk with mesh and replacement of catheter into the stoma..  In PACU pt was hypotensive requiring Rudi-Synephrine and fluids.  She was admitted to IMCU    Interval update:  Evaluated patient at bedside on 8/31  Discussed with surgeon  Will see how she does and consider discharge in the next day or 2  Cultures growing Pseudomonas, I have escalated to Zosyn    I have discussed this patient's plan of care and discharge plan at IDT rounds today with Case Management, Nursing, Nursing leadership, and other members of the IDT team.    Consultants/Specialty  general surgery and urology    Code Status  Full Code    Disposition  Medically Cleared  I have placed the appropriate orders for post-discharge needs.    Review of Systems  Review of Systems   Constitutional:  Positive for malaise/fatigue. Negative for chills, fever and weight loss.        Decreased appetite   HENT:  Negative for hearing loss and tinnitus.    Eyes:  Negative for blurred vision, double vision, photophobia and pain.   Respiratory:  Negative for cough, sputum production and shortness of breath.    Cardiovascular:  Negative for chest pain, palpitations, orthopnea and leg swelling.   Gastrointestinal:  Positive for abdominal pain. Negative for constipation, diarrhea, nausea and vomiting.   Genitourinary:  Negative for dysuria, frequency and urgency.   Musculoskeletal:  Negative for back pain, joint pain, myalgias and neck pain.   Skin:  Negative for rash.   Neurological:  Positive for  weakness. Negative for dizziness, tingling, tremors, sensory change, speech change, focal weakness and headaches.   Psychiatric/Behavioral:  Negative for hallucinations and substance abuse.    All other systems reviewed and are negative.       Physical Exam  Temp:  [36 °C (96.8 °F)-36.7 °C (98.1 °F)] 36.7 °C (98.1 °F)  Pulse:  [61-68] 61  Resp:  [18] 18  BP: (104-123)/(64-71) 115/69  SpO2:  [96 %-98 %] 97 %    Physical Exam  Vitals reviewed.   Constitutional:       General: She is not in acute distress.     Appearance: Normal appearance. She is obese. She is ill-appearing.   HENT:      Head: Normocephalic and atraumatic.      Nose: No congestion.   Eyes:      General:         Right eye: No discharge.         Left eye: No discharge.      Pupils: Pupils are equal, round, and reactive to light.   Cardiovascular:      Rate and Rhythm: Normal rate and regular rhythm.      Pulses: Normal pulses.      Heart sounds: Normal heart sounds. No murmur heard.  Pulmonary:      Effort: Pulmonary effort is normal. No respiratory distress.      Breath sounds: Normal breath sounds. No stridor.   Abdominal:      General: Bowel sounds are normal. There is no distension.      Palpations: Abdomen is soft.      Tenderness: There is abdominal tenderness (Mild).      Comments: Dressing present  WERNER drain  Wound VAC in place   Musculoskeletal:         General: No swelling or tenderness. Normal range of motion.      Cervical back: Normal range of motion. No rigidity.   Skin:     General: Skin is warm.      Capillary Refill: Capillary refill takes less than 2 seconds.      Coloration: Skin is not jaundiced or pale.      Findings: No bruising.   Neurological:      General: No focal deficit present.      Mental Status: She is alert and oriented to person, place, and time.      Cranial Nerves: No cranial nerve deficit.      Comments: Decreased range of motion bilateral lower extremities   Psychiatric:         Mood and Affect: Mood normal.          Behavior: Behavior normal.         Fluids    Intake/Output Summary (Last 24 hours) at 8/31/2024 1534  Last data filed at 8/31/2024 0400  Gross per 24 hour   Intake --   Output 2600 ml   Net -2600 ml       Laboratory  Recent Labs     08/29/24  0240 08/29/24  1515 08/30/24  0743 08/31/24  0856   WBC 13.8*  --  9.2 8.8   RBC 2.48*  --  2.46* 2.57*   HEMOGLOBIN 7.4* 8.1* 7.2* 7.6*   HEMATOCRIT 23.6* 25.8* 23.4* 24.5*   MCV 95.2  --  95.1 95.3   MCH 29.8  --  29.3 29.6   MCHC 31.4*  --  30.8* 31.0*   RDW 48.6  --  46.8 46.8   PLATELETCT 239  --  261 262   MPV 10.2  --  10.6 10.5     Recent Labs     08/29/24  0754 08/29/24  1515 08/30/24  0743 08/31/24  0856   SODIUM 134*  --  134* 135   POTASSIUM 5.6* 5.5 5.6* 5.1   CHLORIDE 103  --  101 101   CO2 23  --  24 25   GLUCOSE 167*  --  151* 125*   BUN 28*  --  27* 27*   CREATININE 1.09  --  0.89 0.99   CALCIUM 10.3  --  10.1 10.0                   Imaging  DX-CHEST-PORTABLE (1 VIEW)   Final Result      1. New linear parenchymal opacities in the right midlung and right lung base, compatible with atelectasis or infiltrates.   2. The remainder of the lungs is clear.   3. Interval removal of the right upper upper extremity PICC catheter.           Assessment/Plan  * Shock (HCC)- (present on admission)  Assessment & Plan  Post operative requiring Rudi synephrine initially and then Levophed in the unit  Vasopressors DC'd early this am  Cortisol>30  No evidence of sepsis  Cont to monitor and resume BP meds as pressures allow    8/23 resolved, monitor      Hyperkalemia  Assessment & Plan  She found to hyperkalemia although renal function has been improving.  She was on lisinopril I discontinued her lisinopril.  Ordered repeat potassium level that showed improvement from 5.7-5.6.  I do repeat potassium level at 3 PM and H&H.    Leukocytosis  Assessment & Plan  White blood cell count is trending down current white blood cell count is 14.3.  Continue antibiotics  Ordered CBC for  tomorrow.    Parastomal hernia  Assessment & Plan  Now s/p repair on 8/20  NGT and 3 WERNER drains in place  Srgry and Urology following    8/22  Belly still quiet, no flatus or BM  Monitor for signs of return of bowel function  Prn pain and N management  Monitor daily BMP for renal function and electrolye abnormalities  Resume po once OK with Srgcl team  Cont supportive care with IVFs, electrolyte replacement, antiemetics and pain medication  Daily BMP to monitor for RAO, hypo K, acidosis    8/23 surgery following, okay for NG tube removal  Diet per surgery  SLP evaluation  Drains in place    8/24 following, drains per surgery, greater than 162 mL output/24 hours  Positive BM, diet per surgery, consider initiation of clear liquid diet  Pain control follow-up labs  Patient is from [facility], to return when medically cleared    8/25 discussed with surgery, Dr. Mercado  150 mL output over 24 hours, plan for drain removal per surgery, plan to remove by Tuesday if decreased output  Likely return to skilled if clinically stable  Diet advance per surgery    8/26 tolerating oral intake, generalized weakness  1 of 3 WERNER drains out, removal per surgery  Transfer to skilled facility, [facility] in 2 to 3 days    8/27 surgery Dr. Mercado and urology is following her.  Continue antibiotics.  Continue to provide her pain control.    8/28 I patient discussed case with surgery Dr. Mercado who recommended that patient will need to go to the OR for another wound VAC placement.  I made her n.p.o. and started on IV fluid for hydration.    8/29 she underwent Postoperative wound infection washout and negative pressure dressing placemen on August 29, 2024.  Continue to provider pain control.  I started her on Ancef for next 3 days.  She received oral Augmentin.        Obesity- (present on admission)  Assessment & Plan  BMI 46  Lifestyle modifications  Outpatient follow-up    Atrial fibrillation (HCC)- (present on admission)  Assessment  & Plan  8/21 Hx of  Has been paced and sinus overnight  Cont Coreg with parameters  Cont Tele  Resume apixaban when OK with Surgical team    8/24 okay to restart Eliquis per surgery, will monitor  Follow-up CBC    8/25 restarted on Eliquis, hemoglobin from 10-7, will monitor for signs of bleeding  Serous discharge from drains  Discussed with surgery, will continue to monitor  May need transfusion if hemoglobin less than 7, monitor closely    8/26 resume Eliquis    8/27 currently rate controlled.  On continuing Eliquis.  Optimize electrolytes.    8/28 patient is going for surgical intervention plan is to hold Eliquis.    8/29 holding Eliquis for now as hemoglobin remains low and she may need blood transfusion.    Chronic respiratory failure with hypoxia (HCC)- (present on admission)  Assessment & Plan  At baseline is on 2 LNC  Mobilize  O2/RT protocols  Monitor for aspiration  Monitor vol status    8/23 patient is from [facility], to return to [facility] once medically cleared, continue to monitor    8/26 now on 3 to 4 L, taper as tolerated  Encourage I-S use    8/27 currently she is requiring 3 L of oxygen to maintain oxygen saturation.  Continue to titrate down oxygen as tolerated.    8/29 currently she is requiring 3 L of oxygen to maintain oxygen saturation.    Pacemaker- (present on admission)  Assessment & Plan  On Tele pt has been intermitently paced/sinus  Now she transferred to medical floor.    RAO (acute kidney injury) (HCC)- (present on admission)  Assessment & Plan    Good UOP  At risk for dehydration given output from NGT and 3 JPs (close to 600ml/24hrs)  Renal function has been improving.  Current BUN is 28 and creatinine 1.09  Avoid nephrotoxins  Ordered lab work for tomorrow    Anemia- (present on admission)  Assessment & Plan  8/25 hemoglobin of 10-6, after starting Eliquis  May be dilutional  BP stable  CBC every 12, transfuse for hemoglobin less than 7  May be secondary to recent  surgery    8/26 improving hemoglobin now at 9, no signs of active bleed, will restart Eliquis  Follow-up a.m. labs    8/27 hemoglobin remained stable.  No signs of active bleeding.  Close monitoring on Eliquis    8/28 no signs of active bleeding.  I placed hold on Eliquis as now she is going for surgical intervention.    8/29 I spoke to general surgery yesterday as patient found to hemoglobin of 6.8 and repeat hemoglobin back above 7 so I held blood transfusion.  Continue to monitor closely and transfuse if her blood COVID below 7.  Holding Eliquis      Presence of urostomy (HCC)- (present on admission)  Assessment & Plan  8/22 Chronic since 5 years old now with surgery by Dr. Gusman and Dr. Mercado  She has 3 WERNER drains in  Pain control with narcotics as indicated  SCDs for DVT prophylaxis  She has an NG tube in  Urology following  Good UOP: cont to monitor    8/23 okay for NG tube removal per surgery    Urology evaluated her and recommendations.    8/25 seen by urology, will monitor    8/26 urology has been following her.  Continue to monitor    8/28 urology evaluated her and recommended outpatient follow-up.         I discussed plan of care during multidisciplinary rounds regarding patient's current medical condition and plan of care.    I reviewed general surgery note    VTE prophylaxis: VTE Selection    I have performed a physical exam and reviewed and updated ROS and Plan today (8/31/2024). In review of yesterday's note (8/30/2024), there are no changes except as documented above.

## 2024-09-01 LAB
BACTERIA SPEC ANAEROBE CULT: NORMAL
SIGNIFICANT IND 70042: NORMAL
SITE SITE: NORMAL
SOURCE SOURCE: NORMAL

## 2024-09-01 PROCEDURE — A9270 NON-COVERED ITEM OR SERVICE: HCPCS | Performed by: HOSPITALIST

## 2024-09-01 PROCEDURE — A9270 NON-COVERED ITEM OR SERVICE: HCPCS | Performed by: STUDENT IN AN ORGANIZED HEALTH CARE EDUCATION/TRAINING PROGRAM

## 2024-09-01 PROCEDURE — A9270 NON-COVERED ITEM OR SERVICE: HCPCS | Performed by: SURGERY

## 2024-09-01 PROCEDURE — 700102 HCHG RX REV CODE 250 W/ 637 OVERRIDE(OP): Performed by: SURGERY

## 2024-09-01 PROCEDURE — 770006 HCHG ROOM/CARE - MED/SURG/GYN SEMI*

## 2024-09-01 PROCEDURE — 99233 SBSQ HOSP IP/OBS HIGH 50: CPT | Performed by: STUDENT IN AN ORGANIZED HEALTH CARE EDUCATION/TRAINING PROGRAM

## 2024-09-01 PROCEDURE — 700102 HCHG RX REV CODE 250 W/ 637 OVERRIDE(OP): Performed by: STUDENT IN AN ORGANIZED HEALTH CARE EDUCATION/TRAINING PROGRAM

## 2024-09-01 PROCEDURE — 700111 HCHG RX REV CODE 636 W/ 250 OVERRIDE (IP): Performed by: SURGERY

## 2024-09-01 PROCEDURE — 700105 HCHG RX REV CODE 258: Performed by: SURGERY

## 2024-09-01 PROCEDURE — 700111 HCHG RX REV CODE 636 W/ 250 OVERRIDE (IP): Mod: JZ | Performed by: HOSPITALIST

## 2024-09-01 PROCEDURE — 700111 HCHG RX REV CODE 636 W/ 250 OVERRIDE (IP): Mod: JZ | Performed by: STUDENT IN AN ORGANIZED HEALTH CARE EDUCATION/TRAINING PROGRAM

## 2024-09-01 PROCEDURE — 700105 HCHG RX REV CODE 258: Performed by: STUDENT IN AN ORGANIZED HEALTH CARE EDUCATION/TRAINING PROGRAM

## 2024-09-01 PROCEDURE — 700102 HCHG RX REV CODE 250 W/ 637 OVERRIDE(OP): Performed by: HOSPITALIST

## 2024-09-01 RX ORDER — SIMETHICONE 125 MG
125 TABLET,CHEWABLE ORAL 3 TIMES DAILY PRN
Status: DISCONTINUED | OUTPATIENT
Start: 2024-09-01 | End: 2024-09-04 | Stop reason: HOSPADM

## 2024-09-01 RX ADMIN — PIPERACILLIN AND TAZOBACTAM 4.5 G: 4; .5 INJECTION, POWDER, FOR SOLUTION INTRAVENOUS at 05:39

## 2024-09-01 RX ADMIN — CITALOPRAM 40 MG: 40 TABLET, FILM COATED ORAL at 05:37

## 2024-09-01 RX ADMIN — CARVEDILOL 12.5 MG: 12.5 TABLET, FILM COATED ORAL at 17:37

## 2024-09-01 RX ADMIN — ONDANSETRON 4 MG: 2 INJECTION INTRAMUSCULAR; INTRAVENOUS at 13:02

## 2024-09-01 RX ADMIN — Medication 5 MG: at 19:57

## 2024-09-01 RX ADMIN — FERROUS SULFATE TAB 325 MG (65 MG ELEMENTAL FE) 325 MG: 325 (65 FE) TAB at 08:59

## 2024-09-01 RX ADMIN — HYDROMORPHONE HYDROCHLORIDE 0.5 MG: 1 INJECTION, SOLUTION INTRAMUSCULAR; INTRAVENOUS; SUBCUTANEOUS at 21:43

## 2024-09-01 RX ADMIN — GABAPENTIN 300 MG: 300 CAPSULE ORAL at 05:37

## 2024-09-01 RX ADMIN — GUAIFENESIN 600 MG: 600 TABLET, EXTENDED RELEASE ORAL at 05:37

## 2024-09-01 RX ADMIN — SODIUM CHLORIDE, POTASSIUM CHLORIDE, SODIUM LACTATE AND CALCIUM CHLORIDE: 600; 310; 30; 20 INJECTION, SOLUTION INTRAVENOUS at 00:48

## 2024-09-01 RX ADMIN — OXYCODONE HYDROCHLORIDE 5 MG: 5 TABLET ORAL at 19:57

## 2024-09-01 RX ADMIN — SIMETHICONE 125 MG: 125 TABLET, CHEWABLE ORAL at 16:14

## 2024-09-01 RX ADMIN — AMLODIPINE BESYLATE 5 MG: 5 TABLET ORAL at 17:37

## 2024-09-01 RX ADMIN — OXYCODONE HYDROCHLORIDE 5 MG: 5 TABLET ORAL at 01:02

## 2024-09-01 RX ADMIN — PIPERACILLIN AND TAZOBACTAM 4.5 G: 4; .5 INJECTION, POWDER, FOR SOLUTION INTRAVENOUS at 13:10

## 2024-09-01 RX ADMIN — PROMETHAZINE HYDROCHLORIDE 25 MG: 25 TABLET ORAL at 21:43

## 2024-09-01 RX ADMIN — GUAIFENESIN 600 MG: 600 TABLET, EXTENDED RELEASE ORAL at 17:37

## 2024-09-01 RX ADMIN — OXYCODONE HYDROCHLORIDE 5 MG: 5 TABLET ORAL at 12:29

## 2024-09-01 RX ADMIN — PIPERACILLIN AND TAZOBACTAM 4.5 G: 4; .5 INJECTION, POWDER, FOR SOLUTION INTRAVENOUS at 21:37

## 2024-09-01 RX ADMIN — ONDANSETRON 4 MG: 2 INJECTION INTRAMUSCULAR; INTRAVENOUS at 20:05

## 2024-09-01 ASSESSMENT — ENCOUNTER SYMPTOMS
PHOTOPHOBIA: 0
DOUBLE VISION: 0
HEADACHES: 0
ABDOMINAL PAIN: 1
TREMORS: 0
CHILLS: 0
WEIGHT LOSS: 0
VOMITING: 0
NAUSEA: 0
TINGLING: 0
FOCAL WEAKNESS: 0
HALLUCINATIONS: 0
CONSTIPATION: 0
NECK PAIN: 0
SHORTNESS OF BREATH: 0
EYE PAIN: 0
BLURRED VISION: 0
FEVER: 0
DIZZINESS: 0
COUGH: 0
SPUTUM PRODUCTION: 0
SENSORY CHANGE: 0
MYALGIAS: 0
BACK PAIN: 0
DIARRHEA: 0
PALPITATIONS: 0
SPEECH CHANGE: 0
WEAKNESS: 1
ORTHOPNEA: 0

## 2024-09-01 ASSESSMENT — PAIN DESCRIPTION - PAIN TYPE
TYPE: ACUTE PAIN

## 2024-09-01 ASSESSMENT — LIFESTYLE VARIABLES: SUBSTANCE_ABUSE: 0

## 2024-09-01 NOTE — DISCHARGE PLANNING
Addendum:  9-1-24/1654  KYRIE AMARAL spoke with Tree with [facility].  He will order regular wound vac today.  He will call CM when it is delivered to their facility.      Case Management Discharge Planning    Admission Date: 8/20/2024  GMLOS: 8.9  ALOS: 12    6-Clicks ADL Score:    6-Clicks Mobility Score:    PT and/or OT Eval ordered: NA  Post-acute Referrals Ordered: Yes  Post-acute Choice Obtained: No  Has referral(s) been sent to post-acute provider:  Yes      Anticipated Discharge Dispo: Discharge Disposition: D/T to SNF with Medicare cert in anticipation of skilled care (03)  Discharge Address: [address] [place] [address]  Discharge Contact Phone Number: [phone] (Neela (friend) : Neo (brother) lives in [place] and is an Emergency Contact)    DME Needed: Regular Wound Vac    Action(s) Taken:   Next wound vac dressing change is 9-2-24 per wound team note.  RN CCM lvm with [facility] with request to return call.    Medically Clear: Yes  8-30-24    Next Steps:   Follow up with [facility].  Obtain choice.    Barriers to Discharge: Pending Placement

## 2024-09-01 NOTE — PROGRESS NOTES
Hospital Medicine Daily Progress Note    Date of Service  9/1/2024    Chief Complaint  Patricia A Tietz is a 59 y.o. female admitted 8/20/2024 with hernia repair    Hospital Course  60yo PMHx urostomy with ileal conduit as a child, HTN, AFib, on apixaban, chronic hypoxic resp failure on 2LNC at baseline.  She was at [facility] in May with an SBO and UTI (Klebsiella and Pseudomonas).  Admitted on 8/20 for planned Ex lap and reapir of 10cm abd wall hernia with mycocutaneous flap trunk with mesh and replacement of catheter into the stoma..  In PACU pt was hypotensive requiring Rudi-Synephrine and fluids.  She was admitted to IMCU    Interval update:  Evaluated patient at bedside on 9/1  Discussed with surgeon  Will see how she does and consider discharge in the next day or 2  Cultures growing Pseudomonas, I have escalated to Zosyn    I have discussed this patient's plan of care and discharge plan at IDT rounds today with Case Management, Nursing, Nursing leadership, and other members of the IDT team.    Consultants/Specialty  general surgery and urology    Code Status  Full Code    Disposition  Medically Cleared  I have placed the appropriate orders for post-discharge needs.    Review of Systems  Review of Systems   Constitutional:  Positive for malaise/fatigue. Negative for chills, fever and weight loss.        Decreased appetite   HENT:  Negative for hearing loss and tinnitus.    Eyes:  Negative for blurred vision, double vision, photophobia and pain.   Respiratory:  Negative for cough, sputum production and shortness of breath.    Cardiovascular:  Negative for chest pain, palpitations, orthopnea and leg swelling.   Gastrointestinal:  Positive for abdominal pain. Negative for constipation, diarrhea, nausea and vomiting.   Genitourinary:  Negative for dysuria, frequency and urgency.   Musculoskeletal:  Negative for back pain, joint pain, myalgias and neck pain.   Skin:  Negative for rash.   Neurological:  Positive for  weakness. Negative for dizziness, tingling, tremors, sensory change, speech change, focal weakness and headaches.   Psychiatric/Behavioral:  Negative for hallucinations and substance abuse.    All other systems reviewed and are negative.       Physical Exam  Temp:  [35.8 °C (96.5 °F)-36.7 °C (98.1 °F)] 35.8 °C (96.5 °F)  Pulse:  [60-61] 60  Resp:  [16-18] 17  BP: (106-117)/(51-69) 115/68  SpO2:  [97 %-98 %] 98 %    Physical Exam  Vitals reviewed.   Constitutional:       General: She is not in acute distress.     Appearance: Normal appearance. She is obese. She is ill-appearing.   HENT:      Head: Normocephalic and atraumatic.      Nose: No congestion.   Eyes:      General:         Right eye: No discharge.         Left eye: No discharge.      Pupils: Pupils are equal, round, and reactive to light.   Cardiovascular:      Rate and Rhythm: Normal rate and regular rhythm.      Pulses: Normal pulses.      Heart sounds: Normal heart sounds. No murmur heard.  Pulmonary:      Effort: Pulmonary effort is normal. No respiratory distress.      Breath sounds: Normal breath sounds. No stridor.   Abdominal:      General: Bowel sounds are normal. There is no distension.      Palpations: Abdomen is soft.      Tenderness: There is abdominal tenderness (Mild).      Comments: Dressing present  WERNER drain  Wound VAC in place   Musculoskeletal:         General: No swelling or tenderness. Normal range of motion.      Cervical back: Normal range of motion. No rigidity.   Skin:     General: Skin is warm.      Capillary Refill: Capillary refill takes less than 2 seconds.      Coloration: Skin is not jaundiced or pale.      Findings: No bruising.   Neurological:      General: No focal deficit present.      Mental Status: She is alert and oriented to person, place, and time.      Cranial Nerves: No cranial nerve deficit.      Comments: Decreased range of motion bilateral lower extremities   Psychiatric:         Mood and Affect: Mood normal.          Behavior: Behavior normal.         Fluids    Intake/Output Summary (Last 24 hours) at 9/1/2024 1336  Last data filed at 9/1/2024 1000  Gross per 24 hour   Intake 240 ml   Output 2500 ml   Net -2260 ml       Laboratory  Recent Labs     08/29/24  1515 08/30/24  0743 08/31/24  0856   WBC  --  9.2 8.8   RBC  --  2.46* 2.57*   HEMOGLOBIN 8.1* 7.2* 7.6*   HEMATOCRIT 25.8* 23.4* 24.5*   MCV  --  95.1 95.3   MCH  --  29.3 29.6   MCHC  --  30.8* 31.0*   RDW  --  46.8 46.8   PLATELETCT  --  261 262   MPV  --  10.6 10.5     Recent Labs     08/29/24  1515 08/30/24  0743 08/31/24  0856   SODIUM  --  134* 135   POTASSIUM 5.5 5.6* 5.1   CHLORIDE  --  101 101   CO2  --  24 25   GLUCOSE  --  151* 125*   BUN  --  27* 27*   CREATININE  --  0.89 0.99   CALCIUM  --  10.1 10.0                   Imaging  DX-CHEST-PORTABLE (1 VIEW)   Final Result      1. New linear parenchymal opacities in the right midlung and right lung base, compatible with atelectasis or infiltrates.   2. The remainder of the lungs is clear.   3. Interval removal of the right upper upper extremity PICC catheter.           Assessment/Plan  * Shock (HCC)- (present on admission)  Assessment & Plan  Post operative requiring Rudi synephrine initially and then Levophed in the unit  Vasopressors DC'd early this am  Cortisol>30  No evidence of sepsis  Cont to monitor and resume BP meds as pressures allow    8/23 resolved, monitor      Hyperkalemia  Assessment & Plan  She found to hyperkalemia although renal function has been improving.  She was on lisinopril I discontinued her lisinopril.  Ordered repeat potassium level that showed improvement from 5.7-5.6.  I do repeat potassium level at 3 PM and H&H.    Leukocytosis  Assessment & Plan  White blood cell count is trending down current white blood cell count is 14.3.  Continue antibiotics  Ordered CBC for tomorrow.    Parastomal hernia  Assessment & Plan  Now s/p repair on 8/20  NGT and 3 WERNER drains in place  Srgry and  Urology following    8/22  Belly still quiet, no flatus or BM  Monitor for signs of return of bowel function  Prn pain and N management  Monitor daily BMP for renal function and electrolye abnormalities  Resume po once OK with Srgcl team  Cont supportive care with IVFs, electrolyte replacement, antiemetics and pain medication  Daily BMP to monitor for RAO, hypo K, acidosis    8/23 surgery following, okay for NG tube removal  Diet per surgery  SLP evaluation  Drains in place    8/24 following, drains per surgery, greater than 162 mL output/24 hours  Positive BM, diet per surgery, consider initiation of clear liquid diet  Pain control follow-up labs  Patient is from [facility], to return when medically cleared    8/25 discussed with surgery, Dr. Mercado  150 mL output over 24 hours, plan for drain removal per surgery, plan to remove by Tuesday if decreased output  Likely return to skilled if clinically stable  Diet advance per surgery    8/26 tolerating oral intake, generalized weakness  1 of 3 WERNER drains out, removal per surgery  Transfer to skilled facility, [facility] in 2 to 3 days    8/27 surgery Dr. Mercado and urology is following her.  Continue antibiotics.  Continue to provide her pain control.    8/28 I patient discussed case with surgery Dr. Mercado who recommended that patient will need to go to the OR for another wound VAC placement.  I made her n.p.o. and started on IV fluid for hydration.    8/29 she underwent Postoperative wound infection washout and negative pressure dressing placemen on August 29, 2024.  Continue to provider pain control.  I started her on Ancef for next 3 days.  She received oral Augmentin.        Obesity- (present on admission)  Assessment & Plan  BMI 46  Lifestyle modifications  Outpatient follow-up    Atrial fibrillation (HCC)- (present on admission)  Assessment & Plan  8/21 Hx of  Has been paced and sinus overnight  Cont Coreg with parameters  Cont Tele  Resume apixaban  when OK with Surgical team    8/24 okay to restart Eliquis per surgery, will monitor  Follow-up CBC    8/25 restarted on Eliquis, hemoglobin from 10-7, will monitor for signs of bleeding  Serous discharge from drains  Discussed with surgery, will continue to monitor  May need transfusion if hemoglobin less than 7, monitor closely    8/26 resume Eliquis    8/27 currently rate controlled.  On continuing Eliquis.  Optimize electrolytes.    8/28 patient is going for surgical intervention plan is to hold Eliquis.    8/29 holding Eliquis for now as hemoglobin remains low and she may need blood transfusion.    Chronic respiratory failure with hypoxia (HCC)- (present on admission)  Assessment & Plan  At baseline is on 2 LNC  Mobilize  O2/RT protocols  Monitor for aspiration  Monitor vol status    8/23 patient is from [facility], to return to [facility] once medically cleared, continue to monitor    8/26 now on 3 to 4 L, taper as tolerated  Encourage I-S use    8/27 currently she is requiring 3 L of oxygen to maintain oxygen saturation.  Continue to titrate down oxygen as tolerated.    8/29 currently she is requiring 3 L of oxygen to maintain oxygen saturation.    Pacemaker- (present on admission)  Assessment & Plan  On Tele pt has been intermitently paced/sinus  Now she transferred to medical floor.    RAO (acute kidney injury) (HCC)- (present on admission)  Assessment & Plan    Good UOP  At risk for dehydration given output from NGT and 3 JPs (close to 600ml/24hrs)  Renal function has been improving.  Current BUN is 28 and creatinine 1.09  Avoid nephrotoxins  Ordered lab work for tomorrow    Anemia- (present on admission)  Assessment & Plan  8/25 hemoglobin of 10-6, after starting Eliquis  May be dilutional  BP stable  CBC every 12, transfuse for hemoglobin less than 7  May be secondary to recent surgery    8/26 improving hemoglobin now at 9, no signs of active bleed, will restart Eliquis  Follow-up a.m.  labs    8/27 hemoglobin remained stable.  No signs of active bleeding.  Close monitoring on Eliquis    8/28 no signs of active bleeding.  I placed hold on Eliquis as now she is going for surgical intervention.    8/29 I spoke to general surgery yesterday as patient found to hemoglobin of 6.8 and repeat hemoglobin back above 7 so I held blood transfusion.  Continue to monitor closely and transfuse if her blood COVID below 7.  Holding Eliquis      Presence of urostomy (HCC)- (present on admission)  Assessment & Plan  8/22 Chronic since 5 years old now with surgery by Dr. Gusman and Dr. Mercado  She has 3 WERNER drains in  Pain control with narcotics as indicated  SCDs for DVT prophylaxis  She has an NG tube in  Urology following  Good UOP: cont to monitor    8/23 okay for NG tube removal per surgery    Urology evaluated her and recommendations.    8/25 seen by urology, will monitor    8/26 urology has been following her.  Continue to monitor    8/28 urology evaluated her and recommended outpatient follow-up.         I discussed plan of care during multidisciplinary rounds regarding patient's current medical condition and plan of care.    I reviewed general surgery note    VTE prophylaxis: VTE Selection    I have performed a physical exam and reviewed and updated ROS and Plan today (9/1/2024). In review of yesterday's note (8/31/2024), there are no changes except as documented above.

## 2024-09-01 NOTE — PROGRESS NOTES
Surgery  Nausea with abx, othwerwise doing well  Cultures back  Wound vac in place  Ok to discharge when facility is ready, may be difficult over the weekend  Change to oral abx, tailor to cultures  Continue wound care.

## 2024-09-01 NOTE — CARE PLAN
The patient is Stable - Low risk of patient condition declining or worsening    Shift Goals  Clinical Goals: maintain vac, rate pain <5 by end of shift  Patient Goals: comfort, pain management  Family Goals: ag    Progress made toward(s) clinical / shift goals:  Pt alert and oriented, makes needs known. Medications administered per MAR, pain treated with PRN medication. 1 episode of nausea, no emesis, treated with zofran. Wound vac cannister changed. Remains free of falls this shift. Other needs met at this time.  Problem: Knowledge Deficit - Standard  Goal: Patient and family/care givers will demonstrate understanding of plan of care, disease process/condition, diagnostic tests and medications  Outcome: Progressing     Problem: Pain - Standard  Goal: Alleviation of pain or a reduction in pain to the patient’s comfort goal  Outcome: Progressing     Problem: Skin Integrity  Goal: Skin integrity is maintained or improved  Outcome: Progressing     Problem: Fall Risk  Goal: Patient will remain free from falls  Outcome: Progressing       Patient is not progressing towards the following goals:

## 2024-09-01 NOTE — CARE PLAN
The patient is Stable - Low risk of patient condition declining or worsening    Shift Goals  Clinical Goals: pt will report a pain level of 4 or less within 12 hour shift  Patient Goals: pain control; oral abx  Family Goals: ag    Progress made toward(s) clinical / shift goals:  Pt alert and able to make needs known. Call light and personal belongings in reach. Bed locked in lowest position with bed alarm on. BERNARDA mattress in place with q 2 turns. Pt had episodes of emesis, nausea medications given as ordered. Pt having loose stools. Hourly rounding in place. All needs met at this time.     Problem: Knowledge Deficit - Standard  Goal: Patient and family/care givers will demonstrate understanding of plan of care, disease process/condition, diagnostic tests and medications  Outcome: Progressing     Problem: Pain - Standard  Goal: Alleviation of pain or a reduction in pain to the patient’s comfort goal  Outcome: Progressing     Problem: Skin Integrity  Goal: Skin integrity is maintained or improved  Outcome: Progressing     Problem: Fall Risk  Goal: Patient will remain free from falls  Outcome: Progressing       Patient is not progressing towards the following goals:

## 2024-09-02 LAB
ALBUMIN SERPL BCP-MCNC: 2.8 G/DL (ref 3.2–4.9)
ALBUMIN/GLOB SERPL: 0.9 G/DL
ALP SERPL-CCNC: 55 U/L (ref 30–99)
ALT SERPL-CCNC: 22 U/L (ref 2–50)
ANION GAP SERPL CALC-SCNC: 9 MMOL/L (ref 7–16)
AST SERPL-CCNC: 12 U/L (ref 12–45)
BILIRUB SERPL-MCNC: 0.2 MG/DL (ref 0.1–1.5)
BUN SERPL-MCNC: 20 MG/DL (ref 8–22)
CALCIUM ALBUM COR SERPL-MCNC: 11.1 MG/DL (ref 8.5–10.5)
CALCIUM SERPL-MCNC: 10.1 MG/DL (ref 8.5–10.5)
CHLORIDE SERPL-SCNC: 99 MMOL/L (ref 96–112)
CO2 SERPL-SCNC: 28 MMOL/L (ref 20–33)
CREAT SERPL-MCNC: 1.04 MG/DL (ref 0.5–1.4)
ERYTHROCYTE [DISTWIDTH] IN BLOOD BY AUTOMATED COUNT: 45.6 FL (ref 35.9–50)
GFR SERPLBLD CREATININE-BSD FMLA CKD-EPI: 61 ML/MIN/1.73 M 2
GLOBULIN SER CALC-MCNC: 3.1 G/DL (ref 1.9–3.5)
GLUCOSE SERPL-MCNC: 105 MG/DL (ref 65–99)
HCT VFR BLD AUTO: 29.6 % (ref 37–47)
HGB BLD-MCNC: 9.6 G/DL (ref 12–16)
MCH RBC QN AUTO: 30.2 PG (ref 27–33)
MCHC RBC AUTO-ENTMCNC: 32.4 G/DL (ref 32.2–35.5)
MCV RBC AUTO: 93.1 FL (ref 81.4–97.8)
PLATELET # BLD AUTO: 259 K/UL (ref 164–446)
PMV BLD AUTO: 10.6 FL (ref 9–12.9)
POTASSIUM SERPL-SCNC: 4.8 MMOL/L (ref 3.6–5.5)
PROT SERPL-MCNC: 5.9 G/DL (ref 6–8.2)
RBC # BLD AUTO: 3.18 M/UL (ref 4.2–5.4)
SODIUM SERPL-SCNC: 136 MMOL/L (ref 135–145)
WBC # BLD AUTO: 10 K/UL (ref 4.8–10.8)

## 2024-09-02 PROCEDURE — 770006 HCHG ROOM/CARE - MED/SURG/GYN SEMI*

## 2024-09-02 PROCEDURE — 85027 COMPLETE CBC AUTOMATED: CPT

## 2024-09-02 PROCEDURE — 80053 COMPREHEN METABOLIC PANEL: CPT

## 2024-09-02 PROCEDURE — 700111 HCHG RX REV CODE 636 W/ 250 OVERRIDE (IP): Mod: JZ | Performed by: HOSPITALIST

## 2024-09-02 PROCEDURE — 36415 COLL VENOUS BLD VENIPUNCTURE: CPT

## 2024-09-02 PROCEDURE — 700102 HCHG RX REV CODE 250 W/ 637 OVERRIDE(OP): Performed by: SURGERY

## 2024-09-02 PROCEDURE — 700101 HCHG RX REV CODE 250: Performed by: STUDENT IN AN ORGANIZED HEALTH CARE EDUCATION/TRAINING PROGRAM

## 2024-09-02 PROCEDURE — 97606 NEG PRS WND THER DME>50 SQCM: CPT

## 2024-09-02 PROCEDURE — 99233 SBSQ HOSP IP/OBS HIGH 50: CPT | Performed by: STUDENT IN AN ORGANIZED HEALTH CARE EDUCATION/TRAINING PROGRAM

## 2024-09-02 PROCEDURE — A9270 NON-COVERED ITEM OR SERVICE: HCPCS | Performed by: SURGERY

## 2024-09-02 PROCEDURE — 700111 HCHG RX REV CODE 636 W/ 250 OVERRIDE (IP): Mod: JZ | Performed by: STUDENT IN AN ORGANIZED HEALTH CARE EDUCATION/TRAINING PROGRAM

## 2024-09-02 PROCEDURE — 700105 HCHG RX REV CODE 258: Performed by: STUDENT IN AN ORGANIZED HEALTH CARE EDUCATION/TRAINING PROGRAM

## 2024-09-02 PROCEDURE — 97602 WOUND(S) CARE NON-SELECTIVE: CPT

## 2024-09-02 RX ADMIN — GABAPENTIN 300 MG: 300 CAPSULE ORAL at 04:58

## 2024-09-02 RX ADMIN — Medication 5 MG: at 20:31

## 2024-09-02 RX ADMIN — GUAIFENESIN 600 MG: 600 TABLET, EXTENDED RELEASE ORAL at 04:58

## 2024-09-02 RX ADMIN — CARVEDILOL 12.5 MG: 12.5 TABLET, FILM COATED ORAL at 17:07

## 2024-09-02 RX ADMIN — LIDOCAINE HYDROCHLORIDE 1 APPLICATION: 40 SOLUTION ORAL at 09:30

## 2024-09-02 RX ADMIN — FERROUS SULFATE TAB 325 MG (65 MG ELEMENTAL FE) 325 MG: 325 (65 FE) TAB at 08:33

## 2024-09-02 RX ADMIN — PIPERACILLIN AND TAZOBACTAM 4.5 G: 4; .5 INJECTION, POWDER, FOR SOLUTION INTRAVENOUS at 20:34

## 2024-09-02 RX ADMIN — GUAIFENESIN 600 MG: 600 TABLET, EXTENDED RELEASE ORAL at 17:07

## 2024-09-02 RX ADMIN — AMLODIPINE BESYLATE 5 MG: 5 TABLET ORAL at 17:07

## 2024-09-02 RX ADMIN — OXYCODONE HYDROCHLORIDE 5 MG: 5 TABLET ORAL at 21:02

## 2024-09-02 RX ADMIN — CITALOPRAM 40 MG: 40 TABLET, FILM COATED ORAL at 04:58

## 2024-09-02 RX ADMIN — ONDANSETRON 4 MG: 2 INJECTION INTRAMUSCULAR; INTRAVENOUS at 17:07

## 2024-09-02 RX ADMIN — APIXABAN 5 MG: 5 TABLET, FILM COATED ORAL at 17:07

## 2024-09-02 RX ADMIN — OXYCODONE HYDROCHLORIDE 10 MG: 10 TABLET ORAL at 08:33

## 2024-09-02 RX ADMIN — PIPERACILLIN AND TAZOBACTAM 4.5 G: 4; .5 INJECTION, POWDER, FOR SOLUTION INTRAVENOUS at 05:02

## 2024-09-02 RX ADMIN — SCOPOLAMINE 1 PATCH: 1.5 PATCH, EXTENDED RELEASE TRANSDERMAL at 16:26

## 2024-09-02 RX ADMIN — PIPERACILLIN AND TAZOBACTAM 4.5 G: 4; .5 INJECTION, POWDER, FOR SOLUTION INTRAVENOUS at 12:52

## 2024-09-02 RX ADMIN — ONDANSETRON 4 MG: 2 INJECTION INTRAMUSCULAR; INTRAVENOUS at 12:48

## 2024-09-02 RX ADMIN — OXYCODONE HYDROCHLORIDE 5 MG: 5 TABLET ORAL at 16:25

## 2024-09-02 ASSESSMENT — ENCOUNTER SYMPTOMS
NECK PAIN: 0
ABDOMINAL PAIN: 1
NAUSEA: 0
MYALGIAS: 0
BACK PAIN: 0
DIARRHEA: 0
SPEECH CHANGE: 0
WEAKNESS: 1
CHILLS: 0
ORTHOPNEA: 0
WEIGHT LOSS: 0
FEVER: 0
FOCAL WEAKNESS: 0
TREMORS: 0
COUGH: 0
PALPITATIONS: 0
HALLUCINATIONS: 0
TINGLING: 0
VOMITING: 0
DOUBLE VISION: 0
BLURRED VISION: 0
CONSTIPATION: 0
EYE PAIN: 0
SPUTUM PRODUCTION: 0
HEADACHES: 0
SHORTNESS OF BREATH: 0
DIZZINESS: 0
PHOTOPHOBIA: 0
SENSORY CHANGE: 0

## 2024-09-02 ASSESSMENT — LIFESTYLE VARIABLES: SUBSTANCE_ABUSE: 0

## 2024-09-02 NOTE — PROGRESS NOTES
Surgery  Stooling  WBC normal  VSS   Continue wound vac  D/c abx?  WBC normal and having wound vac changes.  Maybe a few more days of oral abx?  Anticipate d/c back to facility soon  Surgery to follow

## 2024-09-02 NOTE — CARE PLAN
The patient is Stable - Low risk of patient condition declining or worsening    Shift Goals  Clinical Goals: monitor wound vac, rate pain <5 by end of shift  Patient Goals: pain and nausea control  Family Goals: ag    Progress made toward(s) clinical / shift goals:  Pt alert and oriented. Medications administered per MAR. Required PRN dilaudid for increased back pain after receiving oxycodone. Promethazine used for nausea as initial zofran administration was ineffective. Repositioned throughout shift. Dressings and urostomy remain intact. Other needs met at this time.  Problem: Knowledge Deficit - Standard  Goal: Patient and family/care givers will demonstrate understanding of plan of care, disease process/condition, diagnostic tests and medications  Outcome: Progressing     Problem: Pain - Standard  Goal: Alleviation of pain or a reduction in pain to the patient’s comfort goal  Outcome: Progressing     Problem: Skin Integrity  Goal: Skin integrity is maintained or improved  Outcome: Progressing     Problem: Gastrointestinal Irritability  Goal: Nausea and vomiting will be absent or improve  Outcome: Progressing       Patient is not progressing towards the following goals:

## 2024-09-02 NOTE — WOUND TEAM
"  [facility]     Inpatient Services     Established Ostomy Management/ troubleshooting      Plan: Bedside RNs to assist pt with appliance changes. Ostomy RN to remain available PRN for any needs.    HPI: Reviewed  PMH: Reviewed   SH: Reviewed     Reason for Ostomy nurse consult:  Established urostomy    Ostomy History:          Urostomy 01/07/21 Ileal conduit LLQ (Active)   Wound Image   08/30/24 1449   Stomal Appliance Assessment Changed 09/02/24 1130   Stoma Assessment Red 09/02/24 1130   Stoma Shape Oval 09/02/24 1130   Stoma Size (in) 1.5 08/29/24 0900   Peristomal Assessment Pink 09/02/24 1130   Mucocutaneous Junction Intact 09/02/24 1130   Treatment Appliance Changed;Cleansed with water/washcloth;Site care 09/02/24 1130   Peristomal Protectant No Sting Skin Prep;Paste Ring 09/02/24 1130   Stomal Appliance Paste Ring, 2\" 09/02/24 1130   Output (mL) 1200 mL 09/02/24 0502   Output Color Clear;Yellow 09/02/24 0502   WOUND RN ONLY - Stomal Appliance  2 Piece;Paste Ring, 2\";2 1/4\" (57mm) CTF 08/30/24 1449   Appliance Brand Callum 08/30/24 1449   Secure Start completed No 08/30/24 1449   WOUND NURSE ONLY - Time Spent with Patient (mins) 30 08/30/24 1449       Urostomy Ureterostomy left LLQ (Active)           Interventions and Education (if needed): Previous appliance removed using push/pull method. Peristomal skin and stoma cleansed with warm washcloths then dried. No sting skin prep applied to periskin. Using plastic backing from stencil, stoma traced. Stencil utilized to cut barrier, then barrier measured against stoma to ensure accurate size. Paste ring then applied to back of barrier. Pouch attached to barrier and connected to down drain bag.      Evaluation: Appliance changed due to proximity to LLQ abdmoninal NPWT dressing. No ostomy education needs.      Anticipated discharge needs: TBD  "

## 2024-09-02 NOTE — PROGRESS NOTES
Hospital Medicine Daily Progress Note    Date of Service  9/2/2024    Chief Complaint  Patricia A Tietz is a 59 y.o. female admitted 8/20/2024 with hernia repair    Hospital Course  60yo PMHx urostomy with ileal conduit as a child, HTN, AFib, on apixaban, chronic hypoxic resp failure on 2LNC at baseline.  She was at [facility] in May with an SBO and UTI (Klebsiella and Pseudomonas).  Admitted on 8/20 for planned Ex lap and reapir of 10cm abd wall hernia with mycocutaneous flap trunk with mesh and replacement of catheter into the stoma..  In PACU pt was hypotensive requiring Rudi-Synephrine and fluids.  She was admitted to IMCU    Interval update:  Evaluated patient at bedside on 9/2  Discussed with surgeon  Will see how she does and consider discharge in the next day or 2  Surgery seeming good with discharge soon,    I have discussed this patient's plan of care and discharge plan at IDT rounds today with Case Management, Nursing, Nursing leadership, and other members of the IDT team.    Consultants/Specialty  general surgery and urology    Code Status  Full Code    Disposition  Medically Cleared  I have placed the appropriate orders for post-discharge needs.    Review of Systems  Review of Systems   Constitutional:  Positive for malaise/fatigue. Negative for chills, fever and weight loss.        Decreased appetite   HENT:  Negative for hearing loss and tinnitus.    Eyes:  Negative for blurred vision, double vision, photophobia and pain.   Respiratory:  Negative for cough, sputum production and shortness of breath.    Cardiovascular:  Negative for chest pain, palpitations, orthopnea and leg swelling.   Gastrointestinal:  Positive for abdominal pain. Negative for constipation, diarrhea, nausea and vomiting.   Genitourinary:  Negative for dysuria, frequency and urgency.   Musculoskeletal:  Negative for back pain, joint pain, myalgias and neck pain.   Skin:  Negative for rash.   Neurological:  Positive for weakness. Negative  for dizziness, tingling, tremors, sensory change, speech change, focal weakness and headaches.   Psychiatric/Behavioral:  Negative for hallucinations and substance abuse.    All other systems reviewed and are negative.       Physical Exam  Temp:  [36.1 °C (96.9 °F)-36.7 °C (98.1 °F)] 36.1 °C (97 °F)  Pulse:  [60-62] 60  Resp:  [17] 17  BP: (107-154)/(65-86) 122/65  SpO2:  [96 %-99 %] 98 %    Physical Exam  Vitals reviewed.   Constitutional:       General: She is not in acute distress.     Appearance: Normal appearance. She is obese. She is ill-appearing.   HENT:      Head: Normocephalic and atraumatic.      Nose: No congestion.   Eyes:      General:         Right eye: No discharge.         Left eye: No discharge.      Pupils: Pupils are equal, round, and reactive to light.   Cardiovascular:      Rate and Rhythm: Normal rate and regular rhythm.      Pulses: Normal pulses.      Heart sounds: Normal heart sounds. No murmur heard.  Pulmonary:      Effort: Pulmonary effort is normal. No respiratory distress.      Breath sounds: Normal breath sounds. No stridor.   Abdominal:      General: Bowel sounds are normal. There is no distension.      Palpations: Abdomen is soft.      Tenderness: There is abdominal tenderness (Mild).      Comments: Dressing present  WERNER drain  Wound VAC in place   Musculoskeletal:         General: No swelling or tenderness. Normal range of motion.      Cervical back: Normal range of motion. No rigidity.   Skin:     General: Skin is warm.      Capillary Refill: Capillary refill takes less than 2 seconds.      Coloration: Skin is not jaundiced or pale.      Findings: No bruising.   Neurological:      General: No focal deficit present.      Mental Status: She is alert and oriented to person, place, and time.      Cranial Nerves: No cranial nerve deficit.      Comments: Decreased range of motion bilateral lower extremities   Psychiatric:         Mood and Affect: Mood normal.         Behavior: Behavior  normal.         Fluids    Intake/Output Summary (Last 24 hours) at 9/2/2024 1321  Last data filed at 9/2/2024 1000  Gross per 24 hour   Intake 240 ml   Output 1200 ml   Net -960 ml       Laboratory  Recent Labs     08/31/24  0856 09/02/24  0825   WBC 8.8 10.0   RBC 2.57* 3.18*   HEMOGLOBIN 7.6* 9.6*   HEMATOCRIT 24.5* 29.6*   MCV 95.3 93.1   MCH 29.6 30.2   MCHC 31.0* 32.4   RDW 46.8 45.6   PLATELETCT 262 259   MPV 10.5 10.6     Recent Labs     08/31/24  0856 09/02/24  0825   SODIUM 135 136   POTASSIUM 5.1 4.8   CHLORIDE 101 99   CO2 25 28   GLUCOSE 125* 105*   BUN 27* 20   CREATININE 0.99 1.04   CALCIUM 10.0 10.1                   Imaging  DX-CHEST-PORTABLE (1 VIEW)   Final Result      1. New linear parenchymal opacities in the right midlung and right lung base, compatible with atelectasis or infiltrates.   2. The remainder of the lungs is clear.   3. Interval removal of the right upper upper extremity PICC catheter.           Assessment/Plan  * Shock (HCC)- (present on admission)  Assessment & Plan  Post operative requiring Rudi synephrine initially and then Levophed in the unit  Vasopressors DC'd early this am  Cortisol>30  No evidence of sepsis  Cont to monitor and resume BP meds as pressures allow    8/23 resolved, monitor      Hyperkalemia  Assessment & Plan  She found to hyperkalemia although renal function has been improving.  She was on lisinopril I discontinued her lisinopril.  Ordered repeat potassium level that showed improvement from 5.7-5.6.  I do repeat potassium level at 3 PM and H&H.    Leukocytosis  Assessment & Plan  White blood cell count is trending down current white blood cell count is 14.3.  Continue antibiotics  Ordered CBC for tomorrow.    Parastomal hernia  Assessment & Plan  Now s/p repair on 8/20  NGT and 3 WERNER drains in place  Srgry and Urology following    8/22  Belly still quiet, no flatus or BM  Monitor for signs of return of bowel function  Prn pain and N management  Monitor daily BMP  for renal function and electrolye abnormalities  Resume po once OK with Srgcl team  Cont supportive care with IVFs, electrolyte replacement, antiemetics and pain medication  Daily BMP to monitor for RAO, hypo K, acidosis    8/23 surgery following, okay for NG tube removal  Diet per surgery  SLP evaluation  Drains in place    8/24 following, drains per surgery, greater than 162 mL output/24 hours  Positive BM, diet per surgery, consider initiation of clear liquid diet  Pain control follow-up labs  Patient is from [facility], to return when medically cleared    8/25 discussed with surgery, Dr. Mercado  150 mL output over 24 hours, plan for drain removal per surgery, plan to remove by Tuesday if decreased output  Likely return to skilled if clinically stable  Diet advance per surgery    8/26 tolerating oral intake, generalized weakness  1 of 3 WERNER drains out, removal per surgery  Transfer to skilled facility, [facility] in 2 to 3 days    8/27 surgery Dr. Mercado and urology is following her.  Continue antibiotics.  Continue to provide her pain control.    8/28 I patient discussed case with surgery Dr. Mercado who recommended that patient will need to go to the OR for another wound VAC placement.  I made her n.p.o. and started on IV fluid for hydration.    8/29 she underwent Postoperative wound infection washout and negative pressure dressing placemen on August 29, 2024.  Continue to provider pain control.  I started her on Ancef for next 3 days.  She received oral Augmentin.        Obesity- (present on admission)  Assessment & Plan  BMI 46  Lifestyle modifications  Outpatient follow-up    Atrial fibrillation (HCC)- (present on admission)  Assessment & Plan  8/21 Hx of  Has been paced and sinus overnight  Cont Coreg with parameters  Cont Tele  Resume apixaban when OK with Surgical team    8/24 okay to restart Eliquis per surgery, will monitor  Follow-up CBC    8/25 restarted on Eliquis, hemoglobin from 10-7, will  monitor for signs of bleeding  Serous discharge from drains  Discussed with surgery, will continue to monitor  May need transfusion if hemoglobin less than 7, monitor closely    8/26 resume Eliquis    8/27 currently rate controlled.  On continuing Eliquis.  Optimize electrolytes.    8/28 patient is going for surgical intervention plan is to hold Eliquis.    8/29 holding Eliquis for now as hemoglobin remains low and she may need blood transfusion.    Chronic respiratory failure with hypoxia (HCC)- (present on admission)  Assessment & Plan  At baseline is on 2 LNC  Mobilize  O2/RT protocols  Monitor for aspiration  Monitor vol status    8/23 patient is from [facility], to return to [facility] once medically cleared, continue to monitor    8/26 now on 3 to 4 L, taper as tolerated  Encourage I-S use    8/27 currently she is requiring 3 L of oxygen to maintain oxygen saturation.  Continue to titrate down oxygen as tolerated.    8/29 currently she is requiring 3 L of oxygen to maintain oxygen saturation.    Pacemaker- (present on admission)  Assessment & Plan  On Tele pt has been intermitently paced/sinus  Now she transferred to medical floor.    RAO (acute kidney injury) (HCC)- (present on admission)  Assessment & Plan    Good UOP  At risk for dehydration given output from NGT and 3 JPs (close to 600ml/24hrs)  Renal function has been improving.  Current BUN is 28 and creatinine 1.09  Avoid nephrotoxins  Ordered lab work for tomorrow    Anemia- (present on admission)  Assessment & Plan  8/25 hemoglobin of 10-6, after starting Eliquis  May be dilutional  BP stable  CBC every 12, transfuse for hemoglobin less than 7  May be secondary to recent surgery    8/26 improving hemoglobin now at 9, no signs of active bleed, will restart Eliquis  Follow-up a.m. labs    8/27 hemoglobin remained stable.  No signs of active bleeding.  Close monitoring on Eliquis    8/28 no signs of active bleeding.  I placed hold on Eliquis as  now she is going for surgical intervention.    8/29 I spoke to general surgery yesterday as patient found to hemoglobin of 6.8 and repeat hemoglobin back above 7 so I held blood transfusion.  Continue to monitor closely and transfuse if her blood COVID below 7.  Holding Eliquis      Presence of urostomy (HCC)- (present on admission)  Assessment & Plan  8/22 Chronic since 5 years old now with surgery by Dr. Gusman and Dr. Mercado  She has 3 WERNER drains in  Pain control with narcotics as indicated  SCDs for DVT prophylaxis  She has an NG tube in  Urology following  Good UOP: cont to monitor    8/23 okay for NG tube removal per surgery    Urology evaluated her and recommendations.    8/25 seen by urology, will monitor    8/26 urology has been following her.  Continue to monitor    8/28 urology evaluated her and recommended outpatient follow-up.         I discussed plan of care during multidisciplinary rounds regarding patient's current medical condition and plan of care.    I reviewed general surgery note    VTE prophylaxis: VTE Selection    I have performed a physical exam and reviewed and updated ROS and Plan today (9/2/2024). In review of yesterday's note (9/1/2024), there are no changes except as documented above.

## 2024-09-02 NOTE — CARE PLAN
The patient is Stable - Low risk of patient condition declining or worsening    Shift Goals  Clinical Goals: Pain management: maintain pain level < or = 3/10 on 0-10 pain scale  Patient Goals: Pain control  Family Goals: N/A    Progress made toward(s) clinical / shift goals:  Patient educated on 0-10 pain scale, PRN pain medications per MAR as well as non-pharmacological forms of pain management. Patient verbalizes understanding and states her pain has been managed effectively throughout shift.    Patient is not progressing towards the following goals: N/V exacerbating pain; PRN's being administered with minimal to moderate relief

## 2024-09-02 NOTE — WOUND TEAM
[facility]  Inpatient Services  Wound and Skin Care Follow-up    Admission Date: 8/20/2024     Last order of IP CONSULT TO WOUND CARE was found on 8/29/2024 from Hospital Encounter on 7/2/2024     HPI, PMH, SH: Reviewed    Past Surgical History:   Procedure Laterality Date    AK EXPLORATORY OF ABDOMEN Bilateral 8/28/2024    Procedure: ABDOMINAL WASHOUT WITH NEGATIVE PRESSURE DRESSING PLACEMENT;  Surgeon: Elmer Mercado M.D.;  Location: SURGERY [facility];  Service: General    APPLICATION OR REPLACEMENT, WOUND VAC  8/28/2024    Procedure: APPLICATION, WOUND VAC;  Surgeon: Elmer Mercado M.D.;  Location: SURGERY [facility];  Service: General    VENTRAL HERNIA REPAIR ROBOTIC XI N/A 8/20/2024    Procedure: OPEN INCISIONAL HERNIA REPAIR WITH MESH;  Surgeon: Elmer Mercado M.D.;  Location: SURGERY [facility];  Service: Gen Robotic    ILEO LOOP DIVERSION  8/20/2024    Procedure: RETROPERITONEAL EXPLORATION;  Surgeon: Denis Gusman M.D.;  Location: SURGERY [facility];  Service: Urology    SCAR EXCISION N/A 8/20/2024    Procedure: EXCISION, SCAR;  Surgeon: Elmer Mercado M.D.;  Location: SURGERY [facility];  Service: Gen Robotic    LYSIS ADHESIONS GENERAL N/A 8/20/2024    Procedure: LYSIS, ADHESIONS;  Surgeon: Elmer Mercado M.D.;  Location: [facility];  Service: Gen Robotic    AK UPPER GI ENDOSCOPY,DIAGNOSIS N/A 10/01/2023    Procedure: GASTROSCOPY;  Surgeon: Kelly Santos M.D.;  Location: [facility];  Service: Gastroenterology    AK UPPER GI ENDOSCOPY,BIOPSY N/A 10/01/2023    Procedure: GASTROSCOPY, WITH BIOPSY;  Surgeon: Kelly Santos M.D.;  Location: [facility];  Service: Gastroenterology    KNEE REVISION TOTAL Left 08/2023    Left knee hardware removed/ spacer placed    KNEE REPLACEMENT, TOTAL Left 01/24/2022    APPENDECTOMY      OTHER      Neck surgery 1978 and 2010    OTHER      Kidney stones remove 1976    OTHER      Urostomy 1969     Social History  "    Tobacco Use    Smoking status: Never    Smokeless tobacco: Never   Substance Use Topics    Alcohol use: Not Currently     No chief complaint on file.    Diagnosis: Unspecified hydronephrosis [N13.30]    Unit where seen by Wound Team: S620/02     WOUND FOLLOW UP RELATED TO:  MLI & LLQ NPWT       WOUND TEAM PLAN OF CARE - Frequency of Follow-up:   Nursing to follow dressing orders written for wound care. Contact wound team if area fails to progress, deteriorates or with any questions/concerns if something comes up before next scheduled follow up (See below as to whether wound is following and frequency of wound follow up)  Dressing changes by wound team:                   NPWT change 3 times weekly - MLI & LLQ    WOUND HISTORY:       Per H&P \"60yo PMHx urostomy with ileal conduit as a child, HTN, AFib, on apixaban, chronic hypoxic resp failure on 2LNC at baseline. She was at [facility] in May with an SBO and UTI (Klebsiella and Pseudomonas). Admitted on 8/20 for planned Ex lap and reapir of 10cm abd wall hernia with mycocutaneous flap trunk with mesh and replacement of catheter into the stoma.\"     OR with Dr. Mercado on 8/28/24       WOUND ASSESSMENT/LDA  Wound 08/28/24 Incision Abdomen Midline (Active)   Date First Assessed/Time First Assessed: 08/28/24 1743   Primary Wound Type: Incision  Location: Abdomen  Wound Orientation: Midline      Assessments 9/2/2024 11:30 AM   Wound Image      Site Assessment Red;Slough;Fragile;Undermining;Tunneling   Periwound Assessment Pink;Fragile   Margins Defined edges;Unattached edges   Closure Secondary intention   Drainage Amount Small   Drainage Description Serosanguineous   Treatments Cleansed;Nonselective debridement;Site care;Topical Lidocaine   Wound Cleansing Approved Wound Cleanser   Periwound Protectant No-sting Skin Prep;Hydrocolloid;Mepitel;Paste Ring;Drape   Dressing Status Clean;Dry;Intact   Dressing Changed Changed   Dressing Cleansing/Solutions Not Applicable   Dressing " Options Wound Vac   Dressing Change/Treatment Frequency Monday, Wednesday, Friday, and As Needed   NEXT Dressing Change/Treatment Date 09/04/24   NEXT Weekly Photo (Inpatient Only) 09/09/24   Wound Team Following 3x Weekly   Non-staged Wound Description Full thickness   Wound Length (cm) 13.5 cm   Wound Width (cm) 2 cm   Wound Depth (cm) 3.8 cm   Wound Surface Area (cm^2) 27 cm^2   Wound Volume (cm^3) 102.6 cm^3   Undermining (cm) 12 cm   Undermining of Wound, 1st Location From 4 o'clock;To 12 o'clock   Shape Linear   Wound Odor None   Exposed Structures Adipose;Sutures;MELISSA   WOUND NURSE ONLY - Time Spent with Patient (mins) 75       Negative Pressure Wound Therapy 08/28/24 Abdomen (Active)   Placement Date: 08/28/24   Location: Abdomen      Assessments 9/2/2024 11:30 AM   NPWT Pump Mode / Pressure Setting Ulta;Continuous;125 mmHg   Dressing Type Medium;White Foam   Number of Foam Pieces Used 6   Canister Changed No   NEXT Dressing Change/Treatment Date 09/04/24       Wound 09/02/24 Incision Abdomen Lower Left (Active)   Date First Assessed/Time First Assessed: 09/02/24 1130   Present on Original Admission: Yes  Hand Hygiene Completed: Yes  Primary Wound Type: Incision  Location: Abdomen  Wound Orientation: Lower  Laterality: Left      Assessments 9/2/2024 11:30 AM   Wound Image      Site Assessment Red;Slough;Fragile   Periwound Assessment Pink;Red;Fragile   Margins Defined edges;Unattached edges   Closure Secondary intention   Drainage Amount Small   Drainage Description Serosanguineous   Treatments Cleansed;Nonselective debridement;Site care;Topical Lidocaine   Wound Cleansing Approved Wound Cleanser   Periwound Protectant No-sting Skin Prep;Paste Ring;Mepitel;Drape   Dressing Status Clean;Dry;Intact   Dressing Changed Changed   Dressing Cleansing/Solutions Not Applicable   Dressing Options Wound Vac   Dressing Change/Treatment Frequency Monday, Wednesday, Friday, and As Needed   NEXT Dressing Change/Treatment  Date 09/04/24   NEXT Weekly Photo (Inpatient Only) 09/09/24   Wound Team Following 3x Weekly   Non-staged Wound Description Full thickness   Wound Length (cm) 9 cm   Wound Width (cm) 3.5 cm   Wound Depth (cm) 5 cm   Wound Surface Area (cm^2) 31.5 cm^2   Wound Volume (cm^3) 157.5 cm^3   Tunneling (cm) 11 cm   Tunneling Clock Position of Wound 8 o'clock   Undermining (cm) 5.7 cm   Undermining of Wound, 1st Location From 10 o'clock;To 12 o'clock   Shape Oval   Wound Odor None   Exposed Structures Adipose;MELISSA;Sutures        Vascular:    TIEN:   No results found.    Lab Values:    Lab Results   Component Value Date/Time    WBC 10.0 09/02/2024 08:25 AM    RBC 3.18 (L) 09/02/2024 08:25 AM    HEMOGLOBIN 9.6 (L) 09/02/2024 08:25 AM    HEMATOCRIT 29.6 (L) 09/02/2024 08:25 AM    HBA1C 6.2 (H) 05/08/2023 08:41 PM         Culture Results show:  Recent Results (from the past 720 hour(s))   CULTURE WOUND W/ GRAM STAIN    Collection Time: 08/28/24  4:55 PM    Specimen: Wound   Result Value Ref Range    Significant Indicator POS (POS)     Source WND     Site Abdominal wound     Culture Result - (A)     Gram Stain Result       Many WBCs.  Few Gram positive cocci.  Rare Gram negative rods.      Culture Result Pseudomonas aeruginosa  Moderate growth   (A)     Culture Result Enterococcus faecalis  Moderate growth   (A)     Culture Result (A)      Streptococcus mitis/oralis group  Moderate growth      Culture Result Corynebacterium striatum group  Light growth   (A)     Culture Result Enterococcus faecium  Light growth   (A)        Susceptibility    Enterococcus faecalis - JACK     Ampicillin <=2 Sensitive mcg/mL     Daptomycin 2 Sensitive mcg/mL     Gent Synergy <=500 Sensitive mcg/mL     Tetracycline >8 Resistant mcg/mL     Vancomycin 1 Sensitive mcg/mL    Pseudomonas aeruginosa - JACK     Ceftazidime <=1 Sensitive mcg/mL     Ciprofloxacin 0.5 Sensitive mcg/mL     Cefepime <=2 Sensitive mcg/mL     Levofloxacin 2 Intermediate mcg/mL      Meropenem <=1 Sensitive mcg/mL     Pip/Tazobactam <=8 Sensitive mcg/mL       Pain Level/Medicated:  4% Lidocaine allowed to dwell on wound bed 1 hr prior and PO pain medications administered by bedside RN 2 hr prior       INTERVENTIONS BY WOUND TEAM:  Chart and images reviewed. Discussed with bedside RN. All areas of concern (based on picture review, LDA review and discussion with bedside RN) have been thoroughly assessed. Documentation of areas based on significant findings. This RN in to assess patient. Performed standard wound care which includes appropriate positioning, dressing removal and non-selective debridement. Pictures and measurements obtained weekly if/when required.  Wound:  Midline and LLQ abdominal wounds   Preparation for Dressing removal: Dressing soaked with adhesive remover spray and Dressing soaked with Lidocaine  Cleansed/Non-selectively Debrided with:  Wound cleanser and Gauze  Akila wound: Cleansed with Wound cleanser and Gauze, Prepped with No Sting, Thin Hydrocolloid, Paste Rings, Drape, and Mepitel One  Primary Dressing:    MLI - white foam tucked into 12:00 tunnel and applied to remainder of wound base, 1 piece spiraled regular black foam tucked onto tracts, undermining, and used to fill in wound space. 2nd piece of regular black foam applied over distal incision and continued to MLI. Secured with drape.   LLQ - white foam tucked into 8:00 tunnel and applied to remainder of wound base, 1 piece spiraled regular black foam tucked onto tracts, undermining, and used to fill in wound space. 2nd piece of regular black foam used for small bridge laterally. Secured with drape.   Secondary (Outer) Dressing: TRAC pads to midline and LLQ abdominal wounds, y-connected. NPWT resumed. No leaks noted.     Advanced Wound Care Discharge Planning  Number of Clinicians necessary to complete wound care: 2  Is patient requiring IV pain medications for dressing changes:  No   Length of time for dressing change  "60 min. (This does not include chart review, pre-medication time, set up, clean up or time spent charting.)    Interdisciplinary consultation: Patient, Aida GUADALUPELobo (Wound RN).      EVALUATION / RATIONALE FOR TREATMENT:     Date:  09/02/24  Wound Status:  Wound progressing as expected    MLI and LLQ wounds remain with fragile appearing tissue, slough, and deep tunneling areas. Continued regular NPWT to increase granulation tissue production and white foam to wound base and deep tunnels.     Patient c/o \"stinging\" sensation while cleansing with wound cleanser. Will utilize NS for cleansing for future dressing changes.    Date:  08/30/24  Wound Status:  Initial evaluation    Midline abdominal wound bed clean, red, but with exposed sutures along the inferior portion of the wound. Wound also with tunneling at 12 and 6 o'clock. LQ abdominal wound with necrotic adipose, fragile appearing tissue, and a deep tunnel running medially at 8 o'clock. White foam utilized to fragile wound base, however if no issues at next assessment, can forego white foam. MD Mercado noted purulent drainage on the VAC cannister however this was not seen during time of VAC placement. NPWT re-applied to assist with wound closure by secondary intention, management of bio-burden and exudate through mechanical debridement, and increase oxygenation and granulation tissue production to wound bed.      MEASUREMENTS:   MIDLINE WOUND - 11.8 x 3 x 3.5 cm, tunnel @ 12 (4.5 cm) and 6 (4.4 cm)   LQ WOUND - 8.5 x 3 x 4 cm, UM @ 10-12 (4.5 cm), tunnel @ 8 (10.2 cm)            Goals: Steady decrease in wound area and depth weekly.    NURSING PLAN OF CARE ORDERS:  No new orders this visit    NUTRITION RECOMMENDATIONS   Wound Team Recommendations:  N/A     DIET ORDERS (From admission to next 24h)       Start     Ordered    08/31/24 0931  Diet Order Diet: Low Fiber(GI Soft) (add boost or ensure with meals please); Additive: (add boost or ensure)  ALL MEALS        Question " Answer Comment   Diet: Low Fiber(GI Soft) add boost or ensure with meals please   Additive:  add boost or ensure       08/31/24 0957                    PREVENTATIVE INTERVENTIONS:   Q shift Jaquan - performed per nursing policy  Q shift pressure point assessments - performed per nursing policy    Anticipated discharge plans:  TBD        Vac Discharge Needs:  Vac Discharge plan is purely a recommendation from wound team and not a requirement for discharge unless otherwise stated by physician.  Regular Vac in use and continued at discharge

## 2024-09-02 NOTE — DISCHARGE PLANNING
@6182  Agency/Facility Name: [facility]  Outcome: Note from [facility] in epic states both vendors for wound VAC and pharmacy are closed today.  DPA will follow up tomorrow morning.

## 2024-09-03 LAB
BACTERIA WND AEROBE CULT: ABNORMAL
GRAM STN SPEC: ABNORMAL
SIGNIFICANT IND 70042: ABNORMAL
SITE SITE: ABNORMAL
SOURCE SOURCE: ABNORMAL

## 2024-09-03 PROCEDURE — 97535 SELF CARE MNGMENT TRAINING: CPT

## 2024-09-03 PROCEDURE — 700102 HCHG RX REV CODE 250 W/ 637 OVERRIDE(OP): Performed by: STUDENT IN AN ORGANIZED HEALTH CARE EDUCATION/TRAINING PROGRAM

## 2024-09-03 PROCEDURE — 770001 HCHG ROOM/CARE - MED/SURG/GYN PRIV*

## 2024-09-03 PROCEDURE — A9270 NON-COVERED ITEM OR SERVICE: HCPCS | Performed by: SURGERY

## 2024-09-03 PROCEDURE — 700111 HCHG RX REV CODE 636 W/ 250 OVERRIDE (IP): Mod: JZ | Performed by: HOSPITALIST

## 2024-09-03 PROCEDURE — A9270 NON-COVERED ITEM OR SERVICE: HCPCS | Performed by: STUDENT IN AN ORGANIZED HEALTH CARE EDUCATION/TRAINING PROGRAM

## 2024-09-03 PROCEDURE — 700102 HCHG RX REV CODE 250 W/ 637 OVERRIDE(OP): Performed by: SURGERY

## 2024-09-03 PROCEDURE — 700105 HCHG RX REV CODE 258: Performed by: STUDENT IN AN ORGANIZED HEALTH CARE EDUCATION/TRAINING PROGRAM

## 2024-09-03 PROCEDURE — 700111 HCHG RX REV CODE 636 W/ 250 OVERRIDE (IP): Mod: JZ | Performed by: STUDENT IN AN ORGANIZED HEALTH CARE EDUCATION/TRAINING PROGRAM

## 2024-09-03 PROCEDURE — 99239 HOSP IP/OBS DSCHRG MGMT >30: CPT | Performed by: STUDENT IN AN ORGANIZED HEALTH CARE EDUCATION/TRAINING PROGRAM

## 2024-09-03 RX ORDER — METHOCARBAMOL 500 MG/1
500 TABLET, FILM COATED ORAL 4 TIMES DAILY
Status: DISCONTINUED | OUTPATIENT
Start: 2024-09-03 | End: 2024-09-04 | Stop reason: HOSPADM

## 2024-09-03 RX ORDER — CALCIUM CARBONATE 500 MG/1
500 TABLET, CHEWABLE ORAL
Status: SHIPPED
Start: 2024-09-03 | End: 2024-09-04

## 2024-09-03 RX ORDER — CITALOPRAM HYDROBROMIDE 20 MG/1
20 TABLET ORAL EVERY MORNING
Status: DISCONTINUED | OUTPATIENT
Start: 2024-09-04 | End: 2024-09-04 | Stop reason: HOSPADM

## 2024-09-03 RX ORDER — LINEZOLID 600 MG/1
600 TABLET, FILM COATED ORAL EVERY 12 HOURS
Status: DISCONTINUED | OUTPATIENT
Start: 2024-09-03 | End: 2024-09-04 | Stop reason: HOSPADM

## 2024-09-03 RX ADMIN — METHOCARBAMOL TABLETS 500 MG: 500 TABLET, COATED ORAL at 16:19

## 2024-09-03 RX ADMIN — PIPERACILLIN AND TAZOBACTAM 4.5 G: 4; .5 INJECTION, POWDER, FOR SOLUTION INTRAVENOUS at 04:49

## 2024-09-03 RX ADMIN — Medication 5 MG: at 21:23

## 2024-09-03 RX ADMIN — GUAIFENESIN 600 MG: 600 TABLET, EXTENDED RELEASE ORAL at 18:08

## 2024-09-03 RX ADMIN — LINEZOLID 600 MG: 600 TABLET, FILM COATED ORAL at 18:35

## 2024-09-03 RX ADMIN — CARVEDILOL 12.5 MG: 12.5 TABLET, FILM COATED ORAL at 18:08

## 2024-09-03 RX ADMIN — SENNOSIDES AND DOCUSATE SODIUM 1 TABLET: 50; 8.6 TABLET ORAL at 18:08

## 2024-09-03 RX ADMIN — AMLODIPINE BESYLATE 5 MG: 5 TABLET ORAL at 18:08

## 2024-09-03 RX ADMIN — FERROUS SULFATE TAB 325 MG (65 MG ELEMENTAL FE) 325 MG: 325 (65 FE) TAB at 09:01

## 2024-09-03 RX ADMIN — CITALOPRAM 40 MG: 40 TABLET, FILM COATED ORAL at 04:46

## 2024-09-03 RX ADMIN — OXYCODONE HYDROCHLORIDE 10 MG: 10 TABLET ORAL at 14:35

## 2024-09-03 RX ADMIN — OXYCODONE HYDROCHLORIDE 5 MG: 5 TABLET ORAL at 10:46

## 2024-09-03 RX ADMIN — METHOCARBAMOL TABLETS 500 MG: 500 TABLET, COATED ORAL at 09:01

## 2024-09-03 RX ADMIN — CARVEDILOL 12.5 MG: 12.5 TABLET, FILM COATED ORAL at 04:47

## 2024-09-03 RX ADMIN — OXYCODONE HYDROCHLORIDE 5 MG: 5 TABLET ORAL at 05:12

## 2024-09-03 RX ADMIN — GUAIFENESIN 600 MG: 600 TABLET, EXTENDED RELEASE ORAL at 04:47

## 2024-09-03 RX ADMIN — APIXABAN 5 MG: 5 TABLET, FILM COATED ORAL at 04:46

## 2024-09-03 RX ADMIN — ONDANSETRON 4 MG: 2 INJECTION INTRAMUSCULAR; INTRAVENOUS at 16:19

## 2024-09-03 RX ADMIN — METHOCARBAMOL TABLETS 500 MG: 500 TABLET, COATED ORAL at 21:22

## 2024-09-03 RX ADMIN — SIMETHICONE 125 MG: 125 TABLET, CHEWABLE ORAL at 10:46

## 2024-09-03 RX ADMIN — METHOCARBAMOL TABLETS 500 MG: 500 TABLET, COATED ORAL at 13:42

## 2024-09-03 RX ADMIN — AMLODIPINE BESYLATE 5 MG: 5 TABLET ORAL at 04:46

## 2024-09-03 RX ADMIN — APIXABAN 5 MG: 5 TABLET, FILM COATED ORAL at 18:08

## 2024-09-03 RX ADMIN — LINEZOLID 600 MG: 600 TABLET, FILM COATED ORAL at 12:18

## 2024-09-03 RX ADMIN — OXYCODONE HYDROCHLORIDE 5 MG: 5 TABLET ORAL at 21:22

## 2024-09-03 RX ADMIN — GABAPENTIN 300 MG: 300 CAPSULE ORAL at 04:46

## 2024-09-03 NOTE — CARE PLAN
The patient is Stable - Low risk of patient condition declining or worsening    Shift Goals  Clinical Goals: monitort wound vac and pain management  Patient Goals: rest  Family Goals: ag    Progress made toward(s) clinical / shift goals:  updated on POC and verbalized understanding. Medicated per MAR and tolerated well. Q2 turns. SCDs applied. Wound vac canister changed. Needs attended.     Patient is not progressing towards the following goals:    Problem: Knowledge Deficit - Standard  Goal: Patient and family/care givers will demonstrate understanding of plan of care, disease process/condition, diagnostic tests and medications  Description: Target End Date:  1-3 days or as soon as patient condition allows    Document in Patient Education    1.  Patient and family/caregiver oriented to unit, equipment, visitation policy and means for communicating concern  2.  Complete/review Learning Assessment  3.  Assess knowledge level of disease process/condition, treatment plan, diagnostic tests and medications  4.  Explain disease process/condition, treatment plan, diagnostic tests and medications  Outcome: Not Progressing     Problem: Skin Integrity  Goal: Skin integrity is maintained or improved  Description: Target End Date:  Prior to discharge or change in level of care    Document interventions on Skin Risk/Jaquan flowsheet groups and corresponding LDA    1.  Assess and monitor skin integrity, appearance and/or temperature  2.  Assess risk factors for impaired skin integrity and/or pressures ulcers  3.  Implement precautions to protect skin integrity in collaboration with interdisciplinary team  4.  Implement pressure ulcer prevention protocol if at risk for skin breakdown  5.  Confirm wound care consult if at risk for skin breakdown  6.  Ensure patient use of pressure relieving devices  (Low air loss bed, waffle overlay, heel protectors, ROHO cushion, etc)  Outcome: Not Progressing

## 2024-09-03 NOTE — THERAPY
09/03/24 0945   Time In/Time Out   Therapy Start Time 0945   Therapy End Time 0959   Total Therapy Time 14   Charge Group   PT Self Care / Home Evaluation (Units) 1   Total Time Spent   PT Self Care/Home Evaluation Time Spent (Mins) 12    Services   Is patient using  services for this encounter? No   Initial Contact Note    Initial Contact Note Order Received and Verified. Physical Therapy Evaluation NOT Completed Because Patient Does Not Require Acute Physical Therapy at this Time.   Prior Living Situation   Housing / Facility Skilled Nursing Facility   Comments pt at baseline for mobility, uses lauren lift at SNF   Cognition    Cognition / Consciousness WDL   Level of Consciousness Alert   Education Group   Education Provided Role of Physical Therapist;Exercises - Supine   Role of Physical Therapist Patient Response Patient;Acceptance;Explanation;Verbal Demonstration   Exercises - Supine Patient Response Patient;Acceptance;Explanation;Verbal Demonstration   Additional Comments review frequent turns, skin protections, UE ther ex, ankle pumps all with good understanding.   Anticipated Discharge Equipment and Recommendations   DC Equipment Recommendations None

## 2024-09-03 NOTE — PROGRESS NOTES
Surgery  C/o back pain on left side  Otherwise having some nausea, but having bowel function  Vac in place  Urine clear  A/p   Stop abx, wbc normal and source controlled  PT/OT  Add robaxin  Possible d/c if continues to improve  Discussed with medicine

## 2024-09-03 NOTE — DISCHARGE PLANNING
DC Transport Scheduled    Transport Company Scheduled:  [facility]  Spoke with Barney at [facility] to schedule transport.    Scheduled Date: 9/3/2024  Scheduled Time: 1530    Destination: [facility]   Destination address: [address]    Notified care team of scheduled transport via [employer].     If there are any changes needed to the DC transportation scheduled, please contact [facility] at ext. [phone] between the hours of 1189-0130 Mon-Fri. If outside those hours, contact the ED Case Manager at ext. [phone].

## 2024-09-03 NOTE — DISCHARGE PLANNING
@1023  Agency/Facility Name: [facility]  Spoke To: [facility]  Outcome: DPA called stating the pt is VRE positive, per [facility], that is okay, pt will be in an iso room.  DPA will fax an updated SNF referral once we know what antibiotics pt will be on and for how long.      @8087  Agency/Facility Name: [facility]  Spoke To: [facility]  Outcome: [facility] orders medications for pt from [facility] that is in [place], [place] and facility was waiting for discharge summary.  Pt must be in facility before the order can be sent and they can not take nurse report until the discharge summary is in.  Cut off time for medication orders are 1645.  Transport was here to transport pt, no discharge summary and not hard script for opiate medication.  [facility] requested that transport be cancelled and rescheduled for tomorrow morning at 0930.

## 2024-09-03 NOTE — DISCHARGE PLANNING
Case Management Discharge Planning    Admission Date: 8/20/2024  GMLOS: 8.9  ALOS: 14    6-Clicks ADL Score:    6-Clicks Mobility Score:    PT and/or OT Eval ordered: Yes  Post-acute Referrals Ordered: Yes  Post-acute Choice Obtained: Yes  Has referral(s) been sent to post-acute provider:  Yes      Anticipated Discharge Dispo: Discharge Disposition: D/T to SNF with Medicare cert in anticipation of skilled care (03)  Discharge Address: [address] [place] [address]. [place] [address]  Discharge Contact Phone Number: [phone] (Neela (friend) : Neo (brother) lives in [place] and is an Emergency Contact)    DME Needed: Yes    DME Ordered: No    Action(s) Taken: Updated Provider/Nurse on Discharge Plan  RNCM attended IDT rounds and reviewed chart. According to primary RN order was received for PO antibiotics. [facility] was notified this morning @0800 that pt will be placed on isolation. [facility] confirmed that they have a bed available today for her in an isolation room. Admissions coordinator was also updated regarding the existing wound vac.   Message left for [facility] to notify plan is to discharge. Message sent for transport. PCS form was sent.   @1310 Order sent to [facility] Line for transport.  [facility] will have bed available @5963-1626. Pt was notified and is agreeable with this plan of care. MD and primary RN were updated. @1339 Discharge Summary was requested and MD is working on it.   @1340 Primary RN notified discharge is pending DC Summary and any med orders. Primary RN states she is preparing to give report to [facility].   @1501 Message sent to MD for Discharge Summary and Scripts for meds. MD states he will provided it now.   @1515 Transport is here. CRN and RN manager were notified. Still awaiting Discharge Summary.   @1527 [facility] called to cancel admission today. RNCM spoke to admissions. Discharge plan changed to tomorrow morning @0930  @1600 Jose notified regarding delay  @1603 Ride Line  will reschedule transport via [facility] at @1809..     Escalations Completed: Provider, Transportation Vendor, and Bedside RN    Medically Clear: Yes    Next Steps: Follow-up with medical team to discuss DC needs and barriers.    Barriers to Discharge: Pending Placement and Transportation

## 2024-09-03 NOTE — WOUND TEAM
Assisted Lisa SIMPSON (Wound RN), with wound care, non-selective debridement performed using wound cleanser/NS and gauze. Please see Lisa SIMPSON (Wound RN) wound note for further wound care details.

## 2024-09-03 NOTE — DISCHARGE SUMMARY
"Discharge Summary    CHIEF COMPLAINT ON ADMISSION  No chief complaint on file.      Reason for Admission  Unspecified hydronephrosis     Admission Date  8/20/2024    CODE STATUS  Full Code    HPI & HOSPITAL COURSE  \"58yo PMHx urostomy with ileal conduit as a child, HTN, AFib, on apixaban, chronic hypoxic resp failure on 2LNC at baseline.  She was at [facility] in May with an SBO and UTI (Klebsiella and Pseudomonas).  Admitted on 8/20 for planned Ex lap and reapir of 10cm abd wall hernia with mycocutaneous flap trunk with mesh and replacement of catheter into the stoma..  In PACU pt was hypotensive requiring Rudi-Synephrine and fluids.  She was admitted to IM.  Very pleasant on, improved well      Therefore, she is discharged in fair and stable condition to skilled nursing facility.    The patient met 2-midnight criteria for an inpatient stay at the time of discharge.    Discharge Date  9/3    FOLLOW UP ITEMS POST DISCHARGE  Follow-up with    DISCHARGE DIAGNOSES  Principal Problem:    Shock (HCC) (POA: Yes)  Active Problems:    Presence of urostomy (HCC) (POA: Yes)    Anemia (POA: Yes)    RAO (acute kidney injury) (HCC) (POA: Yes)    Pacemaker (POA: Yes)    Chronic respiratory failure with hypoxia (HCC) (POA: Yes)    Atrial fibrillation (HCC) (POA: Yes)    Obesity (POA: Yes)    Parastomal hernia (POA: Unknown)    Leukocytosis (POA: Unknown)    Hyperkalemia (POA: Unknown)  Resolved Problems:    * No resolved hospital problems. *      FOLLOW UP  No future appointments.  No follow-up provider specified.    MEDICATIONS ON DISCHARGE     Medication List        START taking these medications        Instructions   calcium carbonate 500 MG Chew  Commonly known as: Tums   Chew 1 Tablet every 2 hours as needed (for heartburn).  Dose: 500 mg            CONTINUE taking these medications        Instructions   acetaminophen 325 MG Tabs  Commonly known as: Tylenol   Take 650 mg by mouth every four hours as needed for Mild Pain.  Dose: 650 " mg     amLODIPine 5 MG Tabs  Commonly known as: Norvasc   Take 5 mg by mouth 2 times a day.  Dose: 5 mg     carvedilol 12.5 MG Tabs  Commonly known as: Coreg   Take 12.5 mg by mouth 2 times a day.  Dose: 12.5 mg     citalopram 40 MG Tabs  Commonly known as: CeleXA   Take 40 mg by mouth every morning.  Dose: 40 mg     Eliquis 5mg Tabs  Generic drug: apixaban   Take 5 mg by mouth 2 times a day.  Dose: 5 mg     ferrous sulfate 325 (65 Fe) MG tablet   Take 1 Tablet by mouth every morning with breakfast.  Dose: 325 mg     gabapentin 300 MG Caps  Commonly known as: Neurontin   Take 300 mg by mouth every day.  Dose: 300 mg     guaiFENesin  MG Tb12  Commonly known as: Mucinex   Take 600 mg by mouth every 12 hours. 7 day course started 08/12/2024  Dose: 600 mg     lactulose 10 GM/15ML Soln   Take 30 mL by mouth 2 times a day.  Dose: 30 mL     melatonin 5 mg Tabs   Take 5 mg by mouth every evening.  Dose: 5 mg     methocarbamol 750 MG Tabs  Commonly known as: Robaxin   Take 750 mg by mouth 4 times a day as needed (MUSCLE SPASMS).  Dose: 750 mg     polyethylene glycol-electrolytes 236 GM Solr  Commonly known as: Golytely   Take 4 L by mouth one time. Pre-op one day prep  Dose: 4 L     senna-docusate 8.6-50 MG Tabs  Commonly known as: Pericolace Or Senokot S   Take 1 Tablet by mouth 2 times a day.  Dose: 1 Tablet     traZODone 50 MG Tabs  Commonly known as: Desyrel   Take 25 mg by mouth at bedtime as needed for Sleep. 1/2 tablet = 25mg  Dose: 25 mg     vitamin D2 (Ergocalciferol) 1.25 MG (30600 UT) Caps capsule  Commonly known as: Drisdol   Take 50,000 Units by mouth every day.  Dose: 50,000 Units            ASK your doctor about these medications        Instructions   acidophilus lactobacillus Caps   Take 1 Capsule by mouth in the morning, at noon, and at bedtime.  Dose: 1 Capsule     hydrALAZINE 25 MG Tabs  Commonly known as: Apresoline   Take 25 mg by mouth 3 times a day. Hold for sbp <110  Dose: 25 mg     lisinopril 5  MG Tabs  Commonly known as: Prinivil   Take 5 mg by mouth every day.  Dose: 5 mg     MAGNESIUM OXIDE PO   Take 1 Tablet by mouth every day.  Dose: 1 Tablet     omeprazole 20 MG delayed-release capsule  Commonly known as: PriLOSEC   Take 1 Capsule by mouth 2 times a day.  Dose: 20 mg     oxyCODONE-acetaminophen 7.5-325 MG per tablet  Commonly known as: Percocet   Take 1 Tablet by mouth every 6 hours as needed for Moderate Pain.  Dose: 1 Tablet     sodium bicarbonate 650 MG Tabs  Commonly known as: Sodium Bicarbonate   Take 1,300 Tablets by mouth in the morning, at noon, and at bedtime.   COORDINATE MEDICATION INSTRUCTIONS FROM PRESCRIBING PHYSICIAN FOR SURGERY 8/20/24  Dose: 1,300 Tablet              Allergies  No Known Allergies    DIET  Orders Placed This Encounter   Procedures    Diet Order Diet: Low Fiber(GI Soft) (add boost or ensure with meals please); Additive: (add boost or ensure)     Standing Status:   Standing     Number of Occurrences:   1     Order Specific Question:   Diet:     Answer:   Low Fiber(GI Soft) [2]     Comments:   add boost or ensure with meals please       ACTIVITY  As tolerated.  Weight bearing as tolerated    CONSULTATIONS      PROCEDURES      LABORATORY  Lab Results   Component Value Date    SODIUM 136 09/02/2024    POTASSIUM 4.8 09/02/2024    CHLORIDE 99 09/02/2024    CO2 28 09/02/2024    GLUCOSE 105 (H) 09/02/2024    BUN 20 09/02/2024    CREATININE 1.04 09/02/2024    GLOMRATE 34 (L) 11/04/2023        Lab Results   Component Value Date    WBC 10.0 09/02/2024    HEMOGLOBIN 9.6 (L) 09/02/2024    HEMATOCRIT 29.6 (L) 09/02/2024    PLATELETCT 259 09/02/2024        Total time of the discharge process exceeds 47 minutes.

## 2024-09-03 NOTE — PROGRESS NOTES
Received bedside report from previous RN, patient is alert and oriented x4.On o2 at 3L via NC with o2 sat of 98% at this time. With abdominal incision, wound vac and urostomy in place CDI. Fall precautions in place and call light within reach. All other needs met at this time.

## 2024-09-04 VITALS
WEIGHT: 281.75 LBS | BODY MASS INDEX: 51.85 KG/M2 | SYSTOLIC BLOOD PRESSURE: 100 MMHG | RESPIRATION RATE: 18 BRPM | TEMPERATURE: 97.7 F | DIASTOLIC BLOOD PRESSURE: 58 MMHG | HEIGHT: 62 IN | OXYGEN SATURATION: 98 % | HEART RATE: 66 BPM

## 2024-09-04 PROCEDURE — 700102 HCHG RX REV CODE 250 W/ 637 OVERRIDE(OP): Performed by: SURGERY

## 2024-09-04 PROCEDURE — A9270 NON-COVERED ITEM OR SERVICE: HCPCS | Performed by: SURGERY

## 2024-09-04 PROCEDURE — 700102 HCHG RX REV CODE 250 W/ 637 OVERRIDE(OP): Performed by: STUDENT IN AN ORGANIZED HEALTH CARE EDUCATION/TRAINING PROGRAM

## 2024-09-04 PROCEDURE — A9270 NON-COVERED ITEM OR SERVICE: HCPCS | Performed by: STUDENT IN AN ORGANIZED HEALTH CARE EDUCATION/TRAINING PROGRAM

## 2024-09-04 RX ORDER — LINEZOLID 600 MG/1
600 TABLET, FILM COATED ORAL EVERY 12 HOURS
Qty: 20 TABLET | Refills: 0 | Status: ACTIVE | OUTPATIENT
Start: 2024-09-04

## 2024-09-04 RX ORDER — OXYCODONE HYDROCHLORIDE 5 MG/1
5 TABLET ORAL EVERY 4 HOURS PRN
Qty: 18 TABLET | Refills: 0 | Status: SHIPPED | OUTPATIENT
Start: 2024-09-04 | End: 2024-09-07

## 2024-09-04 RX ADMIN — APIXABAN 5 MG: 5 TABLET, FILM COATED ORAL at 04:31

## 2024-09-04 RX ADMIN — METHOCARBAMOL TABLETS 500 MG: 500 TABLET, COATED ORAL at 09:16

## 2024-09-04 RX ADMIN — OXYCODONE HYDROCHLORIDE 5 MG: 5 TABLET ORAL at 09:39

## 2024-09-04 RX ADMIN — LINEZOLID 600 MG: 600 TABLET, FILM COATED ORAL at 04:31

## 2024-09-04 RX ADMIN — FERROUS SULFATE TAB 325 MG (65 MG ELEMENTAL FE) 325 MG: 325 (65 FE) TAB at 09:17

## 2024-09-04 RX ADMIN — OXYCODONE HYDROCHLORIDE 5 MG: 5 TABLET ORAL at 04:31

## 2024-09-04 RX ADMIN — CITALOPRAM HYDROBROMIDE 20 MG: 20 TABLET ORAL at 05:48

## 2024-09-04 RX ADMIN — GABAPENTIN 300 MG: 300 CAPSULE ORAL at 04:31

## 2024-09-04 RX ADMIN — GUAIFENESIN 600 MG: 600 TABLET, EXTENDED RELEASE ORAL at 04:31

## 2024-09-04 NOTE — PROGRESS NOTES
Received bedside report from previous RN, patient is alert and oriented x4.On o2 at 3L via NC with o2 sat of 98% at this time. With mid abdominal incision, wound vac and urostomy in place CDI. Fall precautions in place and call light within reach. All other needs met at this time.

## 2024-09-04 NOTE — CARE PLAN
The patient is Stable - Low risk of patient condition declining or worsening    Shift Goals  Clinical Goals: Monitor wound vac and pain management  Patient Goals: rest  Family Goals: ag    Progress made toward(s) clinical / shift goals: updated on POC and verbalized understanding. Medicated per MAR and tolerated well. Q2 turns.  Needs attended.     Patient is not progressing towards the following goals:    Problem: Knowledge Deficit - Standard  Goal: Patient and family/care givers will demonstrate understanding of plan of care, disease process/condition, diagnostic tests and medications  Description: Target End Date:  1-3 days or as soon as patient condition allows    Document in Patient Education    1.  Patient and family/caregiver oriented to unit, equipment, visitation policy and means for communicating concern  2.  Complete/review Learning Assessment  3.  Assess knowledge level of disease process/condition, treatment plan, diagnostic tests and medications  4.  Explain disease process/condition, treatment plan, diagnostic tests and medications  Outcome: Not Progressing

## 2024-09-04 NOTE — DISCHARGE PLANNING
Case Management Discharge Planning    Admission Date: 8/20/2024  GMLOS: 8.9  ALOS: 15    6-Clicks ADL Score:    6-Clicks Mobility Score:    PT and/or OT Eval ordered: NA  Post-acute Referrals Ordered: Yes  Post-acute Choice Obtained: Yes  Has referral(s) been sent to post-acute provider:  Yes      Anticipated Discharge Dispo: Discharge Disposition: D/T to SNF with Medicare cert in anticipation of skilled care (03)  Discharge Address: [address] [place] [address]  Discharge Contact Phone Number: [phone] (Neela (friend) : Neo (brother) lives in [place] and is an Emergency Contact)    DME Needed: Yes    DME Ordered: Yes    Action(s) Taken: Updated Provider/Nurse on Discharge Plan and Transport Arranged   Dr Courtney confirmed pt is medically cleared and no further needs. Okay to proceed with transport this morning as previously discussed.   PT scheduled with [facility]  for 0930 pickup today 9/4/2024 to: [facility]           Escalations Completed: None    Medically Clear: Yes    Next Steps: Follow-up with medical team to discuss DC needs     Barriers to Discharge: None

## 2024-09-04 NOTE — CARE PLAN
The patient is watcher:     Patient a/o x4. Currently on 3 liters of O2 nc. Patient was nauseous today after eating 75% of bagel and breakfast. She stated she had a lot of gas. Repositioned multiple times and she said this was helpful. Patient stated pain was 8/10 at one point and pain meds were given a few times. Once repositioned this afternoon she was able to get comfortable. Patient is resting comfortably. Bed is low locked position.       Problem: Respiratory  Goal: Patient will achieve/maintain optimum respiratory ventilation and gas exchange  Outcome: Not Progressing     Problem: Gastrointestinal Irritability  Goal: Nausea and vomiting will be absent or improve  Outcome: Not Progressing     Problem: Mobility  Goal: Patient's capacity to carry out activities will improve  Outcome: Not Progressing       Shift Goals  Clinical Goals: Monitor wound vac, pain will be <4 throughout shift, decrease n/v  Patient Goals: Pain mainagement and no n/v  Family Goals: MELISSA    Progress made toward(s) clinical / shift goals:    Problem: Respiratory  Goal: Patient will achieve/maintain optimum respiratory ventilation and gas exchange  Outcome: Not Progressing     Problem: Gastrointestinal Irritability  Goal: Nausea and vomiting will be absent or improve  Outcome: Not Progressing     Problem: Mobility  Goal: Patient's capacity to carry out activities will improve  Outcome: Not Progressing     Problem: Knowledge Deficit - Standard  Goal: Patient and family/care givers will demonstrate understanding of plan of care, disease process/condition, diagnostic tests and medications  Outcome: Progressing     Problem: Pain - Standard  Goal: Alleviation of pain or a reduction in pain to the patient’s comfort goal  Outcome: Progressing     Problem: Skin Integrity  Goal: Skin integrity is maintained or improved  Outcome: Progressing     Problem: Fall Risk  Goal: Patient will remain free from falls  Outcome: Progressing       Patient is not  progressing towards the following goals:      Problem: Respiratory  Goal: Patient will achieve/maintain optimum respiratory ventilation and gas exchange  Outcome: Not Progressing     Problem: Gastrointestinal Irritability  Goal: Nausea and vomiting will be absent or improve  Outcome: Not Progressing     Problem: Mobility  Goal: Patient's capacity to carry out activities will improve  Outcome: Not Progressing

## 2024-09-04 NOTE — PROGRESS NOTES
Pt discharged with [facility] to [place]. Pt educated on discharge instructions, verbalized understanding. Report called to Avelina. Pts wound vac discontinued and discussed plans for wound care at facility with pt. IV taken out.

## 2024-09-04 NOTE — DISCHARGE PLANNING
PT scheduled with [facility]  for 0930 pickup today 9/4/2024 to:    [facility]         [facility] [facility]    Care team notified.   SLC

## 2024-09-04 NOTE — DISCHARGE PLANNING
@0813  Agency/Facility Name: [facility]  Spoke To: [facility]  Outcome: DPA called to confirm pt can transport at 0930, per [facility], yes, the facility is ready for pt.

## 2024-09-04 NOTE — PROGRESS NOTES
Hourly rounds performed. Patient is resting in bed. Respirations even and unlabored. Denies pain and SOB. Fall precautions continued and call light within reach. All other needs met at this time.

## (undated) DEVICE — GLOVE BIOGEL SZ 6.5 SURGICAL PF LTX (50PR/BX 4BX/CA)

## (undated) DEVICE — GLOVE BIOGEL INDICATOR SZ 7SURGICAL PF LTX - (50/BX 4BX/CA)

## (undated) DEVICE — Device

## (undated) DEVICE — PAD TRAC PAD ONLY (10EA/CA)

## (undated) DEVICE — SODIUM CHL IRRIGATION 0.9% 1000ML (12EA/CA)

## (undated) DEVICE — BAG SPONGE COUNT 10.25 X 32 - BLUE (250/CA)

## (undated) DEVICE — SENSOR OXIMETER ADULT SPO2 RD SET (20EA/BX)

## (undated) DEVICE — GOWN SURGEONS LARGE - (32/CA)

## (undated) DEVICE — RESERVOIR SUCTION 100 CC - SILICONE (20EA/CA)

## (undated) DEVICE — SLEEVE, VASO, REPROC, LARGE

## (undated) DEVICE — CATHETER URETHRAL FOLEY SILICONE OD18 FR 10 ML (10EA/CA)

## (undated) DEVICE — PEN SKIN MARKER W/RULER - (50EA/BX)

## (undated) DEVICE — CLIP MED INTNL HRZN TI ESCP - (25/BX)

## (undated) DEVICE — PENCIL ELECTSURG 10FT BTN SWH - (50/CA)

## (undated) DEVICE — GLOVE SZ 6.5 BIOGEL PI MICRO - PF LF (50PR/BX)

## (undated) DEVICE — MASK OXYGEN VNYL ADLT MED CONC WITH 7 FOOT TUBING  - (50EA/CA)

## (undated) DEVICE — SET LEADWIRE 5 LEAD BEDSIDE DISPOSABLE ECG (1SET OF 5/EA)

## (undated) DEVICE — TOWELS CLOTH SURGICAL - (4/PK 20PK/CA)

## (undated) DEVICE — SUTURE 3-0 PDS II PLUS SH 27 IN (36EA/BX)

## (undated) DEVICE — PACK MAJOR BASIN - (2EA/CA)

## (undated) DEVICE — STAPLER SKIN DISP - (6/BX 10BX/CA) VISISTAT

## (undated) DEVICE — SUTURE 3-0 VICRYL PLUS SH - 8X 18 INCH (12/BX)

## (undated) DEVICE — MASK PANORAMIC OXYGEN PRO2 (30EA/CA)

## (undated) DEVICE — VAC CANISTER W/GEL 500ML - FITS NEW MACHINES (10EA/CA)

## (undated) DEVICE — SLEEVE, VASO, THIGH, MED

## (undated) DEVICE — MAT PATIENT POSITIONING PREVALON (10EA/CA)

## (undated) DEVICE — SUTURE 2-0 PDS PLUS SH 27 (36EA/BX)"

## (undated) DEVICE — PORT AUXILLARY WATER (50EA/BX)

## (undated) DEVICE — SPONGE PEANUT - (5/PK 50PK/CA)

## (undated) DEVICE — FILM CASSETTE ENDO

## (undated) DEVICE — WATER IRRIGATION STERILE 1000ML (12EA/CA)

## (undated) DEVICE — SUTURE 1 PDS-2 PLUS CTX - (24/BX)

## (undated) DEVICE — CLIP MED LG INTNL HRZN TI ESCP - (20/BX)

## (undated) DEVICE — SUCTION INSTRUMENT YANKAUER BULBOUS TIP W/O VENT (50EA/CA)

## (undated) DEVICE — SUTURE 3-0 VICRYL PLUS SH - 27 INCH (36/BX)

## (undated) DEVICE — GOWN SURGEONS X-LARGE - DISP. (30/CA)

## (undated) DEVICE — VESSELOOP MAXI BLUE STERILE- SURG-I-LOOP (10EA/BX)

## (undated) DEVICE — SPONGE XRAY 8X4 STERL. 12PL - (10EA/TY 80TY/CA)

## (undated) DEVICE — GLOVE SZ 6 BIOGEL PI MICRO - PF LF (50PR/BX 4BX/CA)

## (undated) DEVICE — BLOCK BITE MAXI DENTAL RETENTION RIM (100EA/BX)

## (undated) DEVICE — NEEDLE SAFETY 18 GA X 1 1/2 IN (100EA/BX)

## (undated) DEVICE — DRAPE MAYO STAND - (30/CA)

## (undated) DEVICE — TUBING CLEARLINK DUO-VENT - C-FLO (48EA/CA)

## (undated) DEVICE — SUTURE 0 VICRYL PLUS CT-1 - 36 INCH (36/BX)

## (undated) DEVICE — GLOVE BIOGEL PI INDICATOR SZ 7.0 SURGICAL PF LF - (50/BX 4BX/CA)

## (undated) DEVICE — SYRINGE DISP. 60 CC LL - (30/BX, 12BX/CA)**WHEN THESE ARE GONE ORDER #500206**

## (undated) DEVICE — GLOVE BIOGEL SZ 8 SURGICAL PF LTX - (50PR/BX 4BX/CA)

## (undated) DEVICE — GOWN WARMING STANDARD FLEX - (30/CA)

## (undated) DEVICE — TUBE E-T HI-LO CUFF 8.0MM (10EA/PK)

## (undated) DEVICE — SWAB CULTURE AMIES ESWAB (50EA/PK)

## (undated) DEVICE — DRESSING KIT V.A.C. SENSA T.R.A.C. LARGE (10EA/CA)

## (undated) DEVICE — SUTURE 2-0 ETHILON FS - (36/BX) 18 INCH

## (undated) DEVICE — TOWEL STOP TIMEOUT SAFETY FLAG (40EA/CA)

## (undated) DEVICE — ELECTRODE 850 FOAM ADHESIVE - HYDROGEL RADIOTRNSPRNT (50/PK)

## (undated) DEVICE — KIT 2.25IN UROS 2 PC DRN - 57MM 2 1/4 INCH (5/BX)

## (undated) DEVICE — BUTTON ENDOSCOPY DISPOSABLE

## (undated) DEVICE — PAD LAP STERILE 18 X 18 - (5/PK 40PK/CA)

## (undated) DEVICE — SUTURE GENERAL

## (undated) DEVICE — GLOVE BIOGEL SZ 7.5 SURGICAL PF LTX - (50PR/BX 4BX/CA)

## (undated) DEVICE — DRAIN BLAKE 10FR 3/4 FLUTED SILICONE CLOSED WOUND SUCTION CHANNEL - (10/CA)

## (undated) DEVICE — GLOVE BIOGEL PI ORTHO SZ 6 SURGICAL PF LF (40PR/BX)

## (undated) DEVICE — CANISTER SUCTION RIGID RED 1500CC (40EA/CA)

## (undated) DEVICE — CLIP LG INTNL HRZN TI ESCP LGT - (20/BX)

## (undated) DEVICE — NEPTUNE 4 PORT MANIFOLD - (20/PK)

## (undated) DEVICE — GLOVE BIOGEL INDICATOR SZ 8 SURGICAL PF LTX - (50/BX 4BX/CA)

## (undated) DEVICE — SET EXTENSION WITH 2 PORTS (48EA/CA) ***PART #2C8610 IS A SUBSTITUTE*****

## (undated) DEVICE — CHLORAPREP 26 ML APPLICATOR - ORANGE TINT(25/CA)

## (undated) DEVICE — DRAPE IOBAN II INCISE 23X17 - (10EA/BX 4BX/CA)

## (undated) DEVICE — DRAPE IOBAN II 23 IN X 33 IN - (10/BX)

## (undated) DEVICE — GLOVE BIOGEL PI INDICATOR SZ 7.5 SURGICAL PF LF -(50/BX 4BX/CA)

## (undated) DEVICE — LACTATED RINGERS INJ 1000 ML - (14EA/CA 60CA/PF)

## (undated) DEVICE — ELECTRODE DUAL RETURN W/ CORD - (50/PK)

## (undated) DEVICE — CANISTER SUCTION 3000ML MECHANICAL FILTER AUTO SHUTOFF MEDI-VAC NONSTERILE LF DISP (40EA/CA)

## (undated) DEVICE — TRAY SRGPRP PVP IOD WT PRP - (20/CA)

## (undated) DEVICE — TUBE CONNECTING SUCTION - CLEAR PLASTIC STERILE 72 IN (50EA/CA)

## (undated) DEVICE — GLOVE SZ 7 BIOGEL PI MICRO - PF LF (50PR/BX 4BX/CA)

## (undated) DEVICE — SPONGE GAUZESTER 4 X 4 4PLY - (128PK/CA)

## (undated) DEVICE — BOVIE BLADE 6 EXTENDED - (50/PK)

## (undated) DEVICE — KIT CUSTOM PROCEDURE SINGLE FOR ENDO  (15/CA)

## (undated) DEVICE — COVER LIGHT HANDLE ALC PLUS DISP (18EA/BX)